# Patient Record
Sex: MALE | Race: BLACK OR AFRICAN AMERICAN | Employment: OTHER | URBAN - METROPOLITAN AREA
[De-identification: names, ages, dates, MRNs, and addresses within clinical notes are randomized per-mention and may not be internally consistent; named-entity substitution may affect disease eponyms.]

---

## 2022-01-06 ENCOUNTER — HOSPITAL ENCOUNTER (EMERGENCY)
Age: 72
Discharge: HOME | End: 2022-01-06
Attending: EMERGENCY MEDICINE
Payer: MEDICARE

## 2022-01-06 VITALS
BODY MASS INDEX: 37.89 KG/M2 | TEMPERATURE: 97.4 F | HEART RATE: 92 BPM | WEIGHT: 250 LBS | DIASTOLIC BLOOD PRESSURE: 71 MMHG | SYSTOLIC BLOOD PRESSURE: 106 MMHG | RESPIRATION RATE: 16 BRPM | HEIGHT: 68 IN | OXYGEN SATURATION: 99 %

## 2022-01-06 DIAGNOSIS — U07.1 COVID-19: Primary | ICD-10-CM

## 2022-01-06 PROCEDURE — 99281 EMR DPT VST MAYX REQ PHY/QHP: CPT

## 2022-01-06 NOTE — ED PROVIDER NOTES
EMERGENCY DEPARTMENT HISTORY AND PHYSICAL EXAM      Date: 1/6/2022  Patient Name: Lo Wen    History of Presenting Illness     Chief Complaint   Patient presents with    Positive For Covid-19     tested positive for covid two weeks ago. He has sx of cough, sore throat and generally feeling bad       History Provided By: Patient    HPI: Lo Wen, 66 y.o. male with PMHx significant as below, presents by POV to the ED with cc of fatigue and generalized malaise. He was in [place] last week and tested positive for Covid. He reports that he is just very tired. He denies fever, chills, cough, congestion, otalgia, sore throat, loss sensation of smell, loss of sensation of taste. Occupation: retired     Travel: Recently went to [place] for the holidays. There are no other complaints, changes, or physical findings at this time. Social Hx: Tobacco (denies), EtOH (denies), Illicit drug use (denies)     PCP: Unknown, Provider, DPM    No current facility-administered medications on file prior to encounter. Current Outpatient Medications on File Prior to Encounter   Medication Sig Dispense Refill    metFORMIN (GLUCOPHAGE) 500 mg tablet Take  by mouth two (2) times daily (with meals).  simvastatin (ZOCOR) 20 mg tablet Take  by mouth nightly.  lisinopril (PRINIVIL, ZESTRIL) 40 mg tablet Take 40 mg by mouth daily.          Past History     Past Medical History:  Past Medical History:   Diagnosis Date    Diabetes ([address]     Hypertension     DEJA (obstructive sleep apnea)     AHI: 8.9 per hour       Past Surgical History:  Past Surgical History:   Procedure Laterality Date    ABDOMEN SURGERY PROC UNLISTED      Hernia Repair       Family History:  Family History   Problem Relation Age of Onset    Hypertension Mother     Diabetes Mother     Hypertension Father     Diabetes Father        Social History:  Social History     Tobacco Use    Smoking status: Never Smoker    Smokeless tobacco: Never Used   Substance Use Topics    Alcohol use: No    Drug use: Not on file       Allergies: Allergies   Allergen Reactions    Aspirin Anxiety         Review of Systems   Review of Systems   Constitutional: Positive for fatigue. Negative for chills, diaphoresis and fever. HENT: Negative for congestion, ear pain, rhinorrhea and sore throat. Respiratory: Negative for cough and shortness of breath. Cardiovascular: Negative for chest pain. Gastrointestinal: Negative for abdominal pain, constipation, diarrhea, nausea and vomiting. Genitourinary: Negative for difficulty urinating, dysuria, frequency and hematuria. Musculoskeletal: Negative for arthralgias and myalgias. Neurological: Negative for headaches. All other systems reviewed and are negative. Physical Exam   Physical Exam  Vitals and nursing note reviewed. Constitutional:       General: He is not in acute distress. Appearance: He is well-developed. He is not diaphoretic. Comments: 66 y.o. -American male    HENT:      Head: Normocephalic and atraumatic. Eyes:      General:         Right eye: No discharge. Left eye: No discharge. Conjunctiva/sclera: Conjunctivae normal.   Cardiovascular:      Rate and Rhythm: Normal rate and regular rhythm. Heart sounds: Normal heart sounds. No murmur heard. Pulmonary:      Effort: Pulmonary effort is normal. No respiratory distress. Breath sounds: Normal breath sounds. Musculoskeletal:      Cervical back: Normal range of motion and neck supple. Skin:     General: Skin is warm and dry. Neurological:      Mental Status: He is alert and oriented to person, place, and time. Psychiatric:         Behavior: Behavior normal.         Diagnostic Study Results     Labs - COVID-19 testing pending at discharge. Radiologic Studies - None    Medical Decision Making   I am the first provider for this patient.     I reviewed the vital signs, available nursing notes, past medical history, past surgical history, family history and social history. Vital Signs-Reviewed the patient's vital signs. Patient Vitals for the past 12 hrs:   Temp Pulse Resp BP SpO2   01/06/22 1010 97.4 °F (36.3 °C) 92 16 106/71 99 %       Records Reviewed: Nursing Notes and Old Medical Records    Provider Notes (Medical Decision Making): The evaluation, management, and disposition decisions of this patient have been made in the context of the current and rapidly developing COVID-19 pandemic. In my clinical judgment, the balance of clinical factors dictate expedited evaluation and discharge from the ED. I have carefully considered the risk and benefits of prolonged ED workups and/or hospitalization vs their risk of acquiring or transmitting COVID-19. I have made reasonable efforts to conserve healthcare resources and defer to safe outpatient alternatives when feasible. I have also discussed the importance of social distancing and proper hygiene to the patient. Based on an appropriate medical screening exam, there is currently no evidence of an emergency medical condition in the patient, and he is clinically safe for discharge. This was a collective decision made with the patient and/or any available family/caretakers. They expressed understanding and agreement with the above. Patient's vitals are stable. He is not hypoxic. ED Course:   Initial assessment performed. The patients presenting problems have been discussed, and they are in agreement with the care plan formulated and outlined with them. I have encouraged them to ask questions as they arise throughout their visit. Critical Care Time: None    Disposition:  DISCHARGE NOTE:  10:44 AM  The pt is ready for discharge. The pt's signs, symptoms, diagnosis, and discharge instructions have been discussed and pt has conveyed their understanding. The pt is to follow up as recommended or return to ER should their symptoms worsen.  Plan has been discussed and pt is in agreement. PLAN:  1. Current Discharge Medication List        2. Follow-up Information     Follow up With Specialties Details Why Contact Info    Your PCP  In 1 week As needed, If not improved     [facility] EMERGENCY DEPT Emergency Medicine  If symptoms worsen [address]  [address] [facility]  [phone]        3. COVID Testing results will be called once available if positive. Patient should utilize My Chart to access results. 4. Take Tylenol as needed  5. Drink plenty of fluids  6. It is advised to lay prone for 3 hours daily  Return to ED if worse     Diagnosis     Clinical Impression:   1. COVID-19          Please note that this dictation was completed with [employer], the computer voice recognition software. Quite often unanticipated grammatical, syntax, homophones, and other interpretive errors are inadvertently transcribed by the computer software. Please disregards these errors. Please excuse any errors that have escaped final proofreading.

## 2022-01-06 NOTE — ED NOTES
Pt reports he was positive for covid a few weeks ago, continues to feel weak and has a sore throat.  Denies fever

## 2022-01-06 NOTE — DISCHARGE INSTRUCTIONS
It was a pleasure taking care of you at [facility] Emergency Department today. We know that when you come to [facility], you are entrusting us with your health, comfort, and safety. Our physicians and nurses honor that trust, and we truly appreciate the opportunity to care for you and your loved ones. We also value your feedback. If you receive a survey about your Emergency Department experience today, please fill it out. We care about our patients' feedback, and we listen to what you have to say. Thank you!

## 2022-01-07 ENCOUNTER — PATIENT OUTREACH (OUTPATIENT)
Dept: CASE MANAGEMENT | Age: 72
End: 2022-01-07

## 2022-01-07 NOTE — PROGRESS NOTES
Patient contacted regarding COVID-19 diagnosis. [facility] ED 22 dx-covid-19    Discussed COVID-19 related testing which was available at this time. Test results were positive per ED provider note. Patient informed of results, if available? yes. Attempted to contact for follow up. Left voice message requesting call Mansoor Edwards back at work cell [phone].    1/10/22-pt stated he feels okay. Denies cough. Stated he got tested positive at the hospital on 22, although this ACM does not see a covid test in his chart. Pt stated he has received two covid vaccines in [place] and is due for a booster. Gave pt phone numbers to [facility] and [facility] for PC referrals. Ambulatory Care Manager contacted the patient by telephone to perform post discharge assessment. Call within 2 business days of discharge: Yes Verified name and  with patient as identifiers. Provided introduction to self, and explanation of the CTN/ACM role, and reason for call due to risk factors for infection and/or exposure to COVID-19. Symptoms reviewed with patient who verbalized the following symptoms: no new symptoms and no worsening symptoms      Due to no new or worsening symptoms encounter was not routed to provider for escalation. Discussed follow-up appointments. If no appointment was previously scheduled, appointment scheduling offered:  yes. [facility] follow up appointment(s): No future appointments. Non-[facility] follow up appointment(s): no    Interventions to address risk factors: Obtained and reviewed discharge summary and/or continuity of care documents     Advance Care Planning:   Does patient have an Advance Directive: not on file; education provided. Educated patient about risk for severe COVID-19 due to risk factors according to CDC guidelines. ACM reviewed discharge instructions, medical action plan and red flag symptoms with the patient who verbalized understanding. Discussed COVID vaccination status: yes.  Education provided on COVID-19 vaccination as appropriate. Discussed exposure protocols and quarantine with CDC Guidelines. Patient was given an opportunity to verbalize any questions and concerns and agrees to contact ACM or health care provider for questions related to their healthcare. Reviewed and educated patient on any new and changed medications related to discharge diagnosis     Was patient discharged with a pulse oximeter? no Discussed and confirmed pulse oximeter discharge instructions and when to notify provider or seek emergency care. ACM provided contact information. Plan for follow-up call in 5-7 days based on severity of symptoms and risk factors.

## 2022-01-08 ENCOUNTER — HOSPITAL ENCOUNTER (EMERGENCY)
Age: 72
Discharge: HOME | End: 2022-01-08
Attending: EMERGENCY MEDICINE
Payer: MEDICARE

## 2022-01-08 ENCOUNTER — APPOINTMENT (OUTPATIENT)
Dept: GENERAL RADIOLOGY | Age: 72
End: 2022-01-08
Attending: NURSE PRACTITIONER
Payer: MEDICARE

## 2022-01-08 VITALS
SYSTOLIC BLOOD PRESSURE: 128 MMHG | HEIGHT: 68 IN | DIASTOLIC BLOOD PRESSURE: 90 MMHG | HEART RATE: 93 BPM | RESPIRATION RATE: 20 BRPM | TEMPERATURE: 98 F | BODY MASS INDEX: 42.44 KG/M2 | WEIGHT: 280 LBS | OXYGEN SATURATION: 98 %

## 2022-01-08 DIAGNOSIS — R05.9 COUGH: ICD-10-CM

## 2022-01-08 DIAGNOSIS — U07.1 COVID-19: Primary | ICD-10-CM

## 2022-01-08 PROCEDURE — 74011250637 HC RX REV CODE- 250/637: Performed by: NURSE PRACTITIONER

## 2022-01-08 PROCEDURE — 94640 AIRWAY INHALATION TREATMENT: CPT

## 2022-01-08 PROCEDURE — 71045 X-RAY EXAM CHEST 1 VIEW: CPT

## 2022-01-08 PROCEDURE — 99282 EMERGENCY DEPT VISIT SF MDM: CPT

## 2022-01-08 RX ORDER — ALBUTEROL SULFATE 90 UG/1
6 AEROSOL, METERED RESPIRATORY (INHALATION)
Status: COMPLETED | OUTPATIENT
Start: 2022-01-08 | End: 2022-01-08

## 2022-01-08 RX ORDER — ALBUTEROL SULFATE 90 UG/1
2 AEROSOL, METERED RESPIRATORY (INHALATION)
Qty: 1 EACH | Refills: 0 | Status: ON HOLD | OUTPATIENT
Start: 2022-01-08 | End: 2022-09-16 | Stop reason: SDUPTHER

## 2022-01-08 RX ORDER — GUAIFENESIN DEXTROMETHORPHAN HYDROBROMIDE ORAL SOLUTION 10; 100 MG/5ML; MG/5ML
10 SOLUTION ORAL
Qty: 118 ML | Refills: 0 | Status: ON HOLD | OUTPATIENT
Start: 2022-01-08 | End: 2022-03-28

## 2022-01-08 RX ADMIN — ALBUTEROL SULFATE 6 PUFF: 90 AEROSOL, METERED RESPIRATORY (INHALATION) at 13:22

## 2022-01-08 NOTE — ED NOTES
Discharge instructions were given to the patient by Crystal Browne RN. The patient left the Emergency Department ambulatory, alert and oriented and in no acute distress with 2 prescriptions. The patient was encouraged to call or return to the ED for worsening issues or problems and was encouraged to schedule a follow up appointment for continuing care. The patient verbalized understanding of discharge instructions and prescriptions, all questions were answered. The patient has no further concerns at this time.

## 2022-01-08 NOTE — ED TRIAGE NOTES
Patient here with dx of COVID. Stating \" I feel bad\". Denies any SOB. Not taking anything at home. Patient is homeless.

## 2022-01-09 NOTE — ED PROVIDER NOTES
EMERGENCY DEPARTMENT HISTORY AND PHYSICAL EXAM    Date: 1/8/2022  Patient Name: Mabel Brizuela    History of Presenting Illness     Chief Complaint   Patient presents with    Positive For Covid-19         History Provided By: Patient    Chief Complaint: cough  Duration: 2 Days  Timing:  Acute  Quality: dry cough  Severity: Moderate  Modifying Factors: none  Associated Symptoms: denies any other associated signs or symptoms      HPI: Mabel Brizuela is a 66 y.o. male with a PMH of diabetes and hypertension who presents with cough for the past 2 days. Patient states he has tested positive for COVID. Patient denies body aches wheezing chills or fever. PCP: Unknown, Provider, DPM    Current Outpatient Medications   Medication Sig Dispense Refill    albuterol (PROVENTIL HFA, VENTOLIN HFA, PROAIR HFA) 90 mcg/actuation inhaler Take 2 Puffs by inhalation every four (4) hours as needed for Wheezing. 1 Each 0    guaiFENesin-dextromethorphan (Adult Robitussin Peak Cold DM)  mg/5 mL liqd Take 10 mL by mouth every four (4) hours as needed for Cough or Congestion. 118 mL 0    metFORMIN (GLUCOPHAGE) 500 mg tablet Take  by mouth two (2) times daily (with meals).  simvastatin (ZOCOR) 20 mg tablet Take  by mouth nightly.  lisinopril (PRINIVIL, ZESTRIL) 40 mg tablet Take 40 mg by mouth daily.          Past History     Past Medical History:  Past Medical History:   Diagnosis Date    Diabetes ([address]     Hypertension     DEJA (obstructive sleep apnea)     AHI: 8.9 per hour       Past Surgical History:  Past Surgical History:   Procedure Laterality Date    ABDOMEN SURGERY PROC UNLISTED      Hernia Repair       Family History:  Family History   Problem Relation Age of Onset    Hypertension Mother     Diabetes Mother     Hypertension Father     Diabetes Father        Social History:  Social History     Tobacco Use    Smoking status: Never Smoker    Smokeless tobacco: Never Used   Substance Use Topics    Alcohol use: No    Drug use: Not on file       Allergies: Allergies   Allergen Reactions    Aspirin Anxiety         Review of Systems   Review of Systems   Constitutional: Negative for chills, fatigue and fever. HENT: Negative for congestion and sore throat. Eyes: Negative for redness. Respiratory: Positive for cough. Negative for chest tightness and wheezing. Cardiovascular: Negative for chest pain. Gastrointestinal: Negative for abdominal pain. Genitourinary: Negative for dysuria. Musculoskeletal: Negative for arthralgias, back pain, myalgias, neck pain and neck stiffness. Skin: Negative for rash. Neurological: Negative for dizziness, weakness, light-headedness, numbness and headaches. Psychiatric/Behavioral: Negative for agitation and behavioral problems. All other systems reviewed and are negative. Physical Exam     Vitals:    01/08/22 1159   BP: (!) 128/90   Pulse: 93   Resp: 20   Temp: 98 °F (36.7 °C)   SpO2: 98%   Weight: 127 kg (280 lb)   Height: 5' 8\" (1.727 m)     Physical Exam  Vitals and nursing note reviewed. Constitutional:       Appearance: Normal appearance. He is well-developed. HENT:      Head: Normocephalic and atraumatic. Right Ear: External ear normal.      Left Ear: External ear normal.      Nose: Nose normal.      Mouth/Throat:      Mouth: Mucous membranes are moist.   Eyes:      General:         Right eye: No discharge. Left eye: No discharge. Conjunctiva/sclera: Conjunctivae normal.   Cardiovascular:      Rate and Rhythm: Normal rate and regular rhythm. Heart sounds: Normal heart sounds. Pulmonary:      Effort: Pulmonary effort is normal. No respiratory distress. Breath sounds: Normal breath sounds. No wheezing. Comments: Breath sounds diminished  Abdominal:      General: Bowel sounds are normal.      Palpations: Abdomen is soft. Tenderness: There is no abdominal tenderness.    Musculoskeletal:         General: Normal range of motion. Cervical back: Normal range of motion and neck supple. Lymphadenopathy:      Cervical: No cervical adenopathy. Skin:     General: Skin is warm and dry. Neurological:      Mental Status: He is alert and oriented to person, place, and time. Cranial Nerves: No cranial nerve deficit. Psychiatric:         Behavior: Behavior normal.         Thought Content: Thought content normal.         Judgment: Judgment normal.           Diagnostic Study Results     Labs -   No results found for this or any previous visit (from the past 12 hour(s)). Radiologic Studies -   XR CHEST PORT   Final Result   No acute cardiopulmonary process. CT Results  (Last 48 hours)    None        CXR Results  (Last 48 hours)               01/08/22 1326  XR CHEST PORT Final result    Impression:  No acute cardiopulmonary process. Narrative:  EXAM: XR CHEST PORT       HISTORY: chest pain. COMPARISON: None       FINDINGS: Single view(s) of the chest. The lungs are well inflated. No focal   consolidation, pleural effusion, or pneumothorax. The cardiomediastinal   silhouette is unremarkable. The visualized osseous structures are unremarkable. Medical Decision Making   I am the first provider for this patient. I reviewed the vital signs, available nursing notes, past medical history, past surgical history, family history and social history. Vital Signs-Reviewed the patient's vital signs. Records Reviewed: Nursing Notes and Old Medical Records    Provider Notes (Medical Decision Making):   DDX pneumonia COVID-19 bronchitis          Disposition:  home    DISCHARGE NOTE:     I have discussed with patient their diagnosis, treatment, and follow up plan. The patient agrees to follow up as outlined in discharge paperwork and also to return to the ED with any worsening.  Stephon Lezama NP        Follow-up Information     Follow up With Specialties Details Why Contact Info    [facility]    [address] [facility] [address]          Discharge Medication List as of 1/8/2022  2:01 PM      START taking these medications    Details   albuterol (PROVENTIL HFA, VENTOLIN HFA, PROAIR HFA) 90 mcg/actuation inhaler Take 2 Puffs by inhalation every four (4) hours as needed for Wheezing., Normal, Disp-1 Each, R-0      guaiFENesin-dextromethorphan (Adult Robitussin Peak Cold DM)  mg/5 mL liqd Take 10 mL by mouth every four (4) hours as needed for Cough or Congestion. , Normal, Disp-118 mL, R-0         CONTINUE these medications which have NOT CHANGED    Details   metFORMIN (GLUCOPHAGE) 500 mg tablet Take  by mouth two (2) times daily (with meals). Historical Med      simvastatin (ZOCOR) 20 mg tablet Take  by mouth nightly. Historical Med      lisinopril (PRINIVIL, ZESTRIL) 40 mg tablet Take 40 mg by mouth daily. Historical Med, 40 mg             Procedures:  Procedures    Please note that this dictation was completed with Dragon, computer voice recognition software. Quite often unanticipated grammatical, syntax, homophones, and other interpretive errors are inadvertently transcribed by the computer software. Please disregard these errors. Additionally, please excuse any errors that have escaped final proofreading. Diagnosis     Clinical Impression:   1. COVID-19    2.  Cough

## 2022-01-10 ENCOUNTER — PATIENT OUTREACH (OUTPATIENT)
Dept: CASE MANAGEMENT | Age: 72
End: 2022-01-10

## 2022-01-10 NOTE — ACP (ADVANCE CARE PLANNING)
Advance Care Planning:   Does patient have an Advance Directive: not on file; education provided.        [facility] [phone]  Advance Care Planning 1/10/2022   Confirm Advance Directive None

## 2022-01-11 ENCOUNTER — HOSPITAL ENCOUNTER (OUTPATIENT)
Dept: LAB | Age: 72
Discharge: HOME | End: 2022-01-11
Payer: MEDICARE

## 2022-01-11 LAB
SARS-COV-2, XPLCVT: DETECTED
SOURCE, COVRS: ABNORMAL

## 2022-01-11 PROCEDURE — U0005 INFEC AGEN DETEC AMPLI PROBE: HCPCS

## 2022-01-11 NOTE — ANCILLARY DISCHARGE INSTRUCTIONS
Nasopharyngeal swab completed. Tolerated well. Information given to patient about how to access MyChart. Pt given a generic quarantine work/school excuse.

## 2022-01-13 ENCOUNTER — HOSPITAL ENCOUNTER (EMERGENCY)
Age: 72
Discharge: HOME | End: 2022-01-13
Attending: EMERGENCY MEDICINE
Payer: MEDICARE

## 2022-01-13 VITALS
DIASTOLIC BLOOD PRESSURE: 86 MMHG | RESPIRATION RATE: 16 BRPM | WEIGHT: 280 LBS | BODY MASS INDEX: 42.44 KG/M2 | SYSTOLIC BLOOD PRESSURE: 122 MMHG | OXYGEN SATURATION: 97 % | HEIGHT: 68 IN | HEART RATE: 96 BPM | TEMPERATURE: 98.7 F

## 2022-01-13 DIAGNOSIS — N47.1 PHIMOSIS OF PENIS: Primary | ICD-10-CM

## 2022-01-13 PROCEDURE — 74011000250 HC RX REV CODE- 250: Performed by: EMERGENCY MEDICINE

## 2022-01-13 PROCEDURE — 99283 EMERGENCY DEPT VISIT LOW MDM: CPT

## 2022-01-13 RX ADMIN — BACITRACIN, NEOMYCIN, POLYMYXIN B 1 PACKET: 400; 3.5; 5 OINTMENT TOPICAL at 09:37

## 2022-01-13 NOTE — ED TRIAGE NOTES
Patient presents to the ED with c/o foreskin stuck to penis x3 days. Pt reports that he is uncircumcised. PT reports that he is still able to urinate.

## 2022-01-13 NOTE — ED NOTES
Emergency Department Nursing Plan of Care       The Nursing Plan of Care is developed from the Nursing assessment and Emergency Department Attending provider initial evaluation. The plan of care may be reviewed in the ED Provider note.     The Plan of Care was developed with the following considerations:   Patient / Family readiness to learn indicated by:verbalized understanding  Persons(s) to be included in education: patient  Barriers to Learning/Limitations:No    Signed     Haydee Peñaloza RN    1/13/2022   9:24 AM

## 2022-01-13 NOTE — ED PROVIDER NOTES
EMERGENCY DEPARTMENT HISTORY AND PHYSICAL EXAM      Date: 1/13/2022  Patient Name: Drake Coronado    History of Presenting Illness     Chief Complaint   Patient presents with    Skin Problem       History Provided By: Patient    HPI: Drake Coronado, 66 y.o. male with PMHx of HTN, DM, DEJA presents to the ED with cc of phimosis. Patient entered the ED saying that his foreskin was stuck over his penis. Patient complained of extreme pain in his penis. There are no other complaints, changes, or physical findings at this time. PCP: Unknown, Provider, DPM    No current facility-administered medications on file prior to encounter. Current Outpatient Medications on File Prior to Encounter   Medication Sig Dispense Refill    albuterol (PROVENTIL HFA, VENTOLIN HFA, PROAIR HFA) 90 mcg/actuation inhaler Take 2 Puffs by inhalation every four (4) hours as needed for Wheezing. 1 Each 0    guaiFENesin-dextromethorphan (Adult Robitussin Peak Cold DM)  mg/5 mL liqd Take 10 mL by mouth every four (4) hours as needed for Cough or Congestion. 118 mL 0    metFORMIN (GLUCOPHAGE) 500 mg tablet Take  by mouth two (2) times daily (with meals).  simvastatin (ZOCOR) 20 mg tablet Take  by mouth nightly.  lisinopril (PRINIVIL, ZESTRIL) 40 mg tablet Take 40 mg by mouth daily.          Past History     Past Medical History:  Past Medical History:   Diagnosis Date    Diabetes ([address]     Hypertension     DEJA (obstructive sleep apnea)     AHI: 8.9 per hour       Past Surgical History:  Past Surgical History:   Procedure Laterality Date    ABDOMEN SURGERY PROC UNLISTED      Hernia Repair       Family History:  Family History   Problem Relation Age of Onset    Hypertension Mother     Diabetes Mother     Hypertension Father     Diabetes Father        Social History:  Social History     Tobacco Use    Smoking status: Never Smoker    Smokeless tobacco: Never Used   Substance Use Topics    Alcohol use: No    Drug use: Not on file       Allergies: Allergies   Allergen Reactions    Aspirin Anxiety         Review of Systems   Review of Systems   Constitutional: Negative for chills, fatigue and fever. HENT: Negative. Negative for sore throat. Eyes: Negative. Respiratory: Negative for cough and shortness of breath. Cardiovascular: Negative for chest pain and palpitations. Gastrointestinal: Negative for abdominal pain, nausea and vomiting. Genitourinary: Negative for dysuria. Phimosis   Musculoskeletal: Negative. Skin: Negative. Neurological: Negative for dizziness, weakness, light-headedness and headaches. Psychiatric/Behavioral: Negative. All other systems reviewed and are negative. Physical Exam   Physical Exam  Vitals and nursing note reviewed. Constitutional:       Appearance: Normal appearance. HENT:      Head: Normocephalic and atraumatic. Nose: Nose normal.      Mouth/Throat:      Mouth: Mucous membranes are moist.      Pharynx: Oropharynx is clear. Eyes:      Extraocular Movements: Extraocular movements intact. Pupils: Pupils are equal, round, and reactive to light. Cardiovascular:      Rate and Rhythm: Normal rate and regular rhythm. Pulses: Normal pulses. Heart sounds: Normal heart sounds. Pulmonary:      Effort: Pulmonary effort is normal.      Breath sounds: Normal breath sounds. Abdominal:      Tenderness: There is no guarding or rebound. Genitourinary:     Penis: Uncircumcised. Phimosis present. Testes: Normal.      Comments: Foreskin of the penis was unable to be retracted. Musculoskeletal:      Cervical back: Normal range of motion and neck supple. Skin:     General: Skin is warm and dry. Neurological:      General: No focal deficit present. Mental Status: He is alert and oriented to person, place, and time.    Psychiatric:         Mood and Affect: Mood normal.         Behavior: Behavior normal.         Diagnostic Study Results     Labs -   No results found for this or any previous visit (from the past 12 hour(s)). Radiologic Studies -   No orders to display     CT Results  (Last 48 hours)    None        CXR Results  (Last 48 hours)    None          Medical Decision Making   I am the first provider for this patient. I reviewed the vital signs, available nursing notes, past medical history, past surgical history, family history and social history. Vital Signs-Reviewed the patient's vital signs. Patient Vitals for the past 12 hrs:   Temp Pulse Resp BP SpO2   01/13/22 0903 98.7 °F (37.1 °C) 96 16 122/86 97 %         Records Reviewed: Nursing Notes    Provider Notes (Medical Decision Making):   Phimosis balanitis     ED Course:   Initial assessment performed. The patients presenting problems have been discussed, and they are in agreement with the care plan formulated and outlined with them. I have encouraged them to ask questions as they arise throughout their visit. Procedure Note:  9:18 AM    Foreskin was retracted using KY jelly and the glans penis was cleaned with saline and antibiotic ointment was applied. Critical Care Time:   none       Disposition:  DISCHARGE  9:22 AM  The patient has been re-evaluated and is ready for discharge. Reviewed available results with patient. Counseled pt on diagnosis and care plan. Pt has expressed understanding, and all questions have been answered. Pt agrees with plan and agrees to follow up as recommended, or return to the ED if their symptoms worsen. Discharge instructions have been provided and explained to the pt, along with reasons to return to the ED. DISCHARGE PLAN:  1. Current Discharge Medication List        2. Follow-up Information    None       3. Return to ED if worse     Diagnosis     Clinical Impression: No diagnosis found. Attestations:   This note is prepared by Patsey Habermann, acting as Scribe for Aki Mroeno MD.     Aki Moreno MD. The brenda's documentation has been prepared under my direction and personally reviewed by me in its entirety. I confirm that the note above accurately reflects all work, treatment, procedures and medical decision making performed by me.

## 2022-01-17 ENCOUNTER — OFFICE VISIT (OUTPATIENT)
Dept: URGENT CARE | Age: 72
End: 2022-01-17
Payer: MEDICARE

## 2022-01-17 ENCOUNTER — PATIENT OUTREACH (OUTPATIENT)
Dept: CASE MANAGEMENT | Age: 72
End: 2022-01-17

## 2022-01-17 VITALS — TEMPERATURE: 98.4 F | OXYGEN SATURATION: 96 % | HEART RATE: 91 BPM | RESPIRATION RATE: 15 BRPM

## 2022-01-17 DIAGNOSIS — U07.1 COVID-19: Primary | ICD-10-CM

## 2022-01-17 PROCEDURE — G8419 CALC BMI OUT NRM PARAM NOF/U: HCPCS | Performed by: FAMILY MEDICINE

## 2022-01-17 PROCEDURE — G8536 NO DOC ELDER MAL SCRN: HCPCS | Performed by: FAMILY MEDICINE

## 2022-01-17 PROCEDURE — G8427 DOCREV CUR MEDS BY ELIG CLIN: HCPCS | Performed by: FAMILY MEDICINE

## 2022-01-17 PROCEDURE — 3017F COLORECTAL CA SCREEN DOC REV: CPT | Performed by: FAMILY MEDICINE

## 2022-01-17 PROCEDURE — 1101F PT FALLS ASSESS-DOCD LE1/YR: CPT | Performed by: FAMILY MEDICINE

## 2022-01-17 PROCEDURE — G8432 DEP SCR NOT DOC, RNG: HCPCS | Performed by: FAMILY MEDICINE

## 2022-01-17 PROCEDURE — 99202 OFFICE O/P NEW SF 15 MIN: CPT | Performed by: FAMILY MEDICINE

## 2022-01-17 NOTE — PROGRESS NOTES
This patient was seen at [address] [place] [address] [facility]gent [facility] while in their vehicle due to COVID-19 pandemic with PPE and focused examination in order to decrease community viral transmission. The patient/guardian gave verbal consent to treat. Ann Marie Dorado is a 66 y.o. male who presents for COVID-19 retest. Pt tested positive last week. Denies cough, fever, SOB, N/V/D. The history is provided by the patient. Past Medical History:   Diagnosis Date    Diabetes ([address]     Hypertension     DEJA (obstructive sleep apnea)     AHI: 8.9 per hour        Past Surgical History:   Procedure Laterality Date    [place] ABDOMEN SURGERY PROC UNLISTED      Hernia Repair         Family History   Problem Relation Age of Onset    Hypertension Mother     Diabetes Mother     Hypertension Father     Diabetes Father         Social History     Socioeconomic History    Marital status: UNKNOWN     Spouse name: Not on file    Number of children: Not on file    Years of education: Not on file    Highest education level: Not on file   Occupational History    Not on file   Tobacco Use    Smoking status: Never Smoker    Smokeless tobacco: Never Used   Substance and Sexual Activity    Alcohol use: No    Drug use: Not on file    Sexual activity: Not on file   Other Topics Concern    Not on file   Social History Narrative    Not on file     Social Determinants of Health     Financial Resource Strain:     Difficulty of Paying Living Expenses: Not on file   Food Insecurity:     Worried About Running Out of Food in the Last Year: Not on file    [place] of Food in the Last Year: Not on file   Transportation Needs:     Lack of Transportation (Medical): Not on file    Lack of Transportation (Non-Medical):  Not on file   Physical Activity:     Days of Exercise per Week: Not on file    Minutes of Exercise per Session: Not on file   Stress:     Feeling of Stress : Not on file   Social Connections:     Frequency of Communication with Friends and Family: Not on file    Frequency of Social Gatherings with Friends and Family: Not on file    Attends Yarsani Services: Not on file    Active Member of Clubs or Organizations: Not on file    Attends Club or Organization Meetings: Not on file    Marital Status: Not on file   Intimate Partner Violence:     Fear of Current or Ex-Partner: Not on file    Emotionally Abused: Not on file    Physically Abused: Not on file    Sexually Abused: Not on file   Housing Stability:     Unable to Pay for Housing in the Last Year: Not on file    Number of [place] in the Last Year: Not on file    Unstable Housing in the Last Year: Not on file                ALLERGIES: Aspirin    Review of Systems   Constitutional: Negative for fever. Respiratory: Negative for cough and shortness of breath. Gastrointestinal: Negative for diarrhea, nausea and vomiting. Vitals:    01/17/22 1209   Pulse: 91   Resp: 15   Temp: 98.4 °F (36.9 °C)   SpO2: 96%       Physical Exam  Vitals and nursing note reviewed. Constitutional:       General: He is not in acute distress. Appearance: He is well-developed. He is not diaphoretic. Pulmonary:      Effort: Pulmonary effort is normal. No respiratory distress. Breath sounds: No stridor. No wheezing, rhonchi or rales. Neurological:      Mental Status: He is alert. Psychiatric:         Behavior: Behavior normal.         Thought Content:  Thought content normal.         Judgment: Judgment normal.         [facility]    ICD-10-CM ICD-9-CM   1. COVID-19  U07.1 079.89       Orders Placed This Encounter    NOVEL CORONAVIRUS (COVID-19)     Scheduling Instructions:      1) Due to current limited availability of the COVID-19 PCR test, tests will be prioritized and may not be completed.              2) Order only if the test result will change clinical management or necessary for a return to mission-critical employment decision.              3) Print and instruct patient to adhere to CDC home isolation program. (Link Above)              4) Set up or refer patient for a monitoring program.              5) Have patient sign up for and leverage MyChart (if not previously done). Order Specific Question:   Is this test for diagnosis or screening? Answer:   Screening     Order Specific Question:   Symptomatic for COVID-19 as defined by CDC? Answer:   No     Order Specific Question:   Hospitalized for COVID-19? Answer:   No     Order Specific Question:   Admitted to ICU for COVID-19? Answer:   No     Order Specific Question:   Employed in healthcare setting? Answer:   Unknown     Order Specific Question:   Resident in a congregate (group) care setting? Answer:   Unknown     Order Specific Question:   Previously tested for COVID-19? Answer:   Unknown        Await COVID test  Quarantine complete    If signs and symptoms become worse the pt is to go to the ER.            Procedures

## 2022-01-17 NOTE — PROGRESS NOTES
Patient resolved from Transition of Care episode on 1/17/22. ACM/CTN was unsuccessful at contacting this patient today. Patient/family was provided the following resources and education related to COVID-19 during the initial call:                         Signs, symptoms and red flags related to COVID-19            CDC exposure and quarantine guidelines            Conduit exposure contact - [phone]            Contact for their local Department of Health                 Patient has not had any additional ED or hospital visits. No further outreach scheduled with this CTN/ACM. Episode of Care resolved. Patient has this CTN/ACM contact information if future needs arise.

## 2022-01-20 LAB — SARS-COV-2, NAA: NOT DETECTED

## 2022-01-21 ENCOUNTER — TELEPHONE (OUTPATIENT)
Dept: URGENT CARE | Age: 72
End: 2022-01-21

## 2022-01-21 NOTE — TELEPHONE ENCOUNTER
Two pt identifiers confirmed. Informed pt of negative COVID-19 results from 01/17/22. Pt verbalized understanding of information discussed w/ no further questions at this time.

## 2022-02-01 ENCOUNTER — HOSPITAL ENCOUNTER (EMERGENCY)
Age: 72
Discharge: LEFT BEFORE DISCHARGE | End: 2022-02-01
Attending: EMERGENCY MEDICINE
Payer: MEDICARE

## 2022-02-01 ENCOUNTER — HOSPITAL ENCOUNTER (EMERGENCY)
Age: 72
Discharge: HOME | End: 2022-02-01
Attending: EMERGENCY MEDICINE
Payer: MEDICARE

## 2022-02-01 VITALS
DIASTOLIC BLOOD PRESSURE: 72 MMHG | RESPIRATION RATE: 20 BRPM | OXYGEN SATURATION: 100 % | SYSTOLIC BLOOD PRESSURE: 150 MMHG | BODY MASS INDEX: 34.1 KG/M2 | HEART RATE: 78 BPM | TEMPERATURE: 98.1 F | HEIGHT: 68 IN | WEIGHT: 225 LBS

## 2022-02-01 VITALS
OXYGEN SATURATION: 97 % | TEMPERATURE: 97.6 F | SYSTOLIC BLOOD PRESSURE: 145 MMHG | HEIGHT: 68 IN | BODY MASS INDEX: 34.1 KG/M2 | DIASTOLIC BLOOD PRESSURE: 78 MMHG | HEART RATE: 76 BPM | WEIGHT: 225 LBS | RESPIRATION RATE: 18 BRPM

## 2022-02-01 DIAGNOSIS — N39.0 URINARY TRACT INFECTION ASSOCIATED WITH CATHETERIZATION OF URINARY TRACT, UNSPECIFIED INDWELLING URINARY CATHETER TYPE, INITIAL ENCOUNTER (HCC): Primary | ICD-10-CM

## 2022-02-01 DIAGNOSIS — R33.8 BENIGN PROSTATIC HYPERPLASIA WITH URINARY RETENTION: ICD-10-CM

## 2022-02-01 DIAGNOSIS — T83.511A URINARY TRACT INFECTION ASSOCIATED WITH CATHETERIZATION OF URINARY TRACT, UNSPECIFIED INDWELLING URINARY CATHETER TYPE, INITIAL ENCOUNTER (HCC): Primary | ICD-10-CM

## 2022-02-01 DIAGNOSIS — N40.1 BENIGN PROSTATIC HYPERPLASIA WITH URINARY RETENTION: ICD-10-CM

## 2022-02-01 LAB
APPEARANCE UR: ABNORMAL
BACTERIA URNS QL MICRO: ABNORMAL /HPF
BILIRUB UR QL: NEGATIVE
COLOR UR: ABNORMAL
EPITH CASTS URNS QL MICRO: ABNORMAL /LPF
GLUCOSE UR STRIP.AUTO-MCNC: 250 MG/DL
HGB UR QL STRIP: ABNORMAL
KETONES UR QL STRIP.AUTO: NEGATIVE MG/DL
LEUKOCYTE ESTERASE UR QL STRIP.AUTO: ABNORMAL
NITRITE UR QL STRIP.AUTO: POSITIVE
PH UR STRIP: 6 [PH] (ref 5–8)
PROT UR STRIP-MCNC: 100 MG/DL
RBC #/AREA URNS HPF: ABNORMAL /HPF (ref 0–5)
SP GR UR REFRACTOMETRY: 1.01 (ref 1–1.03)
UA: UC IF INDICATED,UAUC: ABNORMAL
UROBILINOGEN UR QL STRIP.AUTO: 0.2 EU/DL (ref 0.2–1)
WBC URNS QL MICRO: >100 /HPF (ref 0–4)

## 2022-02-01 PROCEDURE — 74011000250 HC RX REV CODE- 250: Performed by: EMERGENCY MEDICINE

## 2022-02-01 PROCEDURE — 99283 EMERGENCY DEPT VISIT LOW MDM: CPT

## 2022-02-01 PROCEDURE — 87077 CULTURE AEROBIC IDENTIFY: CPT

## 2022-02-01 PROCEDURE — 75810000275 HC EMERGENCY DEPT VISIT NO LEVEL OF CARE

## 2022-02-01 PROCEDURE — 87086 URINE CULTURE/COLONY COUNT: CPT

## 2022-02-01 PROCEDURE — 74011250637 HC RX REV CODE- 250/637: Performed by: EMERGENCY MEDICINE

## 2022-02-01 PROCEDURE — 87186 SC STD MICRODIL/AGAR DIL: CPT

## 2022-02-01 PROCEDURE — 81001 URINALYSIS AUTO W/SCOPE: CPT

## 2022-02-01 RX ORDER — IBUPROFEN 400 MG/1
800 TABLET ORAL
Status: COMPLETED | OUTPATIENT
Start: 2022-02-01 | End: 2022-02-01

## 2022-02-01 RX ORDER — LEVOFLOXACIN 750 MG/1
750 TABLET ORAL DAILY
Qty: 5 TABLET | Refills: 0 | Status: SHIPPED | OUTPATIENT
Start: 2022-02-01 | End: 2022-02-03 | Stop reason: ALTCHOICE

## 2022-02-01 RX ORDER — LIDOCAINE HYDROCHLORIDE 20 MG/ML
JELLY TOPICAL
Status: COMPLETED | OUTPATIENT
Start: 2022-02-01 | End: 2022-02-01

## 2022-02-01 RX ORDER — LEVOFLOXACIN 750 MG/1
750 TABLET ORAL
Status: COMPLETED | OUTPATIENT
Start: 2022-02-01 | End: 2022-02-01

## 2022-02-01 RX ORDER — LIDOCAINE HYDROCHLORIDE 20 MG/ML
JELLY TOPICAL
Status: DISCONTINUED | OUTPATIENT
Start: 2022-02-01 | End: 2022-02-01

## 2022-02-01 RX ORDER — PHENAZOPYRIDINE HYDROCHLORIDE 200 MG/1
200 TABLET, FILM COATED ORAL 3 TIMES DAILY
Qty: 6 TABLET | Refills: 0 | Status: SHIPPED | OUTPATIENT
Start: 2022-02-01 | End: 2022-02-03

## 2022-02-01 RX ADMIN — IBUPROFEN 800 MG: 400 TABLET, FILM COATED ORAL at 10:32

## 2022-02-01 RX ADMIN — LIDOCAINE HYDROCHLORIDE: 20 JELLY TOPICAL at 11:31

## 2022-02-01 RX ADMIN — LEVOFLOXACIN 750 MG: 750 TABLET, FILM COATED ORAL at 11:20

## 2022-02-01 NOTE — ED NOTES
Volunteer told this RN that pt asked for directions out of ER. Pt not in lobby and not in waiting room. Dr Nguyen Ng aware.

## 2022-02-01 NOTE — ED PROVIDER NOTES
EMERGENCY DEPARTMENT HISTORY AND PHYSICAL EXAM      Date: 2/1/2022  Patient Name: Elisha Frost    History of Presenting Illness     Chief Complaint   Patient presents with    Penis Pain     History Provided By: Patient    HPI: Elisha Frost, 66 y.o. male with past medical history significant for diabetes and enlarged prostate who presents via private vehicle to the ED with cc of penile pain and dysuria for the past week or so. Patient states the pain is only present when he urinates or dribbles urine from the urethral meatus. 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 He straight caths at least 4 times a day using a clean catheter each time. He denies any flank pain, fevers, chills, nausea, vomiting, or diarrhea. His pain is rated as a 10 out of 10 and described as a sharp, stabbing pain at the tip of the penis that does not radiate. He has tried taking Tylenol for the pain without improvement. PMHx: Diabetes and enlarged prostate  Social Hx: Denies alcohol, tobacco, or illegal drug use    PCP: Unknown, Provider, LETICIA    There are no other complaints, changes, or physical findings at this time. No current facility-administered medications on file prior to encounter. Current Outpatient Medications on File Prior to Encounter   Medication Sig Dispense Refill    albuterol (PROVENTIL HFA, VENTOLIN HFA, PROAIR HFA) 90 mcg/actuation inhaler Take 2 Puffs by inhalation every four (4) hours as needed for Wheezing. 1 Each 0    metFORMIN (GLUCOPHAGE) 500 mg tablet Take  by mouth two (2) times daily (with meals).  simvastatin (ZOCOR) 20 mg tablet Take  by mouth nightly.  lisinopril (PRINIVIL, ZESTRIL) 40 mg tablet Take 40 mg by mouth daily.  guaiFENesin-dextromethorphan (Adult Robitussin Peak Cold DM)  mg/5 mL liqd Take 10 mL by mouth every four (4) hours as needed for Cough or Congestion.  (Patient not taking: Reported on 2/1/2022) 118 mL 0     Past History     Past Medical History:  Past Medical History:   Diagnosis Date    Diabetes ([address]     Gastrointestinal disorder     Hypertension     DEJA (obstructive sleep apnea)     AHI: 8.9 per hour     Past Surgical History:  Past Surgical History:   Procedure Laterality Date    HX PROSTATE SURGERY      [place] ABDOMEN SURGERY PROC UNLISTED      Hernia Repair     Family History:  Family History   Problem Relation Age of Onset    Hypertension Mother     Diabetes Mother     Hypertension Father     Diabetes Father      Social History:  Social History     Tobacco Use    Smoking status: Never Smoker    Smokeless tobacco: Never Used   Substance Use Topics    Alcohol use: No    Drug use: Not on file     Allergies: Allergies   Allergen Reactions    Aspirin Anxiety     Review of Systems   Review of Systems   Constitutional: Negative for chills and fever. HENT: Negative for congestion, rhinorrhea, sneezing and sore throat. Eyes: Negative for redness and visual disturbance. Respiratory: Negative for shortness of breath. Cardiovascular: Negative for chest pain and leg swelling. Gastrointestinal: Negative for abdominal pain, nausea and vomiting. Genitourinary: Positive for dysuria and penile pain. Negative for decreased urine volume, flank pain, frequency, penile discharge, penile swelling, scrotal swelling and testicular pain. Musculoskeletal: Negative for back pain, myalgias and neck stiffness. Skin: Negative for rash. Neurological: Negative for dizziness, syncope, weakness and headaches. Hematological: Negative for adenopathy. All other systems reviewed and are negative. Physical Exam   Physical Exam  Vitals and nursing note reviewed. Constitutional:       Appearance: Normal appearance. He is well-developed. He is obese. HENT:      Head: Normocephalic and atraumatic. Cardiovascular:      Rate and Rhythm: Normal rate and regular rhythm. Pulses: Normal pulses.       Heart sounds: Normal heart sounds. Pulmonary:      Effort: Pulmonary effort is normal. No respiratory distress. Breath sounds: Normal breath sounds. Chest:      Chest wall: No tenderness. Abdominal:      General: Bowel sounds are normal.      Palpations: Abdomen is soft. Tenderness: There is no abdominal tenderness. There is no guarding or rebound. Genitourinary:     Penis: Uncircumcised. Phimosis and swelling (Swelling of the glans penis without erythema) present. No tenderness or lesions. Testes: Normal.   Musculoskeletal:      Cervical back: Full passive range of motion without pain, normal range of motion and neck supple. Skin:     General: Skin is warm and dry. Findings: No erythema or rash. Neurological:      Mental Status: He is alert and oriented to person, place, and time. Psychiatric:         Speech: Speech normal.         Behavior: Behavior normal.         Thought Content: Thought content normal.         Judgment: Judgment normal.       Diagnostic Study Results   Labs -     Recent Results (from the past 12 hour(s))   URINALYSIS W/ REFLEX CULTURE    Collection Time: 02/01/22 10:32 AM    Specimen: Urine   Result Value Ref Range    Color YELLOW/STRAW      Appearance TURBID (A) CLEAR      Specific gravity 1.015 1.003 - 1.030      pH (UA) 6.0 5.0 - 8.0      Protein 100 (A) NEG mg/dL    Glucose 250 (A) NEG mg/dL    Ketone Negative NEG mg/dL    Bilirubin Negative NEG      Blood LARGE (A) NEG      Urobilinogen 0.2 0.2 - 1.0 EU/dL    Nitrites Positive (A) NEG      Leukocyte Esterase LARGE (A) NEG      WBC >100 (H) 0 - 4 /hpf    RBC 10-20 0 - 5 /hpf    Epithelial cells FEW FEW /lpf    Bacteria 2+ (A) NEG /hpf    UA:UC IF INDICATED URINE CULTURE ORDERED (A) CNI         Radiologic Studies -   No orders to display     No results found. Medical Decision Making   I am the first provider for this patient.     I reviewed the vital signs, available nursing notes, past medical history, past surgical history, family history and social history. Vital Signs-Reviewed the patient's vital signs. Patient Vitals for the past 24 hrs:   Temp Pulse Resp BP SpO2   02/01/22 0928 -- -- -- (!) 145/78 --   02/01/22 0924 97.6 °F (36.4 °C) 76 18 (!) 136/115 97 %     Pulse Oximetry Analysis - 97% on RA (normal)    Records Reviewed: Nursing Notes and Old Medical Records    Provider Notes (Medical Decision Making):   69-year-old male presents with dysuria and pain at the tip of the penis for the past week or more. He has no signs of trauma, excoriation, or sore at the urethral meatus. Differential includes UTI, urethritis, and low suspicion for STI. Will send a urinalysis and treat with ibuprofen and lidocaine Urojet. Will have case management see the patient to arrange urology follow-up. ED Course:   Initial assessment performed. The patients presenting problems have been discussed, and they are in agreement with the care plan formulated and outlined with them. I have encouraged them to ask questions as they arise throughout their visit. Progress Note  12:17 PM  Case management has arranged a follow-up appointment with urology this Thursday. Progress Note:   Updated pt on all returned results and findings. Discussed the importance of proper follow up as referred below along with return precautions. Pt in agreement with the care plan and expresses agreement with and understanding of all items discussed. Disposition:  Discharge Note:  The pt is ready for discharge. The pt's signs, symptoms, diagnosis, and discharge instructions have been discussed and pt has conveyed their understanding. The pt is to follow up as recommended or return to ER should their symptoms worsen. Plan has been discussed and pt is in agreement. PLAN:  1. Current Discharge Medication List      START taking these medications    Details   levoFLOXacin (Levaquin) 750 mg tablet Take 1 Tablet by mouth daily.   Qty: 5 Tablet, Refills: 0  Start date: 2/1/2022      phenazopyridine (Pyridium) 200 mg tablet Take 1 Tablet by mouth three (3) times daily for 2 days. Qty: 6 Tablet, Refills: 0  Start date: 2/1/2022, End date: 2/3/2022           2. Follow-up Information     Follow up With Specialties Details Why [address] [facility]  On 2/3/2022 Your appointment time 0800, please bring insurance card, picture ID and discharge papers appoinment with Dr. Justine Dias. [address] [facility] [address] [facility] EMERGENCY DEPT Emergency Medicine  As needed, If symptoms worsen [address]  Call  to arrange primary care [facility]  [phone]        Return to ED if worse     Diagnosis     Clinical Impression:   1. Urinary tract infection associated with catheterization of urinary tract, unspecified indwelling urinary catheter type, initial encounter ([facility] [address]    2. Benign prostatic hyperplasia with urinary retention            Please note that this dictation was completed with Dragon, computer voice recognition software. Quite often unanticipated grammatical, syntax, homophones, and other interpretive errors are inadvertently transcribed by the computer software. Please disregard these errors. Additionally, please excuse any errors that have escaped final proofreading.

## 2022-02-01 NOTE — PROGRESS NOTES
CM receive consult. Patient needs Urology follow-up. [id number] RAUDEL review chart. Patient has urologist last follow-up was 6/30/22 with Dr. Madina Haskins. [id number] CM spoke with patient he states he does not deal with them anymore and has not call to schedule an appointment with a urologist and does not have a plan other than the ER. He receives his supplies with a company that send him 2-3 months supply and in the process of switching medicaid to [facility] from [place].     [phone] [id number] RAUDEL attempting to schedule follow-up with [facility] urology currently caller #[phone] inform patient appointments might be weeks out for new patient. [id number] follow-up appointment for 2/3/22 @ 0800 with Dr. Chano Quinonez.     Talia Hunter CM

## 2022-02-02 NOTE — PROGRESS NOTES
Spiritual Care Partner Volunteer visited patient at [facility] in [facility] EMERGENCY DEPT on 2/1/2022   Documented by:  Marva Iraheta M.Div,., Kobi 605 Provider   Paging Service [phone])

## 2022-02-03 LAB
BACTERIA SPEC CULT: ABNORMAL
BACTERIA SPEC CULT: ABNORMAL
CC UR VC: ABNORMAL
SERVICE CMNT-IMP: ABNORMAL

## 2022-02-03 RX ORDER — NITROFURANTOIN 25; 75 MG/1; MG/1
100 CAPSULE ORAL 2 TIMES DAILY
Qty: 14 CAPSULE | Refills: 0 | Status: SHIPPED | OUTPATIENT
Start: 2022-02-03 | End: 2022-02-10

## 2022-02-03 NOTE — PROGRESS NOTES
Pt notified, states he went to urology today and was prescribed Omnicef which is 3rd generation same as rocephin so pt advised to D/C [facility] and [facility] can start tomorrow

## 2022-02-18 ENCOUNTER — APPOINTMENT (OUTPATIENT)
Dept: ULTRASOUND IMAGING | Age: 72
DRG: 699 | End: 2022-02-18
Attending: STUDENT IN AN ORGANIZED HEALTH CARE EDUCATION/TRAINING PROGRAM
Payer: MEDICARE

## 2022-02-18 ENCOUNTER — HOSPITAL ENCOUNTER (INPATIENT)
Age: 72
LOS: 1 days | Discharge: HOME | DRG: 699 | End: 2022-02-21
Attending: EMERGENCY MEDICINE | Admitting: STUDENT IN AN ORGANIZED HEALTH CARE EDUCATION/TRAINING PROGRAM
Payer: MEDICARE

## 2022-02-18 DIAGNOSIS — N39.0 URINARY TRACT INFECTION ASSOCIATED WITH CATHETERIZATION OF URINARY TRACT, UNSPECIFIED INDWELLING URINARY CATHETER TYPE, INITIAL ENCOUNTER (HCC): Primary | ICD-10-CM

## 2022-02-18 DIAGNOSIS — N41.0 ACUTE PROSTATITIS: ICD-10-CM

## 2022-02-18 DIAGNOSIS — T83.511A URINARY TRACT INFECTION ASSOCIATED WITH CATHETERIZATION OF URINARY TRACT, UNSPECIFIED INDWELLING URINARY CATHETER TYPE, INITIAL ENCOUNTER (HCC): Primary | ICD-10-CM

## 2022-02-18 PROBLEM — Z16.12 ESBL (EXTENDED SPECTRUM BETA-LACTAMASE) PRODUCING BACTERIA INFECTION: Status: ACTIVE | Noted: 2022-02-18

## 2022-02-18 PROBLEM — A49.9 ESBL (EXTENDED SPECTRUM BETA-LACTAMASE) PRODUCING BACTERIA INFECTION: Status: ACTIVE | Noted: 2022-02-18

## 2022-02-18 LAB
ANION GAP SERPL CALC-SCNC: 12 MMOL/L (ref 5–15)
APPEARANCE UR: ABNORMAL
BACTERIA URNS QL MICRO: ABNORMAL /HPF
BASOPHILS # BLD: 0 K/UL (ref 0–0.1)
BASOPHILS NFR BLD: 1 % (ref 0–1)
BILIRUB UR QL: NEGATIVE
BUN SERPL-MCNC: 23 MG/DL (ref 6–20)
BUN/CREAT SERPL: 15 (ref 12–20)
CALCIUM SERPL-MCNC: 9.2 MG/DL (ref 8.5–10.1)
CHLORIDE SERPL-SCNC: 100 MMOL/L (ref 97–108)
CO2 SERPL-SCNC: 23 MMOL/L (ref 21–32)
COLOR UR: ABNORMAL
CREAT SERPL-MCNC: 1.55 MG/DL (ref 0.7–1.3)
DIFFERENTIAL METHOD BLD: ABNORMAL
EOSINOPHIL # BLD: 0.1 K/UL (ref 0–0.4)
EOSINOPHIL NFR BLD: 2 % (ref 0–7)
EPITH CASTS URNS QL MICRO: ABNORMAL /LPF
ERYTHROCYTE [DISTWIDTH] IN BLOOD BY AUTOMATED COUNT: 14 % (ref 11.5–14.5)
GLUCOSE BLD STRIP.AUTO-MCNC: 221 MG/DL (ref 65–117)
GLUCOSE SERPL-MCNC: 226 MG/DL (ref 65–100)
GLUCOSE UR STRIP.AUTO-MCNC: 250 MG/DL
HCT VFR BLD AUTO: 34.2 % (ref 36.6–50.3)
HGB BLD-MCNC: 10.4 G/DL (ref 12.1–17)
HGB UR QL STRIP: ABNORMAL
IMM GRANULOCYTES # BLD AUTO: 0 K/UL (ref 0–0.04)
IMM GRANULOCYTES NFR BLD AUTO: 0 % (ref 0–0.5)
KETONES UR QL STRIP.AUTO: NEGATIVE MG/DL
LEUKOCYTE ESTERASE UR QL STRIP.AUTO: ABNORMAL
LYMPHOCYTES # BLD: 1.7 K/UL (ref 0.8–3.5)
LYMPHOCYTES NFR BLD: 30 % (ref 12–49)
MCH RBC QN AUTO: 26.5 PG (ref 26–34)
MCHC RBC AUTO-ENTMCNC: 30.4 G/DL (ref 30–36.5)
MCV RBC AUTO: 87.2 FL (ref 80–99)
MONOCYTES # BLD: 0.8 K/UL (ref 0–1)
MONOCYTES NFR BLD: 13 % (ref 5–13)
NEUTS SEG # BLD: 3.1 K/UL (ref 1.8–8)
NEUTS SEG NFR BLD: 54 % (ref 32–75)
NITRITE UR QL STRIP.AUTO: POSITIVE
NRBC # BLD: 0 K/UL (ref 0–0.01)
NRBC BLD-RTO: 0 PER 100 WBC
PH UR STRIP: 6 [PH] (ref 5–8)
PLATELET # BLD AUTO: 295 K/UL (ref 150–400)
PMV BLD AUTO: 9.3 FL (ref 8.9–12.9)
POTASSIUM SERPL-SCNC: 4.1 MMOL/L (ref 3.5–5.1)
PROT UR STRIP-MCNC: 100 MG/DL
RBC # BLD AUTO: 3.92 M/UL (ref 4.1–5.7)
RBC #/AREA URNS HPF: ABNORMAL /HPF (ref 0–5)
SERVICE CMNT-IMP: ABNORMAL
SODIUM SERPL-SCNC: 135 MMOL/L (ref 136–145)
SP GR UR REFRACTOMETRY: 1.01 (ref 1–1.03)
UA: UC IF INDICATED,UAUC: ABNORMAL
UROBILINOGEN UR QL STRIP.AUTO: 0.2 EU/DL (ref 0.2–1)
WBC # BLD AUTO: 5.7 K/UL (ref 4.1–11.1)
WBC URNS QL MICRO: >100 /HPF (ref 0–4)

## 2022-02-18 PROCEDURE — 85025 COMPLETE CBC W/AUTO DIFF WBC: CPT

## 2022-02-18 PROCEDURE — 36415 COLL VENOUS BLD VENIPUNCTURE: CPT

## 2022-02-18 PROCEDURE — 74011250637 HC RX REV CODE- 250/637: Performed by: STUDENT IN AN ORGANIZED HEALTH CARE EDUCATION/TRAINING PROGRAM

## 2022-02-18 PROCEDURE — 96376 TX/PRO/DX INJ SAME DRUG ADON: CPT

## 2022-02-18 PROCEDURE — 51798 US URINE CAPACITY MEASURE: CPT

## 2022-02-18 PROCEDURE — 74011000250 HC RX REV CODE- 250: Performed by: EMERGENCY MEDICINE

## 2022-02-18 PROCEDURE — G0378 HOSPITAL OBSERVATION PER HR: HCPCS

## 2022-02-18 PROCEDURE — 74011250637 HC RX REV CODE- 250/637: Performed by: EMERGENCY MEDICINE

## 2022-02-18 PROCEDURE — 74011250636 HC RX REV CODE- 250/636: Performed by: STUDENT IN AN ORGANIZED HEALTH CARE EDUCATION/TRAINING PROGRAM

## 2022-02-18 PROCEDURE — 82962 GLUCOSE BLOOD TEST: CPT

## 2022-02-18 PROCEDURE — 99284 EMERGENCY DEPT VISIT MOD MDM: CPT

## 2022-02-18 PROCEDURE — 74011000258 HC RX REV CODE- 258: Performed by: STUDENT IN AN ORGANIZED HEALTH CARE EDUCATION/TRAINING PROGRAM

## 2022-02-18 PROCEDURE — 87086 URINE CULTURE/COLONY COUNT: CPT

## 2022-02-18 PROCEDURE — 81001 URINALYSIS AUTO W/SCOPE: CPT

## 2022-02-18 PROCEDURE — 74011000250 HC RX REV CODE- 250: Performed by: STUDENT IN AN ORGANIZED HEALTH CARE EDUCATION/TRAINING PROGRAM

## 2022-02-18 PROCEDURE — 87186 SC STD MICRODIL/AGAR DIL: CPT

## 2022-02-18 PROCEDURE — 76770 US EXAM ABDO BACK WALL COMP: CPT

## 2022-02-18 PROCEDURE — 74011636637 HC RX REV CODE- 636/637: Performed by: HOSPITALIST

## 2022-02-18 PROCEDURE — 74011250636 HC RX REV CODE- 250/636: Performed by: EMERGENCY MEDICINE

## 2022-02-18 PROCEDURE — 74011000258 HC RX REV CODE- 258: Performed by: EMERGENCY MEDICINE

## 2022-02-18 PROCEDURE — 87077 CULTURE AEROBIC IDENTIFY: CPT

## 2022-02-18 PROCEDURE — 96374 THER/PROPH/DIAG INJ IV PUSH: CPT

## 2022-02-18 PROCEDURE — 80048 BASIC METABOLIC PNL TOTAL CA: CPT

## 2022-02-18 RX ORDER — PANTOPRAZOLE SODIUM 40 MG/1
1 TABLET, DELAYED RELEASE ORAL DAILY
COMMUNITY
Start: 2021-08-26 | End: 2022-04-27 | Stop reason: SDUPTHER

## 2022-02-18 RX ORDER — AMITRIPTYLINE HYDROCHLORIDE 10 MG/1
10 TABLET, FILM COATED ORAL
Status: ON HOLD | COMMUNITY
Start: 2021-12-06 | End: 2022-02-19

## 2022-02-18 RX ORDER — INSULIN LISPRO 100 [IU]/ML
2 INJECTION, SOLUTION INTRAVENOUS; SUBCUTANEOUS
Status: DISCONTINUED | OUTPATIENT
Start: 2022-02-18 | End: 2022-02-19

## 2022-02-18 RX ORDER — MAGNESIUM SULFATE 100 %
4 CRYSTALS MISCELLANEOUS AS NEEDED
Status: DISCONTINUED | OUTPATIENT
Start: 2022-02-18 | End: 2022-02-18

## 2022-02-18 RX ORDER — OXYCODONE AND ACETAMINOPHEN 5; 325 MG/1; MG/1
1 TABLET ORAL
Status: DISCONTINUED | OUTPATIENT
Start: 2022-02-18 | End: 2022-02-21 | Stop reason: HOSPADM

## 2022-02-18 RX ORDER — CEFDINIR 300 MG/1
300 CAPSULE ORAL 2 TIMES DAILY
COMMUNITY
End: 2022-02-21

## 2022-02-18 RX ORDER — DEXTROSE 50 % IN WATER (D50W) INTRAVENOUS SYRINGE
25-50 AS NEEDED
Status: DISCONTINUED | OUTPATIENT
Start: 2022-02-18 | End: 2022-02-21 | Stop reason: HOSPADM

## 2022-02-18 RX ORDER — ATORVASTATIN CALCIUM 80 MG/1
1 TABLET, FILM COATED ORAL
COMMUNITY
Start: 2021-03-28 | End: 2022-04-27 | Stop reason: SDUPTHER

## 2022-02-18 RX ORDER — TAMSULOSIN HYDROCHLORIDE 0.4 MG/1
0.8 CAPSULE ORAL DAILY
COMMUNITY
End: 2022-03-09

## 2022-02-18 RX ORDER — MAGNESIUM SULFATE 100 %
4 CRYSTALS MISCELLANEOUS AS NEEDED
Status: DISCONTINUED | OUTPATIENT
Start: 2022-02-18 | End: 2022-02-21 | Stop reason: HOSPADM

## 2022-02-18 RX ORDER — ATORVASTATIN CALCIUM 10 MG/1
20 TABLET, FILM COATED ORAL
Status: DISCONTINUED | OUTPATIENT
Start: 2022-02-18 | End: 2022-02-19

## 2022-02-18 RX ORDER — SODIUM CHLORIDE 0.9 % (FLUSH) 0.9 %
5-40 SYRINGE (ML) INJECTION AS NEEDED
Status: DISCONTINUED | OUTPATIENT
Start: 2022-02-18 | End: 2022-02-21 | Stop reason: HOSPADM

## 2022-02-18 RX ORDER — ACETAMINOPHEN 650 MG/1
650 SUPPOSITORY RECTAL
Status: DISCONTINUED | OUTPATIENT
Start: 2022-02-18 | End: 2022-02-21 | Stop reason: HOSPADM

## 2022-02-18 RX ORDER — TRAMADOL HYDROCHLORIDE 50 MG/1
50 TABLET ORAL
Status: COMPLETED | OUTPATIENT
Start: 2022-02-18 | End: 2022-02-18

## 2022-02-18 RX ORDER — ACETAMINOPHEN 325 MG/1
650 TABLET ORAL
Status: DISCONTINUED | OUTPATIENT
Start: 2022-02-18 | End: 2022-02-21 | Stop reason: HOSPADM

## 2022-02-18 RX ORDER — ENOXAPARIN SODIUM 100 MG/ML
40 INJECTION SUBCUTANEOUS DAILY
Status: DISCONTINUED | OUTPATIENT
Start: 2022-02-19 | End: 2022-02-21 | Stop reason: HOSPADM

## 2022-02-18 RX ORDER — SODIUM CHLORIDE 0.9 % (FLUSH) 0.9 %
5-40 SYRINGE (ML) INJECTION EVERY 8 HOURS
Status: DISCONTINUED | OUTPATIENT
Start: 2022-02-18 | End: 2022-02-21 | Stop reason: HOSPADM

## 2022-02-18 RX ORDER — GABAPENTIN 100 MG/1
200 CAPSULE ORAL
Status: ON HOLD | COMMUNITY
End: 2022-02-19

## 2022-02-18 RX ORDER — POLYETHYLENE GLYCOL 3350 17 G/17G
17 POWDER, FOR SOLUTION ORAL DAILY PRN
Status: DISCONTINUED | OUTPATIENT
Start: 2022-02-18 | End: 2022-02-21 | Stop reason: HOSPADM

## 2022-02-18 RX ORDER — TAMSULOSIN HYDROCHLORIDE 0.4 MG/1
0.8 CAPSULE ORAL DAILY
Status: DISCONTINUED | OUTPATIENT
Start: 2022-02-18 | End: 2022-02-21 | Stop reason: HOSPADM

## 2022-02-18 RX ORDER — LOSARTAN POTASSIUM 100 MG/1
100 TABLET ORAL DAILY
COMMUNITY
Start: 2021-11-29 | End: 2022-03-16

## 2022-02-18 RX ORDER — LATANOPROST 50 UG/ML
1 SOLUTION/ DROPS OPHTHALMIC
Status: ON HOLD | COMMUNITY
End: 2022-09-16 | Stop reason: SDUPTHER

## 2022-02-18 RX ORDER — SODIUM CHLORIDE 0.9 % (FLUSH) 0.9 %
5-40 SYRINGE (ML) INJECTION EVERY 8 HOURS
Status: DISCONTINUED | OUTPATIENT
Start: 2022-02-18 | End: 2022-02-19 | Stop reason: SDUPTHER

## 2022-02-18 RX ORDER — AMLODIPINE BESYLATE 5 MG/1
1 TABLET ORAL DAILY
COMMUNITY
Start: 2021-03-29 | End: 2022-04-27 | Stop reason: SDUPTHER

## 2022-02-18 RX ORDER — DEXTROSE 50 % IN WATER (D50W) INTRAVENOUS SYRINGE
12.5-25 AS NEEDED
Status: DISCONTINUED | OUTPATIENT
Start: 2022-02-18 | End: 2022-02-18

## 2022-02-18 RX ORDER — FINASTERIDE 5 MG/1
5 TABLET, FILM COATED ORAL DAILY
Status: DISCONTINUED | OUTPATIENT
Start: 2022-02-19 | End: 2022-02-21 | Stop reason: HOSPADM

## 2022-02-18 RX ORDER — ONDANSETRON 4 MG/1
4 TABLET, ORALLY DISINTEGRATING ORAL
Status: DISCONTINUED | OUTPATIENT
Start: 2022-02-18 | End: 2022-02-21 | Stop reason: HOSPADM

## 2022-02-18 RX ORDER — ONDANSETRON 2 MG/ML
4 INJECTION INTRAMUSCULAR; INTRAVENOUS
Status: DISCONTINUED | OUTPATIENT
Start: 2022-02-18 | End: 2022-02-21 | Stop reason: HOSPADM

## 2022-02-18 RX ORDER — IPRATROPIUM BROMIDE AND ALBUTEROL SULFATE 2.5; .5 MG/3ML; MG/3ML
3 SOLUTION RESPIRATORY (INHALATION)
Status: DISCONTINUED | OUTPATIENT
Start: 2022-02-18 | End: 2022-02-21 | Stop reason: HOSPADM

## 2022-02-18 RX ADMIN — TRAMADOL HYDROCHLORIDE 50 MG: 50 TABLET, COATED ORAL at 14:00

## 2022-02-18 RX ADMIN — MEROPENEM 500 MG: 500 INJECTION, POWDER, FOR SOLUTION INTRAVENOUS at 21:22

## 2022-02-18 RX ADMIN — SODIUM CHLORIDE, PRESERVATIVE FREE 10 ML: 5 INJECTION INTRAVENOUS at 21:21

## 2022-02-18 RX ADMIN — SODIUM CHLORIDE, PRESERVATIVE FREE 10 ML: 5 INJECTION INTRAVENOUS at 14:00

## 2022-02-18 RX ADMIN — Medication 2 UNITS: at 22:10

## 2022-02-18 RX ADMIN — TAMSULOSIN HYDROCHLORIDE 0.8 MG: 0.4 CAPSULE ORAL at 17:06

## 2022-02-18 RX ADMIN — ATORVASTATIN CALCIUM 20 MG: 10 TABLET ORAL at 21:21

## 2022-02-18 RX ADMIN — MEROPENEM 500 MG: 500 INJECTION, POWDER, FOR SOLUTION INTRAVENOUS at 14:47

## 2022-02-18 NOTE — LETTER
[facility] EMERGENCY DEPT  [address] [facility] [id number] [phone]    Work/School Note    Date: 2/18/2022    To Whom It May concern:    Mabel Brizuela was seen and treated today in the emergency room by the following provider(s):  Attending Provider: Nicolas Morgan MD.      Mabel Brizuela may return to work on 2/20/2022.     Sincerely,                              Lorraine HASKINS

## 2022-02-18 NOTE — PROGRESS NOTES
Advance Care Planning     Advance Care Planning (ACP) Physician/NP/PA Conversation      Date of Conversation: 2/18/2022  Conducted with: Patient with Decision Making Capacity    Healthcare Decision Maker:     Primary Decision Maker: Jessica Nino -  - [phone]  Click here to complete [address] including selection of the Healthcare Decision Maker Relationship (ie \"Primary\")      Today we documented Decision Maker(s). The patient will provide ACP documents. Care Preferences:    Hospitalization: \"If your health worsens and it becomes clear that your chance of recovery is unlikely, what would be your preference regarding hospitalization? \"  The patient would prefer hospitalization. Ventilation: \"If you were unable to breathe on your own and your chance of recovery was unlikely, what would be your preference about the use of a ventilator (breathing machine) if it was available to you? \"   The patient would desire the use of a ventilator. Resuscitation: \"In the event your heart stopped as a result of an underlying serious health condition, would you want attempts to be made to restart your heart, or would you prefer a natural death? \"   Yes, attempt to resuscitate.     Additional topics discussed: artificial nutrition, hospitalization preferences and resuscitation preferences    Conversation Outcomes / Follow-Up Plan:   ACP in process - information provided, considering goals and options  Reviewed DNR/DNI and patient elects Full Code (Attempt Resuscitation)     Length of Voluntary ACP Conversation in minutes:  20 minutes    Richard Domínguez MD

## 2022-02-18 NOTE — ED PROVIDER NOTES
EMERGENCY DEPARTMENT HISTORY AND PHYSICAL EXAM      Date: 2/18/2022  Patient Name: Elisha Frost    History of Presenting Illness     Chief Complaint   Patient presents with    Anal Pain    Urinary Pain       History Provided By: Patient    HPI: Elisha Frost, 66 y.o. male presents to the ED with cc of perineal pain and difficulty urinating for several weeks. Patient has a history of enlarged prostate and has to self catheterize himself to urinate. Patient has been on antibiotics for several weeks for this urinary tract infection. According to patient his not feeling any better. Denies fever low back pain nausea vomiting or abdominal pain. There are no other associated symptoms, patient concerns, or physical findings at this time. PCP: Unknown, Provider, DPM    No current facility-administered medications on file prior to encounter. Current Outpatient Medications on File Prior to Encounter   Medication Sig Dispense Refill    cefdinir (OMNICEF) 300 mg capsule Take 300 mg by mouth two (2) times a day.  metFORMIN (GLUCOPHAGE) 500 mg tablet Take  by mouth two (2) times daily (with meals).  simvastatin (ZOCOR) 20 mg tablet Take  by mouth nightly.  lisinopril (PRINIVIL, ZESTRIL) 40 mg tablet Take 40 mg by mouth daily.  albuterol (PROVENTIL HFA, VENTOLIN HFA, PROAIR HFA) 90 mcg/actuation inhaler Take 2 Puffs by inhalation every four (4) hours as needed for Wheezing. (Patient not taking: Reported on 2/18/2022) 1 Each 0    guaiFENesin-dextromethorphan (Adult Robitussin Peak Cold DM)  mg/5 mL liqd Take 10 mL by mouth every four (4) hours as needed for Cough or Congestion.  (Patient not taking: Reported on 2/1/2022) 118 mL 0       Past History     Past Medical History:  Past Medical History:   Diagnosis Date    Diabetes ([facility] [address]     Gastrointestinal disorder     Hypertension     DEJA (obstructive sleep apnea)     AHI: 8.9 per hour       Past Surgical History:  Past Surgical History: Procedure Laterality Date    HX PROSTATE SURGERY      CA ABDOMEN SURGERY PROC UNLISTED      Hernia Repair       Family History:  Family History   Problem Relation Age of Onset   [facility] Hypertension Mother     Diabetes Mother     Hypertension Father     Diabetes Father        Social History:  Social History     Tobacco Use    Smoking status: Never Smoker    Smokeless tobacco: Never Used   Substance Use Topics    Alcohol use: No    Drug use: Not on file       Allergies: Allergies   Allergen Reactions    Aspirin Anxiety         Review of Systems   Review of Systems   Constitutional: Negative for fever. HENT: Negative for sore throat and trouble swallowing. Eyes: Negative for photophobia and redness. Respiratory: Negative for cough and shortness of breath. Cardiovascular: Negative for chest pain and leg swelling. Gastrointestinal: Negative for abdominal pain, constipation, diarrhea, nausea and vomiting. Endocrine: Negative for polydipsia and polyuria. Genitourinary: Positive for difficulty urinating, dysuria and urgency. Negative for hematuria and scrotal swelling. Musculoskeletal: Negative for back pain and joint swelling. Skin: Negative for rash. Neurological: Negative for dizziness, syncope, weakness and headaches. Psychiatric/Behavioral: Negative for suicidal ideas. All other systems reviewed and are negative. Physical Exam   Physical Exam  Vitals and nursing note reviewed. Constitutional:       General: He is not in acute distress. Appearance: He is well-developed. HENT:      Head: Normocephalic and atraumatic. Mouth/Throat:      Pharynx: No oropharyngeal exudate. Eyes:      General:         Left eye: No discharge. Conjunctiva/sclera: Conjunctivae normal.      Pupils: Pupils are equal, round, and reactive to light. Neck:      Vascular: No JVD. Cardiovascular:      Rate and Rhythm: Normal rate and regular rhythm. Heart sounds: Normal heart sounds. Pulmonary:      Effort: Pulmonary effort is normal. No respiratory distress. Breath sounds: Normal breath sounds. No wheezing. Abdominal:      General: Bowel sounds are normal. There is no distension. Palpations: Abdomen is soft. Tenderness: There is no abdominal tenderness. There is no guarding or rebound. Musculoskeletal:         General: No tenderness. Normal range of motion. Cervical back: Normal range of motion and neck supple. Lymphadenopathy:      Cervical: No cervical adenopathy. Skin:     General: Skin is warm and dry. Findings: No rash. Neurological:      Mental Status: He is alert and oriented to person, place, and time. Cranial Nerves: No cranial nerve deficit. Deep Tendon Reflexes: Reflexes are normal and symmetric. Psychiatric:         Behavior: Behavior normal.         Diagnostic Study Results     Labs -   No results found for this or any previous visit (from the past 12 hour(s)). Radiologic Studies -   No orders to display     CT Results  (Last 48 hours)    None        CXR Results  (Last 48 hours)    None          Medical Decision Making   I am the first provider for this patient. I reviewed the vital signs, available nursing notes, past medical history, past surgical history, family history and social history. Vital Signs-Reviewed the patient's vital signs. Patient Vitals for the past 12 hrs:   Temp Pulse Resp BP SpO2   02/18/22 1243 98.8 °F (37.1 °C) 85 18 (!) 153/134 98 %       EKG interpretation: (Preliminary)    Records Reviewed: Nursing Notes    Provider Notes (Medical Decision Making):   UTI, BPH, urosepsis, acute prostatitis. ED Course:   Initial assessment performed. The patients presenting problems have been discussed, and they are in agreement with the care plan formulated and outlined with them. I have encouraged them to ask questions as they arise throughout their visit.       Critical Care Time:   none  Progress Note:  2:50 PM Case discussed with Dr. Herve Negrete- hospitalist who agrees to admit patient to the hospital.    Disposition:Admitted    admitted to hospital  I reviewed exam findings, diagnostic results, and clinical impression with patient. Counseled patient on diagnosis and care plan. DISCHARGE PLAN:  1. Current Discharge Medication List        2. Follow-up Information    None       3. Return to ED if current symptoms worsen or new symptoms arise. Diagnosis     Clinical Impression: No diagnosis found.

## 2022-02-18 NOTE — H&P
Hospitalist Admission Note    NAME: Britney Bhatia   :  1950   MRN:  [id number]   Room Number: NK75/17  @ [address] [facility]     Date/Time:  2022 3:37 PM    Patient PCP: Unknown, Provider, DPM    Please note that this dictation was completed with [email or web address], the computer voice recognition software. Quite often unanticipated grammatical, syntax, homophones, and other interpretive errors are inadvertently transcribed by the computer software. Please disregard these errors. Please excuse any errors that have escaped final proofreading.  ______________________________________________________________________  Given the patient's current clinical presentation, I have a high level of concern for decompensation if discharged from the emergency department. Complex decision making was performed, which includes reviewing the patient's available past medical records, laboratory results, and x-ray films. My assessment of this patient's clinical condition and my plan of care is as follows. 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 Body mass index is 34.21 kg/m².   Code Status: Full   Surrogate Decision Maker:    DVT Prophylaxis: Lovenox  GI Prophylaxis: not indicated Subjective:   CHIEF COMPLAINT: Urinary hesitancy w/ pain       HISTORY OF PRESENT ILLNESS:     Stephen Garrido is a 66 y.o.  male with PMH of  Above mentioned  who presents to ED with c/o  Worsening urinary complaints including burning, painful micturition and difficulty w/ urination. Patient has been treated for UTI w/ PO ABx w/o improvement. Patient denied any other active medical complaints. Patient stated he has been going to urology for BPH and still having problems with that       We were asked to admit for work up and evaluation of the above problems. Past Medical History:   Diagnosis Date    Diabetes ([address]     Gastrointestinal disorder     Hypertension     DEJA (obstructive sleep apnea)     AHI: 8.9 per hour        Past Surgical History:   Procedure Laterality Date    HX PROSTATE SURGERY      MA ABDOMEN SURGERY PROC UNLISTED      Hernia Repair       Social History     Tobacco Use    Smoking status: Never Smoker    Smokeless tobacco: Never Used   Substance Use Topics    Alcohol use: No        Family History   Problem Relation Age of Onset    Hypertension Mother     Diabetes Mother     Hypertension Father     Diabetes Father      Allergies   Allergen Reactions    Aspirin Anxiety        Prior to Admission medications    Medication Sig Start Date End Date Taking? Authorizing Provider   cefdinir (OMNICEF) 300 mg capsule Take 300 mg by mouth two (2) times a day. Yes Other, MD Reggie   metFORMIN (GLUCOPHAGE) 500 mg tablet Take  by mouth two (2) times daily (with meals). Yes Provider, Historical   simvastatin (ZOCOR) 20 mg tablet Take  by mouth nightly. Yes Provider, Historical   lisinopril (PRINIVIL, ZESTRIL) 40 mg tablet Take 40 mg by mouth daily. Yes Provider, Historical   albuterol (PROVENTIL HFA, VENTOLIN HFA, PROAIR HFA) 90 mcg/actuation inhaler Take 2 Puffs by inhalation every four (4) hours as needed for Wheezing.   Patient not taking: Reported on 2/18/2022 1/8/22 Soha Soto NP   guaiFENesin-dextromethorphan (Adult Robitussin Peak Cold DM)  mg/5 mL liqd Take 10 mL by mouth every four (4) hours as needed for Cough or Congestion. Patient not taking: Reported on 2/1/2022 1/8/22   Soha Soto NP       REVIEW OF SYSTEMS:     I am not able to complete the review of systems because:    The patient is intubated and sedated    The patient has altered mental status due to his acute medical problems    The patient has baseline aphasia from prior stroke(s)    The patient has baseline dementia and is not reliable historian    The patient is in acute medical distress and unable to provide information           Total of 12 systems reviewed as follows:       POSITIVE= underlined text  Negative = text not underlined  General:  fever, chills, sweats, generalized weakness, weight loss/gain,      loss of appetite   Eyes:    blurred vision, eye pain, loss of vision, double vision  ENT:    rhinorrhea, pharyngitis   Respiratory:   cough, sputum production, SOB, ALEJO, wheezing, pleuritic pain   Cardiology:   chest pain, palpitations, orthopnea, PND, edema, syncope   Gastrointestinal:  abdominal pain , N/V, diarrhea, dysphagia, constipation, bleeding   Genitourinary:  frequency, urgency, dysuria, hematuria, incontinence   Muskuloskeletal :  arthralgia, myalgia, back pain  Hematology:  easy bruising, nose or gum bleeding, lymphadenopathy   Dermatological: rash, ulceration, pruritis, color change / jaundice  Endocrine:   hot flashes or polydipsia   Neurological:  headache, dizziness, confusion, focal weakness, paresthesia,     Speech difficulties, memory loss, gait difficulty  Psychological: Feelings of anxiety, depression, agitation    Objective:   VITALS:    Visit Vitals  BP (!) 153/134 (BP 1 Location: Left upper arm, BP Patient Position: At rest)   Pulse 85   Temp 98.8 °F (37.1 °C)   Resp 18   Ht 5' 8\" (1.727 m)   Wt 102.1 kg (225 lb)   SpO2 98%   BMI 34.21 kg/m² PHYSICAL EXAM:    General:    Alert, cooperative, no distress, appears stated age. HEENT: Atraumatic, anicteric sclerae, pink conjunctivae     No oral ulcers, mucosa moist, throat clear, dentition fair  Neck:  Supple, symmetrical,  thyroid: non tender  Lungs:   Clear to auscultation bilaterally. No Wheezing or Rhonchi. No rales. Chest wall:  No tenderness  No Accessory muscle use. Heart:   Regular  rhythm,  No  murmur   No edema  Abdomen:   Soft, non-tender. Not distended. Bowel sounds normal  Extremities: No cyanosis. No clubbing,      Skin turgor normal, Capillary refill normal, Radial dial pulse 2+  Skin:     Not pale. Not Jaundiced  No rashes   Psych:  Good insight. Not depressed. Not anxious or agitated. Neurologic: EOMs intact. No facial asymmetry. No aphasia or slurred speech. Symmetrical strength, Sensation grossly intact. Alert and oriented X 4.     ______________________________________________________________________    Care Plan discussed with:  Patient/Family    Expected  Disposition:   PT, OT, RN  ________________________________________________________________________  TOTAL TIME:  54 Minutes    Critical Care Provided     Minutes non procedure based      Comments    x Reviewed previous records   >50% of visit spent in counseling and coordination of care x Discussion with patient and/or family and questions answered       ________________________________________________________________________  Signed: Yue Gaytan MD    Procedures: see electronic medical records for all procedures/Xrays and details which were not copied into this note but were reviewed prior to creation of Plan.     LAB DATA REVIEWED:    Recent Results (from the past 24 hour(s))   URINALYSIS W/ REFLEX CULTURE    Collection Time: 02/18/22  1:43 PM    Specimen: Urine   Result Value Ref Range    Color YELLOW/STRAW      Appearance TURBID (A) CLEAR      Specific gravity 1.015 1.003 - 1.030      pH (UA) 6.0 5.0 - 8.0 Protein 100 (A) NEG mg/dL    Glucose 250 (A) NEG mg/dL    Ketone Negative NEG mg/dL    Bilirubin Negative NEG      Blood LARGE (A) NEG      Urobilinogen 0.2 0.2 - 1.0 EU/dL    Nitrites Positive (A) NEG      Leukocyte Esterase LARGE (A) NEG      WBC >100 (H) 0 - 4 /hpf    RBC 10-20 0 - 5 /hpf    Epithelial cells FEW FEW /lpf    Bacteria 1+ (A) NEG /hpf    UA:UC IF INDICATED URINE CULTURE ORDERED (A) CNI     CBC WITH AUTOMATED DIFF    Collection Time: 02/18/22  1:43 PM   Result Value Ref Range    WBC 5.7 4.1 - 11.1 K/uL    RBC 3.92 (L) 4.10 - 5.70 M/uL    HGB 10.4 (L) 12.1 - 17.0 g/dL    HCT 34.2 (L) 36.6 - 50.3 %    MCV 87.2 80.0 - 99.0 FL    MCH 26.5 26.0 - 34.0 PG    MCHC 30.4 30.0 - 36.5 g/dL    RDW 14.0 11.5 - 14.5 %    PLATELET 763 283 - 132 K/uL    MPV 9.3 8.9 - 12.9 FL    NRBC 0.0 0  WBC    ABSOLUTE NRBC 0.00 0.00 - 0.01 K/uL    NEUTROPHILS 54 32 - 75 %    LYMPHOCYTES 30 12 - 49 %    MONOCYTES 13 5 - 13 %    EOSINOPHILS 2 0 - 7 %    BASOPHILS 1 0 - 1 %    IMMATURE GRANULOCYTES 0 0.0 - 0.5 %    ABS. NEUTROPHILS 3.1 1.8 - 8.0 K/UL    ABS. LYMPHOCYTES 1.7 0.8 - 3.5 K/UL    ABS. MONOCYTES 0.8 0.0 - 1.0 K/UL    ABS. EOSINOPHILS 0.1 0.0 - 0.4 K/UL    ABS. BASOPHILS 0.0 0.0 - 0.1 K/UL    ABS. IMM.  GRANS. 0.0 0.00 - 0.04 K/UL    DF AUTOMATED     METABOLIC PANEL, BASIC    Collection Time: 02/18/22  1:43 PM   Result Value Ref Range    Sodium 135 (L) 136 - 145 mmol/L    Potassium 4.1 3.5 - 5.1 mmol/L    Chloride 100 97 - 108 mmol/L    CO2 23 21 - 32 mmol/L    Anion gap 12 5 - 15 mmol/L    Glucose 226 (H) 65 - 100 mg/dL    BUN 23 (H) 6 - 20 MG/DL    Creatinine 1.55 (H) 0.70 - 1.30 MG/DL    BUN/Creatinine ratio 15 12 - 20      GFR est AA 54 (L) >60 ml/min/1.73m2    GFR est non-AA 44 (L) >60 ml/min/1.73m2    Calcium 9.2 8.5 - 10.1 MG/DL

## 2022-02-18 NOTE — ED TRIAGE NOTES
C/o enlarged prostate due to infection x 2-3 weeks. Experiencing worsening pain so he came into ED. Pt reporting painful, \"milky,\" urination ever since he started taking prescribed cefdinir.

## 2022-02-19 LAB
ANION GAP SERPL CALC-SCNC: 14 MMOL/L (ref 5–15)
BUN SERPL-MCNC: 23 MG/DL (ref 6–20)
BUN/CREAT SERPL: 17 (ref 12–20)
CALCIUM SERPL-MCNC: 9 MG/DL (ref 8.5–10.1)
CHLORIDE SERPL-SCNC: 101 MMOL/L (ref 97–108)
CO2 SERPL-SCNC: 23 MMOL/L (ref 21–32)
CREAT SERPL-MCNC: 1.36 MG/DL (ref 0.7–1.3)
EST. AVERAGE GLUCOSE BLD GHB EST-MCNC: 177 MG/DL
GLUCOSE BLD STRIP.AUTO-MCNC: 192 MG/DL (ref 65–117)
GLUCOSE BLD STRIP.AUTO-MCNC: 201 MG/DL (ref 65–117)
GLUCOSE BLD STRIP.AUTO-MCNC: 210 MG/DL (ref 65–117)
GLUCOSE BLD STRIP.AUTO-MCNC: 221 MG/DL (ref 65–117)
GLUCOSE SERPL-MCNC: 191 MG/DL (ref 65–100)
HBA1C MFR BLD: 7.8 % (ref 4–5.6)
POTASSIUM SERPL-SCNC: 3.7 MMOL/L (ref 3.5–5.1)
SERVICE CMNT-IMP: ABNORMAL
SODIUM SERPL-SCNC: 138 MMOL/L (ref 136–145)

## 2022-02-19 PROCEDURE — 74011250636 HC RX REV CODE- 250/636: Performed by: STUDENT IN AN ORGANIZED HEALTH CARE EDUCATION/TRAINING PROGRAM

## 2022-02-19 PROCEDURE — 83036 HEMOGLOBIN GLYCOSYLATED A1C: CPT

## 2022-02-19 PROCEDURE — 74011000258 HC RX REV CODE- 258: Performed by: STUDENT IN AN ORGANIZED HEALTH CARE EDUCATION/TRAINING PROGRAM

## 2022-02-19 PROCEDURE — 74011636637 HC RX REV CODE- 636/637: Performed by: STUDENT IN AN ORGANIZED HEALTH CARE EDUCATION/TRAINING PROGRAM

## 2022-02-19 PROCEDURE — 51701 INSERT BLADDER CATHETER: CPT

## 2022-02-19 PROCEDURE — 74011636637 HC RX REV CODE- 636/637: Performed by: HOSPITALIST

## 2022-02-19 PROCEDURE — 82962 GLUCOSE BLOOD TEST: CPT

## 2022-02-19 PROCEDURE — 96372 THER/PROPH/DIAG INJ SC/IM: CPT

## 2022-02-19 PROCEDURE — G0378 HOSPITAL OBSERVATION PER HR: HCPCS

## 2022-02-19 PROCEDURE — 0T9B70Z DRAINAGE OF BLADDER WITH DRAINAGE DEVICE, VIA NATURAL OR ARTIFICIAL OPENING: ICD-10-PCS | Performed by: HOSPITALIST

## 2022-02-19 PROCEDURE — 96376 TX/PRO/DX INJ SAME DRUG ADON: CPT

## 2022-02-19 PROCEDURE — 36415 COLL VENOUS BLD VENIPUNCTURE: CPT

## 2022-02-19 PROCEDURE — 80048 BASIC METABOLIC PNL TOTAL CA: CPT

## 2022-02-19 PROCEDURE — 74011000250 HC RX REV CODE- 250: Performed by: STUDENT IN AN ORGANIZED HEALTH CARE EDUCATION/TRAINING PROGRAM

## 2022-02-19 PROCEDURE — 74011250637 HC RX REV CODE- 250/637: Performed by: STUDENT IN AN ORGANIZED HEALTH CARE EDUCATION/TRAINING PROGRAM

## 2022-02-19 RX ORDER — INSULIN LISPRO 100 [IU]/ML
7 INJECTION, SOLUTION INTRAVENOUS; SUBCUTANEOUS
Status: DISCONTINUED | OUTPATIENT
Start: 2022-02-19 | End: 2022-02-19

## 2022-02-19 RX ORDER — LATANOPROST 50 UG/ML
1 SOLUTION/ DROPS OPHTHALMIC EVERY EVENING
Status: DISCONTINUED | OUTPATIENT
Start: 2022-02-19 | End: 2022-02-21 | Stop reason: HOSPADM

## 2022-02-19 RX ORDER — NALOXONE HYDROCHLORIDE 0.4 MG/ML
0.4 INJECTION, SOLUTION INTRAMUSCULAR; INTRAVENOUS; SUBCUTANEOUS
Status: DISCONTINUED | OUTPATIENT
Start: 2022-02-19 | End: 2022-02-21 | Stop reason: HOSPADM

## 2022-02-19 RX ORDER — INSULIN LISPRO 100 [IU]/ML
1-10 INJECTION, SOLUTION INTRAVENOUS; SUBCUTANEOUS
Status: DISCONTINUED | OUTPATIENT
Start: 2022-02-19 | End: 2022-02-21 | Stop reason: HOSPADM

## 2022-02-19 RX ORDER — SODIUM CHLORIDE, SODIUM LACTATE, POTASSIUM CHLORIDE, CALCIUM CHLORIDE 600; 310; 30; 20 MG/100ML; MG/100ML; MG/100ML; MG/100ML
75 INJECTION, SOLUTION INTRAVENOUS CONTINUOUS
Status: DISPENSED | OUTPATIENT
Start: 2022-02-19 | End: 2022-02-19

## 2022-02-19 RX ORDER — ATORVASTATIN CALCIUM 40 MG/1
80 TABLET, FILM COATED ORAL
Status: DISCONTINUED | OUTPATIENT
Start: 2022-02-19 | End: 2022-02-21 | Stop reason: HOSPADM

## 2022-02-19 RX ADMIN — SODIUM CHLORIDE, PRESERVATIVE FREE 10 ML: 5 INJECTION INTRAVENOUS at 06:26

## 2022-02-19 RX ADMIN — Medication 2 UNITS: at 21:29

## 2022-02-19 RX ADMIN — Medication 2 UNITS: at 08:18

## 2022-02-19 RX ADMIN — MEROPENEM 500 MG: 500 INJECTION, POWDER, FOR SOLUTION INTRAVENOUS at 21:28

## 2022-02-19 RX ADMIN — OXYCODONE HYDROCHLORIDE AND ACETAMINOPHEN 1 TABLET: 5; 325 TABLET ORAL at 21:35

## 2022-02-19 RX ADMIN — SODIUM CHLORIDE, POTASSIUM CHLORIDE, SODIUM LACTATE AND CALCIUM CHLORIDE 75 ML/HR: 600; 310; 30; 20 INJECTION, SOLUTION INTRAVENOUS at 10:31

## 2022-02-19 RX ADMIN — Medication 2 UNITS: at 11:33

## 2022-02-19 RX ADMIN — OXYCODONE HYDROCHLORIDE AND ACETAMINOPHEN 1 TABLET: 5; 325 TABLET ORAL at 08:55

## 2022-02-19 RX ADMIN — LATANOPROST 1 DROP: 50 SOLUTION OPHTHALMIC at 17:26

## 2022-02-19 RX ADMIN — MEROPENEM 500 MG: 500 INJECTION, POWDER, FOR SOLUTION INTRAVENOUS at 03:06

## 2022-02-19 RX ADMIN — MEROPENEM 500 MG: 500 INJECTION, POWDER, FOR SOLUTION INTRAVENOUS at 08:19

## 2022-02-19 RX ADMIN — ATORVASTATIN CALCIUM 80 MG: 40 TABLET, FILM COATED ORAL at 21:29

## 2022-02-19 RX ADMIN — FINASTERIDE 5 MG: 5 TABLET, FILM COATED ORAL at 08:18

## 2022-02-19 RX ADMIN — Medication 3 UNITS: at 16:09

## 2022-02-19 RX ADMIN — TAMSULOSIN HYDROCHLORIDE 0.8 MG: 0.4 CAPSULE ORAL at 08:18

## 2022-02-19 RX ADMIN — OXYCODONE HYDROCHLORIDE AND ACETAMINOPHEN 1 TABLET: 5; 325 TABLET ORAL at 14:56

## 2022-02-19 RX ADMIN — MEROPENEM 500 MG: 500 INJECTION, POWDER, FOR SOLUTION INTRAVENOUS at 15:15

## 2022-02-19 RX ADMIN — SODIUM CHLORIDE, PRESERVATIVE FREE 10 ML: 5 INJECTION INTRAVENOUS at 14:59

## 2022-02-19 RX ADMIN — ENOXAPARIN SODIUM 40 MG: 100 INJECTION SUBCUTANEOUS at 08:18

## 2022-02-19 RX ADMIN — SODIUM CHLORIDE, PRESERVATIVE FREE 10 ML: 5 INJECTION INTRAVENOUS at 21:29

## 2022-02-19 RX ADMIN — OXYCODONE HYDROCHLORIDE AND ACETAMINOPHEN 1 TABLET: 5; 325 TABLET ORAL at 00:57

## 2022-02-19 NOTE — PROGRESS NOTES
CM sent referral to [place] for ABX at home pending referral.    RAUDEL also left message for [facility].      Talia Hunter CM

## 2022-02-19 NOTE — PROGRESS NOTES
Problem: Pain  Goal: *Control of Pain  Outcome: Progressing Towards Goal     Problem: Diabetes Self-Management  Goal: *Disease process and treatment process  Description: Define diabetes and identify own type of diabetes; list 3 options for treating diabetes.   Outcome: Progressing Towards Goal

## 2022-02-19 NOTE — PROGRESS NOTES
TRANSFER - IN REPORT:    Verbal report received from Ana LuisaRN(name) on Devin Hilliard  being received from ED(unit) for routine progression of care      Report consisted of patients Situation, Background, Assessment and   Recommendations(SBAR). Information from the following report(s) SBAR, Kardex, ED Summary, Intake/Output, MAR and Recent Results was reviewed with the receiving nurse. Opportunity for questions and clarification was provided.

## 2022-02-19 NOTE — ED NOTES
Verbal shift change report given to CLEOPATRA RN  (oncoming nurse) by Fausto Henderson RN  (offgoing nurse). Report included the following information SBAR, ED Summary, Intake/Output, MAR and Recent Results.

## 2022-02-19 NOTE — PROGRESS NOTES
2119- PVR bladder scan revealed >293.    2138-This writer's message to on call provider:    Patient admitted for UTI, PVR bladder scan revealed >293 @ 2113. Also, Accucheck FSBG revealed HSBG of 221 and patient does not have a sliding scale ordered. Please advise. 2151-New orders received from Dr. Marisa Glynn. 0105-PVR bladder scan revealed 407.    0110-This writer's message to on call provider:    Patient admitted for UTI, PVR bladder scan revealed 407 ml. Please advise    0114-New order received from Dr. Marisa Glynn. 0130-Patient refused to allow this writer to straight cath him. He stated he self caths at home and stated \"you don't know when it hurts me\". Patient agreed to allow this writer to assist him. Patient performed hand hygiene, donned sterile gloves (size 8.5), cleansed area and self cath(14 Fr.) for 2 min and removed 400ml of yellow cloudy urine. Patient tolerated fair.

## 2022-02-19 NOTE — PROGRESS NOTES
Hospitalist Progress Note    NAME: Yoana Becker   :  1950   MRN:  [id number]   Room Number:    @ [facility]     Please note that this dictation was completed with Improve[employer]! [id number], the computer voice recognition software. Quite often unanticipated grammatical, syntax, homophones, and other interpretive errors are inadvertently transcribed by the computer software. Please disregard these errors. Please excuse any errors that have escaped final proofreading. Interim Hospital Summary: 66 y.o. male whom presented on 2022 with      Assessment / Plan:    # ESBL UTI   # Urinary Pain  #Urinary retention and LUTS due to BPH POA  -UA with WBC, RBC, Bacteria   -UCX w/ ESBL previously  -Patient currently performs CIC using 14 Fr catheter     - IV Merrem   - Cont Flomax   - Add finasteride   - Pain mgmt   - Cont CIC     #AYESHA on CKD 2  -Suspect secondary to bladder obstruction  -Serum creatinine 1.55 on admission(  baseline creatinine 2020)   -Ultrasound retroperitoneum without any hydro nephrosis  -Renally dose medication  -Avoid nephrotoxic medication  -Monitor creatinine     #Hx of LUTS s/p EBRT ( 2019)  and ADT( 2021)  for GG5 prostate cancer 2019   #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         Body mass index is 34.21 kg/m². Code Status: Full   Surrogate Decision Maker:     DVT Prophylaxis: Lovenox  GI Prophylaxis: not indicated         Subjective:     Chief Complaint / Reason for Physician Visit  Feeling better discussed with RN events overnight.      Review of Systems:  No fevers, chills, appetite change, cough, sputum production, shortness of breath, dyspnea on exertion, nausea, vomitting, diarrhea, constipation, chest pain, leg edema, abdominal pain, joint pain, rash, itching. Tolerating PT/OT. Tolerating diet. Objective:     VITALS:   Last 24hrs VS reviewed since prior progress note. Most recent are:  Patient Vitals for the past 24 hrs:   Temp Pulse Resp BP SpO2   02/19/22 0814 97.1 °F (36.2 °C) 76 16 125/86 95 %   02/19/22 0750 98.1 °F (36.7 °C) 66 18 138/71 97 %   02/18/22 2055 98.6 °F (37 °C) 81 18 (!) 117/52 96 %   02/18/22 1847 -- 74 18 (!) 141/76 97 %   02/18/22 1243 98.8 °F (37.1 °C) 85 18 (!) 153/134 98 %       Intake/Output Summary (Last 24 hours) at 2/19/2022 0924  Last data filed at 2/19/2022 0750  Gross per 24 hour   Intake 60 ml   Output 1150 ml   Net -1090 ml        PHYSICAL EXAM:  General: WD, WN. Alert, cooperative, no acute distress    EENT:  EOMI. Anicteric sclerae. MMM  Resp:  CTA bilaterally, no wheezing or rales. No accessory muscle use  CV:  Regular  rhythm,  normal S1/S2, no murmurs rubs gallops, No edema  GI:  Soft, Non distended, Non tender. +Bowel sounds  Neurologic:  Alert and oriented X 3, normal speech,   Psych:   Good insight. Not anxious nor agitated  Skin:  No rashes. No jaundice    Reviewed most current lab test results and cultures  YES  Reviewed most current radiology test results   YES  Review and summation of old records today    NO  Reviewed patient's current orders and MAR    YES  PMH/SH reviewed - no change compared to H&P  ________________________________________________________________________  Care Plan discussed with:    Comments   Patient x    Family      RN x    Care Manager x    Consultant                       x Multidiciplinary team rounds were held today with , nursing, pharmacist and clinical coordinator. Patient's plan of care was discussed; medications were reviewed and discharge planning was addressed.      ________________________________________________________________________  Total NON critical care TIME:  25   Minutes    Total CRITICAL CARE TIME Spent:   Minutes non procedure based Comments   >50% of visit spent in counseling and coordination of care x    ________________________________________________________________________  Cassius New MD     Procedures: see electronic medical records for all procedures/Xrays and details which were not copied into this note but were reviewed prior to creation of Plan. LABS:  I reviewed today's most current labs and imaging studies.   Pertinent labs include:  Recent Labs     02/18/22  1343   WBC 5.7   HGB 10.4*   HCT 34.2*        Recent Labs     02/19/22  0400 02/18/22  1343    135*   K 3.7 4.1    100   CO2 23 23   * 226*   BUN 23* 23*   CREA 1.36* 1.55*   CA 9.0 9.2       Signed: Cassius New MD

## 2022-02-19 NOTE — PROGRESS NOTES
2484 Shift report given to Nell Marcos RN and Eder Horne RN (oncoming) from JOZEF Enrique (offgoing). Report included the following: SBAR, MAR, Kardex, Intake/Ouput and Recent Results. Pt appears to be sleeping w/ no signs of distress. 8248 Phone provided for pt & pharmacy called to talk about home medications     0922 Pt stated pain has decreased to 5/10.     1013 Pt given supplies to straight cath self when needed. Urinal emptied. Pt's phone brought to nurse's station to charge    [phone] Pt waiting for family to bring personal straight cath supplies as pt states ours are too big. Pt provided sterile gloves (size 8.5) and iodine swabs     1442 Pt resting in bed w/ no signs of distress     1630 Pt self cath x 2 per Eder Horne RN. Supplies provided per request     [phone] Pt resting in bed w/ no signs of distress     1829 Pt talking on the phone. IV abx complete & turned off     1925 Shift report given to JOZEF Enrique (oncoming) from Nell Marcos, [address] [place] [address] and Eder Horne, [address] [place] [address] (offgoing). Report included the following: SBAR, MAR, Kardex, Intake/Ouput and Recent Results. ** Assessment & charting completed by Eder Horne RN.  Reviewed & verified by this RN **

## 2022-02-19 NOTE — PROGRESS NOTES
**Physician Signature**  This document was electronically signed by: Larry Stephens MD  02/18/2022 09:51 PM    **Consult Information**  Member Facility: [address] [place] [address] Rd., [address] MRN: [id number]  Consult ID: [id number]  Facility Time Zone: ET  Date and Time of Request: 02/18/2022 09:38:31 PM  ET  Requesting Clinician: Sd Smith  Patient Name: Marry Holder  Date of Birth: [birth year]  Gender: Male    **Reason for Consult**  Reason for Consult: Other non-Emergent    **Clinical Note**  Clinical Note: Bladder scan shows >293 PVR. Recommend monitoring and rechecking BS in 4 hours. BG is 221, order placed for ISS. **Attestation**  Interaction Mode: Phone Only  Phone Duration (mins): 1  Time of Call : 02/18/2022 09:49 PM  ET  Interaction Attestation: Interprofessional internet consultation was delivered through telephonic and/or electronic communication upon the request of the patients treating provider, while the requesting and the rendering provider were not in the same physical location. A written summary report was provided to the requesting provider at the originating site.   Evaluation Duration (mins): 2    **Physician Signature**  This document was electronically signed by: Larry Stephens MD  02/18/2022 09:51 PM

## 2022-02-19 NOTE — PROGRESS NOTES
**Physician Signature**  This document was electronically signed by: Oren Lerner MD  02/19/2022 01:14 AM    **Consult Information**  Member Facility: [address] [place] [address] Rd[address] MRN: [id number]  Consult ID: [id number]  Facility Time Zone: ET  Date and Time of Request: 02/19/2022 01:10:08 AM  ET  Requesting Clinician: Tom Becerra  Patient Name: Steven Bravo  Date of Birth: [birth year]  Gender: Male    **Reason for Consult**  Reason for Consult: Other non-Emergent    **Clinical Note**  Clinical Note: PVR bladder scan is 407, order placed for straight cath. **Attestation**  Interaction Mode: Phone Only  Phone Duration (mins): 1  Time of Call : 02/19/2022 01:13 AM  ET  Interaction Attestation: Interprofessional internet consultation was delivered through telephonic and/or electronic communication upon the request of the patients treating provider, while the requesting and the rendering provider were not in the same physical location. A written summary report was provided to the requesting provider at the originating site.   Evaluation Duration (mins): 1    **Physician Signature**  This document was electronically signed by: Oren Lerner MD  02/19/2022 01:14 AM

## 2022-02-19 NOTE — PROGRESS NOTES
Bedside shift change report given to [facility] (oncoming nurse) by Serenity Finney (offgoing nurse). Report included the following information SBAR, Kardex, Intake/Output, MAR and Recent Results.

## 2022-02-19 NOTE — PROGRESS NOTES
[facility] Admission Pharmacy Medication Reconciliation    Information obtained from:  Tone Mendoza [phone], [address], [place], [facility]  RxQuery data available1:yes    Comments/recommendations:    1) The patient was interviewed regarding current PTA medication list and drug allergies. Fair historian and admits he does not know exact names of medications. 2) Medication changes to PTA list:    Added  Losartan 100 mg po daily  Amlodipine 5 mg po daily  Atorvastatin 80 mg po QHS  Latanoprost (XALATAN) 0.005% eye drops - one drop each eye QHS  Pantoprazole 40 mg po daily  Removed  Lisinopril 40 mg  Adjusted  Simvastatin 20 mg changed to atorvastatin 80 mg    3) The [employer] and [employer] were accessed to determine fill history of any controlled medications:  No long-term controlled meds. Less than 7 days' supply of Oxycodone 5 mg, Lore Lewis from 4/20/21 to 12/6/21       1RxQuery pharmacy benefit data reflects medications filled and processed through the patient's insurance, however                this data does NOT capture whether the medication was picked up or is currently being taken by the patient. Past Medical History/Disease States:  Past Medical History:   Diagnosis Date    Diabetes ([address]     Gastrointestinal disorder     Hypertension     DEJA (obstructive sleep apnea)     AHI: 8.9 per hour         Patient allergies: Allergies as of 02/18/2022 - Fully Reviewed 02/18/2022   Allergen Reaction Noted    Aspirin Anxiety 04/16/2013         Prior to Admission Medications   Prescriptions Last Dose Informant  Taking? albuterol (PROVENTIL HFA, VENTOLIN HFA, PROAIR HFA) 90 mcg/actuation inhaler  Self  Yes   Sig: Take 2 Puffs by inhalation every four (4) hours as needed for Wheezing. amLODIPine (NORVASC) 5 mg tablet 2/17/2022 Self  Yes   Sig: Take 1 Tablet by mouth daily.    atorvastatin (LIPITOR) 80 mg tablet 2/16/2022 Self  Yes Sig: Take 1 Tablet by mouth nightly. cefdinir (OMNICEF) 300 mg capsule Not Taking at Unknown time Self  No   Sig: Take 300 mg by mouth two (2) times a day. Patient not taking: Reported on 2/19/2022   guaiFENesin-dextromethorphan (Adult Robitussin Peak Cold DM)  mg/5 mL liqd  Self  Yes   Sig: Take 10 mL by mouth every four (4) hours as needed for Cough or Congestion. latanoprost (XALATAN) 0.005 % ophthalmic solution 2/17/2022 Self  Yes   Sig: Administer 1 Drop to both eyes nightly. losartan (COZAAR) 100 mg tablet 2/17/2022 Self  Yes   Sig: Take 100 mg by mouth daily. metFORMIN (GLUCOPHAGE) 500 mg tablet 2/17/2022 at Unknown time Self  Yes   Sig: Take  by mouth two (2) times daily (with meals). pantoprazole (PROTONIX) 40 mg tablet 2/17/2022 Self  Yes   Sig: Take 1 Tablet by mouth daily. tamsulosin (FLOMAX) 0.4 mg capsule 2/17/2022 Self  Yes   Sig: Take 0.8 mg by mouth daily.           Thank you,  Gala Garza, [facility]

## 2022-02-20 LAB
ANION GAP SERPL CALC-SCNC: 10 MMOL/L (ref 5–15)
BACTERIA SPEC CULT: ABNORMAL
BACTERIA SPEC CULT: ABNORMAL
BUN SERPL-MCNC: 20 MG/DL (ref 6–20)
BUN/CREAT SERPL: 17 (ref 12–20)
CALCIUM SERPL-MCNC: 9 MG/DL (ref 8.5–10.1)
CC UR VC: ABNORMAL
CHLORIDE SERPL-SCNC: 102 MMOL/L (ref 97–108)
CO2 SERPL-SCNC: 25 MMOL/L (ref 21–32)
CREAT SERPL-MCNC: 1.17 MG/DL (ref 0.7–1.3)
GLUCOSE BLD STRIP.AUTO-MCNC: 169 MG/DL (ref 65–117)
GLUCOSE BLD STRIP.AUTO-MCNC: 196 MG/DL (ref 65–117)
GLUCOSE BLD STRIP.AUTO-MCNC: 221 MG/DL (ref 65–117)
GLUCOSE BLD STRIP.AUTO-MCNC: 249 MG/DL (ref 65–117)
GLUCOSE SERPL-MCNC: 156 MG/DL (ref 65–100)
POTASSIUM SERPL-SCNC: 3.9 MMOL/L (ref 3.5–5.1)
SERVICE CMNT-IMP: ABNORMAL
SODIUM SERPL-SCNC: 137 MMOL/L (ref 136–145)

## 2022-02-20 PROCEDURE — G0378 HOSPITAL OBSERVATION PER HR: HCPCS

## 2022-02-20 PROCEDURE — 51701 INSERT BLADDER CATHETER: CPT

## 2022-02-20 PROCEDURE — 80048 BASIC METABOLIC PNL TOTAL CA: CPT

## 2022-02-20 PROCEDURE — 74011000250 HC RX REV CODE- 250: Performed by: STUDENT IN AN ORGANIZED HEALTH CARE EDUCATION/TRAINING PROGRAM

## 2022-02-20 PROCEDURE — 65270000032 HC RM SEMIPRIVATE

## 2022-02-20 PROCEDURE — 96376 TX/PRO/DX INJ SAME DRUG ADON: CPT

## 2022-02-20 PROCEDURE — 74011000258 HC RX REV CODE- 258: Performed by: STUDENT IN AN ORGANIZED HEALTH CARE EDUCATION/TRAINING PROGRAM

## 2022-02-20 PROCEDURE — 74011250637 HC RX REV CODE- 250/637: Performed by: STUDENT IN AN ORGANIZED HEALTH CARE EDUCATION/TRAINING PROGRAM

## 2022-02-20 PROCEDURE — 74011250636 HC RX REV CODE- 250/636: Performed by: STUDENT IN AN ORGANIZED HEALTH CARE EDUCATION/TRAINING PROGRAM

## 2022-02-20 PROCEDURE — 36415 COLL VENOUS BLD VENIPUNCTURE: CPT

## 2022-02-20 PROCEDURE — 82962 GLUCOSE BLOOD TEST: CPT

## 2022-02-20 PROCEDURE — 74011636637 HC RX REV CODE- 636/637: Performed by: STUDENT IN AN ORGANIZED HEALTH CARE EDUCATION/TRAINING PROGRAM

## 2022-02-20 PROCEDURE — 96372 THER/PROPH/DIAG INJ SC/IM: CPT

## 2022-02-20 RX ADMIN — SODIUM CHLORIDE, PRESERVATIVE FREE 10 ML: 5 INJECTION INTRAVENOUS at 14:01

## 2022-02-20 RX ADMIN — OXYCODONE HYDROCHLORIDE AND ACETAMINOPHEN 1 TABLET: 5; 325 TABLET ORAL at 20:40

## 2022-02-20 RX ADMIN — SODIUM CHLORIDE, PRESERVATIVE FREE 10 ML: 5 INJECTION INTRAVENOUS at 05:00

## 2022-02-20 RX ADMIN — Medication 2 UNITS: at 16:46

## 2022-02-20 RX ADMIN — Medication 2 UNITS: at 08:55

## 2022-02-20 RX ADMIN — Medication 2 UNITS: at 21:21

## 2022-02-20 RX ADMIN — SODIUM CHLORIDE, PRESERVATIVE FREE 10 ML: 5 INJECTION INTRAVENOUS at 22:00

## 2022-02-20 RX ADMIN — OXYCODONE HYDROCHLORIDE AND ACETAMINOPHEN 1 TABLET: 5; 325 TABLET ORAL at 08:54

## 2022-02-20 RX ADMIN — ENOXAPARIN SODIUM 40 MG: 100 INJECTION SUBCUTANEOUS at 09:57

## 2022-02-20 RX ADMIN — OXYCODONE HYDROCHLORIDE AND ACETAMINOPHEN 1 TABLET: 5; 325 TABLET ORAL at 03:33

## 2022-02-20 RX ADMIN — FINASTERIDE 5 MG: 5 TABLET, FILM COATED ORAL at 08:54

## 2022-02-20 RX ADMIN — Medication 3 UNITS: at 11:48

## 2022-02-20 RX ADMIN — MEROPENEM 500 MG: 500 INJECTION, POWDER, FOR SOLUTION INTRAVENOUS at 15:01

## 2022-02-20 RX ADMIN — ATORVASTATIN CALCIUM 80 MG: 40 TABLET, FILM COATED ORAL at 20:39

## 2022-02-20 RX ADMIN — MEROPENEM 500 MG: 500 INJECTION, POWDER, FOR SOLUTION INTRAVENOUS at 03:34

## 2022-02-20 RX ADMIN — LATANOPROST 1 DROP: 50 SOLUTION OPHTHALMIC at 18:00

## 2022-02-20 RX ADMIN — TAMSULOSIN HYDROCHLORIDE 0.8 MG: 0.4 CAPSULE ORAL at 08:54

## 2022-02-20 RX ADMIN — MEROPENEM 500 MG: 500 INJECTION, POWDER, FOR SOLUTION INTRAVENOUS at 20:40

## 2022-02-20 RX ADMIN — MEROPENEM 500 MG: 500 INJECTION, POWDER, FOR SOLUTION INTRAVENOUS at 08:54

## 2022-02-20 NOTE — PROGRESS NOTES
Bedside shift change report given to [facility] (oncoming nurse) by Tresa Mcdonald (offgoing nurse). Report included the following information SBAR, Kardex, Intake/Output, MAR and Recent Results.

## 2022-02-20 NOTE — PROGRESS NOTES
Problem: Pain  Goal: *Control of Pain  Outcome: Progressing Towards Goal     Problem: Falls - Risk of  Goal: *Absence of Falls  Description: Document Sharlene Fall Risk and appropriate interventions in the flowsheet.   Outcome: Progressing Towards Goal  Note: Fall Risk Interventions:            Medication Interventions: Assess postural VS orthostatic hypotension

## 2022-02-20 NOTE — PROGRESS NOTES
8678 Shift report given to Eddie Smart, RN and Mary Ku, RN (oncoming) from JOZEF Enrique (offgoing). Report included the following: SBAR, MAR, Kardex, Intake/Output and Recent results. 0741 , pt denies any further needs     0835 Pt eating breakfast at this time     0924 IV pump beeping. Corrected pump. Pt left talking on the phone     [phone] Pt in bed talking on phone     [phone] Critical lab called from [facility], pt growing E coli in urine, ESBL. MD aware, no new orders placed as this is not new information, pt has ESBL    1213 Discussed w/ pt about not taking home Metformin as MD is prescribing only insulin while in the hospital. This RN explained that increased BS can be affected by infection in the body, stress, and abx. Pt informed that sliding scale insulin is a quicker fix than the metformin. Pt verbalized understanding & agreed to not take his metformin while receiving treatment in hospital     1303 Pt appears to be sleeping in bed w/ no signs of distress     1408 Pt walking unit to get exercise. Pt compliant wearing mask     1645 Pt appears to be sleeping w/ no signs of distress     1925 Shift report given to Sai Enrique  (oncoming) from Eddie Smart [facility] [place] and Stormy Garre, [place] (offgoing). Report included the following: SBAR, MAR, Kardex, Intake/Output and Recent results.

## 2022-02-20 NOTE — PROGRESS NOTES
Hospitalist Progress Note    NAME: Elena Hubbard   :  1950   MRN:  [id number]   Room Number:    @ [facility]     Please note that this dictation was completed with Martin Pierson, the computer voice recognition software. Quite often unanticipated grammatical, syntax, homophones, and other interpretive errors are inadvertently transcribed by the computer software. Please disregard these errors. Please excuse any errors that have escaped final proofreading. Interim Hospital Summary: 66 y.o. male whom presented on 2022 with      Assessment / Plan:    # ESBL UTI   # Urinary Pain POA resolved   #Urinary retention and LUTS due to BPH POA  -UA with WBC, RBC, Bacteria   -UCX w/ ESBL previously  -Patient currently performs CIC using 14 Fr catheter     - IV Merrem x 7 days   - Cont Flomax   - Add finasteride   - Pain mgmt   - Cont CIC     #AYESHA on CKD 2 POA resolved   -Suspect secondary to bladder obstruction  -Serum creatinine 1.55 on admission trend down to 1.17 (  baseline creatinine 2020)   -Ultrasound retroperitoneum without any hydro nephrosis  -Renally dose medication  -Avoid nephrotoxic medication  -Monitor creatinine     #Hx of LUTS s/p EBRT ( 2019)  and ADT( 2021)  for GG5 prostate cancer 2019   #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         Body mass index is 34.21 kg/m². Code Status: Full   Surrogate Decision Maker:     DVT Prophylaxis: Lovenox  GI Prophylaxis: not indicated         Subjective:     Chief Complaint / Reason for Physician Visit  Feeling better discussed with RN events overnight.      Review of Systems:  No fevers, chills, appetite change, cough, sputum production, shortness of breath, dyspnea on exertion, nausea, vomitting, diarrhea, constipation, chest pain, leg edema, abdominal pain, joint pain, rash, itching. Tolerating PT/OT. Tolerating diet. Objective:     VITALS:   Last 24hrs VS reviewed since prior progress note. Most recent are:  Patient Vitals for the past 24 hrs:   Temp Pulse Resp BP SpO2   02/20/22 0809 98.2 °F (36.8 °C) 72 16 127/77 100 %   02/20/22 0257 97.2 °F (36.2 °C) 64 18 134/79 100 %   02/19/22 2008 97.9 °F (36.6 °C) 70 18 135/74 98 %   02/19/22 1516 96.8 °F (36 °C) 78 17 117/66 99 %       Intake/Output Summary (Last 24 hours) at 2/20/2022 0911  Last data filed at 2/20/2022 0309  Gross per 24 hour   Intake 120 ml   Output 1395 ml   Net -1275 ml        PHYSICAL EXAM:  General: WD, WN. Alert, cooperative, no acute distress    EENT:  EOMI. Anicteric sclerae. MMM  Resp:  CTA bilaterally, no wheezing or rales. No accessory muscle use  CV:  Regular  rhythm,  normal S1/S2, no murmurs rubs gallops, No edema  GI:  Soft, Non distended, Non tender. +Bowel sounds  Neurologic:  Alert and oriented X 3, normal speech,   Psych:   Good insight. Not anxious nor agitated  Skin:  No rashes. No jaundice    Reviewed most current lab test results and cultures  YES  Reviewed most current radiology test results   YES  Review and summation of old records today    NO  Reviewed patient's current orders and MAR    YES  PMH/ reviewed - no change compared to H&P  ________________________________________________________________________  Care Plan discussed with:    Comments   Patient x    Family      RN x    Care Manager x    Consultant                       x Multidiciplinary team rounds were held today with , nursing, pharmacist and clinical coordinator. Patient's plan of care was discussed; medications were reviewed and discharge planning was addressed.      ________________________________________________________________________  Total NON critical care TIME:  25   Minutes    Total CRITICAL CARE TIME Spent:   Minutes non procedure based      Comments   >50% of visit spent in counseling and coordination of care x    ________________________________________________________________________  Michael Puri MD     Procedures: see electronic medical records for all procedures/Xrays and details which were not copied into this note but were reviewed prior to creation of Plan. LABS:  I reviewed today's most current labs and imaging studies.   Pertinent labs include:  Recent Labs     02/18/22  1343   WBC 5.7   HGB 10.4*   HCT 34.2*        Recent Labs     02/20/22  0400 02/19/22  0400 02/18/22  1343    138 135*   K 3.9 3.7 4.1    101 100   CO2 25 23 23   * 191* 226*   BUN 20 23* 23*   CREA 1.17 1.36* 1.55*   CA 9.0 9.0 9.2       Signed: Michael Puri MD

## 2022-02-20 NOTE — PROGRESS NOTES
Transition of Care Plan:  RUR: 11%     * Disposition- Home  * Transportation at Discharge: Family to transport home  * IM/MOONS/OBS/FOC letter: IMM on discharge   * Appointment with [facility] [place] [facility] on 2/28/22     Additional Resources:  * Primary care associate Patient to call and schedule   *Podiatry patient to call and schedule follow-up  *Neurology patient to call and schedule follow-up. [id number] will need to verify patient Medicare/Medicaid (out-of-state). CM reviewed chart. CM completed assessment with pt at bedside. Pt is alert and oriented throughout encounter. CM introduced self/role, verified demographics, and discussed discharge planning. Patient is known to CM, 5 ER visits in 6 month patient recently located to [place] from [place]. CM saw patient on 2/1/22 schedule a new patient appointment with [facility], per patient he kept that appointment and next one is on 2/28/22 but does not know the time. Discuss with patient provider plan for his remainder 4 days of ABX, referral sent to [facility] vital care pending his insurance information and CM will send order and information to [facility] for additional services. Patient states he has not called and establish care for PCP asking information on neurology for his foot, dental, and foot care. CM remind patient all those information was giving to him last ER visits and he has to call himself to establish these cares. Inform patient will add same information back on AVS and he much call to establish these care if he plans to stay in [place] permanently. Patient was status was upgraded from OBS to inpatient will no longer needs OBS and MOONS but will need the IMM notification 4 hours prior to discharge, first notification of IMM sign on 2/18 by patient access. Patient has transportation home knows to inform CM if plans change.     [address], [address] [phone] Weekend cell phone

## 2022-02-21 VITALS
HEART RATE: 60 BPM | OXYGEN SATURATION: 100 % | RESPIRATION RATE: 18 BRPM | DIASTOLIC BLOOD PRESSURE: 81 MMHG | HEIGHT: 68 IN | TEMPERATURE: 98.1 F | BODY MASS INDEX: 32.98 KG/M2 | WEIGHT: 217.59 LBS | SYSTOLIC BLOOD PRESSURE: 127 MMHG

## 2022-02-21 LAB
GLUCOSE BLD STRIP.AUTO-MCNC: 156 MG/DL (ref 65–117)
GLUCOSE BLD STRIP.AUTO-MCNC: 271 MG/DL (ref 65–117)
SERVICE CMNT-IMP: ABNORMAL
SERVICE CMNT-IMP: ABNORMAL

## 2022-02-21 PROCEDURE — 74011000258 HC RX REV CODE- 258: Performed by: STUDENT IN AN ORGANIZED HEALTH CARE EDUCATION/TRAINING PROGRAM

## 2022-02-21 PROCEDURE — 74011636637 HC RX REV CODE- 636/637: Performed by: STUDENT IN AN ORGANIZED HEALTH CARE EDUCATION/TRAINING PROGRAM

## 2022-02-21 PROCEDURE — 74011250637 HC RX REV CODE- 250/637: Performed by: STUDENT IN AN ORGANIZED HEALTH CARE EDUCATION/TRAINING PROGRAM

## 2022-02-21 PROCEDURE — 82962 GLUCOSE BLOOD TEST: CPT

## 2022-02-21 PROCEDURE — 74011000250 HC RX REV CODE- 250: Performed by: STUDENT IN AN ORGANIZED HEALTH CARE EDUCATION/TRAINING PROGRAM

## 2022-02-21 PROCEDURE — 74011250636 HC RX REV CODE- 250/636: Performed by: STUDENT IN AN ORGANIZED HEALTH CARE EDUCATION/TRAINING PROGRAM

## 2022-02-21 RX ADMIN — SODIUM CHLORIDE, PRESERVATIVE FREE 10 ML: 5 INJECTION INTRAVENOUS at 06:00

## 2022-02-21 RX ADMIN — Medication 2 UNITS: at 08:38

## 2022-02-21 RX ADMIN — Medication 5 UNITS: at 11:50

## 2022-02-21 RX ADMIN — ERTAPENEM SODIUM 1 G: 1 INJECTION, POWDER, LYOPHILIZED, FOR SOLUTION INTRAMUSCULAR; INTRAVENOUS at 12:45

## 2022-02-21 RX ADMIN — FINASTERIDE 5 MG: 5 TABLET, FILM COATED ORAL at 08:38

## 2022-02-21 RX ADMIN — ENOXAPARIN SODIUM 40 MG: 100 INJECTION SUBCUTANEOUS at 08:38

## 2022-02-21 RX ADMIN — MEROPENEM 500 MG: 500 INJECTION, POWDER, FOR SOLUTION INTRAVENOUS at 08:38

## 2022-02-21 RX ADMIN — TAMSULOSIN HYDROCHLORIDE 0.8 MG: 0.4 CAPSULE ORAL at 08:38

## 2022-02-21 RX ADMIN — MEROPENEM 500 MG: 500 INJECTION, POWDER, FOR SOLUTION INTRAVENOUS at 03:21

## 2022-02-21 RX ADMIN — OXYCODONE HYDROCHLORIDE AND ACETAMINOPHEN 1 TABLET: 5; 325 TABLET ORAL at 03:31

## 2022-02-21 NOTE — PROGRESS NOTES
2545 Shift report given to Rajesh Joyce RN and Belinda Looney RN (offgoing). Report included the following: SBAR, MAR, Kardex, Intake/Output and Recent results. Pt appears to be sleeping w/ no signs of distress. 1314 . Urinal emptied. Pt stated urine is from straight cathing self at 0630.     0909 Pt walking hallways, stating he was \"stretching his legs\"     1048 Pt sitting in bed, talking on the phone     [phone] IV abx hung & infusing    [phone] Pt requesting pain medication before he leaves. This RN accompanied Belinda Looney RN to inform pt that if he got his PRN Percocet at this time he would have to wait 4 hours before being allowed to drive home d/t a side effect of drowsiness and to ensure safety. Pt requesting to talk to MD about a prescription for pain medication.  MD made aware     1425 Pt walking independently downstairs for dc

## 2022-02-21 NOTE — ACP (ADVANCE CARE PLANNING)
Current Advanced Directive/Advance Care Plan: Full Code  Patient wants CPR/ventilation. Primary Decision Maker: Laurent Kowalski - Sister - [phone]  Patient also has a son, Mario [place].  Patient stated that he does not want son to make medical decisions for him. CM explained that son is next of kin and according to [place] law son is decision maker. CM informed him that he can sign an Advanced Directive to make someone else his decision maker. Patient stated understand and he wants to think about it before signing a document.

## 2022-02-21 NOTE — PROGRESS NOTES
2011 HTN nurse notified. Pt c/o 8/10 burning pain to penis. Nurse notified. Pt denies any needs at this time. 0245 Pt up in bed watching tv. Notified nurse of BP. Pt c/o pain to penis. Notified nurse. No further needs at this time.

## 2022-02-21 NOTE — PROGRESS NOTES
Physician Progress Note      PATIENT:               Narayan English  [facility]:                  [id number]  :                       1950  ADMIT DATE:       2022 1:18 PM  DISCH DATE:  RESPONDING  PROVIDER #:        Radha Fraser MD          QUERY TEXT:    Pt admitted with UTI. Pt noted to have Hx of Prostate CA and has to self catheterize himself to urinate. If possible, please document in the progress notes and discharge summary if you are evaluating and/or treating any of the following: The medical record reflects the following:  Risk Factors: Hx. Prostate CA; Self catheterize  Clinical Indicators: UA: nitrites: +; le: large; wbc: >100; bact: 1+. ..... UCx: >100,000 colonies/ml; E. Coli. Victor Hugo James Noted: cc of perineal pain and difficulty urinating for several weeks; Positive for difficulty urinating, dysuria and urgency  Treatment: PO Ultram; PO Flomax; IV ABx; UA/ UCX; IVF    Thank you,    Sacha Wing  [facility]  [phone]  Options provided:  -- UTI due to self catheterization  -- UTI not due to self catheterization  -- Other - I will add my own diagnosis  -- Disagree - Not applicable / Not valid  -- Disagree - Clinically unable to determine / Unknown  -- Refer to Clinical Documentation Reviewer    PROVIDER RESPONSE TEXT:    UTI is due to self catheterization. Query created by: Tim Khan on 2022 11:01 AM      QUERY TEXT:    Patient admitted with UTI. Noted documentation of AYESHA on CKD 2 in IM progress note dated . In order to support the diagnosis of AYESHA, please include additional clinical indicators in your documentation. Or please document if the diagnosis of AYESHA has been ruled out after further study. The medical record reflects the following:  Risk Factors: Hx: dm; htn; prostate ca; Self-catherization  Clinical Indicators: Bun/Cr: 23/1.55 ^ 23/1.36 ^ 20/1.17; GFR: 54 ^ >60. Victor Hugo James .. US Retroperitoneum: No hydronephrosis. Victor Hugo James Noted: Serum creatinine 1.55 on admission(  baseline creatinine 1- 2020)  Treatment: Renally dose medication; Avoid nephrotoxic medication; Monitor creatinine; US Retroperitoneum; Thank you,    Gilmer Guerrero  [facility]  [phone]    RIFLE  (BSV Approved)    RISK:  Increased SCr x 1.5 or GFR decrease > 25% (within 7 days)    INJURY:  Increased SCr x 2.0 or GFR decreased > 50%    FAILURE:  Increased SCr x 3.0 or GFR decrease > 75% or SCr >4.0 mg/dL or acute increase >0.5 mg/dL    LOSS:  Persistent acute renal failure = complete loss of kidney function > 4 weeks    END STAGE:  End stage of kidney disease > 3 months      AKIN    STAGE  1:  Increase in SCr >/= 0.3 mg/dL or >/= 150% to 200% (1.5 to 2-fold) from baseline (within 48 hours)    STAGE  2:  Increase in SCr to more than 200% to 300% (>2-3 fold) from baseline    STAGE  3:  Increase in SCr to more than 300% (>3-fold) from baseline or SCr >/= 4.0 mg/dL with an acute increase of at least 0.5 mg/dL or initiation of renal replacement therapy    Defined by Kidney Disease Improving Global Outcomes (KDIGO) clinical practice guideline for acute kidney injury:  -Increase in SCr by greater than or equal to 0.3 mg/dl within 48 hours; or  -Increase or decrease in SCr to greater than or equal to 1.5 times baseline, which is known or presumed to have occurred within the prior 7 days; or  -Urine volume < 0.5ml/kg/h for 6 hours  Options provided:  -- Acute kidney injury evidenced by, Please document evidence as well as baseline creatinine, if known. -- Currently resolved acute kidney injury was evidenced by, Please document evidence as well as baseline creatinine, if known.   -- Acute kidney injury ruled out after study  -- Other - I will add my own diagnosis  -- Disagree - Not applicable / Not valid  -- Disagree - Clinically unable to determine / Unknown  -- Refer to Clinical Documentation Reviewer    PROVIDER RESPONSE TEXT:    This patient has an acute kidney injury as evidenced by scr 1.55 ( baseline 1)    Query created by: Sudhir Terry on 2/21/2022 11:09 AM      Electronically signed by:  Shireen Dakin MD 2/21/2022 11:36 AM

## 2022-02-21 NOTE — PROGRESS NOTES
Bedside and Verbal shift change report given to JOZEF Rios/JOZEF Amaya (oncoming nurse) by Tresa Mcdonald (offgoing nurse). Report included the following information SBAR, Kardex, Intake/Output, MAR and Recent Results.

## 2022-02-21 NOTE — PROGRESS NOTES
Reason for Admission:  HISTORY OF PRESENT ILLNESS:     Stephen Garrido is a 66 y.o.  male with PMH of  Above mentioned  who presents to ED with c/o  Worsening urinary complaints including burning, painful micturition and difficulty w/ urination. Patient has been treated for UTI w/ PO ABx w/o improvement. RUR Score:    11                 Plan for utilizing home health: Will need IV ABT     PCP: First and Last name:  Unknown, Provider, DPM     Name of Practice:    Are you a current patient: Yes/No:    Approximate date of last visit:    Can you participate in a virtual visit with your PCP:                     Current Advanced Directive/Advance Care Plan: Full Code  Patient wants CPR/ventilation. Primary Decision MakerHonorio Patel Sister - [phone]  Patient also has a son, Israel Jaramillo:   Click here to complete [place] including selection of the Healthcare Decision Maker Relationship (ie \"Primary\")             Primary Decision Maker: Derrick Brunner - Sister - [phone]                  Transition of Care Plan:                    Verified address and phone number. Geni Cool phone number is [phone]  Lives with son in [facility] from [place] at the first of this year. Receives [employer] income     Plan  1/  IV ABT for 4 days. Patient and CM met with Iris Le ([phone]) to scheduled patient to come into [address] [facility] for completion of his IV ABT. 2/ Patient will need to establish Primary Care. CM to schedule appointment. Patient uses [facility] on [address] for medications. Patient stated that he has transportation home at discharge. No DME or HH usage. CM met with patient to discuss OBS notification. Patient declined to sign the forms. Care Management Interventions  PCP Verified by CM:  Yes  Mode of Transport at Discharge: Self  Transition of Care Consult (CM Consult): Infusion Center  Health Maintenance Reviewed: Yes  Support Systems: Child(jaylan),Other Family Member(s)  Confirm Follow Up Transport: Self  The Plan for Transition of Care is Related to the Following Treatment Goals : Establish PCP, IV Infusion Therapy  The Patient and/or Patient Representative was Provided with a Choice of Provider and Agrees with the Discharge Plan?: Yes  Name of the Patient Representative Who was Provided with a Choice of Provider and Agrees with the Discharge Plan: Patient  Freedom of Choice List was Provided with Basic Dialogue that Supports the Patient's Individualized Plan of Care/Goals, Treatment Preferences and Shares the Quality Data Associated with the Providers?: Yes  Discharge Location  Patient Expects to be Discharged to[de-identified]  (Home with Outpatient Services)      Follow up with Pillo Boateng DPM on 2/22/2022   Specialty: Podiatry   [address]   [address] [phone]   as discuss please call and establish care for your foot care. Icon Checkbox    Dental    Next steps: Follow up on 2/22/2022   call your insurance compnay and your care coordinator with your insurance and ask about dental care. as discuss call you insurance company and ask about dental care. Icon Checkbox    [facility]   Next steps: Follow up on 2/22/2022   [address] [facility] [address], [facility] [address]   Phone: [phone]   Your appointment time is 1pm.  , Bring ins card, picture id, and discharge papers, Please keep this appointment, Please arrive 15 minutes early.    Icon Checkbox    Follow up with Jamey on 2/28/2022   [address] [facility]   [phone]   please keep this appointment as discuss   Icon Checkbox    Follow up with Jevon Hagen MD on 3/24/2022   Specialty: Internal Medicine   [address] [address]   [phone] PCP- NEW TO PROVIDER 10:00 AM Pleaae call if you cannot keep within 48 hours.   JENAE Arriaga/RAUDEL  [phone]

## 2022-02-21 NOTE — DISCHARGE INSTRUCTIONS
Patient Education        Learning About ESBL Infection  What is an ESBL infection? ESBL stands for extended spectrum beta-lactamase. It's an enzyme found in some strains of bacteria. ESBL-producing bacteria can't be killed by many of the antibiotics that doctors use to treat infections, like penicillins and some cephalosporins. This makes it harder to treat. An infection with ESBL germs can be in any part of the body, including blood, organs, skin, and sites where surgery was done. There are many ways ESBL germs can be spread. The most common ways are by touching a person or thing that has the bacteria on it. The infection is more likely to spread in a hospital. For some people, especially those who are weak or ill, an ESBL infection can be serious. What are the symptoms? Symptoms of an ESBL infection are similar to other infections. Which symptoms you have will depend on where the infection is. For example:  · If the infection is in the skin, that area may be red or tender. · If it's in the urinary tract, you may have back pain, a burning sensation when you urinate, or a need to urinate more often than usual.  · If it's in the lungs, you may have a cough and trouble breathing. You may also:  · Have diarrhea. · Feel weak and sick. · Have fever and chills. How is an ESBL infection treated? Your doctor will give you antibiotics to treat the infection. Take your antibiotics as directed. Do not stop taking them just because you feel better. You need to take the full course of antibiotics. Taking only some of the medicine may cause antibiotic-resistant bacteria to develop. You may have to stay in the hospital for treatment. How can you prevent the spread of an ESBL infection? If you have an ESBL infection in the hospital:  · Your doctor may want to keep you away from others to reduce the chances of spreading the bacteria. You may be in a special room, called an isolation room.  Visitors may be limited to prevent ESBL germs from being carried outside your room. Children, pregnant women, people who are ill, and some others might not be allowed into the room. That's because they can be more likely to get a serious infection. · Everyone who comes in the room will need to wash their hands with soap and water or use an alcohol-based hand . Clean hands help stop the germs from spreading. · Visitors and caregivers may have to use disposable gloves and a gown over their clothes. This helps prevent ESBL germs from spreading. Practice good hygiene  · Wash your hands often. Hand-washing is the best way to avoid spreading germs. When washing hands with soap and water:  ? Wet your hands with running water, and apply soap. ? Rub your hands together to make a lather. Scrub well for at least 20 seconds. ? Pay special attention to your wrists, the backs of your hands, between your fingers, and under your fingernails. ? Rinse your hands well under running water. ? Use a clean towel to dry your hands, or air-dry your hands. You may want to use a clean towel as a barrier between the faucet and your clean hands when you turn off the water. · You can also use an alcohol-based hand . If you use , rub your hands and fingers until they are dry. You don't need to use water. The alcohol quickly kills many types of germs on your hands. · If you're in the hospital, ask everyone to wash their hands before they come in the room. · Keep cuts and scrapes clean and covered with a bandage. Wash your hands after you touch elastic bandages or other dressings over a wound. This can keep bacteria from spreading. Wrap bandages in a plastic bag before you throw them away. Avoid contact with other people's wounds or bandages. · Do not share towels, washcloths, razors, clothing, or other items that touched your wound or bandage. Wash your sheets, towels, and clothes with warm water and detergent.  Dry them in a hot dryer, if you can. · Keep shared areas clean by wiping down surfaces (such as counters, doorknobs, and light switches) with a disinfectant. Follow-up care is a key part of your treatment and safety. Be sure to make and go to all appointments, and call your doctor if you are having problems. It's also a good idea to know your test results and keep a list of the medicines you take. Where can you learn more? Go to http://www.gray.com/  Enter L900 in the search box to learn more about \"Learning About ESBL Infection. \"  Current as of: July 1, 2021               Content Version: 13.0  © 2274-1358 [employer]. Care instructions adapted under license by [employer] (which disclaims liability or warranty for this information).  If you have questions about a medical condition or this instruction, always ask your healthcare professional. [address] any warranty or liability for your use of this information.       -You are scheduled to receive Ertapenem1 g daily for 4 more days  -Please follow-up with urology for the management of urinary retention

## 2022-02-21 NOTE — DISCHARGE SUMMARY
Hospitalist Discharge Summary     Patient ID:  Rick Garcia  [id number]  33 y.o.  1950    PCP on record: Unknown, Provider, DPCHAPIN    Admit date: 2/18/2022  Discharge date and time: 2/21/2022    Please note that this dictation was completed with [employer], the [employer] AI voice recognition software. Quite often unanticipated grammatical, syntax, homophones, and other interpretive errors are inadvertently transcribed by the computer software. Please disregard these errors. Please excuse any errors that have escaped final proofreading. Admission Diagnoses: ESBL (extended spectrum beta-lactamase) producing bacteria infection [A49.9, Z16.12]    Discharge Diagnoses: Active Problems:    ESBL (extended spectrum beta-lactamase) producing bacteria infection (2/18/2022)           Hospital Course:     # ESBL UTI   # Urinary Pain POA resolved   #Urinary retention and LUTS due to BPH POA  -UA with WBC, RBC, Bacteria   -UCX w/ ESBL previously  -Patient currently performs CIC using 14 Fr catheter  -Patient received 3 days of meropenem in the hospital and to receive 4 days of  Ertapenem outpatient to complete 7-day course of ESBL UTI   -Follow-up with urology for the management of urinary retention due to BPH  - Patient agreed and verbalized understanding          #AYESHA on CKD 2 POA resolved      #Hx of LUTS s/p EBRT ( 9/2019)  and ADT( 1/2021)  for GG5 prostate cancer 9/2019   #History of complex radiation related bulbomembranous stricture ( 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  male with PMH of  Above mentioned  who presents to ED with c/o  Worsening urinary complaints including burning, painful micturition and difficulty w/ urination. Patient has been treated for UTI w/ PO ABx w/o improvement.  Patient denied any other active medical complaints. Patient stated he has been going to urology for BPH and still having problems with that     ______________________________________________________________________  DISCHARGE SUMMARY/HOSPITAL COURSE:  for full details see H&P, daily progress notes, labs, consult notes. _______________________________________________________________________  Patient seen and examined by me on discharge day. Pertinent Findings:  Gen:    Not in distress  Chest: Clear lungs  CVS:   Regular rhythm. No edema  Abd:  Soft, not distended, not tender  Neuro:  Alert with good insight. Oriented to person, place, and time   _______________________________________________________________________  DISCHARGE MEDICATIONS:   Current Discharge Medication List      START taking these medications    Details   ertapenem 1 gram 1 g IVPB 1 g by IntraVENous route every twenty-four (24) hours for 4 days. Qty: 4 Dose, Refills: 0  Start date: 2/21/2022, End date: 2/25/2022         CONTINUE these medications which have NOT CHANGED    Details   amLODIPine (NORVASC) 5 mg tablet Take 1 Tablet by mouth daily. atorvastatin (LIPITOR) 80 mg tablet Take 1 Tablet by mouth nightly. losartan (COZAAR) 100 mg tablet Take 100 mg by mouth daily. tamsulosin (FLOMAX) 0.4 mg capsule Take 0.8 mg by mouth daily. latanoprost (XALATAN) 0.005 % ophthalmic solution Administer 1 Drop to both eyes nightly. pantoprazole (PROTONIX) 40 mg tablet Take 1 Tablet by mouth daily. albuterol (PROVENTIL HFA, VENTOLIN HFA, PROAIR HFA) 90 mcg/actuation inhaler Take 2 Puffs by inhalation every four (4) hours as needed for Wheezing. Qty: 1 Each, Refills: 0      guaiFENesin-dextromethorphan (Adult Robitussin Peak Cold DM)  mg/5 mL liqd Take 10 mL by mouth every four (4) hours as needed for Cough or Congestion.   Qty: 118 mL, Refills: 0      metFORMIN (GLUCOPHAGE) 500 mg tablet Take  by mouth two (2) times daily (with meals). STOP taking these medications       cefdinir (OMNICEF) 300 mg capsule Comments:   Reason for Stopping:               My Recommended Diet, Activity, Wound Care, and follow-up labs are listed in the patient's Discharge Insturctions which I have personally completed and reviewed. _______________________________________________________________________  DISPOSITION:     Home with Family: x   Home with HH/PT/OT/RN:    SNF/LTC:    ARMANDO:    OTHER:        Condition at Discharge:  Stable  _______________________________________________________________________  Follow up with:   PCP : Unknown, Provider, DPM  Follow-up Information     Follow up With Specialties Details Why [facility]  On 2/28/2022 please keep this appointment as discuss [address]  [phone]    [facility] On 2/22/2022 as discuss please call and establish care for your foot care. [place]   [phone]      Dental   On 2/22/2022 as discuss call you insurance company and ask about dental care. call your insurance Bonnie Burns and your care coordinator with your insurance and ask about dental care. [facility]  On 2/22/2022 Your appointment time is 1pm.  , Bring ins card, picture id, and discharge papers, Please keep this appointment, Please arrive 15 minutes early. [address]ann [address]  Phone: [phone]    Sd Lynn MD Internal Medicine On 3/24/2022 Mar24 PCP- NEW TO PROVIDER 10:00 AM Pleaae call if you cannot keep within 48 hours.  [address] [facility]  [phone]      Unknown, Provider, DPM Podiatry   Patient not available to ask                Total time in minutes spent coordinating this discharge (includes going over instructions, follow-up, prescriptions, and preparing report for sign off to her PCP) : 45minutes    Signed:  Sylvia Jones MD

## 2022-02-22 ENCOUNTER — TELEPHONE (OUTPATIENT)
Dept: CASE MANAGEMENT | Age: 72
End: 2022-02-22

## 2022-02-22 NOTE — PROGRESS NOTES
[address]       2/22/2022      RE: Melanie Elaine      To Whom it May Concern: This is to certify that Melanie Elaine was admitted to hospital on 2/18/2022   to 2/21/2022 He may return to work on 2/27/2022. Thank you for your assistance in this matter.     Sincerely,      Jenny Rubio MD

## 2022-02-22 NOTE — TELEPHONE ENCOUNTER
CM called patient for the purpose of follow up to inpatient admission to check on environmental challenges/medications/appointment follow up/and questions/concerns. The call was answered by patient  and name confirmed per patient he is doing better wanted to know about his appointments inform of appointments dates and times no further questions or concerns from patient.      Talia Hunter CM

## 2022-02-23 ENCOUNTER — HOSPITAL ENCOUNTER (EMERGENCY)
Age: 72
Discharge: HOME | End: 2022-02-23
Attending: EMERGENCY MEDICINE | Admitting: EMERGENCY MEDICINE
Payer: MEDICARE

## 2022-02-23 VITALS
TEMPERATURE: 98.1 F | WEIGHT: 225.8 LBS | BODY MASS INDEX: 34.22 KG/M2 | OXYGEN SATURATION: 97 % | HEART RATE: 83 BPM | SYSTOLIC BLOOD PRESSURE: 173 MMHG | HEIGHT: 68 IN | RESPIRATION RATE: 22 BRPM | DIASTOLIC BLOOD PRESSURE: 89 MMHG

## 2022-02-23 DIAGNOSIS — R31.9 HEMATURIA, UNSPECIFIED TYPE: Primary | ICD-10-CM

## 2022-02-23 DIAGNOSIS — N30.90 CYSTITIS: ICD-10-CM

## 2022-02-23 PROCEDURE — 99283 EMERGENCY DEPT VISIT LOW MDM: CPT

## 2022-02-23 RX ORDER — OXYCODONE HYDROCHLORIDE 5 MG/1
TABLET ORAL
Status: DISPENSED
Start: 2022-02-23 | End: 2022-02-23

## 2022-02-23 RX ORDER — PHENAZOPYRIDINE HYDROCHLORIDE 200 MG/1
200 TABLET, FILM COATED ORAL 3 TIMES DAILY
Qty: 6 TABLET | Refills: 0 | Status: SHIPPED | OUTPATIENT
Start: 2022-02-23 | End: 2022-02-25

## 2022-02-23 NOTE — ED NOTES
Patient given copy of dc instructions and 0 paper script(s) and 1 electronic script. Patient verbalized understanding of instructions and script (s). Patient given a current medication reconciliation form and verbalized understanding of their medications. Patient verbalized understanding of the importance of discussing medications with  his or her physician or clinic they will be following up with. Patient alert and oriented and in no acute distress.

## 2022-02-23 NOTE — ED NOTES
Pt was in room pouring red tinged urine out of the urinal into the sink in the room. Explained to pt that he cannot do that. Pt stated he didn't know he couldn't pour urine down the sink in the room.

## 2022-02-23 NOTE — ED TRIAGE NOTES
Pt presents to ED with c/o blood in urine beginning this morning.   Pt reports a recent admission to the second floor for a UTI

## 2022-02-23 NOTE — ED PROVIDER NOTES
EMERGENCY DEPARTMENT HISTORY AND PHYSICAL EXAM      Date: 2/23/2022  Patient Name: Richard Olmos    History of Presenting Illness     Chief Complaint   Patient presents with    Blood in Urine       History Provided By: Patient    HPI: Richard Olmos, 66 y.o. male with PMHx as noted below presents the emergency department with chief complaint of dysuria and hematuria. Patient was just discharged from the hospital with a urinary tract infection and has been receiving IV ertapenem. Patient states he has gone to his scheduled infusions but is still continue to have pain with urination and today noted hematuria. Patient does note that he has not been self cathing as he should be. Otherwise rates the pain is moderate and constant without relieving or exacerbating factors. The pain is nonradiating. He is having no systemic symptoms. Pt denies any other alleviating or exacerbating factors. Additionally, pt specifically denies any recent fever, chills, headache, nausea, vomiting, abdominal pain, CP, SOB, lightheadedness, dizziness, numbness, weakness, BLE swelling, heart palpitations, urinary sxs, diarrhea, constipation, melena, hematochezia, cough, or congestion. PCP: None    Current Outpatient Medications   Medication Sig Dispense Refill    phenazopyridine (Pyridium) 200 mg tablet Take 1 Tablet by mouth three (3) times daily for 6 doses. 6 Tablet 0    ertapenem 1 gram 1 g IVPB 1 g by IntraVENous route every twenty-four (24) hours for 4 days. 4 Dose 0    amLODIPine (NORVASC) 5 mg tablet Take 1 Tablet by mouth daily.  atorvastatin (LIPITOR) 80 mg tablet Take 1 Tablet by mouth nightly.  losartan (COZAAR) 100 mg tablet Take 100 mg by mouth daily.  tamsulosin (FLOMAX) 0.4 mg capsule Take 0.8 mg by mouth daily.  latanoprost (XALATAN) 0.005 % ophthalmic solution Administer 1 Drop to both eyes nightly.  pantoprazole (PROTONIX) 40 mg tablet Take 1 Tablet by mouth daily.       albuterol (PROVENTIL HFA, VENTOLIN HFA, PROAIR HFA) 90 mcg/actuation inhaler Take 2 Puffs by inhalation every four (4) hours as needed for Wheezing. 1 Each 0    guaiFENesin-dextromethorphan (Adult Robitussin Peak Cold DM)  mg/5 mL liqd Take 10 mL by mouth every four (4) hours as needed for Cough or Congestion. 118 mL 0    metFORMIN (GLUCOPHAGE) 500 mg tablet Take  by mouth two (2) times daily (with meals). Past History     Past Medical History:  Past Medical History:   Diagnosis Date    Diabetes ([facility] [address]     Gastrointestinal disorder     Hypertension     DEJA (obstructive sleep apnea)     AHI: 8.9 per hour       Past Surgical History:  Past Surgical History:   Procedure Laterality Date    HX PROSTATE SURGERY      [place] ABDOMEN SURGERY PROC UNLISTED      Hernia Repair       Family History:  Family History   Problem Relation Age of Onset    Hypertension Mother     Diabetes Mother     Hypertension Father     Diabetes Father        Social History:  Social History     Tobacco Use    Smoking status: Never Smoker    Smokeless tobacco: Never Used   Substance Use Topics    Alcohol use: No    Drug use: Not on file       Allergies: Allergies   Allergen Reactions    Aspirin Anxiety         Review of Systems   Review of Systems  Constitutional: Negative for fever, chills, and fatigue. HENT: Negative for congestion, sore throat, rhinorrhea, sneezing and neck stiffness   Eyes: Negative for discharge and redness. Respiratory: Negative for  shortness of breath, wheezing   Cardiovascular: Negative for chest pain, palpitations   Gastrointestinal: Negative for nausea, vomiting, abdominal pain, constipation, diarrhea and blood in stool. Genitourinary: Positive for dysuria, hematuria.negative flank pain, decreased urine volume, discharge,   Musculoskeletal: Negative for myalgias or joint pain . Skin: Negative for rash or lesions . Neurological: Negative weakness, light-headedness, numbness and headaches. Physical Exam   Physical Exam    GENERAL: alert and oriented, no acute distress  EYES: PEERL, No injection, discharge or icterus. ENT: Mucous membranes pink and moist.  NECK: Supple  LUNGS: Airway patent. Non-labored respirations. Breath sounds clear with good air entry bilaterally. HEART: Regular rate and rhythm. No peripheral edema  ABDOMEN: Non-distended and non-tender, without guarding or rebound. SKIN:  warm, dry  MSK/EXTREMITIES: Without swelling, tenderness or deformity, symmetric with normal ROM  NEUROLOGICAL: Alert, oriented      Diagnostic Study Results     Labs -   No results found for this or any previous visit (from the past 12 hour(s)). Radiologic Studies -   No orders to display     CT Results  (Last 48 hours)    None        CXR Results  (Last 48 hours)    None            Medical Decision Making     IJohana MD am the first provider for this patient and am the attending of record for this patient encounter. I reviewed the vital signs, available nursing notes, past medical history, past surgical history, family history and social history. Vital Signs-Reviewed the patient's vital signs. Patient Vitals for the past 12 hrs:   Temp Pulse Resp BP SpO2   02/23/22 0307 98.1 °F (36.7 °C) 83 22 (!) 173/89 97 %         Records Reviewed: Nursing Notes and Old Medical Records    Provider Notes (Medical Decision Making): On presentation, the patient is well appearing, in no acute distress with normal vital signs. Patient presenting with hematuria and dysuria in the setting of known urinary tract infection. Urine was reviewed at bedside and was notable for hematuria, no notable clots. Patient is still able to pass urine however he is supposed to be intermittently straight cathing at home which he has not been doing. Patient did have 500 cc of urine in his bladder likely as he has not been straight cathing. Patient was given oxycodone with some relief.   Pain is likely multifactorial with hematuria, cystitis as well as some bladder distention from retention. Patient is able to pass urine here is do not feel that an indwelling Ball would be indicated. Will discharge patient as he is having no signs of any systemic illness that would warrant hospitalization. Patient should continue his current antibiotic regimen and patient has follow-up with urology in 5 days. Patient should return immediately to the emergency department if his pain worsens or if he has acute urinary retention that is unrelieved by home catheterization. ED Course:   Initial assessment performed. The patients presenting problems have been discussed, and they are in agreement with the care plan formulated and outlined with them. I have encouraged them to ask questions as they arise throughout their visit. Medications - No data to display      [address] [employer]  results have been reviewed with him. He has been counseled regarding his diagnosis. He verbally conveys understanding and agreement of the signs, symptoms, diagnosis, treatment and prognosis and additionally agrees to follow up as recommended. He also agrees with the care-plan and conveys that all of his questions have been answered. I have also put together some discharge instructions for him that include: 1) educational information regarding their diagnosis, 2) how to care for their diagnosis at home, as well a 3) list of reasons why they would want to return to the ED prior to their follow-up appointment, should their condition change. Disposition:  home    PLAN:  1. Discharge Medication List as of 2/23/2022  4:41 AM      START taking these medications    Details   phenazopyridine (Pyridium) 200 mg tablet Take 1 Tablet by mouth three (3) times daily for 6 doses. , Normal, Disp-6 Tablet, R-0         CONTINUE these medications which have NOT CHANGED    Details   ertapenem 1 gram 1 g IVPB 1 g by IntraVENous route every twenty-four (24) hours for 4 days. , No Print, Disp-4 Dose, R-0      amLODIPine (NORVASC) 5 mg tablet Take 1 Tablet by mouth daily. , Historical Med      atorvastatin (LIPITOR) 80 mg tablet Take 1 Tablet by mouth nightly., Historical Med      losartan (COZAAR) 100 mg tablet Take 100 mg by mouth daily. , Historical Med      tamsulosin (FLOMAX) 0.4 mg capsule Take 0.8 mg by mouth daily. , Historical Med      latanoprost (XALATAN) 0.005 % ophthalmic solution Administer 1 Drop to both eyes nightly., Historical Med      pantoprazole (PROTONIX) 40 mg tablet Take 1 Tablet by mouth daily. , Historical Med      albuterol (PROVENTIL HFA, VENTOLIN HFA, PROAIR HFA) 90 mcg/actuation inhaler Take 2 Puffs by inhalation every four (4) hours as needed for Wheezing., Normal, Disp-1 Each, R-0      guaiFENesin-dextromethorphan (Adult Robitussin Peak Cold DM)  mg/5 mL liqd Take 10 mL by mouth every four (4) hours as needed for Cough or Congestion. , Normal, Disp-118 mL, R-0      metFORMIN (GLUCOPHAGE) 500 mg tablet Take  by mouth two (2) times daily (with meals). , Historical Med           2. Follow-up Information     Follow up With Specialties Details Why [address] [facility] EMERGENCY DEPT Emergency Medicine  If symptoms worsen [address] Urology  Schedule an appointment as soon as possible for a visit in 2 days  [address] [facility]        Return to ED if worse     Diagnosis     Clinical Impression:   1. Hematuria, unspecified type    2. Cystitis        Please note that this dictation was completed with Dragon, computer voice recognition software. Quite often unanticipated grammatical, syntax, homophones, and other interpretive errors are inadvertently transcribed by the computer software. Please disregard these errors. Additionally, please excuse any errors that have escaped final proofreading.

## 2022-02-23 NOTE — ED NOTES
The documentation for this period is being entered following the guidelines as defined in the [facility] downtime policy by García Chavez.

## 2022-03-09 ENCOUNTER — HOSPITAL ENCOUNTER (INPATIENT)
Age: 72
LOS: 7 days | Discharge: HOME | DRG: 690 | End: 2022-03-16
Attending: EMERGENCY MEDICINE | Admitting: STUDENT IN AN ORGANIZED HEALTH CARE EDUCATION/TRAINING PROGRAM
Payer: MEDICARE

## 2022-03-09 DIAGNOSIS — N39.0 UTI DUE TO EXTENDED-SPECTRUM BETA LACTAMASE (ESBL) PRODUCING ESCHERICHIA COLI: ICD-10-CM

## 2022-03-09 DIAGNOSIS — D64.9 ANEMIA, UNSPECIFIED TYPE: ICD-10-CM

## 2022-03-09 DIAGNOSIS — B96.29 UTI DUE TO EXTENDED-SPECTRUM BETA LACTAMASE (ESBL) PRODUCING ESCHERICHIA COLI: ICD-10-CM

## 2022-03-09 DIAGNOSIS — C61 PROSTATE CANCER (HCC): ICD-10-CM

## 2022-03-09 DIAGNOSIS — Z16.12 UTI DUE TO EXTENDED-SPECTRUM BETA LACTAMASE (ESBL) PRODUCING ESCHERICHIA COLI: ICD-10-CM

## 2022-03-09 DIAGNOSIS — N23 URINARY TRACT PAIN: Primary | ICD-10-CM

## 2022-03-09 DIAGNOSIS — R42 DIZZINESS: ICD-10-CM

## 2022-03-09 LAB
ALBUMIN SERPL-MCNC: 3.2 G/DL (ref 3.5–5)
ALBUMIN/GLOB SERPL: 0.6 {RATIO} (ref 1.1–2.2)
ALP SERPL-CCNC: 88 U/L (ref 45–117)
ALT SERPL-CCNC: 30 U/L (ref 12–78)
ANION GAP SERPL CALC-SCNC: 14 MMOL/L (ref 5–15)
APPEARANCE UR: ABNORMAL
AST SERPL-CCNC: 17 U/L (ref 15–37)
BACTERIA URNS QL MICRO: NEGATIVE /HPF
BASOPHILS # BLD: 0 K/UL (ref 0–0.1)
BASOPHILS NFR BLD: 0 % (ref 0–1)
BILIRUB SERPL-MCNC: 0.5 MG/DL (ref 0.2–1)
BILIRUB UR QL: NEGATIVE
BUN SERPL-MCNC: 11 MG/DL (ref 6–20)
BUN/CREAT SERPL: 8 (ref 12–20)
CALCIUM SERPL-MCNC: 9.2 MG/DL (ref 8.5–10.1)
CHLORIDE SERPL-SCNC: 99 MMOL/L (ref 97–108)
CO2 SERPL-SCNC: 24 MMOL/L (ref 21–32)
COLOR UR: ABNORMAL
CREAT SERPL-MCNC: 1.35 MG/DL (ref 0.7–1.3)
DIFFERENTIAL METHOD BLD: ABNORMAL
EOSINOPHIL # BLD: 0.1 K/UL (ref 0–0.4)
EOSINOPHIL NFR BLD: 2 % (ref 0–7)
EPITH CASTS URNS QL MICRO: ABNORMAL /LPF
ERYTHROCYTE [DISTWIDTH] IN BLOOD BY AUTOMATED COUNT: 16 % (ref 11.5–14.5)
GLOBULIN SER CALC-MCNC: 5.2 G/DL (ref 2–4)
GLUCOSE BLD STRIP.AUTO-MCNC: 146 MG/DL (ref 65–117)
GLUCOSE BLD STRIP.AUTO-MCNC: 208 MG/DL (ref 65–117)
GLUCOSE BLD STRIP.AUTO-MCNC: 211 MG/DL (ref 65–117)
GLUCOSE BLD STRIP.AUTO-MCNC: 231 MG/DL (ref 65–117)
GLUCOSE SERPL-MCNC: 202 MG/DL (ref 65–100)
GLUCOSE UR STRIP.AUTO-MCNC: NEGATIVE MG/DL
HCT VFR BLD AUTO: 29.5 % (ref 36.6–50.3)
HEMOCCULT STL QL: NEGATIVE
HGB BLD-MCNC: 8.7 G/DL (ref 12.1–17)
HGB UR QL STRIP: ABNORMAL
IMM GRANULOCYTES # BLD AUTO: 0 K/UL (ref 0–0.04)
IMM GRANULOCYTES NFR BLD AUTO: 0 % (ref 0–0.5)
KETONES UR QL STRIP.AUTO: NEGATIVE MG/DL
LEUKOCYTE ESTERASE UR QL STRIP.AUTO: ABNORMAL
LYMPHOCYTES # BLD: 1.8 K/UL (ref 0.8–3.5)
LYMPHOCYTES NFR BLD: 27 % (ref 12–49)
MCH RBC QN AUTO: 25.7 PG (ref 26–34)
MCHC RBC AUTO-ENTMCNC: 29.5 G/DL (ref 30–36.5)
MCV RBC AUTO: 87.3 FL (ref 80–99)
MONOCYTES # BLD: 0.6 K/UL (ref 0–1)
MONOCYTES NFR BLD: 9 % (ref 5–13)
NEUTS SEG # BLD: 4.2 K/UL (ref 1.8–8)
NEUTS SEG NFR BLD: 62 % (ref 32–75)
NITRITE UR QL STRIP.AUTO: POSITIVE
NRBC # BLD: 0 K/UL (ref 0–0.01)
NRBC BLD-RTO: 0 PER 100 WBC
PH UR STRIP: 6 [PH] (ref 5–8)
PLATELET # BLD AUTO: 393 K/UL (ref 150–400)
PMV BLD AUTO: 8.2 FL (ref 8.9–12.9)
POTASSIUM SERPL-SCNC: 3.5 MMOL/L (ref 3.5–5.1)
PROT SERPL-MCNC: 8.4 G/DL (ref 6.4–8.2)
PROT UR STRIP-MCNC: 100 MG/DL
RBC # BLD AUTO: 3.38 M/UL (ref 4.1–5.7)
RBC #/AREA URNS HPF: ABNORMAL /HPF (ref 0–5)
SERVICE CMNT-IMP: ABNORMAL
SODIUM SERPL-SCNC: 137 MMOL/L (ref 136–145)
SP GR UR REFRACTOMETRY: <1.005 (ref 1–1.03)
TROPONIN-HIGH SENSITIVITY: 8 NG/L (ref 0–76)
UA: UC IF INDICATED,UAUC: ABNORMAL
UROBILINOGEN UR QL STRIP.AUTO: 0.2 EU/DL (ref 0.2–1)
WBC # BLD AUTO: 6.9 K/UL (ref 4.1–11.1)
WBC URNS QL MICRO: ABNORMAL /HPF (ref 0–4)

## 2022-03-09 PROCEDURE — 87077 CULTURE AEROBIC IDENTIFY: CPT

## 2022-03-09 PROCEDURE — 36415 COLL VENOUS BLD VENIPUNCTURE: CPT

## 2022-03-09 PROCEDURE — 93005 ELECTROCARDIOGRAM TRACING: CPT

## 2022-03-09 PROCEDURE — 74011250636 HC RX REV CODE- 250/636: Performed by: STUDENT IN AN ORGANIZED HEALTH CARE EDUCATION/TRAINING PROGRAM

## 2022-03-09 PROCEDURE — 74011636637 HC RX REV CODE- 636/637: Performed by: STUDENT IN AN ORGANIZED HEALTH CARE EDUCATION/TRAINING PROGRAM

## 2022-03-09 PROCEDURE — 77030019905 HC CATH URETH INTMIT MDII -A

## 2022-03-09 PROCEDURE — 84484 ASSAY OF TROPONIN QUANT: CPT

## 2022-03-09 PROCEDURE — 99285 EMERGENCY DEPT VISIT HI MDM: CPT

## 2022-03-09 PROCEDURE — 74011000250 HC RX REV CODE- 250: Performed by: STUDENT IN AN ORGANIZED HEALTH CARE EDUCATION/TRAINING PROGRAM

## 2022-03-09 PROCEDURE — 87186 SC STD MICRODIL/AGAR DIL: CPT

## 2022-03-09 PROCEDURE — 87086 URINE CULTURE/COLONY COUNT: CPT

## 2022-03-09 PROCEDURE — 82272 OCCULT BLD FECES 1-3 TESTS: CPT

## 2022-03-09 PROCEDURE — 74011000258 HC RX REV CODE- 258: Performed by: STUDENT IN AN ORGANIZED HEALTH CARE EDUCATION/TRAINING PROGRAM

## 2022-03-09 PROCEDURE — 81001 URINALYSIS AUTO W/SCOPE: CPT

## 2022-03-09 PROCEDURE — 82962 GLUCOSE BLOOD TEST: CPT

## 2022-03-09 PROCEDURE — 80053 COMPREHEN METABOLIC PANEL: CPT

## 2022-03-09 PROCEDURE — 85025 COMPLETE CBC W/AUTO DIFF WBC: CPT

## 2022-03-09 PROCEDURE — 74011250637 HC RX REV CODE- 250/637: Performed by: STUDENT IN AN ORGANIZED HEALTH CARE EDUCATION/TRAINING PROGRAM

## 2022-03-09 PROCEDURE — 65270000032 HC RM SEMIPRIVATE

## 2022-03-09 RX ORDER — SODIUM CHLORIDE 0.9 % (FLUSH) 0.9 %
5-40 SYRINGE (ML) INJECTION EVERY 8 HOURS
Status: DISCONTINUED | OUTPATIENT
Start: 2022-03-09 | End: 2022-03-16 | Stop reason: HOSPADM

## 2022-03-09 RX ORDER — DEXTROSE 50 % IN WATER (D50W) INTRAVENOUS SYRINGE
25-50 AS NEEDED
Status: DISCONTINUED | OUTPATIENT
Start: 2022-03-09 | End: 2022-03-16 | Stop reason: HOSPADM

## 2022-03-09 RX ORDER — TAMSULOSIN HYDROCHLORIDE 0.4 MG/1
0.8 CAPSULE ORAL DAILY
Status: DISCONTINUED | OUTPATIENT
Start: 2022-03-09 | End: 2022-03-16 | Stop reason: HOSPADM

## 2022-03-09 RX ORDER — ATORVASTATIN CALCIUM 40 MG/1
80 TABLET, FILM COATED ORAL
Status: DISCONTINUED | OUTPATIENT
Start: 2022-03-09 | End: 2022-03-16 | Stop reason: HOSPADM

## 2022-03-09 RX ORDER — INSULIN LISPRO 100 [IU]/ML
1-10 INJECTION, SOLUTION INTRAVENOUS; SUBCUTANEOUS
Status: DISCONTINUED | OUTPATIENT
Start: 2022-03-09 | End: 2022-03-16 | Stop reason: HOSPADM

## 2022-03-09 RX ORDER — MAGNESIUM SULFATE 100 %
4 CRYSTALS MISCELLANEOUS AS NEEDED
Status: DISCONTINUED | OUTPATIENT
Start: 2022-03-09 | End: 2022-03-16 | Stop reason: HOSPADM

## 2022-03-09 RX ORDER — PANTOPRAZOLE SODIUM 40 MG/1
40 TABLET, DELAYED RELEASE ORAL
Status: DISCONTINUED | OUTPATIENT
Start: 2022-03-10 | End: 2022-03-16 | Stop reason: HOSPADM

## 2022-03-09 RX ORDER — ACETAMINOPHEN 325 MG/1
650 TABLET ORAL
Status: DISCONTINUED | OUTPATIENT
Start: 2022-03-09 | End: 2022-03-16 | Stop reason: HOSPADM

## 2022-03-09 RX ORDER — TAMSULOSIN HYDROCHLORIDE 0.4 MG/1
0.4 CAPSULE ORAL 2 TIMES DAILY
COMMUNITY
End: 2022-04-27 | Stop reason: SDUPTHER

## 2022-03-09 RX ORDER — SODIUM CHLORIDE 0.9 % (FLUSH) 0.9 %
5-40 SYRINGE (ML) INJECTION AS NEEDED
Status: DISCONTINUED | OUTPATIENT
Start: 2022-03-09 | End: 2022-03-16 | Stop reason: HOSPADM

## 2022-03-09 RX ORDER — ENOXAPARIN SODIUM 100 MG/ML
40 INJECTION SUBCUTANEOUS DAILY
Status: DISCONTINUED | OUTPATIENT
Start: 2022-03-10 | End: 2022-03-16 | Stop reason: HOSPADM

## 2022-03-09 RX ORDER — AMLODIPINE BESYLATE 5 MG/1
5 TABLET ORAL DAILY
Status: DISCONTINUED | OUTPATIENT
Start: 2022-03-10 | End: 2022-03-16 | Stop reason: HOSPADM

## 2022-03-09 RX ORDER — LATANOPROST 50 UG/ML
1 SOLUTION/ DROPS OPHTHALMIC
Status: DISCONTINUED | OUTPATIENT
Start: 2022-03-09 | End: 2022-03-16 | Stop reason: HOSPADM

## 2022-03-09 RX ORDER — OXYCODONE AND ACETAMINOPHEN 5; 325 MG/1; MG/1
1 TABLET ORAL
Status: DISCONTINUED | OUTPATIENT
Start: 2022-03-09 | End: 2022-03-16 | Stop reason: HOSPADM

## 2022-03-09 RX ORDER — IPRATROPIUM BROMIDE AND ALBUTEROL SULFATE 2.5; .5 MG/3ML; MG/3ML
3 SOLUTION RESPIRATORY (INHALATION)
Status: DISCONTINUED | OUTPATIENT
Start: 2022-03-09 | End: 2022-03-16 | Stop reason: HOSPADM

## 2022-03-09 RX ORDER — GUAIFENESIN/DEXTROMETHORPHAN 100-10MG/5
10 SYRUP ORAL
Status: DISCONTINUED | OUTPATIENT
Start: 2022-03-09 | End: 2022-03-16 | Stop reason: HOSPADM

## 2022-03-09 RX ORDER — ONDANSETRON 2 MG/ML
4 INJECTION INTRAMUSCULAR; INTRAVENOUS
Status: DISCONTINUED | OUTPATIENT
Start: 2022-03-09 | End: 2022-03-16 | Stop reason: HOSPADM

## 2022-03-09 RX ORDER — POLYETHYLENE GLYCOL 3350 17 G/17G
17 POWDER, FOR SOLUTION ORAL DAILY PRN
Status: DISCONTINUED | OUTPATIENT
Start: 2022-03-09 | End: 2022-03-16 | Stop reason: HOSPADM

## 2022-03-09 RX ORDER — SODIUM CHLORIDE, SODIUM LACTATE, POTASSIUM CHLORIDE, CALCIUM CHLORIDE 600; 310; 30; 20 MG/100ML; MG/100ML; MG/100ML; MG/100ML
100 INJECTION, SOLUTION INTRAVENOUS CONTINUOUS
Status: DISCONTINUED | OUTPATIENT
Start: 2022-03-09 | End: 2022-03-10

## 2022-03-09 RX ORDER — ACETAMINOPHEN 650 MG/1
650 SUPPOSITORY RECTAL
Status: DISCONTINUED | OUTPATIENT
Start: 2022-03-09 | End: 2022-03-16 | Stop reason: HOSPADM

## 2022-03-09 RX ORDER — ACETAMINOPHEN 325 MG/1
650 TABLET ORAL
COMMUNITY
End: 2022-04-30

## 2022-03-09 RX ADMIN — SODIUM CHLORIDE, POTASSIUM CHLORIDE, SODIUM LACTATE AND CALCIUM CHLORIDE 100 ML/HR: 600; 310; 30; 20 INJECTION, SOLUTION INTRAVENOUS at 15:07

## 2022-03-09 RX ADMIN — LATANOPROST 1 DROP: 50 SOLUTION OPHTHALMIC at 21:32

## 2022-03-09 RX ADMIN — Medication 3 UNITS: at 16:58

## 2022-03-09 RX ADMIN — OXYCODONE HYDROCHLORIDE AND ACETAMINOPHEN 1 TABLET: 5; 325 TABLET ORAL at 15:08

## 2022-03-09 RX ADMIN — TAMSULOSIN HYDROCHLORIDE 0.8 MG: 0.4 CAPSULE ORAL at 15:08

## 2022-03-09 RX ADMIN — MEROPENEM 500 MG: 500 INJECTION, POWDER, FOR SOLUTION INTRAVENOUS at 15:08

## 2022-03-09 RX ADMIN — ATORVASTATIN CALCIUM 80 MG: 40 TABLET, FILM COATED ORAL at 21:10

## 2022-03-09 RX ADMIN — OXYCODONE HYDROCHLORIDE AND ACETAMINOPHEN 1 TABLET: 5; 325 TABLET ORAL at 20:02

## 2022-03-09 RX ADMIN — SODIUM CHLORIDE, PRESERVATIVE FREE 10 ML: 5 INJECTION INTRAVENOUS at 14:05

## 2022-03-09 RX ADMIN — SODIUM CHLORIDE, PRESERVATIVE FREE 10 ML: 5 INJECTION INTRAVENOUS at 21:11

## 2022-03-09 RX ADMIN — MEROPENEM 500 MG: 500 INJECTION, POWDER, FOR SOLUTION INTRAVENOUS at 21:10

## 2022-03-09 NOTE — ED NOTES
TRANSFER - OUT REPORT:    Verbal report given to Adria Villanueva RN (name) on Iline July  being transferred to Med/Surg (unit) for routine progression of care       Report consisted of patients Situation, Background, Assessment and   Recommendations(SBAR). Information from the following report(s) SBAR, Kardex and ED Summary was reviewed with the receiving nurse. Lines:   Peripheral IV 03/09/22 Distal;Right Antecubital (Active)        Opportunity for questions and clarification was provided.       Patient transported with:  Pt sam

## 2022-03-09 NOTE — PROGRESS NOTES
1730 Patient transported to room form ED. Patient place in gown. Skin assessed. patient bathed with HCG wipes.

## 2022-03-09 NOTE — ED NOTES
Bobbi from [facility] aware needs to consult with Dr. Luis Daniel Mendosa, advised she will get back to us

## 2022-03-09 NOTE — ED TRIAGE NOTES
Pt c/o lightheadedness/dizziness since he woke up this morning around 0600. Pt states he has been up all night urinating.

## 2022-03-09 NOTE — PROGRESS NOTES
Advance Care Planning     Advance Care Planning (ACP) Physician/NP/PA Conversation      Date of Conversation: 3/9/2022  Conducted with: Patient with Decision Making Capacity    Healthcare Decision Maker:     Primary Decision Maker: Fouzia Cool - [phone]  Click here to complete [place] including selection of the Healthcare Decision Maker Relationship (ie \"Primary\")      Today we documented Decision Maker(s). The patient will provide ACP documents. Care Preferences:    Hospitalization: \"If your health worsens and it becomes clear that your chance of recovery is unlikely, what would be your preference regarding hospitalization? \"  The patient would prefer hospitalization. Ventilation: \"If you were unable to breathe on your own and your chance of recovery was unlikely, what would be your preference about the use of a ventilator (breathing machine) if it was available to you? \"   The patient would desire the use of a ventilator. Resuscitation: \"In the event your heart stopped as a result of an underlying serious health condition, would you want attempts to be made to restart your heart, or would you prefer a natural death? \"   Yes, attempt to resuscitate.     Additional topics discussed: treatment goals and hospitalization preferences    Conversation Outcomes / Follow-Up Plan:   ACP in process - information provided, considering goals and options  Reviewed DNR/DNI and patient elects Full Code (Attempt Resuscitation)     Length of Voluntary ACP Conversation in minutes:  20 minutes    Danilo Marshall MD

## 2022-03-09 NOTE — H&P
Hospitalist Admission Note    NAME: Steffanie Pratt   :  1950   MRN:  [id number]   Room Number: ER09/09  @ [facility]     Date/Time:  3/9/2022 2:09 PM    Patient PCP: None    Please note that this dictation was completed with [employer], the computer voice recognition software. Quite often unanticipated grammatical, syntax, homophones, and other interpretive errors are inadvertently transcribed by the computer software. Please disregard these errors. Please excuse any errors that have escaped final proofreading.  ______________________________________________________________________  Given the patient's current clinical presentation, I have a high level of concern for decompensation if discharged from the emergency department. Complex decision making was performed, which includes reviewing the patient's available past medical records, laboratory results, and x-ray films. My assessment of this patient's clinical condition and my plan of care is as follows. Assessment / Plan:        Active Problems:    ESBL (extended spectrum beta-lactamase) producing bacteria infection (2022)      # ESBL UTI   # Urinary Pain POA   #Urinary retention and LUTS due to BPH POA  #Hematuria POA  -UA with WBC, RBC, Bacteria   -UCX w/ ESBL previously  -Patient currently performs CIC using 14 Fr catheter  -Patient did not complete previous course for ESBL UTI  -Patient did not see urology appointment made for him previously on   -Hemoglobin 8.7 baseline 10.4      -IV meropenem inpatient setting for 7 days given patient does not have resources to receive outpatient therapy  -Monitor labs  -Transfuse to keep hemoglobin above 7  -Continue Flomax  - Percocet PRN for pain   -Bladder check  as needed  -Patient to follow-up with urology as an outpatient for management of hematuria and urinary retention due to BPH w/ LUTS           #CKD 2 POA   #Hx of LUTS s/p EBRT ( 2019)  and ADT( 1/2021)  for GG5 prostate cancer 9/2019   #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   - Duo neb as needed   - resume HTN meds as BP tolerates       Body mass index is 33.45 kg/m². Code Status: Full   Surrogate Decision Maker:    DVT Prophylaxis: Hold due to hematuria  GI Prophylaxis: not indicate        Subjective:   CHIEF COMPLAINT: Urinary burning    HISTORY OF PRESENT ILLNESS:     Katelyn Gama is a 66 y.o.  male with PMH of above-mentioned problems who presents to ED with c/o burning sensation while urination since last night. Patient has been diagnosed with ESBL UTI in the past and was discharged home on 4 more days of ertapenem. Patient states that he only received 1 more doses outpatient and did not complete the entire course due to hematuria. Patient stated he did not attend his urology appointment. Patient endorsed pain while urination. We were asked to admit for work up and evaluation of the above problems. Past Medical History:   Diagnosis Date    Diabetes ([facility] [address]     Gastrointestinal disorder     Hypertension     DEJA (obstructive sleep apnea)     AHI: 8.9 per hour        Past Surgical History:   Procedure Laterality Date    HX PROSTATE SURGERY      [place] ABDOMEN SURGERY PROC UNLISTED      Hernia Repair       Social History     Tobacco Use    Smoking status: Never Smoker    Smokeless tobacco: Never Used   Substance Use Topics    Alcohol use: No        Family History   Problem Relation Age of Onset    Hypertension Mother     Diabetes Mother     Hypertension Father     Diabetes Father      Allergies   Allergen Reactions    Aspirin Anxiety        Prior to Admission medications    Medication Sig Start Date End Date Taking?  Authorizing Provider   amLODIPine (NORVASC) 5 mg tablet Take 1 Tablet by mouth daily. 3/29/21  Yes Provider, Historical   atorvastatin (LIPITOR) 80 mg tablet Take 1 Tablet by mouth nightly. 3/28/21  Yes Provider, Historical   losartan (COZAAR) 100 mg tablet Take 100 mg by mouth daily. 11/29/21  Yes Provider, Historical   tamsulosin (FLOMAX) 0.4 mg capsule Take 0.8 mg by mouth daily. Yes Provider, Historical   latanoprost (XALATAN) 0.005 % ophthalmic solution Administer 1 Drop to both eyes nightly. Yes Provider, Historical   pantoprazole (PROTONIX) 40 mg tablet Take 1 Tablet by mouth daily. 8/26/21  Yes Provider, Historical   albuterol (PROVENTIL HFA, VENTOLIN HFA, PROAIR HFA) 90 mcg/actuation inhaler Take 2 Puffs by inhalation every four (4) hours as needed for Wheezing. 1/8/22  Yes Andria Hunt, NP   guaiFENesin-dextromethorphan (Adult Robitussin Peak Cold DM)  mg/5 mL liqd Take 10 mL by mouth every four (4) hours as needed for Cough or Congestion. 1/8/22  Yes Andria Hunt, NP   metFORMIN (GLUCOPHAGE) 500 mg tablet Take  by mouth two (2) times daily (with meals). Yes Provider, Historical       REVIEW OF SYSTEMS:     I am not able to complete the review of systems because:    The patient is intubated and sedated    The patient has altered mental status due to his acute medical problems    The patient has baseline aphasia from prior stroke(s)    The patient has baseline dementia and is not reliable historian    The patient is in acute medical distress and unable to provide information           Total of 12 systems reviewed as follows:       POSITIVE= underlined text  Negative = text not underlined  General:  fever, chills, sweats, generalized weakness, weight loss/gain,      loss of appetite   Eyes:    blurred vision, eye pain, loss of vision, double vision  ENT:    rhinorrhea, pharyngitis   Respiratory:   cough, sputum production, SOB, ALEJO, wheezing, pleuritic pain   Cardiology:   chest pain, palpitations, orthopnea, PND, edema, syncope   Gastrointestinal:  abdominal pain , N/V, diarrhea, dysphagia, constipation, bleeding   Genitourinary:  frequency, urgency, dysuria, hematuria, incontinence   Muskuloskeletal :  arthralgia, myalgia, back pain  Hematology:  easy bruising, nose or gum bleeding, lymphadenopathy   Dermatological: rash, ulceration, pruritis, color change / jaundice  Endocrine:   hot flashes or polydipsia   Neurological:  headache, dizziness, confusion, focal weakness, paresthesia,     Speech difficulties, memory loss, gait difficulty  Psychological: Feelings of anxiety, depression, agitation    Objective:   VITALS:    Visit Vitals  /82   Pulse 83   Temp 98 °F (36.7 °C)   Resp 18   Ht 5' 8\" (1.727 m)   Wt 99.8 kg (220 lb)   SpO2 96%   BMI 33.45 kg/m²       PHYSICAL EXAM:    General:    Alert, cooperative, no distress, appears stated age. HEENT: Atraumatic, anicteric sclerae, pink conjunctivae     No oral ulcers, mucosa moist, throat clear, dentition fair  Neck:  Supple, symmetrical,  thyroid: non tender  Lungs:   Clear to auscultation bilaterally. No Wheezing or Rhonchi. No rales. Chest wall:  No tenderness  No Accessory muscle use. Heart:   Regular  rhythm,  No  murmur   No edema  Abdomen:   Soft, non-tender. Not distended. Bowel sounds normal  Extremities: No cyanosis. No clubbing,      Skin turgor normal, Capillary refill normal, Radial dial pulse 2+  Skin:     Not pale. Not Jaundiced  No rashes   Psych:  Good insight. Not depressed. Not anxious or agitated. Neurologic: EOMs intact. No facial asymmetry. No aphasia or slurred speech. Symmetrical strength, Sensation grossly intact.  Alert and oriented X 4.     ______________________________________________________________________    Care Plan discussed with:  Patient/Family    Expected  Disposition:  Home w/Family  ________________________________________________________________________  TOTAL TIME:  54 Minutes    Critical Care Provided     Minutes non procedure based      Comments    x Reviewed previous records   >50% of visit spent in counseling and coordination of care x Discussion with patient and/or family and questions answered       ________________________________________________________________________  Signed: Adirana Herrera MD    Procedures: see electronic medical records for all procedures/Xrays and details which were not copied into this note but were reviewed prior to creation of Plan. LAB DATA REVIEWED:    Recent Results (from the past 24 hour(s))   EKG, 12 LEAD, INITIAL    Collection Time: 03/09/22 10:03 AM   Result Value Ref Range    Ventricular Rate 84 BPM    Atrial Rate 84 BPM    P-R Interval 150 ms    QRS Duration 72 ms    Q-T Interval 366 ms    QTC Calculation (Bezet) 432 ms    Calculated P Axis 37 degrees    Calculated R Axis -9 degrees    Calculated T Axis 11 degrees    Diagnosis Normal sinus rhythm  No previous ECGs available      GLUCOSE, POC    Collection Time: 03/09/22 10:06 AM   Result Value Ref Range    Glucose (POC) 211 (H) 65 - 117 mg/dL    Performed by Vaishali Keane RN    METABOLIC PANEL, COMPREHENSIVE    Collection Time: 03/09/22 10:09 AM   Result Value Ref Range    Sodium 137 136 - 145 mmol/L    Potassium 3.5 3.5 - 5.1 mmol/L    Chloride 99 97 - 108 mmol/L    CO2 24 21 - 32 mmol/L    Anion gap 14 5 - 15 mmol/L    Glucose 202 (H) 65 - 100 mg/dL    BUN 11 6 - 20 MG/DL    Creatinine 1.35 (H) 0.70 - 1.30 MG/DL    BUN/Creatinine ratio 8 (L) 12 - 20      GFR est AA >60 >60 ml/min/1.73m2    GFR est non-AA 52 (L) >60 ml/min/1.73m2    Calcium 9.2 8.5 - 10.1 MG/DL    Bilirubin, total 0.5 0.2 - 1.0 MG/DL    ALT (SGPT) 30 12 - 78 U/L    AST (SGOT) 17 15 - 37 U/L    Alk.  phosphatase 88 45 - 117 U/L    Protein, total 8.4 (H) 6.4 - 8.2 g/dL    Albumin 3.2 (L) 3.5 - 5.0 g/dL    Globulin 5.2 (H) 2.0 - 4.0 g/dL    A-G Ratio 0.6 (L) 1.1 - 2.2     CBC WITH AUTOMATED DIFF    Collection Time: 03/09/22 10:09 AM   Result Value Ref Range    WBC 6.9 4.1 - 11.1 K/uL    RBC 3.38 (L) 4.10 - 5.70 M/uL    HGB 8.7 (L) 12.1 - 17.0 g/dL    HCT 29.5 (L) 36.6 - 50.3 %    MCV 87.3 80.0 - 99.0 FL    MCH 25.7 (L) 26.0 - 34.0 PG    MCHC 29.5 (L) 30.0 - 36.5 g/dL    RDW 16.0 (H) 11.5 - 14.5 %    PLATELET 120 793 - 545 K/uL    MPV 8.2 (L) 8.9 - 12.9 FL    NRBC 0.0 0  WBC    ABSOLUTE NRBC 0.00 0.00 - 0.01 K/uL    NEUTROPHILS 62 32 - 75 %    LYMPHOCYTES 27 12 - 49 %    MONOCYTES 9 5 - 13 %    EOSINOPHILS 2 0 - 7 %    BASOPHILS 0 0 - 1 %    IMMATURE GRANULOCYTES 0 0.0 - 0.5 %    ABS. NEUTROPHILS 4.2 1.8 - 8.0 K/UL    ABS. LYMPHOCYTES 1.8 0.8 - 3.5 K/UL    ABS. MONOCYTES 0.6 0.0 - 1.0 K/UL    ABS. EOSINOPHILS 0.1 0.0 - 0.4 K/UL    ABS. BASOPHILS 0.0 0.0 - 0.1 K/UL    ABS. IMM.  GRANS. 0.0 0.00 - 0.04 K/UL    DF AUTOMATED     URINALYSIS W/ REFLEX CULTURE    Collection Time: 03/09/22 10:09 AM    Specimen: Urine   Result Value Ref Range    Color DARK YELLOW      Appearance TURBID (A) CLEAR      Specific gravity <1.005 1.003 - 1.030    pH (UA) 6.0 5.0 - 8.0      Protein 100 (A) NEG mg/dL    Glucose Negative NEG mg/dL    Ketone Negative NEG mg/dL    Bilirubin Negative NEG      Blood LARGE (A) NEG      Urobilinogen 0.2 0.2 - 1.0 EU/dL    Nitrites Positive (A) NEG      Leukocyte Esterase LARGE (A) NEG      WBC  0 - 4 /hpf    RBC 5-10 0 - 5 /hpf    Epithelial cells FEW FEW /lpf    Bacteria Negative NEG /hpf    UA:UC IF INDICATED URINE CULTURE ORDERED (A) CNI     TROPONIN-HIGH SENSITIVITY    Collection Time: 03/09/22 10:09 AM   Result Value Ref Range    Troponin-High Sensitivity 8 0 - 76 ng/L   OCCULT BLOOD, STOOL    Collection Time: 03/09/22 11:10 AM   Result Value Ref Range    Occult blood, stool Negative NEG

## 2022-03-09 NOTE — PROGRESS NOTES
[facility]: MED RECONCILIATION    Comments/Recommendations:   Patient was a good historian and endorses being adherent to medications. He said that he is almost out of his home medications and would like to have new Rxs on d/c and find a new PCP. He said that he is not allergic to aspirin, but will not take it even if it is prescribed. Medications added:     Acetaminophen 625 mg PO q4h PRN pain    Medications removed:    None    Medications adjusted:    Tamsulosin - he takes 1 capsule BID instead of 2 capsules PO every day    Information obtained from: Patient and RX Query    Allergies: Aspirin - says not allergic, but refuses to take    Prior to Admission Medications:     Medication Documentation Review Audit       Reviewed by Olimpia Patel (Pharmacy Student) on 03/09/22 at 1728      Medication Sig Documenting Provider Last Dose Status Taking?   acetaminophen (TylenoL) 325 mg tablet Take 650 mg by mouth every four (4) hours as needed for Pain. Provider, Historical 3/9/2022 0800 Active Yes   albuterol (PROVENTIL HFA, VENTOLIN HFA, PROAIR HFA) 90 mcg/actuation inhaler Take 2 Puffs by inhalation every four (4) hours as needed for Wheezing. Jacque Lopez NP 3/6/2022 Unknown time Active Yes   amLODIPine (NORVASC) 5 mg tablet Take 1 Tablet by mouth daily. Provider, Historical 3/8/2022 0800 Active Yes   atorvastatin (LIPITOR) 80 mg tablet Take 1 Tablet by mouth nightly. Provider, Historical 3/8/2022 2000 Active Yes   guaiFENesin-dextromethorphan (Adult Robitussin Peak Cold DM)  mg/5 mL liqd Take 10 mL by mouth every four (4) hours as needed for Cough or Congestion. Jacque Lopez NP 2/9/2022 Unknown time Active Yes   latanoprost (XALATAN) 0.005 % ophthalmic solution Administer 1 Drop to both eyes nightly. Provider, Historical 3/8/2022 2000 Active Yes   losartan (COZAAR) 100 mg tablet Take 100 mg by mouth daily.  Provider, Historical 3/8/2022 0800 Active Yes   metFORMIN (GLUCOPHAGE) 500 mg tablet Take  by mouth two (2) times daily (with meals). Provider, Historical 3/8/2022 2000 Active Yes   pantoprazole (PROTONIX) 40 mg tablet Take 1 Tablet by mouth daily. Provider, Historical 3/8/2022 0800 Active Yes   tamsulosin (FLOMAX) 0.4 mg capsule Take 0.4 mg by mouth two (2) times a day.  Provider, Historical 3/8/2022 2000 Active Yes                    Constantino Martin, PharmD Candidate 1976

## 2022-03-09 NOTE — ED PROVIDER NOTES
EMERGENCY DEPARTMENT HISTORY AND PHYSICAL EXAM      Date: 3/9/2022  Patient Name: Devin Hilliard    History of Presenting Illness     Chief Complaint   Patient presents with    Dizziness       History Provided By: Patient    Additional History (Context): Devin Hilliard is a 66 y.o. male with HTN, DM, DEJA who presents with dizziness. Onset this morning. Patient reports dizziness occurs at rest or with activity. Denies chest pain, nausea, vomiting, shortness of breath. He also reports urinary frequency and painful urination. Patient recently seen at least 3 times within the last month for recurrent UTI with last hospitalization on 2/18/2022. At that time he was admitted for IV antibiotics for UTI. He reports no symptom improvement and states that he noticed hematuria secondary to self-catheterization. Denies fever, chills, abdominal pain.   PCP: None    Current Facility-Administered Medications   Medication Dose Route Frequency Provider Last Rate Last Admin    sodium chloride (NS) flush 5-40 mL  5-40 mL IntraVENous Q8H Josette Alfaro MD        sodium chloride (NS) flush 5-40 mL  5-40 mL IntraVENous PRN Suma Coyne MD        acetaminophen (TYLENOL) tablet 650 mg  650 mg Oral Q6H PRN Suma Coyne MD        Or    acetaminophen (TYLENOL) suppository 650 mg  650 mg Rectal Q6H PRN Suma Coyne MD        polyethylene glycol (MIRALAX) packet 17 g  17 g Oral DAILY PRN Suma Coyne MD        [Held by provider] enoxaparin (LOVENOX) injection 40 mg  40 mg SubCUTAneous DAILY Suma Coyne MD        ondansetron [facility]) injection 4 mg  4 mg IntraVENous Q6H PRN Suma Coyne MD        glucose chewable tablet 16 g  4 Tablet Oral PRN Suma Coyne MD        dextrose (D50W) injection syrg 12.5-25 g  25-50 mL IntraVENous PRN Suma Coyne MD        glucagon (GLUCAGEN) injection 1 mg  1 mg IntraMUSCular PRN Suma Coyne MD        insulin lispro (HUMALOG) injection 1-10 Units  1-10 Units SubCUTAneous AC&HS MD Tigre Oliveros ON 3/10/2022] amLODIPine (NORVASC) tablet 5 mg  5 mg Oral DAILY Nae Najera MD        atorvastatin (LIPITOR) tablet 80 mg  80 mg Oral QHS Nae Najera MD        tamsulosin (FLOMAX) capsule 0.8 mg  0.8 mg Oral DAILY Nae Najera MD        albuterol-ipratropium (DUO-NEB) 2.5 MG-0.5 MG/3 ML  3 mL Nebulization Q4H PRN Nae Najera MD        [START ON 3/10/2022] pantoprazole (PROTONIX) tablet 40 mg  40 mg Oral ACB Dia Alfaro MD        meropenem (MERREM) 500 mg in 0.9% sodium chloride (MBP/ADV) 50 mL MBP  500 mg IntraVENous Q6H Nae Najera MD        oxyCODONE-acetaminophen (PERCOCET) 5-325 mg per tablet 1 Tablet  1 Tablet Oral Q4H PRN Nae Najera MD        latanoprost (XALATAN) 0.005 % ophthalmic solution 1 Drop  1 Drop Both Eyes QPM Flaco Alfaro MD        guaiFENesin-dextromethorphan (ROBITUSSIN DM) 100-10 mg/5 mL syrup 10 mL  10 mL Oral Q6H PRN Nae Najera MD        lactated Ringers infusion  100 mL/hr IntraVENous CONTINUOUS Nae Najera MD         Current Outpatient Medications   Medication Sig Dispense Refill    amLODIPine (NORVASC) 5 mg tablet Take 1 Tablet by mouth daily.  atorvastatin (LIPITOR) 80 mg tablet Take 1 Tablet by mouth nightly.  losartan (COZAAR) 100 mg tablet Take 100 mg by mouth daily.  tamsulosin (FLOMAX) 0.4 mg capsule Take 0.8 mg by mouth daily.  latanoprost (XALATAN) 0.005 % ophthalmic solution Administer 1 Drop to both eyes nightly.  pantoprazole (PROTONIX) 40 mg tablet Take 1 Tablet by mouth daily.  albuterol (PROVENTIL HFA, VENTOLIN HFA, PROAIR HFA) 90 mcg/actuation inhaler Take 2 Puffs by inhalation every four (4) hours as needed for Wheezing. 1 Each 0    guaiFENesin-dextromethorphan (Adult Robitussin Peak Cold DM)  mg/5 mL liqd Take 10 mL by mouth every four (4) hours as needed for Cough or Congestion.  118 mL 0    metFORMIN (GLUCOPHAGE) 500 mg tablet Take  by mouth two (2) times daily (with meals). Past History     Past Medical History:  Past Medical History:   Diagnosis Date    Diabetes ([address]     Gastrointestinal disorder     Hypertension     DEJA (obstructive sleep apnea)     AHI: 8.9 per hour       Past Surgical History:  Past Surgical History:   Procedure Laterality Date    HX PROSTATE SURGERY      [place] ABDOMEN SURGERY PROC UNLISTED      Hernia Repair       Family History:  Family History   Problem Relation Age of Onset    Hypertension Mother     Diabetes Mother     Hypertension Father     Diabetes Father        Social History:  Social History     Tobacco Use    Smoking status: Never Smoker    Smokeless tobacco: Never Used   Substance Use Topics    Alcohol use: No    Drug use: Not Currently       Allergies: Allergies   Allergen Reactions    Aspirin Anxiety         Review of Systems   Review of Systems   Constitutional: Negative for chills, fatigue and fever. HENT: Negative for congestion, ear pain, rhinorrhea and sore throat. Respiratory: Negative for cough and shortness of breath. Cardiovascular: Negative for chest pain and leg swelling. Gastrointestinal: Negative for abdominal pain, constipation, diarrhea, nausea and vomiting. Endocrine: Negative for polydipsia and polyphagia. Genitourinary: Positive for decreased urine volume, dysuria, frequency, hematuria and penile pain. Negative for penile swelling, scrotal swelling, testicular pain and urgency. Musculoskeletal: Negative for back pain, gait problem and neck pain. Skin: Negative for wound. Neurological: Positive for dizziness. Negative for numbness and headaches. All other systems reviewed and are negative.       Physical Exam     Vitals:    03/09/22 0936 03/09/22 1012 03/09/22 1130   BP: (!) 153/68  130/82   Pulse: 91 83    Resp: 18     Temp: 98 °F (36.7 °C)     SpO2: 96%     Weight: 99.8 kg (220 lb)     Height: 5' 8\" (1.727 m)       Physical Exam  Vitals and nursing note reviewed. Exam conducted with a chaperone present Segundo Coyne ). Constitutional:       General: He is not in acute distress. Appearance: He is well-developed. He is not ill-appearing. HENT:      Head: Normocephalic and atraumatic. Right Ear: Tympanic membrane, ear canal and external ear normal.      Left Ear: Tympanic membrane, ear canal and external ear normal.      Nose: Nose normal.      Mouth/Throat:      Mouth: Mucous membranes are moist.      Pharynx: Oropharynx is clear. No oropharyngeal exudate or posterior oropharyngeal erythema. Eyes:      Extraocular Movements: Extraocular movements intact. Conjunctiva/sclera: Conjunctivae normal.      Pupils: Pupils are equal, round, and reactive to light. Cardiovascular:      Rate and Rhythm: Normal rate and regular rhythm. Pulses: Normal pulses. Heart sounds: Normal heart sounds. Pulmonary:      Effort: Pulmonary effort is normal.      Breath sounds: Normal breath sounds. Abdominal:      General: Bowel sounds are normal. There is no distension. Palpations: Abdomen is soft. Tenderness: There is no abdominal tenderness. There is no guarding or rebound. Genitourinary:     Rectum: Normal.      Comments: Declined evaluation of genital but allow rectal exam   Musculoskeletal:         General: Normal range of motion. Cervical back: Normal range of motion and neck supple. Lymphadenopathy:      Cervical: No cervical adenopathy. Skin:     General: Skin is warm and dry. Neurological:      Mental Status: He is alert and oriented to person, place, and time. GCS: GCS eye subscore is 4. GCS verbal subscore is 5. GCS motor subscore is 6. Cranial Nerves: No cranial nerve deficit. Sensory: Sensation is intact. No sensory deficit. Motor: Motor function is intact. Coordination: Coordination is intact. Gait: Gait is intact. Psychiatric:         Thought Content:  Thought content normal.           Diagnostic Study Results     Labs -     Recent Results (from the past 12 hour(s))   EKG, 12 LEAD, INITIAL    Collection Time: 03/09/22 10:03 AM   Result Value Ref Range    Ventricular Rate 84 BPM    Atrial Rate 84 BPM    P-R Interval 150 ms    QRS Duration 72 ms    Q-T Interval 366 ms    QTC Calculation (Bezet) 432 ms    Calculated P Axis 37 degrees    Calculated R Axis -9 degrees    Calculated T Axis 11 degrees    Diagnosis Normal sinus rhythm  No previous ECGs available      GLUCOSE, POC    Collection Time: 03/09/22 10:06 AM   Result Value Ref Range    Glucose (POC) 211 (H) 65 - 117 mg/dL    Performed by Tai Whitaker RN    METABOLIC PANEL, COMPREHENSIVE    Collection Time: 03/09/22 10:09 AM   Result Value Ref Range    Sodium 137 136 - 145 mmol/L    Potassium 3.5 3.5 - 5.1 mmol/L    Chloride 99 97 - 108 mmol/L    CO2 24 21 - 32 mmol/L    Anion gap 14 5 - 15 mmol/L    Glucose 202 (H) 65 - 100 mg/dL    BUN 11 6 - 20 MG/DL    Creatinine 1.35 (H) 0.70 - 1.30 MG/DL    BUN/Creatinine ratio 8 (L) 12 - 20      GFR est AA >60 >60 ml/min/1.73m2    GFR est non-AA 52 (L) >60 ml/min/1.73m2    Calcium 9.2 8.5 - 10.1 MG/DL    Bilirubin, total 0.5 0.2 - 1.0 MG/DL    ALT (SGPT) 30 12 - 78 U/L    AST (SGOT) 17 15 - 37 U/L    Alk.  phosphatase 88 45 - 117 U/L    Protein, total 8.4 (H) 6.4 - 8.2 g/dL    Albumin 3.2 (L) 3.5 - 5.0 g/dL    Globulin 5.2 (H) 2.0 - 4.0 g/dL    A-G Ratio 0.6 (L) 1.1 - 2.2     CBC WITH AUTOMATED DIFF    Collection Time: 03/09/22 10:09 AM   Result Value Ref Range    WBC 6.9 4.1 - 11.1 K/uL    RBC 3.38 (L) 4.10 - 5.70 M/uL    HGB 8.7 (L) 12.1 - 17.0 g/dL    HCT 29.5 (L) 36.6 - 50.3 %    MCV 87.3 80.0 - 99.0 FL    MCH 25.7 (L) 26.0 - 34.0 PG    MCHC 29.5 (L) 30.0 - 36.5 g/dL    RDW 16.0 (H) 11.5 - 14.5 %    PLATELET 584 864 - 172 K/uL    MPV 8.2 (L) 8.9 - 12.9 FL    NRBC 0.0 0  WBC    ABSOLUTE NRBC 0.00 0.00 - 0.01 K/uL    NEUTROPHILS 62 32 - 75 %    LYMPHOCYTES 27 12 - 49 % MONOCYTES 9 5 - 13 %    EOSINOPHILS 2 0 - 7 %    BASOPHILS 0 0 - 1 %    IMMATURE GRANULOCYTES 0 0.0 - 0.5 %    ABS. NEUTROPHILS 4.2 1.8 - 8.0 K/UL    ABS. LYMPHOCYTES 1.8 0.8 - 3.5 K/UL    ABS. MONOCYTES 0.6 0.0 - 1.0 K/UL    ABS. EOSINOPHILS 0.1 0.0 - 0.4 K/UL    ABS. BASOPHILS 0.0 0.0 - 0.1 K/UL    ABS. IMM. GRANS. 0.0 0.00 - 0.04 K/UL    DF AUTOMATED     URINALYSIS W/ REFLEX CULTURE    Collection Time: 03/09/22 10:09 AM    Specimen: Urine   Result Value Ref Range    Color DARK YELLOW      Appearance TURBID (A) CLEAR      Specific gravity <1.005 1.003 - 1.030    pH (UA) 6.0 5.0 - 8.0      Protein 100 (A) NEG mg/dL    Glucose Negative NEG mg/dL    Ketone Negative NEG mg/dL    Bilirubin Negative NEG      Blood LARGE (A) NEG      Urobilinogen 0.2 0.2 - 1.0 EU/dL    Nitrites Positive (A) NEG      Leukocyte Esterase LARGE (A) NEG      WBC  0 - 4 /hpf    RBC 5-10 0 - 5 /hpf    Epithelial cells FEW FEW /lpf    Bacteria Negative NEG /hpf    UA:UC IF INDICATED URINE CULTURE ORDERED (A) CNI     TROPONIN-HIGH SENSITIVITY    Collection Time: 03/09/22 10:09 AM   Result Value Ref Range    Troponin-High Sensitivity 8 0 - 76 ng/L   OCCULT BLOOD, STOOL    Collection Time: 03/09/22 11:10 AM   Result Value Ref Range    Occult blood, stool Negative NEG         Radiologic Studies -   No orders to display     CT Results  (Last 48 hours)    None        CXR Results  (Last 48 hours)    None            Medical Decision Making   I am the first provider for this patient. I reviewed the vital signs, available nursing notes, past medical history, past surgical history, family history and social history. Vital Signs-Reviewed the patient's vital signs. Records Reviewed: Nursing Notes, Old Medical Records, Previous Radiology Studies and Previous Laboratory Studies    69-year-old male presenting for dizziness exhibiting benign neurological exam in addition he does not exhibit any signs of orthostatic hypotension.   Plan to obtain labs in addition will repeat UA due to history of UTI. Differential diagnoses include UTI, pyelonephritis, kidney stones, BPH, anemia, orthostatic hypotension, dehydration, hypoglycemia    ED Course:   ED Course as of 03/09/22 1426   Wed Mar 09, 2022   1018 EKG per my interpretation normal sinus rhythm, rate 84 bpm, normal axis, no acute ischemic changes or interval changes. [AK]   1100 Progress Note:  [NA]   1135 Progress Note:   UA + for leuks and nitrates in addition to WBC concerning for recurrent UTI. Crea still slightly elevated. Cncerning for drop in hgb from 10.4 to 8.7. Denies blood in stools. I suspect chronic anemia due to prostate CA but occult stool performed  [NA]   1302 Progress Note:   IP consult to hospitalist. Discussed case with Dr Martin Notice. He recommends case management consult to arrange outpatient IV infusion. If unable to be completed then he will accept pt for admission. [NA]   1424 Progress Note:   Informed by hospitalist Dr Martin Notice that patient will be accepted for admission for IV antibiotics for UTI. [NA]      ED Course User Index  [AK] Jacki Jimenes MD  [NA] Awilda Santiago NP         Disposition:  Admit     Follow-up Information     Follow up With Specialties Details Why Contact Info    Sravani Caicedo MD Internal Medicine On 3/24/2022 Mar24 NEW TO PROVIDER 10:00 AM  [address] [facility]  [phone]      [place] [address]  Phone: [phone]          Current Discharge Medication List          Provider Notes (Medical Decision Making):     Procedures:  Procedures      Diagnosis     Clinical Impression:   1. UTI due to extended-spectrum beta lactamase (ESBL) producing Escherichia coli    2. Anemia, unspecified type    3. Dizziness    4.  Prostate cancer ([facility] [address]

## 2022-03-09 NOTE — PROGRESS NOTES
ISAEL     Referral from the ER about 1:15PM  To work on IV ABX x 7 days for patient. Patient known to us from previous admit. He was discharged from us  2-22- at that time sent home with 4 days of IV ABX through [facility] - He was to go there center [address] [place] [address] [place] [place] [address]  Phone: [phone]    Called them and the kept all of his appointments- missed one injection per the center when they sent him to the ER. His new PCP appointment is March 28 coming up  soon. They were willing to accept hm back but they could not provide the service over the weekend. They are only open M-F. Will need to contact our [address] [place] [address] to see if they could accept with our Hospitalist following for a week. Normally they do not accept this. Need to clarify and  reach the Urologist patient indicates he has seen. He missed the appointment we had made for  Last Month. [facility] providers out until June. We can check to see who is able to accept his insurance plans at this time.      Addendum    MD to admit at this time and will follow-up with patient on the unit     [address] JUANJOSE RN

## 2022-03-09 NOTE — ED NOTES
Emergency Department Nursing Plan of Care       The Nursing Plan of Care is developed from the Nursing assessment and Emergency Department Attending provider initial evaluation. The plan of care may be reviewed in the ED Provider note.     The Plan of Care was developed with the following considerations:   Patient / Family readiness to learn indicated by:verbalized understanding  Persons(s) to be included in education: patient  Barriers to Learning/Limitations:No    Signed     Naz Jimenez RN    3/9/2022   3:34 PM

## 2022-03-10 LAB
ALBUMIN SERPL-MCNC: 2.9 G/DL (ref 3.5–5)
ALBUMIN/GLOB SERPL: 0.6 {RATIO} (ref 1.1–2.2)
ALP SERPL-CCNC: 76 U/L (ref 45–117)
ALT SERPL-CCNC: 25 U/L (ref 12–78)
ANION GAP SERPL CALC-SCNC: 9 MMOL/L (ref 5–15)
AST SERPL-CCNC: 12 U/L (ref 15–37)
BASOPHILS # BLD: 0 K/UL (ref 0–0.1)
BASOPHILS NFR BLD: 1 % (ref 0–1)
BILIRUB SERPL-MCNC: 0.4 MG/DL (ref 0.2–1)
BUN SERPL-MCNC: 12 MG/DL (ref 6–20)
BUN/CREAT SERPL: 9 (ref 12–20)
CALCIUM SERPL-MCNC: 8.9 MG/DL (ref 8.5–10.1)
CHLORIDE SERPL-SCNC: 103 MMOL/L (ref 97–108)
CO2 SERPL-SCNC: 25 MMOL/L (ref 21–32)
CREAT SERPL-MCNC: 1.38 MG/DL (ref 0.7–1.3)
DIFFERENTIAL METHOD BLD: ABNORMAL
EOSINOPHIL # BLD: 0.2 K/UL (ref 0–0.4)
EOSINOPHIL NFR BLD: 3 % (ref 0–7)
ERYTHROCYTE [DISTWIDTH] IN BLOOD BY AUTOMATED COUNT: 16 % (ref 11.5–14.5)
GLOBULIN SER CALC-MCNC: 4.7 G/DL (ref 2–4)
GLUCOSE BLD STRIP.AUTO-MCNC: 165 MG/DL (ref 65–117)
GLUCOSE BLD STRIP.AUTO-MCNC: 195 MG/DL (ref 65–117)
GLUCOSE BLD STRIP.AUTO-MCNC: 213 MG/DL (ref 65–117)
GLUCOSE BLD STRIP.AUTO-MCNC: 219 MG/DL (ref 65–117)
GLUCOSE SERPL-MCNC: 152 MG/DL (ref 65–100)
HCT VFR BLD AUTO: 27.3 % (ref 36.6–50.3)
HGB BLD-MCNC: 8.2 G/DL (ref 12.1–17)
IMM GRANULOCYTES # BLD AUTO: 0 K/UL (ref 0–0.04)
IMM GRANULOCYTES NFR BLD AUTO: 0 % (ref 0–0.5)
LYMPHOCYTES # BLD: 1.7 K/UL (ref 0.8–3.5)
LYMPHOCYTES NFR BLD: 33 % (ref 12–49)
MCH RBC QN AUTO: 26 PG (ref 26–34)
MCHC RBC AUTO-ENTMCNC: 30 G/DL (ref 30–36.5)
MCV RBC AUTO: 86.7 FL (ref 80–99)
MONOCYTES # BLD: 0.6 K/UL (ref 0–1)
MONOCYTES NFR BLD: 12 % (ref 5–13)
NEUTS SEG # BLD: 2.7 K/UL (ref 1.8–8)
NEUTS SEG NFR BLD: 51 % (ref 32–75)
NRBC # BLD: 0 K/UL (ref 0–0.01)
NRBC BLD-RTO: 0 PER 100 WBC
PLATELET # BLD AUTO: 374 K/UL (ref 150–400)
PMV BLD AUTO: 8.5 FL (ref 8.9–12.9)
POTASSIUM SERPL-SCNC: 3.7 MMOL/L (ref 3.5–5.1)
PROT SERPL-MCNC: 7.6 G/DL (ref 6.4–8.2)
RBC # BLD AUTO: 3.15 M/UL (ref 4.1–5.7)
SERVICE CMNT-IMP: ABNORMAL
SODIUM SERPL-SCNC: 137 MMOL/L (ref 136–145)
WBC # BLD AUTO: 5.3 K/UL (ref 4.1–11.1)

## 2022-03-10 PROCEDURE — 80053 COMPREHEN METABOLIC PANEL: CPT

## 2022-03-10 PROCEDURE — 74011250636 HC RX REV CODE- 250/636: Performed by: STUDENT IN AN ORGANIZED HEALTH CARE EDUCATION/TRAINING PROGRAM

## 2022-03-10 PROCEDURE — 74011250637 HC RX REV CODE- 250/637: Performed by: STUDENT IN AN ORGANIZED HEALTH CARE EDUCATION/TRAINING PROGRAM

## 2022-03-10 PROCEDURE — 74011000250 HC RX REV CODE- 250: Performed by: STUDENT IN AN ORGANIZED HEALTH CARE EDUCATION/TRAINING PROGRAM

## 2022-03-10 PROCEDURE — 65270000032 HC RM SEMIPRIVATE

## 2022-03-10 PROCEDURE — 85025 COMPLETE CBC W/AUTO DIFF WBC: CPT

## 2022-03-10 PROCEDURE — 74011000258 HC RX REV CODE- 258: Performed by: STUDENT IN AN ORGANIZED HEALTH CARE EDUCATION/TRAINING PROGRAM

## 2022-03-10 PROCEDURE — 82962 GLUCOSE BLOOD TEST: CPT

## 2022-03-10 PROCEDURE — 74011636637 HC RX REV CODE- 636/637: Performed by: STUDENT IN AN ORGANIZED HEALTH CARE EDUCATION/TRAINING PROGRAM

## 2022-03-10 PROCEDURE — 36415 COLL VENOUS BLD VENIPUNCTURE: CPT

## 2022-03-10 RX ORDER — NALOXONE HYDROCHLORIDE 0.4 MG/ML
0.4 INJECTION, SOLUTION INTRAMUSCULAR; INTRAVENOUS; SUBCUTANEOUS
Status: DISCONTINUED | OUTPATIENT
Start: 2022-03-10 | End: 2022-03-16 | Stop reason: HOSPADM

## 2022-03-10 RX ADMIN — LATANOPROST 1 DROP: 50 SOLUTION OPHTHALMIC at 21:19

## 2022-03-10 RX ADMIN — AMLODIPINE BESYLATE 5 MG: 5 TABLET ORAL at 08:32

## 2022-03-10 RX ADMIN — MEROPENEM 500 MG: 500 INJECTION, POWDER, FOR SOLUTION INTRAVENOUS at 08:32

## 2022-03-10 RX ADMIN — OXYCODONE HYDROCHLORIDE AND ACETAMINOPHEN 1 TABLET: 5; 325 TABLET ORAL at 07:46

## 2022-03-10 RX ADMIN — Medication 2 UNITS: at 11:04

## 2022-03-10 RX ADMIN — PANTOPRAZOLE SODIUM 40 MG: 40 TABLET, DELAYED RELEASE ORAL at 07:46

## 2022-03-10 RX ADMIN — OXYCODONE HYDROCHLORIDE AND ACETAMINOPHEN 1 TABLET: 5; 325 TABLET ORAL at 02:27

## 2022-03-10 RX ADMIN — OXYCODONE HYDROCHLORIDE AND ACETAMINOPHEN 1 TABLET: 5; 325 TABLET ORAL at 15:01

## 2022-03-10 RX ADMIN — MEROPENEM 500 MG: 500 INJECTION, POWDER, FOR SOLUTION INTRAVENOUS at 14:24

## 2022-03-10 RX ADMIN — Medication 2 UNITS: at 07:46

## 2022-03-10 RX ADMIN — Medication 4 UNITS: at 16:16

## 2022-03-10 RX ADMIN — MEROPENEM 500 MG: 500 INJECTION, POWDER, FOR SOLUTION INTRAVENOUS at 20:47

## 2022-03-10 RX ADMIN — SODIUM CHLORIDE, PRESERVATIVE FREE 10 ML: 5 INJECTION INTRAVENOUS at 13:00

## 2022-03-10 RX ADMIN — ATORVASTATIN CALCIUM 80 MG: 40 TABLET, FILM COATED ORAL at 21:18

## 2022-03-10 RX ADMIN — TAMSULOSIN HYDROCHLORIDE 0.8 MG: 0.4 CAPSULE ORAL at 08:32

## 2022-03-10 RX ADMIN — SODIUM CHLORIDE, PRESERVATIVE FREE 10 ML: 5 INJECTION INTRAVENOUS at 06:06

## 2022-03-10 RX ADMIN — OXYCODONE HYDROCHLORIDE AND ACETAMINOPHEN 1 TABLET: 5; 325 TABLET ORAL at 20:48

## 2022-03-10 RX ADMIN — MEROPENEM 500 MG: 500 INJECTION, POWDER, FOR SOLUTION INTRAVENOUS at 02:28

## 2022-03-10 RX ADMIN — Medication 3 UNITS: at 21:18

## 2022-03-10 RX ADMIN — SODIUM CHLORIDE, PRESERVATIVE FREE 10 ML: 5 INJECTION INTRAVENOUS at 21:19

## 2022-03-10 NOTE — PROGRESS NOTES
Hospitalist Progress Note    NAME: Maranda Chavez   :  1950   MRN:  [id number]   Room Number:  521/96  @ [facility]     Please note that this dictation was completed with Carlos Alonzo, the computer voice recognition software. Quite often unanticipated grammatical, syntax, homophones, and other interpretive errors are inadvertently transcribed by the computer software. Please disregard these errors. Please excuse any errors that have escaped final proofreading. Interim Hospital Summary: 66 y.o. male whom presented on 3/9/2022 with      Assessment / Plan:          # ESBL UTI   # Urinary Pain POA   #Urinary retention and LUTS due to BPH POA  #Hematuria POA  -UA with WBC, RBC, Bacteria   -UCX w/ ESBL previously  -Patient currently performs CIC using 14 Fr catheter  -Patient did not complete previous course for ESBL UTI  -Patient did not see urology appointment made for him previously on   -Hemoglobin 8.7 ( baseline 10.4)         -IV meropenem inpatient setting for 7 days given patient does not have resources to receive outpatient therapy  -Monitor labs  -Transfuse to keep hemoglobin above 7  -Continue Flomax  - Percocet PRN for pain   -Bladder check  as needed  -Patient to follow-up with urology as an outpatient for management of hematuria and urinary retention due to BPH w/ LUTS           #CKD 2 POA   #Hx of LUTS s/p EBRT ( 2019)  and ADT( 2021)  for GG5 prostate cancer 2019   #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   - Duo neb as needed   - resume HTN meds as BP tolerates         Body mass index is 33.45 kg/m².   Code Status: Full   Surrogate Decision Maker:     DVT Prophylaxis: Hold due to hematuria  GI Prophylaxis: not indicate       Subjective:     Chief Complaint / Reason for Physician Visit  Feeling better  Discussed with RN events overnight. Review of Systems:  No fevers, chills, appetite change, cough, sputum production, shortness of breath, dyspnea on exertion, nausea, vomitting, diarrhea, constipation, chest pain, leg edema, abdominal pain, joint pain, rash, itching. Tolerating PT/OT. Tolerating diet. Objective:     VITALS:   Last 24hrs VS reviewed since prior progress note. Most recent are:  Patient Vitals for the past 24 hrs:   Temp Pulse Resp BP SpO2   03/10/22 0730 (!) 96.6 °F (35.9 °C) 66 18 128/78 100 %   03/09/22 2003 98.4 °F (36.9 °C) 84 18 134/74 98 %   03/09/22 1730 98.6 °F (37 °C) 89 18 138/79 99 %   03/09/22 1445 -- -- -- (!) 141/70 --   03/09/22 1415 -- -- -- 135/72 --   03/09/22 1130 -- -- -- 130/82 --   03/09/22 1012 -- 83 -- -- --   03/09/22 0936 98 °F (36.7 °C) 91 18 (!) 153/68 96 %       Intake/Output Summary (Last 24 hours) at 3/10/2022 0905  Last data filed at 3/10/2022 0626  Gross per 24 hour   Intake 1550 ml   Output 200 ml   Net 1350 ml        PHYSICAL EXAM:  General: WD, WN. Alert, cooperative, no acute distress    EENT:  EOMI. Anicteric sclerae. MMM  Resp:  CTA bilaterally, no wheezing or rales. No accessory muscle use  CV:  Regular  rhythm,  normal S1/S2, no murmurs rubs gallops, No edema  GI:  Soft, Non distended, Non tender. +Bowel sounds  Neurologic:  Alert and oriented X 3, normal speech,   Psych:   Good insight. Not anxious nor agitated  Skin:  No rashes.   No jaundice    Reviewed most current lab test results and cultures  YES  Reviewed most current radiology test results   YES  Review and summation of old records today    NO  Reviewed patient's current orders and MAR    YES  PMH/SH reviewed - no change compared to H&P  ________________________________________________________________________  Care Plan discussed with:    Comments   Patient x    Family      RN x    Care Manager x    Consultant                       x Multidiciplinary team rounds were held today with , nursing, pharmacist and clinical coordinator. Patient's plan of care was discussed; medications were reviewed and discharge planning was addressed. ________________________________________________________________________  Total NON critical care TIME:  25   Minutes    Total CRITICAL CARE TIME Spent:   Minutes non procedure based      Comments   >50% of visit spent in counseling and coordination of care x    ________________________________________________________________________  Darlin Cmapos MD     Procedures: see electronic medical records for all procedures/Xrays and details which were not copied into this note but were reviewed prior to creation of Plan. LABS:  I reviewed today's most current labs and imaging studies.   Pertinent labs include:  Recent Labs     03/10/22  0243 03/09/22  1009   WBC 5.3 6.9   HGB 8.2* 8.7*   HCT 27.3* 29.5*    393     Recent Labs     03/10/22  0243 03/09/22  1009    137   K 3.7 3.5    99   CO2 25 24   * 202*   BUN 12 11   CREA 1.38* 1.35*   CA 8.9 9.2   ALB 2.9* 3.2*   TBILI 0.4 0.5   ALT 25 30       Signed: Darlin Campos MD

## 2022-03-10 NOTE — PROGRESS NOTES
Assumed care of patient from Yevgeniy Padilla, [address] [place] [address]. Patient resting quietly in bed, currently with no complaints.

## 2022-03-10 NOTE — PROGRESS NOTES
Reason for Admission:   HISTORY OF PRESENT ILLNESS:     Mario Meyer is a 66 y.o.  male with PMH of above-mentioned problems who presents to ED with c/o burning sensation while urination since last night. Patient has been diagnosed with ESBL UTI in the past and was discharged home on 4 more days of ertapenem. Patient states that he only received 1 more doses outpatient and did not complete the entire course due to hematuria. Patient stated he did not attend his urology appointment. RUR Score:  16              PCP: First and Last name:   None     Name of Practice:    Are you a current patient: Yes/No:    Approximate date of last visit:    Can you participate in a virtual visit if needed:     Do you (patient/family) have any concerns for transition/discharge? Plan for utilizing home health:       Current Advanced Directive/Advance Care Plan:  Full Code  Wants CPR and ventilation. Primary Decision Maker: Laurent Cool - [phone]    Healthcare Decision Maker:   Click here to complete [address] including selection of the Healthcare Decision Maker Relationship (ie \"Primary\")            Primary Decision Maker: Laurent Cool - [phone]    Transition of Care Plan:     Verified address and phone number. Michelle Cool phone number is [phone]  Lives with son in [place] to [place] from [place] at the first of this year. Receives [employer] income     Patient uses [facility] [address] for medications. Patient stated that he has transportation home at discharge. No DME or HH usage.          Patient stated that he only went for out patient infusion therapy once and then he did not want to go back for further treatment.       Patient has a PCP appointment scheduled for 3/24:  Keo Chatman MD on 3/24/2022   Specialty: Internal Medicine   [address] [facility] [address]   [phone] PCP- NEW TO PROVIDER 10:00 AM Pleaae call if you cannot keep within 48 hours. Planning  1/ PCP f/u appointment scheduled  2/ Urology appointment to be rescheduled    Care Management Interventions  PCP Verified by CM:  Yes  Mode of Transport at Discharge: Self  Transition of Care Consult (CM Consult): Discharge Planning  Support Systems: Child(jaylan),Other Family Member(s)  The Plan for Transition of Care is Related to the Following Treatment Goals : F/U PCP appointment, Urology appointment  Name of the Patient Representative Who was Provided with a Choice of Provider and Agrees with the Discharge Plan: Patient  Freedom of Choice List was Provided with Basic Dialogue that Supports the Patient's Individualized Plan of Care/Goals, Treatment Preferences and Shares the Quality Data Associated with the Providers?: Yes  Discharge Location  Patient Expects to be Discharged to[de-identified]  [facility])     JENAE Ernst/RAUDEL  [phone]

## 2022-03-10 NOTE — INTERDISCIPLINARY ROUNDS
Interdisciplinary team rounds were held 3/10/2022 with the following team members:Nursing, physician, pharmacy, case management and the patient. Plan of care discussed. See clinical pathway and/or care plan for interventions and desired outcomes.

## 2022-03-10 NOTE — PROGRESS NOTES
Problem: Diabetes Self-Management  Goal: *Disease process and treatment process  Description: Define diabetes and identify own type of diabetes; list 3 options for treating diabetes. Outcome: Progressing Towards Goal  Goal: *Incorporating nutritional management into lifestyle  Description: Describe effect of type, amount and timing of food on blood glucose; list 3 methods for planning meals. Outcome: Progressing Towards Goal  Goal: *Incorporating physical activity into lifestyle  Description: State effect of exercise on blood glucose levels. Outcome: Progressing Towards Goal  Goal: *Developing strategies to promote health/change behavior  Description: Define the ABC's of diabetes; identify appropriate screenings, schedule and personal plan for screenings. Outcome: Progressing Towards Goal  Goal: *Using medications safely  Description: State effect of diabetes medications on diabetes; name diabetes medication taking, action and side effects. Outcome: Progressing Towards Goal  Goal: *Monitoring blood glucose, interpreting and using results  Description: Identify recommended blood glucose targets  and personal targets. Outcome: Progressing Towards Goal  Goal: *Prevention, detection, treatment of acute complications  Description: List symptoms of hyper- and hypoglycemia; describe how to treat low blood sugar and actions for lowering  high blood glucose level. Outcome: Progressing Towards Goal  Goal: *Prevention, detection and treatment of chronic complications  Description: Define the natural course of diabetes and describe the relationship of blood glucose levels to long term complications of diabetes.   Outcome: Progressing Towards Goal  Goal: *Developing strategies to address psychosocial issues  Description: Describe feelings about living with diabetes; identify support needed and support network  Outcome: Progressing Towards Goal  Goal: *Insulin pump training  Outcome: Progressing Towards Goal  Goal: *Sick day guidelines  Outcome: Progressing Towards Goal  Goal: *Patient Specific Goal (EDIT GOAL, INSERT TEXT)  Outcome: Progressing Towards Goal     Problem: Patient Education: Go to Patient Education Activity  Goal: Patient/Family Education  Outcome: Progressing Towards Goal     Problem: Risk for Spread of Infection  Goal: Prevent transmission of infectious organism to others  Description: Prevent the transmission of infectious organisms to other patients, staff members, and visitors. Outcome: Progressing Towards Goal     Problem: Patient Education:  Go to Education Activity  Goal: Patient/Family Education  Outcome: Progressing Towards Goal     Problem: Falls - Risk of  Goal: *Absence of Falls  Description: Document Lyndon Thakur Fall Risk and appropriate interventions in the flowsheet.   Outcome: Progressing Towards Goal  Note: Fall Risk Interventions:            Medication Interventions: Assess postural VS orthostatic hypotension                   Problem: Patient Education: Go to Patient Education Activity  Goal: Patient/Family Education  Outcome: Progressing Towards Goal

## 2022-03-10 NOTE — PROGRESS NOTES
1920: Bedside and Verbal shift change report received from Karyn Urias [facility] (offgoing nurse) and given to Honorio Ho RN (oncoming nurse). Report included the following information SBAR, Kardex, MAR and Recent Results. 0330: labs drawn and taken to laboratory. Pt slept well throughout the night thus far. Requested PRN PO pain med for c/o pain to penis last night and this morning - c/o intense burning w/ urination. Pt has experienced urgency and frequency, only voiding small amounts of cloudy urine at a time. 0720: Bedside and Verbal shift change report given to Sherrin Collet., RN (oncoming nurse) by Honorio Ho RN (offgoing nurse). Report included the following information SBAR, Kardex, MAR and Recent Results.

## 2022-03-10 NOTE — ACP (ADVANCE CARE PLANNING)
Current Advanced Directive/Advance Care Plan:  Full Code  Wants CPR and ventilation.   Primary Decision MakerDeryudith Nia Cool - [phone]

## 2022-03-11 LAB
ALBUMIN SERPL-MCNC: 2.8 G/DL (ref 3.5–5)
ALBUMIN/GLOB SERPL: 0.6 {RATIO} (ref 1.1–2.2)
ALP SERPL-CCNC: 74 U/L (ref 45–117)
ALT SERPL-CCNC: 24 U/L (ref 12–78)
ANION GAP SERPL CALC-SCNC: 10 MMOL/L (ref 5–15)
AST SERPL-CCNC: 13 U/L (ref 15–37)
ATRIAL RATE: 84 BPM
BACTERIA SPEC CULT: ABNORMAL
BACTERIA SPEC CULT: ABNORMAL
BASOPHILS # BLD: 0.1 K/UL (ref 0–0.1)
BASOPHILS NFR BLD: 1 % (ref 0–1)
BILIRUB SERPL-MCNC: 0.4 MG/DL (ref 0.2–1)
BUN SERPL-MCNC: 16 MG/DL (ref 6–20)
BUN/CREAT SERPL: 12 (ref 12–20)
CALCIUM SERPL-MCNC: 9.2 MG/DL (ref 8.5–10.1)
CALCULATED P AXIS, ECG09: 37 DEGREES
CALCULATED R AXIS, ECG10: -9 DEGREES
CALCULATED T AXIS, ECG11: 11 DEGREES
CC UR VC: ABNORMAL
CHLORIDE SERPL-SCNC: 104 MMOL/L (ref 97–108)
CO2 SERPL-SCNC: 25 MMOL/L (ref 21–32)
CREAT SERPL-MCNC: 1.32 MG/DL (ref 0.7–1.3)
DIAGNOSIS, 93000: NORMAL
DIFFERENTIAL METHOD BLD: ABNORMAL
EOSINOPHIL # BLD: 0.2 K/UL (ref 0–0.4)
EOSINOPHIL NFR BLD: 4 % (ref 0–7)
ERYTHROCYTE [DISTWIDTH] IN BLOOD BY AUTOMATED COUNT: 15.8 % (ref 11.5–14.5)
GLOBULIN SER CALC-MCNC: 4.8 G/DL (ref 2–4)
GLUCOSE BLD STRIP.AUTO-MCNC: 163 MG/DL (ref 65–117)
GLUCOSE BLD STRIP.AUTO-MCNC: 172 MG/DL (ref 65–117)
GLUCOSE BLD STRIP.AUTO-MCNC: 188 MG/DL (ref 65–117)
GLUCOSE BLD STRIP.AUTO-MCNC: 247 MG/DL (ref 65–117)
GLUCOSE SERPL-MCNC: 158 MG/DL (ref 65–100)
HCT VFR BLD AUTO: 27.2 % (ref 36.6–50.3)
HGB BLD-MCNC: 8 G/DL (ref 12.1–17)
IMM GRANULOCYTES # BLD AUTO: 0 K/UL (ref 0–0.04)
IMM GRANULOCYTES NFR BLD AUTO: 0 % (ref 0–0.5)
LYMPHOCYTES # BLD: 1.9 K/UL (ref 0.8–3.5)
LYMPHOCYTES NFR BLD: 34 % (ref 12–49)
MCH RBC QN AUTO: 25.7 PG (ref 26–34)
MCHC RBC AUTO-ENTMCNC: 29.4 G/DL (ref 30–36.5)
MCV RBC AUTO: 87.5 FL (ref 80–99)
MONOCYTES # BLD: 0.6 K/UL (ref 0–1)
MONOCYTES NFR BLD: 11 % (ref 5–13)
NEUTS SEG # BLD: 2.8 K/UL (ref 1.8–8)
NEUTS SEG NFR BLD: 50 % (ref 32–75)
NRBC # BLD: 0 K/UL (ref 0–0.01)
NRBC BLD-RTO: 0 PER 100 WBC
P-R INTERVAL, ECG05: 150 MS
PLATELET # BLD AUTO: 349 K/UL (ref 150–400)
PMV BLD AUTO: 8.1 FL (ref 8.9–12.9)
POTASSIUM SERPL-SCNC: 4.1 MMOL/L (ref 3.5–5.1)
PROT SERPL-MCNC: 7.6 G/DL (ref 6.4–8.2)
Q-T INTERVAL, ECG07: 366 MS
QRS DURATION, ECG06: 72 MS
QTC CALCULATION (BEZET), ECG08: 432 MS
RBC # BLD AUTO: 3.11 M/UL (ref 4.1–5.7)
SERVICE CMNT-IMP: ABNORMAL
SODIUM SERPL-SCNC: 139 MMOL/L (ref 136–145)
VENTRICULAR RATE, ECG03: 84 BPM
WBC # BLD AUTO: 5.6 K/UL (ref 4.1–11.1)

## 2022-03-11 PROCEDURE — 65270000032 HC RM SEMIPRIVATE

## 2022-03-11 PROCEDURE — 74011250637 HC RX REV CODE- 250/637: Performed by: STUDENT IN AN ORGANIZED HEALTH CARE EDUCATION/TRAINING PROGRAM

## 2022-03-11 PROCEDURE — 74011000258 HC RX REV CODE- 258: Performed by: STUDENT IN AN ORGANIZED HEALTH CARE EDUCATION/TRAINING PROGRAM

## 2022-03-11 PROCEDURE — 85025 COMPLETE CBC W/AUTO DIFF WBC: CPT

## 2022-03-11 PROCEDURE — 74011250636 HC RX REV CODE- 250/636: Performed by: STUDENT IN AN ORGANIZED HEALTH CARE EDUCATION/TRAINING PROGRAM

## 2022-03-11 PROCEDURE — 74011000250 HC RX REV CODE- 250: Performed by: STUDENT IN AN ORGANIZED HEALTH CARE EDUCATION/TRAINING PROGRAM

## 2022-03-11 PROCEDURE — 80053 COMPREHEN METABOLIC PANEL: CPT

## 2022-03-11 PROCEDURE — 36415 COLL VENOUS BLD VENIPUNCTURE: CPT

## 2022-03-11 PROCEDURE — 82962 GLUCOSE BLOOD TEST: CPT

## 2022-03-11 PROCEDURE — 74011636637 HC RX REV CODE- 636/637: Performed by: STUDENT IN AN ORGANIZED HEALTH CARE EDUCATION/TRAINING PROGRAM

## 2022-03-11 RX ADMIN — ATORVASTATIN CALCIUM 80 MG: 40 TABLET, FILM COATED ORAL at 22:12

## 2022-03-11 RX ADMIN — OXYCODONE HYDROCHLORIDE AND ACETAMINOPHEN 1 TABLET: 5; 325 TABLET ORAL at 04:54

## 2022-03-11 RX ADMIN — Medication 2 UNITS: at 12:02

## 2022-03-11 RX ADMIN — MEROPENEM 500 MG: 500 INJECTION, POWDER, FOR SOLUTION INTRAVENOUS at 22:12

## 2022-03-11 RX ADMIN — LATANOPROST 1 DROP: 50 SOLUTION OPHTHALMIC at 22:16

## 2022-03-11 RX ADMIN — Medication 2 UNITS: at 08:50

## 2022-03-11 RX ADMIN — OXYCODONE HYDROCHLORIDE AND ACETAMINOPHEN 1 TABLET: 5; 325 TABLET ORAL at 15:39

## 2022-03-11 RX ADMIN — Medication 2 UNITS: at 17:05

## 2022-03-11 RX ADMIN — Medication 2 UNITS: at 21:53

## 2022-03-11 RX ADMIN — OXYCODONE HYDROCHLORIDE AND ACETAMINOPHEN 1 TABLET: 5; 325 TABLET ORAL at 00:44

## 2022-03-11 RX ADMIN — MEROPENEM 500 MG: 500 INJECTION, POWDER, FOR SOLUTION INTRAVENOUS at 14:13

## 2022-03-11 RX ADMIN — PANTOPRAZOLE SODIUM 40 MG: 40 TABLET, DELAYED RELEASE ORAL at 08:49

## 2022-03-11 RX ADMIN — TAMSULOSIN HYDROCHLORIDE 0.8 MG: 0.4 CAPSULE ORAL at 08:49

## 2022-03-11 RX ADMIN — MEROPENEM 500 MG: 500 INJECTION, POWDER, FOR SOLUTION INTRAVENOUS at 08:50

## 2022-03-11 RX ADMIN — OXYCODONE HYDROCHLORIDE AND ACETAMINOPHEN 1 TABLET: 5; 325 TABLET ORAL at 22:32

## 2022-03-11 RX ADMIN — AMLODIPINE BESYLATE 5 MG: 5 TABLET ORAL at 08:49

## 2022-03-11 RX ADMIN — SODIUM CHLORIDE, PRESERVATIVE FREE 10 ML: 5 INJECTION INTRAVENOUS at 14:21

## 2022-03-11 RX ADMIN — SODIUM CHLORIDE, PRESERVATIVE FREE 10 ML: 5 INJECTION INTRAVENOUS at 22:16

## 2022-03-11 RX ADMIN — SODIUM CHLORIDE, PRESERVATIVE FREE 10 ML: 5 INJECTION INTRAVENOUS at 05:33

## 2022-03-11 RX ADMIN — MEROPENEM 500 MG: 500 INJECTION, POWDER, FOR SOLUTION INTRAVENOUS at 02:23

## 2022-03-11 NOTE — PROGRESS NOTES
1661 Shift report given to Susan Birmingham RN and Gabi Mauricio RN (oncoming) from Martin Tyler RN (offgoing). Report included the following: SBAR, MAR, Kardex, Intake/Output and Recent Results. Pt resting in bed w/ no signs of distress. 2780 VS taken by Ms Cece Infante. , insulin coverage to follow     0831 Pt appears to be sleeping w/ no signs of distress    0918 Pt sitting at side of bed. Gabi Mauricio RN at bedside. 1010 Pt resting in bed. Gabi Mauricio RN at bedside     1050 IV abx complete, pt disconnected. Urinal emptied     1107 , taken by Ms Danyell Gordillo. Insulin coverage to follow     1202 Insulin coverage admin. Pt denies any further needs. Left eating lunch in chair     1513 Pt sitting up in the chair. IV abx complete & disconnected. Pt brought new paper scrubs & socks per pt request     1535 VS stable, pt stated pain 8/10 in penis. PRN Percocet admin for pt stated pain     1609 , taken by Gabi Mauricio RN. Insulin coverage to follow     1708 Pt stated \"its fine\" when asked about pain level. Stated it was rated as 8/10 still. Insulin coverage admin by Gabi Mauricio, [facility] Pt resting in bed, talking on the phone. Pt denies any needs at this time     1804 Pt visitor at bedside     1915 Shift report given to JOZEF Dawson ([place])  (oncoming) from Susan Birmingham, [facility] and Gabi Mauricio RN (offgoing). Report included the following: SBAR, MAR, Kardex, Intake/Output and Recent Results.

## 2022-03-11 NOTE — PROGRESS NOTES
Hospitalist Progress Note    NAME: Stephen Garrido   :  1950   MRN:  [id number]   Room Number:    @ [facility]       Interim Hospital Summary: 66 y.o. male whom presented on 3/9/2022 with      Assessment / Plan:  Anticipated discharge date : 3/16/2022  Anticipated disposition : Home  Barriers to discharge : IV abx     ESBL UTI   Urinary Pain POA   Urinary retention and LUTS due to BPH POA  Hematuria POA  -UA with WBC, RBC, Bacteria   -UCX w/ ESBL previously  -Patient currently performs CIC using 14 Fr catheter  -Patient did not complete previous course for ESBL UTI  -Patient did not see urology appointment made for him previously on   -Hemoglobin 8.7 ( baseline 10.4)         -IV meropenem,  3/16/2022  -Monitor labs  -Transfuse to keep hemoglobin above 7  -Continue Flomax  - Percocet PRN for pain   -Bladder check  as needed  -Patient to follow-up with urology as an outpatient for management of hematuria and urinary retention due to BPH w/ LUTS           CKD 2 POA   Hx of LUTS s/p EBRT ( 2019)  and ADT( 2021)  for GG5 prostate cancer 2019   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   - Duo neb as needed   - resume HTN meds as BP tolerates         Body mass index is 33.45 kg/m². Code Status: Full   Surrogate Decision Maker:     DVT Prophylaxis: Hold due to hematuria  GI Prophylaxis: not indicate         Subjective:     Chief Complaint / Reason for Physician Visit  \"im doing ok\". Discussed with RN events overnight. Review of Systems:  No fevers, chills, appetite change, cough, sputum production, shortness of breath, dyspnea on exertion, nausea, vomitting, diarrhea, constipation, chest pain, leg edema, abdominal pain, joint pain, rash, itching. Tolerating PT/OT.  Tolerating diet.       Objective:     VITALS:   Last 24hrs VS reviewed since prior progress note. Most recent are:  Patient Vitals for the past 24 hrs:   Temp Pulse Resp BP SpO2   03/11/22 0748 98.5 °F (36.9 °C) 68 18 (!) 165/86 96 %   03/11/22 0400 98 °F (36.7 °C) 72 18 136/87 100 %   03/10/22 1930 98.5 °F (36.9 °C) 79 18 (!) 148/83 96 %   03/10/22 1521 98.2 °F (36.8 °C) 75 20 125/74 97 %       Intake/Output Summary (Last 24 hours) at 3/11/2022 1019  Last data filed at 3/11/2022 1172  Gross per 24 hour   Intake 720 ml   Output 875 ml   Net -155 ml        PHYSICAL EXAM:  General: Alert, cooperative, no acute distress    EENT:  EOMI. Anicteric sclerae. MMM  Resp:  CTA bilaterally, no wheezing or rales. No accessory muscle use  CV:  Regular  rhythm,  normal S1/S2, no murmurs rubs gallops, No edema  GI:  Soft, Non distended, Non tender. +Bowel sounds  Neurologic:  Alert and oriented X 3, normal speech,   Psych:   Good insight. Not anxious nor agitated  Skin:  No rashes. No jaundice    Reviewed most current lab test results and cultures  YES  Reviewed most current radiology test results   YES  Review and summation of old records today    NO  Reviewed patient's current orders and MAR    YES  PMH/ reviewed - no change compared to H&P  ________________________________________________________________________  Care Plan discussed with:    Comments   Patient x    Family      RN x    Care Manager x    Consultant                       x Multidiciplinary team rounds were held today with , nursing, pharmacist and clinical coordinator. Patient's plan of care was discussed; medications were reviewed and discharge planning was addressed.      ________________________________________________________________________  Total NON critical care TIME: 25  Minutes    Total CRITICAL CARE TIME Spent:   Minutes non procedure based      Comments   >50% of visit spent in counseling and coordination of care ________________________________________________________________________  Donnell Stevens MD     Procedures: see electronic medical records for all procedures/Xrays and details which were not copied into this note but were reviewed prior to creation of Plan. LABS:  I reviewed today's most current labs and imaging studies.   Pertinent labs include:  Recent Labs     03/11/22  0327 03/10/22  0243 03/09/22  1009   WBC 5.6 5.3 6.9   HGB 8.0* 8.2* 8.7*   HCT 27.2* 27.3* 29.5*    374 393     Recent Labs     03/11/22  0327 03/10/22  0243 03/09/22  1009    137 137   K 4.1 3.7 3.5    103 99   CO2 25 25 24   * 152* 202*   BUN 16 12 11   CREA 1.32* 1.38* 1.35*   CA 9.2 8.9 9.2   ALB 2.8* 2.9* 3.2*   TBILI 0.4 0.4 0.5   ALT 24 25 30       Signed: Donnell Stevens MD

## 2022-03-11 NOTE — PROGRESS NOTES
Problem: Diabetes Self-Management  Goal: *Using medications safely  Description: State effect of diabetes medications on diabetes; name diabetes medication taking, action and side effects. Outcome: Progressing Towards Goal     Problem: Diabetes Self-Management  Goal: *Monitoring blood glucose, interpreting and using results  Description: Identify recommended blood glucose targets  and personal targets. Outcome: Progressing Towards Goal     Problem: Diabetes Self-Management  Goal: *Prevention, detection and treatment of chronic complications  Description: Define the natural course of diabetes and describe the relationship of blood glucose levels to long term complications of diabetes.   Outcome: Progressing Towards Goal

## 2022-03-11 NOTE — PROGRESS NOTES
Readmission Assessment  Number of days since last admission?: 8-30 days  Previous disposition: Other (comment) (Home with out patient home infusion)  Who is being interviewed?: Patient  What was the patient's/caregiver's perception as to why they think they needed to return back to the hospital?: Other (Comment) (Only went to IV Out Patient Therapy one time.   DId not f/u with Urology appointment)  Did you visit your Primary Care Physician after you left the hospital, before you returned this time?: No ([facility] scheduled for 3/24)  Why weren't you able to visit your PCP?: Other (Comment) (Patient did not complete IV ABT outpatient and did not go to f/u urology appointment)  Did you see a specialist, such as Cardiac, Pulmonary, Orthopedic Physician, etc. after you left the hospital?: No  Who advised the patient to return to the hospital?: Self-referral  Does the patient report anything that got in the way of taking their medications?: No  In our efforts to provide the best possible care to you and others like you, can you think of anything that we could have done to help you after you left the hospital the first time, so that you might not have needed to return so soon?: Teaching during hospitalization regarding your illness

## 2022-03-11 NOTE — PROGRESS NOTES
1915: Bedside and Verbal shift change report given to Nicolle Gutiérrez RN (oncoming nurse) by Jessi Malone RN (offgoing nurse). Report included the following information: SBAR, Kizzy, MAR and Recent Results. 2110: Blood glucose 216 - 3 units Humalog given SQ    0454: gave Percocet 5/325mg PO for c/o pain to his penis throughout the night. Pt has been voiding adequate amounts of urine. Urine has become more clear over the past couple of days and less malodorous. Still c/o of burning w/ urination. Urgency and frequency are still present. However, amount pt is voiding has increased tremendously over the past couple of days. 0715: Bedside and Verbal shift change report given to Alva Goddard RN (oncoming nurse) by Meryle Beecham, RN (offgoing nurse). Report included the following information:  SBAR, Kizzy, MAR and Recent Results.

## 2022-03-12 LAB
ALBUMIN SERPL-MCNC: 2.8 G/DL (ref 3.5–5)
ALBUMIN/GLOB SERPL: 0.6 {RATIO} (ref 1.1–2.2)
ALP SERPL-CCNC: 72 U/L (ref 45–117)
ALT SERPL-CCNC: 28 U/L (ref 12–78)
ANION GAP SERPL CALC-SCNC: 10 MMOL/L (ref 5–15)
AST SERPL-CCNC: 17 U/L (ref 15–37)
BASOPHILS # BLD: 0.1 K/UL (ref 0–0.1)
BASOPHILS NFR BLD: 1 % (ref 0–1)
BILIRUB SERPL-MCNC: 0.3 MG/DL (ref 0.2–1)
BUN SERPL-MCNC: 18 MG/DL (ref 6–20)
BUN/CREAT SERPL: 15 (ref 12–20)
CALCIUM SERPL-MCNC: 9 MG/DL (ref 8.5–10.1)
CHLORIDE SERPL-SCNC: 104 MMOL/L (ref 97–108)
CO2 SERPL-SCNC: 25 MMOL/L (ref 21–32)
CREAT SERPL-MCNC: 1.2 MG/DL (ref 0.7–1.3)
DIFFERENTIAL METHOD BLD: ABNORMAL
EOSINOPHIL # BLD: 0.2 K/UL (ref 0–0.4)
EOSINOPHIL NFR BLD: 4 % (ref 0–7)
ERYTHROCYTE [DISTWIDTH] IN BLOOD BY AUTOMATED COUNT: 15.8 % (ref 11.5–14.5)
GLOBULIN SER CALC-MCNC: 4.6 G/DL (ref 2–4)
GLUCOSE BLD STRIP.AUTO-MCNC: 178 MG/DL (ref 65–117)
GLUCOSE BLD STRIP.AUTO-MCNC: 191 MG/DL (ref 65–117)
GLUCOSE BLD STRIP.AUTO-MCNC: 193 MG/DL (ref 65–117)
GLUCOSE BLD STRIP.AUTO-MCNC: 276 MG/DL (ref 65–117)
GLUCOSE SERPL-MCNC: 175 MG/DL (ref 65–100)
HCT VFR BLD AUTO: 25.9 % (ref 36.6–50.3)
HGB BLD-MCNC: 7.8 G/DL (ref 12.1–17)
IMM GRANULOCYTES # BLD AUTO: 0 K/UL (ref 0–0.04)
IMM GRANULOCYTES NFR BLD AUTO: 0 % (ref 0–0.5)
LYMPHOCYTES # BLD: 2 K/UL (ref 0.8–3.5)
LYMPHOCYTES NFR BLD: 42 % (ref 12–49)
MCH RBC QN AUTO: 26.1 PG (ref 26–34)
MCHC RBC AUTO-ENTMCNC: 30.1 G/DL (ref 30–36.5)
MCV RBC AUTO: 86.6 FL (ref 80–99)
MONOCYTES # BLD: 0.6 K/UL (ref 0–1)
MONOCYTES NFR BLD: 12 % (ref 5–13)
NEUTS SEG # BLD: 2 K/UL (ref 1.8–8)
NEUTS SEG NFR BLD: 41 % (ref 32–75)
NRBC # BLD: 0 K/UL (ref 0–0.01)
NRBC BLD-RTO: 0 PER 100 WBC
PLATELET # BLD AUTO: 346 K/UL (ref 150–400)
PMV BLD AUTO: 8.3 FL (ref 8.9–12.9)
POTASSIUM SERPL-SCNC: 3.9 MMOL/L (ref 3.5–5.1)
PROT SERPL-MCNC: 7.4 G/DL (ref 6.4–8.2)
RBC # BLD AUTO: 2.99 M/UL (ref 4.1–5.7)
RBC MORPH BLD: ABNORMAL
SERVICE CMNT-IMP: ABNORMAL
SODIUM SERPL-SCNC: 139 MMOL/L (ref 136–145)
WBC # BLD AUTO: 4.9 K/UL (ref 4.1–11.1)

## 2022-03-12 PROCEDURE — 74011636637 HC RX REV CODE- 636/637: Performed by: STUDENT IN AN ORGANIZED HEALTH CARE EDUCATION/TRAINING PROGRAM

## 2022-03-12 PROCEDURE — 74011250636 HC RX REV CODE- 250/636: Performed by: STUDENT IN AN ORGANIZED HEALTH CARE EDUCATION/TRAINING PROGRAM

## 2022-03-12 PROCEDURE — 85025 COMPLETE CBC W/AUTO DIFF WBC: CPT

## 2022-03-12 PROCEDURE — 36415 COLL VENOUS BLD VENIPUNCTURE: CPT

## 2022-03-12 PROCEDURE — 74011250637 HC RX REV CODE- 250/637: Performed by: STUDENT IN AN ORGANIZED HEALTH CARE EDUCATION/TRAINING PROGRAM

## 2022-03-12 PROCEDURE — 82962 GLUCOSE BLOOD TEST: CPT

## 2022-03-12 PROCEDURE — 74011000258 HC RX REV CODE- 258: Performed by: STUDENT IN AN ORGANIZED HEALTH CARE EDUCATION/TRAINING PROGRAM

## 2022-03-12 PROCEDURE — 65270000032 HC RM SEMIPRIVATE

## 2022-03-12 PROCEDURE — 74011000250 HC RX REV CODE- 250: Performed by: STUDENT IN AN ORGANIZED HEALTH CARE EDUCATION/TRAINING PROGRAM

## 2022-03-12 PROCEDURE — 80053 COMPREHEN METABOLIC PANEL: CPT

## 2022-03-12 RX ADMIN — MEROPENEM 500 MG: 500 INJECTION, POWDER, FOR SOLUTION INTRAVENOUS at 08:15

## 2022-03-12 RX ADMIN — Medication 5 UNITS: at 12:26

## 2022-03-12 RX ADMIN — OXYCODONE HYDROCHLORIDE AND ACETAMINOPHEN 1 TABLET: 5; 325 TABLET ORAL at 04:34

## 2022-03-12 RX ADMIN — ATORVASTATIN CALCIUM 80 MG: 40 TABLET, FILM COATED ORAL at 22:14

## 2022-03-12 RX ADMIN — OXYCODONE HYDROCHLORIDE AND ACETAMINOPHEN 1 TABLET: 5; 325 TABLET ORAL at 15:30

## 2022-03-12 RX ADMIN — MEROPENEM 500 MG: 500 INJECTION, POWDER, FOR SOLUTION INTRAVENOUS at 03:48

## 2022-03-12 RX ADMIN — SODIUM CHLORIDE, PRESERVATIVE FREE 10 ML: 5 INJECTION INTRAVENOUS at 22:49

## 2022-03-12 RX ADMIN — PANTOPRAZOLE SODIUM 40 MG: 40 TABLET, DELAYED RELEASE ORAL at 08:15

## 2022-03-12 RX ADMIN — MEROPENEM 500 MG: 500 INJECTION, POWDER, FOR SOLUTION INTRAVENOUS at 15:21

## 2022-03-12 RX ADMIN — MEROPENEM 500 MG: 500 INJECTION, POWDER, FOR SOLUTION INTRAVENOUS at 22:14

## 2022-03-12 RX ADMIN — OXYCODONE HYDROCHLORIDE AND ACETAMINOPHEN 1 TABLET: 5; 325 TABLET ORAL at 08:30

## 2022-03-12 RX ADMIN — TAMSULOSIN HYDROCHLORIDE 0.8 MG: 0.4 CAPSULE ORAL at 08:15

## 2022-03-12 RX ADMIN — Medication 2 UNITS: at 17:17

## 2022-03-12 RX ADMIN — SODIUM CHLORIDE, PRESERVATIVE FREE 10 ML: 5 INJECTION INTRAVENOUS at 06:32

## 2022-03-12 RX ADMIN — OXYCODONE HYDROCHLORIDE AND ACETAMINOPHEN 1 TABLET: 5; 325 TABLET ORAL at 22:15

## 2022-03-12 RX ADMIN — LATANOPROST 1 DROP: 50 SOLUTION OPHTHALMIC at 22:45

## 2022-03-12 RX ADMIN — SODIUM CHLORIDE, PRESERVATIVE FREE 10 ML: 5 INJECTION INTRAVENOUS at 15:22

## 2022-03-12 RX ADMIN — Medication 2 UNITS: at 08:30

## 2022-03-12 RX ADMIN — AMLODIPINE BESYLATE 5 MG: 5 TABLET ORAL at 08:15

## 2022-03-12 NOTE — PROGRESS NOTES
shift change report given to [facility] RN  by Diane Fraser. Report included the following information SBAR, Kardex, MAR, and Recent results. Fabi Murcia

## 2022-03-12 NOTE — PROGRESS NOTES
Problem: Diabetes Self-Management  Goal: *Disease process and treatment process  Description: Define diabetes and identify own type of diabetes; list 3 options for treating diabetes. Outcome: Progressing Towards Goal  Goal: *Incorporating nutritional management into lifestyle  Description: Describe effect of type, amount and timing of food on blood glucose; list 3 methods for planning meals. Outcome: Progressing Towards Goal  Goal: *Incorporating physical activity into lifestyle  Description: State effect of exercise on blood glucose levels. Outcome: Progressing Towards Goal  Goal: *Developing strategies to promote health/change behavior  Description: Define the ABC's of diabetes; identify appropriate screenings, schedule and personal plan for screenings. Outcome: Progressing Towards Goal  Goal: *Using medications safely  Description: State effect of diabetes medications on diabetes; name diabetes medication taking, action and side effects. Outcome: Progressing Towards Goal  Goal: *Monitoring blood glucose, interpreting and using results  Description: Identify recommended blood glucose targets  and personal targets. Outcome: Progressing Towards Goal  Goal: *Prevention, detection, treatment of acute complications  Description: List symptoms of hyper- and hypoglycemia; describe how to treat low blood sugar and actions for lowering  high blood glucose level. Outcome: Progressing Towards Goal  Goal: *Prevention, detection and treatment of chronic complications  Description: Define the natural course of diabetes and describe the relationship of blood glucose levels to long term complications of diabetes.   Outcome: Progressing Towards Goal  Goal: *Developing strategies to address psychosocial issues  Description: Describe feelings about living with diabetes; identify support needed and support network  Outcome: Progressing Towards Goal  Goal: *Insulin pump training  Outcome: Progressing Towards Goal  Goal: *Sick day guidelines  Outcome: Progressing Towards Goal  Goal: *Patient Specific Goal (EDIT GOAL, INSERT TEXT)  Outcome: Progressing Towards Goal     Problem: Patient Education: Go to Patient Education Activity  Goal: Patient/Family Education  Outcome: Progressing Towards Goal     Problem: Risk for Spread of Infection  Goal: Prevent transmission of infectious organism to others  Description: Prevent the transmission of infectious organisms to other patients, staff members, and visitors. Outcome: Progressing Towards Goal     Problem: Patient Education:  Go to Education Activity  Goal: Patient/Family Education  Outcome: Progressing Towards Goal     Problem: Falls - Risk of  Goal: *Absence of Falls  Description: Document Na Chaves Fall Risk and appropriate interventions in the flowsheet.   Outcome: Progressing Towards Goal  Note: Fall Risk Interventions:            Medication Interventions: Bed/chair exit alarm,Teach patient to arise slowly                   Problem: Patient Education: Go to Patient Education Activity  Goal: Patient/Family Education  Outcome: Progressing Towards Goal

## 2022-03-12 NOTE — PROGRESS NOTES
Problem: Diabetes Self-Management  Goal: *Disease process and treatment process  Description: Define diabetes and identify own type of diabetes; list 3 options for treating diabetes. Outcome: Progressing Towards Goal  Goal: *Incorporating nutritional management into lifestyle  Description: Describe effect of type, amount and timing of food on blood glucose; list 3 methods for planning meals. Outcome: Progressing Towards Goal  Goal: *Incorporating physical activity into lifestyle  Description: State effect of exercise on blood glucose levels. Outcome: Progressing Towards Goal  Goal: *Developing strategies to promote health/change behavior  Description: Define the ABC's of diabetes; identify appropriate screenings, schedule and personal plan for screenings. Outcome: Progressing Towards Goal  Goal: *Using medications safely  Description: State effect of diabetes medications on diabetes; name diabetes medication taking, action and side effects. Outcome: Progressing Towards Goal  Goal: *Monitoring blood glucose, interpreting and using results  Description: Identify recommended blood glucose targets  and personal targets. Outcome: Progressing Towards Goal  Goal: *Prevention, detection, treatment of acute complications  Description: List symptoms of hyper- and hypoglycemia; describe how to treat low blood sugar and actions for lowering  high blood glucose level. Outcome: Progressing Towards Goal  Goal: *Prevention, detection and treatment of chronic complications  Description: Define the natural course of diabetes and describe the relationship of blood glucose levels to long term complications of diabetes.   Outcome: Progressing Towards Goal  Goal: *Developing strategies to address psychosocial issues  Description: Describe feelings about living with diabetes; identify support needed and support network  Outcome: Progressing Towards Goal  Goal: *Insulin pump training  Outcome: Progressing Towards Goal  Goal: *Sick day guidelines  Outcome: Progressing Towards Goal  Goal: *Patient Specific Goal (EDIT GOAL, INSERT TEXT)  Outcome: Progressing Towards Goal     Problem: Risk for Spread of Infection  Goal: Prevent transmission of infectious organism to others  Description: Prevent the transmission of infectious organisms to other patients, staff members, and visitors. Outcome: Progressing Towards Goal     Problem: Falls - Risk of  Goal: *Absence of Falls  Description: Document Julieta Campos Fall Risk and appropriate interventions in the flowsheet. Outcome: Progressing Towards Goal  Note: Fall Risk Interventions:            Medication Interventions: Patient to call before getting OOB                   Problem: Falls - Risk of  Goal: *Absence of Falls  Description: Document Julieta Campos Fall Risk and appropriate interventions in the flowsheet. Outcome: Progressing Towards Goal  Note: Fall Risk Interventions:            Medication Interventions: Patient to call before getting OOB                   Problem: Falls - Risk of  Goal: *Absence of Falls  Description: Document Julieta Campos Fall Risk and appropriate interventions in the flowsheet.   Outcome: Progressing Towards Goal  Note: Fall Risk Interventions:            Medication Interventions: Patient to call before getting OOB

## 2022-03-12 NOTE — PROGRESS NOTES
Hospitalist Progress Note    NAME: Lo Wen   :  1950   MRN:  [id number]   Room Number:    @ [facility]       Interim Hospital Summary: 66 y.o. male whom presented on 3/9/2022 with      Assessment / Plan:  Anticipated discharge date : 3/16/2022  Anticipated disposition : Home  Barriers to discharge : IV abx     ESBL UTI   Urinary Pain POA   Urinary retention and LUTS due to BPH POA  Hematuria POA  -UA with WBC, RBC, Bacteria   -UCX w/ ESBL previously  -Patient currently performs CIC using 14 Fr catheter  -Patient did not complete previous course for ESBL UTI  -Patient did not see urology appointment made for him previously on   -Hemoglobin 8.7 ( baseline 10.4)         -IV meropenem,  3/16/2022  -Monitor labs  -Transfuse to keep hemoglobin above 7  -Continue Flomax  - Percocet PRN for pain   -Bladder check  as needed  -Patient to follow-up with urology as an outpatient for management of hematuria and urinary retention due to BPH w/ LUTS           CKD 2 POA   Hx of LUTS s/p EBRT ( 2019)  and ADT( 2021)  for GG5 prostate cancer 2019   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   - Duo neb as needed   - resume HTN meds as BP tolerates         Body mass index is 33.45 kg/m². Code Status: Full   Surrogate Decision Maker:     DVT Prophylaxis: Hold due to hematuria  GI Prophylaxis: not indicate         Subjective:     Chief Complaint / Reason for Physician Visit  \"im doing ok\". Discussed with RN events overnight. Review of Systems:  No fevers, chills, appetite change, cough, sputum production, shortness of breath, dyspnea on exertion, nausea, vomitting, diarrhea, constipation, chest pain, leg edema, abdominal pain, joint pain, rash, itching. Tolerating PT/OT.  Tolerating diet.       Objective:     VITALS:   Last 24hrs VS reviewed since prior progress note. Most recent are:  Patient Vitals for the past 24 hrs:   Temp Pulse Resp BP SpO2   03/12/22 0801 97.7 °F (36.5 °C) 62 16 123/73 99 %   03/12/22 0355 97.1 °F (36.2 °C) 63 16 131/80 95 %   03/11/22 2051 98.7 °F (37.1 °C) 80 18 (!) 141/72 98 %       Intake/Output Summary (Last 24 hours) at 3/12/2022 1751  Last data filed at 3/12/2022 2584  Gross per 24 hour   Intake --   Output 250 ml   Net -250 ml        PHYSICAL EXAM:  General: Alert, cooperative, no acute distress    EENT:  EOMI. Anicteric sclerae. MMM  Resp:  CTA bilaterally, no wheezing or rales. No accessory muscle use  CV:  Regular  rhythm,  normal S1/S2, no murmurs rubs gallops, No edema  GI:  Soft, Non distended, Non tender. +Bowel sounds  Neurologic:  Alert and oriented X 3, normal speech,   Psych:   Good insight. Not anxious nor agitated  Skin:  No rashes. No jaundice    Reviewed most current lab test results and cultures  YES  Reviewed most current radiology test results   YES  Review and summation of old records today    NO  Reviewed patient's current orders and MAR    YES  PMH/ reviewed - no change compared to H&P  ________________________________________________________________________  Care Plan discussed with:    Comments   Patient x    Family      RN x    Care Manager x    Consultant                       x Multidiciplinary team rounds were held today with , nursing, pharmacist and clinical coordinator. Patient's plan of care was discussed; medications were reviewed and discharge planning was addressed.      ________________________________________________________________________  Total NON critical care TIME: 25  Minutes    Total CRITICAL CARE TIME Spent:   Minutes non procedure based      Comments   >50% of visit spent in counseling and coordination of care     ________________________________________________________________________  Awilda Barr Sarah Chavez MD     Procedures: see electronic medical records for all procedures/Xrays and details which were not copied into this note but were reviewed prior to creation of Plan. LABS:  I reviewed today's most current labs and imaging studies.   Pertinent labs include:  Recent Labs     03/12/22  0338 03/11/22  0327 03/10/22  0243   WBC 4.9 5.6 5.3   HGB 7.8* 8.0* 8.2*   HCT 25.9* 27.2* 27.3*    349 374     Recent Labs     03/12/22  0338 03/11/22  0327 03/10/22  0243    139 137   K 3.9 4.1 3.7    104 103   CO2 25 25 25   * 158* 152*   BUN 18 16 12   CREA 1.20 1.32* 1.38*   CA 9.0 9.2 8.9   ALB 2.8* 2.8* 2.9*   TBILI 0.3 0.4 0.4   ALT 28 24 25       Signed: Joyce Vann MD

## 2022-03-12 NOTE — PROGRESS NOTES
Patient continue to receive Antibiotics. He denies chest pain, cough or SOB. Patient has been awake most of night, c/o pain medicated with PRN as ordered. No overnight issues.

## 2022-03-13 LAB
GLUCOSE BLD STRIP.AUTO-MCNC: 171 MG/DL (ref 65–117)
GLUCOSE BLD STRIP.AUTO-MCNC: 198 MG/DL (ref 65–117)
GLUCOSE BLD STRIP.AUTO-MCNC: 198 MG/DL (ref 65–117)
GLUCOSE BLD STRIP.AUTO-MCNC: 221 MG/DL (ref 65–117)
SERVICE CMNT-IMP: ABNORMAL

## 2022-03-13 PROCEDURE — 74011000250 HC RX REV CODE- 250: Performed by: STUDENT IN AN ORGANIZED HEALTH CARE EDUCATION/TRAINING PROGRAM

## 2022-03-13 PROCEDURE — 65270000032 HC RM SEMIPRIVATE

## 2022-03-13 PROCEDURE — 74011000258 HC RX REV CODE- 258: Performed by: STUDENT IN AN ORGANIZED HEALTH CARE EDUCATION/TRAINING PROGRAM

## 2022-03-13 PROCEDURE — 82962 GLUCOSE BLOOD TEST: CPT

## 2022-03-13 PROCEDURE — 74011250636 HC RX REV CODE- 250/636: Performed by: STUDENT IN AN ORGANIZED HEALTH CARE EDUCATION/TRAINING PROGRAM

## 2022-03-13 PROCEDURE — 74011250637 HC RX REV CODE- 250/637: Performed by: STUDENT IN AN ORGANIZED HEALTH CARE EDUCATION/TRAINING PROGRAM

## 2022-03-13 PROCEDURE — 74011636637 HC RX REV CODE- 636/637: Performed by: STUDENT IN AN ORGANIZED HEALTH CARE EDUCATION/TRAINING PROGRAM

## 2022-03-13 RX ADMIN — AMLODIPINE BESYLATE 5 MG: 5 TABLET ORAL at 08:45

## 2022-03-13 RX ADMIN — MEROPENEM 500 MG: 500 INJECTION, POWDER, FOR SOLUTION INTRAVENOUS at 04:01

## 2022-03-13 RX ADMIN — OXYCODONE HYDROCHLORIDE AND ACETAMINOPHEN 1 TABLET: 5; 325 TABLET ORAL at 22:05

## 2022-03-13 RX ADMIN — TAMSULOSIN HYDROCHLORIDE 0.8 MG: 0.4 CAPSULE ORAL at 08:45

## 2022-03-13 RX ADMIN — MEROPENEM 500 MG: 500 INJECTION, POWDER, FOR SOLUTION INTRAVENOUS at 14:28

## 2022-03-13 RX ADMIN — OXYCODONE HYDROCHLORIDE AND ACETAMINOPHEN 1 TABLET: 5; 325 TABLET ORAL at 05:00

## 2022-03-13 RX ADMIN — Medication 2 UNITS: at 17:20

## 2022-03-13 RX ADMIN — OXYCODONE HYDROCHLORIDE AND ACETAMINOPHEN 1 TABLET: 5; 325 TABLET ORAL at 09:43

## 2022-03-13 RX ADMIN — OXYCODONE HYDROCHLORIDE AND ACETAMINOPHEN 1 TABLET: 5; 325 TABLET ORAL at 14:28

## 2022-03-13 RX ADMIN — Medication 2 UNITS: at 21:40

## 2022-03-13 RX ADMIN — ATORVASTATIN CALCIUM 80 MG: 40 TABLET, FILM COATED ORAL at 21:40

## 2022-03-13 RX ADMIN — PANTOPRAZOLE SODIUM 40 MG: 40 TABLET, DELAYED RELEASE ORAL at 08:45

## 2022-03-13 RX ADMIN — Medication 2 UNITS: at 08:45

## 2022-03-13 RX ADMIN — MEROPENEM 500 MG: 500 INJECTION, POWDER, FOR SOLUTION INTRAVENOUS at 08:46

## 2022-03-13 RX ADMIN — SODIUM CHLORIDE, PRESERVATIVE FREE 10 ML: 5 INJECTION INTRAVENOUS at 12:25

## 2022-03-13 RX ADMIN — LATANOPROST 1 DROP: 50 SOLUTION OPHTHALMIC at 21:42

## 2022-03-13 RX ADMIN — SODIUM CHLORIDE, PRESERVATIVE FREE 10 ML: 5 INJECTION INTRAVENOUS at 21:43

## 2022-03-13 RX ADMIN — Medication 2 UNITS: at 12:25

## 2022-03-13 RX ADMIN — MEROPENEM 500 MG: 500 INJECTION, POWDER, FOR SOLUTION INTRAVENOUS at 21:39

## 2022-03-13 RX ADMIN — SODIUM CHLORIDE, PRESERVATIVE FREE 10 ML: 5 INJECTION INTRAVENOUS at 05:01

## 2022-03-13 NOTE — PROGRESS NOTES
Problem: Diabetes Self-Management  Goal: *Disease process and treatment process  Description: Define diabetes and identify own type of diabetes; list 3 options for treating diabetes. Outcome: Progressing Towards Goal  Goal: *Incorporating nutritional management into lifestyle  Description: Describe effect of type, amount and timing of food on blood glucose; list 3 methods for planning meals. Outcome: Progressing Towards Goal  Goal: *Incorporating physical activity into lifestyle  Description: State effect of exercise on blood glucose levels. Outcome: Progressing Towards Goal  Goal: *Developing strategies to promote health/change behavior  Description: Define the ABC's of diabetes; identify appropriate screenings, schedule and personal plan for screenings. Outcome: Progressing Towards Goal  Goal: *Using medications safely  Description: State effect of diabetes medications on diabetes; name diabetes medication taking, action and side effects. Outcome: Progressing Towards Goal  Goal: *Monitoring blood glucose, interpreting and using results  Description: Identify recommended blood glucose targets  and personal targets. Outcome: Progressing Towards Goal  Goal: *Prevention, detection, treatment of acute complications  Description: List symptoms of hyper- and hypoglycemia; describe how to treat low blood sugar and actions for lowering  high blood glucose level. Outcome: Progressing Towards Goal  Goal: *Prevention, detection and treatment of chronic complications  Description: Define the natural course of diabetes and describe the relationship of blood glucose levels to long term complications of diabetes.   Outcome: Progressing Towards Goal  Goal: *Developing strategies to address psychosocial issues  Description: Describe feelings about living with diabetes; identify support needed and support network  Outcome: Progressing Towards Goal  Goal: *Insulin pump training  Outcome: Progressing Towards Goal  Goal: *Sick day guidelines  Outcome: Progressing Towards Goal  Goal: *Patient Specific Goal (EDIT GOAL, INSERT TEXT)  Outcome: Progressing Towards Goal     Problem: Falls - Risk of  Goal: *Absence of Falls  Description: Document Sharlene Fall Risk and appropriate interventions in the flowsheet. Outcome: Progressing Towards Goal  Note: Fall Risk Interventions:            Medication Interventions: Patient to call before getting OOB                   Problem: Risk for Spread of Infection  Goal: Prevent transmission of infectious organism to others  Description: Prevent the transmission of infectious organisms to other patients, staff members, and visitors.   Outcome: Progressing Towards Goal     Problem: Pain  Goal: *Control of Pain  Outcome: Progressing Towards Goal  Goal: *PALLIATIVE CARE:  Alleviation of Pain  Outcome: Progressing Towards Goal Arun Parry

## 2022-03-13 NOTE — PROGRESS NOTES
Hospitalist Progress Note    NAME: Iban Quezada   :  1950   MRN:  [id number]   Room Number:    @ [facility]       Interim Hospital Summary: 66 y.o. male whom presented on 3/9/2022 with      Assessment / Plan:  Anticipated discharge date : 3/16/2022  Anticipated disposition : Home  Barriers to discharge : IV abx     ESBL UTI   Urinary Pain POA   Urinary retention and LUTS due to BPH POA  Hematuria POA  -UA with WBC, RBC, Bacteria   -UCX w/ ESBL previously  -Patient currently performs CIC using 14 Fr catheter  -Patient did not complete previous course for ESBL UTI  -Patient did not see urology appointment made for him previously on   -Hemoglobin 8.7 ( baseline 10.4)         -IV meropenem,  3/16/2022  -Monitor labs  -Transfuse to keep hemoglobin above 7  -Continue Flomax  - Percocet PRN for pain   -Bladder check  as needed  -Patient to follow-up with urology as an outpatient for management of hematuria and urinary retention due to BPH w/ LUTS           CKD 2 POA   Hx of LUTS s/p EBRT ( 2019)  and ADT( 2021)  for GG5 prostate cancer 2019   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   - Duo neb as needed   - resume HTN meds as BP tolerates         Body mass index is 33.45 kg/m². Code Status: Full   Surrogate Decision Maker:     DVT Prophylaxis: Hold due to hematuria  GI Prophylaxis: not indicate         Subjective:     Chief Complaint / Reason for Physician Visit  \"im doing ok\". Discussed with RN events overnight. Review of Systems:  No fevers, chills, appetite change, cough, sputum production, shortness of breath, dyspnea on exertion, nausea, vomitting, diarrhea, constipation, chest pain, leg edema, abdominal pain, joint pain, rash, itching. Tolerating PT/OT.  Tolerating diet.       Objective:     VITALS:   Last 24hrs VS reviewed since prior progress note. Most recent are:  Patient Vitals for the past 24 hrs:   Temp Pulse Resp BP SpO2   03/13/22 0759 97.8 °F (36.6 °C) 63 18 (!) 146/92 97 %   03/13/22 0449 97.2 °F (36.2 °C) 64 19 136/81 97 %   03/12/22 1947 98 °F (36.7 °C) 62 18 121/62 97 %     No intake or output data in the 24 hours ending 03/13/22 1040     PHYSICAL EXAM:  General: Alert, cooperative, no acute distress    EENT:  EOMI. Anicteric sclerae. MMM  Resp:  CTA bilaterally, no wheezing or rales. No accessory muscle use  CV:  Regular  rhythm,  normal S1/S2, no murmurs rubs gallops, No edema  GI:  Soft, Non distended, Non tender. +Bowel sounds  Neurologic:  Alert and oriented X 3, normal speech,   Psych:   Good insight. Not anxious nor agitated  Skin:  No rashes. No jaundice    Reviewed most current lab test results and cultures  YES  Reviewed most current radiology test results   YES  Review and summation of old records today    NO  Reviewed patient's current orders and MAR    YES  PMH/SH reviewed - no change compared to H&P  ________________________________________________________________________  Care Plan discussed with:    Comments   Patient x    Family      RN x    Care Manager x    Consultant                       x Multidiciplinary team rounds were held today with , nursing, pharmacist and clinical coordinator. Patient's plan of care was discussed; medications were reviewed and discharge planning was addressed.      ________________________________________________________________________  Total NON critical care TIME: 25  Minutes    Total CRITICAL CARE TIME Spent:   Minutes non procedure based      Comments   >50% of visit spent in counseling and coordination of care     ________________________________________________________________________  Jeannette Encinas MD     Procedures: see electronic medical records for all procedures/Xrays and details which were not copied into this note but were reviewed prior to creation of Plan. LABS:  I reviewed today's most current labs and imaging studies.   Pertinent labs include:  Recent Labs     03/12/22 0338 03/11/22 0327   WBC 4.9 5.6   HGB 7.8* 8.0*   HCT 25.9* 27.2*    349     Recent Labs     03/12/22 0338 03/11/22 0327    139   K 3.9 4.1    104   CO2 25 25   * 158*   BUN 18 16   CREA 1.20 1.32*   CA 9.0 9.2   ALB 2.8* 2.8*   TBILI 0.3 0.4   ALT 28 24       Signed: Everton Cottrell MD

## 2022-03-13 NOTE — PROGRESS NOTES
shift change report given to [facility] RN by Tamela Servin.  Report included the following information SBAR, Kardex, and [facility]

## 2022-03-13 NOTE — PROGRESS NOTES
Patient is alert, c/o pain medicated with PRN. IV flushed well with no issues. No overnight issues.  Patient resting well in bed

## 2022-03-13 NOTE — PROGRESS NOTES
1927) Bedside and Verbal shift change report given to [address] and Lorrie Holloway (oncoming nurse) by Peng Treadwell (offgoing nurse). Report included the following information SBAR, Kardex, Intake/Output and MAR.     1953)  PM assessment done. Patient is AOx4. VS taken. Patient found resting comfortably in the bed and watching TV.      2140) Bedtime medications taken whole with 60 ml of water and tolerated well. Scheduled IV Meropenem 500 mg in 0.9% Nacl 50 ml given. 2205) PRN Percocet 5-325 mg tablet given with 30 ml of water. 0040) Patient was rounded on. Patient is awake and resting comfortably in bed.    0221) Patient was rounded on. Patient is sleeping comfortably in bed.    0400) Scheduled  IV Meropenem 500 mg in 0.9% Nacl 50 ml given. PRN Percocet 5-325 mg tablet given with 60 ml of water per patient's request. Cookie given per patient's request. Patient in bed resting comfortably. 9219)  Patient was rounded on. Patient in bed resting comfortably in bed.

## 2022-03-13 NOTE — PROGRESS NOTES
Problem: Diabetes Self-Management  Goal: *Disease process and treatment process  Description: Define diabetes and identify own type of diabetes; list 3 options for treating diabetes. Outcome: Progressing Towards Goal     Problem: Diabetes Self-Management  Goal: *Incorporating nutritional management into lifestyle  Description: Describe effect of type, amount and timing of food on blood glucose; list 3 methods for planning meals. Outcome: Progressing Towards Goal     Problem: Diabetes Self-Management  Goal: *Incorporating physical activity into lifestyle  Description: State effect of exercise on blood glucose levels. Outcome: Progressing Towards Goal     Problem: Diabetes Self-Management  Goal: *Using medications safely  Description: State effect of diabetes medications on diabetes; name diabetes medication taking, action and side effects. Outcome: Progressing Towards Goal     Problem: Diabetes Self-Management  Goal: *Monitoring blood glucose, interpreting and using results  Description: Identify recommended blood glucose targets  and personal targets. Outcome: Progressing Towards Goal     Problem: Diabetes Self-Management  Goal: *Prevention, detection, treatment of acute complications  Description: List symptoms of hyper- and hypoglycemia; describe how to treat low blood sugar and actions for lowering  high blood glucose level. Outcome: Progressing Towards Goal     Problem: Diabetes Self-Management  Goal: *Prevention, detection and treatment of chronic complications  Description: Define the natural course of diabetes and describe the relationship of blood glucose levels to long term complications of diabetes.   Outcome: Progressing Towards Goal     Problem: Patient Education:  Go to Education Activity  Goal: Patient/Family Education  Outcome: Progressing Towards Goal     Problem: Patient Education: Go to Patient Education Activity  Goal: Patient/Family Education  Outcome: Progressing Towards Goal     Problem: Progress Note (short form)





- Note


Progress Note: 





urology note. consult dictated Falls - Risk of  Goal: *Absence of Falls  Description: Document Joaquim Beer Fall Risk and appropriate interventions in the flowsheet.   Outcome: Progressing Towards Goal  Note: Fall Risk Interventions:            Medication Interventions: Patient to call before getting OOB

## 2022-03-14 LAB
GLUCOSE BLD STRIP.AUTO-MCNC: 173 MG/DL (ref 65–117)
GLUCOSE BLD STRIP.AUTO-MCNC: 221 MG/DL (ref 65–117)
GLUCOSE BLD STRIP.AUTO-MCNC: 248 MG/DL (ref 65–117)
GLUCOSE BLD STRIP.AUTO-MCNC: 291 MG/DL (ref 65–117)
SERVICE CMNT-IMP: ABNORMAL

## 2022-03-14 PROCEDURE — 74011636637 HC RX REV CODE- 636/637: Performed by: STUDENT IN AN ORGANIZED HEALTH CARE EDUCATION/TRAINING PROGRAM

## 2022-03-14 PROCEDURE — 65270000032 HC RM SEMIPRIVATE

## 2022-03-14 PROCEDURE — 74011250636 HC RX REV CODE- 250/636: Performed by: STUDENT IN AN ORGANIZED HEALTH CARE EDUCATION/TRAINING PROGRAM

## 2022-03-14 PROCEDURE — 82962 GLUCOSE BLOOD TEST: CPT

## 2022-03-14 PROCEDURE — 2709999900 HC NON-CHARGEABLE SUPPLY

## 2022-03-14 PROCEDURE — 74011250637 HC RX REV CODE- 250/637: Performed by: STUDENT IN AN ORGANIZED HEALTH CARE EDUCATION/TRAINING PROGRAM

## 2022-03-14 PROCEDURE — 74011000258 HC RX REV CODE- 258: Performed by: STUDENT IN AN ORGANIZED HEALTH CARE EDUCATION/TRAINING PROGRAM

## 2022-03-14 PROCEDURE — 51798 US URINE CAPACITY MEASURE: CPT

## 2022-03-14 PROCEDURE — 74011000250 HC RX REV CODE- 250: Performed by: STUDENT IN AN ORGANIZED HEALTH CARE EDUCATION/TRAINING PROGRAM

## 2022-03-14 RX ADMIN — SODIUM CHLORIDE, PRESERVATIVE FREE 10 ML: 5 INJECTION INTRAVENOUS at 13:59

## 2022-03-14 RX ADMIN — AMLODIPINE BESYLATE 5 MG: 5 TABLET ORAL at 08:23

## 2022-03-14 RX ADMIN — OXYCODONE HYDROCHLORIDE AND ACETAMINOPHEN 1 TABLET: 5; 325 TABLET ORAL at 04:00

## 2022-03-14 RX ADMIN — Medication 2 UNITS: at 16:55

## 2022-03-14 RX ADMIN — Medication 3 UNITS: at 11:12

## 2022-03-14 RX ADMIN — OXYCODONE HYDROCHLORIDE AND ACETAMINOPHEN 1 TABLET: 5; 325 TABLET ORAL at 12:33

## 2022-03-14 RX ADMIN — MEROPENEM 500 MG: 500 INJECTION, POWDER, FOR SOLUTION INTRAVENOUS at 03:38

## 2022-03-14 RX ADMIN — SODIUM CHLORIDE, PRESERVATIVE FREE 10 ML: 5 INJECTION INTRAVENOUS at 06:12

## 2022-03-14 RX ADMIN — LATANOPROST 1 DROP: 50 SOLUTION OPHTHALMIC at 22:13

## 2022-03-14 RX ADMIN — Medication 3 UNITS: at 22:12

## 2022-03-14 RX ADMIN — SODIUM CHLORIDE, PRESERVATIVE FREE 10 ML: 5 INJECTION INTRAVENOUS at 22:14

## 2022-03-14 RX ADMIN — OXYCODONE HYDROCHLORIDE AND ACETAMINOPHEN 1 TABLET: 5; 325 TABLET ORAL at 08:22

## 2022-03-14 RX ADMIN — MEROPENEM 500 MG: 500 INJECTION, POWDER, FOR SOLUTION INTRAVENOUS at 08:44

## 2022-03-14 RX ADMIN — TAMSULOSIN HYDROCHLORIDE 0.8 MG: 0.4 CAPSULE ORAL at 08:23

## 2022-03-14 RX ADMIN — MEROPENEM 500 MG: 500 INJECTION, POWDER, FOR SOLUTION INTRAVENOUS at 20:55

## 2022-03-14 RX ADMIN — OXYCODONE HYDROCHLORIDE AND ACETAMINOPHEN 1 TABLET: 5; 325 TABLET ORAL at 16:13

## 2022-03-14 RX ADMIN — PANTOPRAZOLE SODIUM 40 MG: 40 TABLET, DELAYED RELEASE ORAL at 08:22

## 2022-03-14 RX ADMIN — ATORVASTATIN CALCIUM 80 MG: 40 TABLET, FILM COATED ORAL at 22:13

## 2022-03-14 RX ADMIN — Medication 3 UNITS: at 08:21

## 2022-03-14 RX ADMIN — MEROPENEM 500 MG: 500 INJECTION, POWDER, FOR SOLUTION INTRAVENOUS at 15:27

## 2022-03-14 RX ADMIN — OXYCODONE HYDROCHLORIDE AND ACETAMINOPHEN 1 TABLET: 5; 325 TABLET ORAL at 20:54

## 2022-03-14 NOTE — PROGRESS NOTES
0720)Bedside shift change report given to Lukas Interiano RN (oncoming nurse) by Dyan Lozano RN (offgoing nurse). Report included the following information SBAR, Kardex and MAR.   0820) pt request Merrem after he finished eating his breakfast  0900) pt refuse CHG bath at this time \"maybe later\"   1025) IDR with Dr. Wil Henderson (MD), Rin Nam (pharmacist), Lindy Salamanca and Nguyen Tillman (), Camacho Beckham (nurse supervisor),  Kerwin ([facility]), and Lukas Interiano (RN) to discuss plan of care including discharge 3-16 after 9 am antibiotic, no fluid restriction, consult to nutrition for diabetic diet education. 1115) pt sitting in chair, watching TV. Discussed with pt drinking water and diet sodas instead of regular soda. Discussed   1230) pt request shaving supplies  1350) Pt resting  1430) pt resting  1527) Pt IV dressing reinforced  1600) pt request straight cath kit. Discuss bladder scan. Pt ask to wait until after antibiotic is finished. 1620) Discuss with Dr. Wil Henderson, pt bladder scan 405 mL; pt able to self cath. 1655) pt straight cathed 400 mL  1715) pt walk hallway with a new gown on and washing his hands  1800) pt resting in bed. 1912) Bedside shift change report given to Dyan Lozano RN (oncoming nurse) by Lukas Interiano RN (offgoing nurse). Report included the following information SBAR, Kardex and MAR.

## 2022-03-14 NOTE — PROGRESS NOTES
This writer witnessed patient ambulating in hallway without a gown to cover garments he was wearing. Patient educated by this writer that because he is on contact precautions, he would need to wear a gown to cover clothes when he walks in the hallway. Patient stated \" I have been here for ten days and nobody has told me that. \" This writer apologized to patient and informed him that this was per policy. Patient appeared upset and stated \"I am not the one to be picked on. \" This writer explained to patient that was not the intention at all and we are here to promote healing as well as a healthy, safe environment for him, other patients and staff. Patient nodded in agreement. This writer provided patient with 2 gowns to wear outside of his room.

## 2022-03-14 NOTE — PROGRESS NOTES
Hospitalist Progress Note    NAME: Melvin Suero   :  1950   MRN:  [id number]   Room Number:    @ [facility]       Interim Hospital Summary: 66 y.o. male whom presented on 3/9/2022 with      Assessment / Plan:  Anticipated discharge date : 3/16/2022 AM after abx  Anticipated disposition : Home with family  Barriers to discharge : abx     ESBL UTI   Urinary Pain POA   Urinary retention and LUTS due to BPH POA  Hematuria POA  -UA with WBC, RBC, Bacteria   -UCX w/ ESBL previously  -Patient currently performs CIC using 14 Fr catheter  -Patient did not complete previous course for ESBL UTI  -Patient did not see urology appointment made for him previously on   -Hemoglobin 8.7 ( baseline 10.4)         -IV meropenem,  3/16/2022  -Monitor labs  -Transfuse to keep hemoglobin above 7  -Continue Flomax  - Percocet PRN for pain   -Bladder check  as needed  -Patient to follow-up with urology as an outpatient for management of hematuria and urinary retention due to BPH w/ LUTS           CKD 2 POA   Hx of LUTS s/p EBRT ( 2019)  and ADT( 2021)  for GG5 prostate cancer 2019   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   - Duo neb as needed   - resume HTN meds as BP tolerates         Body mass index is 33.45 kg/m². Code Status: Full   Surrogate Decision Maker:     DVT Prophylaxis: Hold due to hematuria  GI Prophylaxis: not indicate         Subjective:     Chief Complaint / Reason for Physician Visit  \"im doing ok\". Discussed with RN events overnight. Review of Systems:  No fevers, chills, appetite change, cough, sputum production, shortness of breath, dyspnea on exertion, nausea, vomitting, diarrhea, constipation, chest pain, leg edema, abdominal pain, joint pain, rash, itching. Tolerating PT/OT. Tolerating diet. Objective:     VITALS:   Last 24hrs VS reviewed since prior progress note. Most recent are:  Patient Vitals for the past 24 hrs:   Temp Pulse Resp BP SpO2   03/14/22 0726 97 °F (36.1 °C) 60 18 (!) 143/82 97 %   03/13/22 1958 98.3 °F (36.8 °C) 71 18 (!) 147/79 94 %   03/13/22 1953 -- -- -- -- 94 %   03/13/22 1542 98.1 °F (36.7 °C) 63 -- 131/69 96 %   03/13/22 1141 97.2 °F (36.2 °C) 67 16 119/76 96 %       Intake/Output Summary (Last 24 hours) at 3/14/2022 1135  Last data filed at 3/14/2022 1112  Gross per 24 hour   Intake 710 ml   Output 1125 ml   Net -415 ml        PHYSICAL EXAM:  General: Alert, cooperative, no acute distress    EENT:  EOMI. Anicteric sclerae. MMM  Resp:  CTA bilaterally, no wheezing or rales. No accessory muscle use  CV:  Regular  rhythm,  normal S1/S2, no murmurs rubs gallops, No edema  GI:  Soft, Non distended, Non tender. +Bowel sounds  Neurologic:  Alert and oriented X 3, normal speech,   Psych:   Good insight. Not anxious nor agitated  Skin:  No rashes. No jaundice    Reviewed most current lab test results and cultures  YES  Reviewed most current radiology test results   YES  Review and summation of old records today    NO  Reviewed patient's current orders and MAR    YES  PMH/ reviewed - no change compared to H&P  ________________________________________________________________________  Care Plan discussed with:    Comments   Patient x    Family      RN x    Care Manager x    Consultant                       x Multidiciplinary team rounds were held today with , nursing, pharmacist and clinical coordinator. Patient's plan of care was discussed; medications were reviewed and discharge planning was addressed.      ________________________________________________________________________  Total NON critical care TIME: 25  Minutes    Total CRITICAL CARE TIME Spent:   Minutes non procedure based      Comments   >50% of visit spent in counseling and coordination of care     ________________________________________________________________________  Clifford Tavera MD     Procedures: see electronic medical records for all procedures/Xrays and details which were not copied into this note but were reviewed prior to creation of Plan. LABS:  I reviewed today's most current labs and imaging studies.   Pertinent labs include:  Recent Labs     03/12/22  0338   WBC 4.9   HGB 7.8*   HCT 25.9*        Recent Labs     03/12/22  0338      K 3.9      CO2 25   *   BUN 18   CREA 1.20   CA 9.0   ALB 2.8*   TBILI 0.3   ALT 28       Signed: Clifford Tavera MD

## 2022-03-14 NOTE — PROGRESS NOTES
Bedside shift change report given to [facility] VENITA (oncoming nurse) by Martha Ponce (offgoing nurse). Report included the following information SBAR, Kardex, Intake/Output, MAR and Recent Results.

## 2022-03-15 LAB
GLUCOSE BLD STRIP.AUTO-MCNC: 207 MG/DL (ref 65–117)
GLUCOSE BLD STRIP.AUTO-MCNC: 209 MG/DL (ref 65–117)
GLUCOSE BLD STRIP.AUTO-MCNC: 210 MG/DL (ref 65–117)
GLUCOSE BLD STRIP.AUTO-MCNC: 256 MG/DL (ref 65–117)
SERVICE CMNT-IMP: ABNORMAL

## 2022-03-15 PROCEDURE — 65270000032 HC RM SEMIPRIVATE

## 2022-03-15 PROCEDURE — 74011000258 HC RX REV CODE- 258: Performed by: STUDENT IN AN ORGANIZED HEALTH CARE EDUCATION/TRAINING PROGRAM

## 2022-03-15 PROCEDURE — 74011250637 HC RX REV CODE- 250/637: Performed by: STUDENT IN AN ORGANIZED HEALTH CARE EDUCATION/TRAINING PROGRAM

## 2022-03-15 PROCEDURE — 51798 US URINE CAPACITY MEASURE: CPT

## 2022-03-15 PROCEDURE — 74011250636 HC RX REV CODE- 250/636: Performed by: STUDENT IN AN ORGANIZED HEALTH CARE EDUCATION/TRAINING PROGRAM

## 2022-03-15 PROCEDURE — 2709999900 HC NON-CHARGEABLE SUPPLY

## 2022-03-15 PROCEDURE — 74011636637 HC RX REV CODE- 636/637: Performed by: STUDENT IN AN ORGANIZED HEALTH CARE EDUCATION/TRAINING PROGRAM

## 2022-03-15 PROCEDURE — 82962 GLUCOSE BLOOD TEST: CPT

## 2022-03-15 PROCEDURE — 74011000250 HC RX REV CODE- 250: Performed by: STUDENT IN AN ORGANIZED HEALTH CARE EDUCATION/TRAINING PROGRAM

## 2022-03-15 RX ORDER — FINASTERIDE 5 MG/1
5 TABLET, FILM COATED ORAL DAILY
Status: DISCONTINUED | OUTPATIENT
Start: 2022-03-15 | End: 2022-03-16 | Stop reason: HOSPADM

## 2022-03-15 RX ORDER — LIDOCAINE HYDROCHLORIDE 20 MG/ML
JELLY TOPICAL ONCE
Status: DISPENSED | OUTPATIENT
Start: 2022-03-15 | End: 2022-03-15

## 2022-03-15 RX ADMIN — MEROPENEM 500 MG: 500 INJECTION, POWDER, FOR SOLUTION INTRAVENOUS at 09:17

## 2022-03-15 RX ADMIN — AMLODIPINE BESYLATE 5 MG: 5 TABLET ORAL at 08:09

## 2022-03-15 RX ADMIN — MEROPENEM 500 MG: 500 INJECTION, POWDER, FOR SOLUTION INTRAVENOUS at 21:19

## 2022-03-15 RX ADMIN — SODIUM CHLORIDE, PRESERVATIVE FREE 10 ML: 5 INJECTION INTRAVENOUS at 21:19

## 2022-03-15 RX ADMIN — PANTOPRAZOLE SODIUM 40 MG: 40 TABLET, DELAYED RELEASE ORAL at 08:09

## 2022-03-15 RX ADMIN — ATORVASTATIN CALCIUM 80 MG: 40 TABLET, FILM COATED ORAL at 21:19

## 2022-03-15 RX ADMIN — Medication 3 UNITS: at 16:51

## 2022-03-15 RX ADMIN — Medication 2 UNITS: at 21:19

## 2022-03-15 RX ADMIN — SODIUM CHLORIDE, PRESERVATIVE FREE 10 ML: 5 INJECTION INTRAVENOUS at 14:33

## 2022-03-15 RX ADMIN — SODIUM CHLORIDE, PRESERVATIVE FREE 10 ML: 5 INJECTION INTRAVENOUS at 06:04

## 2022-03-15 RX ADMIN — OXYCODONE HYDROCHLORIDE AND ACETAMINOPHEN 1 TABLET: 5; 325 TABLET ORAL at 08:25

## 2022-03-15 RX ADMIN — SODIUM CHLORIDE, PRESERVATIVE FREE 10 ML: 5 INJECTION INTRAVENOUS at 09:17

## 2022-03-15 RX ADMIN — MEROPENEM 500 MG: 500 INJECTION, POWDER, FOR SOLUTION INTRAVENOUS at 03:34

## 2022-03-15 RX ADMIN — LATANOPROST 1 DROP: 50 SOLUTION OPHTHALMIC at 21:20

## 2022-03-15 RX ADMIN — Medication 3 UNITS: at 08:09

## 2022-03-15 RX ADMIN — OXYCODONE HYDROCHLORIDE AND ACETAMINOPHEN 1 TABLET: 5; 325 TABLET ORAL at 21:31

## 2022-03-15 RX ADMIN — MEROPENEM 500 MG: 500 INJECTION, POWDER, FOR SOLUTION INTRAVENOUS at 14:33

## 2022-03-15 RX ADMIN — FINASTERIDE 5 MG: 5 TABLET, FILM COATED ORAL at 11:42

## 2022-03-15 RX ADMIN — TAMSULOSIN HYDROCHLORIDE 0.8 MG: 0.4 CAPSULE ORAL at 08:09

## 2022-03-15 RX ADMIN — OXYCODONE HYDROCHLORIDE AND ACETAMINOPHEN 1 TABLET: 5; 325 TABLET ORAL at 16:05

## 2022-03-15 RX ADMIN — Medication 5 UNITS: at 11:40

## 2022-03-15 RX ADMIN — OXYCODONE HYDROCHLORIDE AND ACETAMINOPHEN 1 TABLET: 5; 325 TABLET ORAL at 04:39

## 2022-03-15 NOTE — PROGRESS NOTES
Comprehensive Nutrition Assessment    Type and Reason for Visit: (P) Initial,Consult,Patient education    Nutrition Recommendations/Plan: Diet as tolerated. RD added supplements to meal trays. Diet education. Nutrition Assessment:  (P) Pt admitted with ESBL UTI, abx until 3/16. Did not complete previous abx course for same dx. Hx notable for DM (A1c 7.8), CKD-II, HTN      Estimated Daily Nutrient Needs:  Energy (kcal): (P) 2050; Weight Used for Energy Requirements: (P) Adjusted  Protein (g): (P) 76.8 (.76 g/kg CBW); Weight Used for Protein Requirements: (P) Adjusted  Fluid (ml/day): (P) 2050; Method Used for Fluid Requirements: (P) 1 ml/kcal      Nutrition Related Findings:  (P) Pt tolerating diet      Wounds:    (P) None       Current Nutrition Therapies:  ADULT DIET Regular; 4 carb choices (60 gm/meal)    Anthropometric Measures:  Height:  (P) 5' 8\" (172.7 cm)  Current Body Wt:  (P) 101.6 kg (224 lb)   Admission Body Wt:       Usual Body Wt:        Ideal Body Wt:  (P) 154 lbs:  (P) 145.5 %   Adjusted Body Weight:   ; Weight Adjustment for: (P)  (ABW 86 kg)   Adjusted BMI:       BMI Category:  (P) Obese class 1 (BMI 30.0-34. 9)       Nutrition Diagnosis:   (P) Food & nutrition-related knowledge deficit,Overweight/obesity,Limited adherence to nutrition-related recommendations related to (P) endocrine dysfunction,excessive energy intake as evidenced by (P) BMI    Nutrition Interventions:   Food and/or Nutrient Delivery: (P) Continue current diet,Start oral nutrition supplement  Nutrition Education and Counseling: (P) Education initiated  Coordination of Nutrition Care: (P) Continue to monitor while inpatient    Goals:  (P) PO intake of meals and ONS > 75% most meals next 5-10 days       Nutrition Monitoring and Evaluation:   Behavioral-Environmental Outcomes: (P) Knowledge or skill  Food/Nutrient Intake Outcomes: (P) Food and nutrient intake,Supplement intake  Physical Signs/Symptoms Outcomes: (P) Meal time behavior,Weight    Discharge Planning:    (P) Continue current diet     Electronically signed by Claude Bury, PhD, RD, [address]  on 3/15/2022 at 12:04 PM    Contact: [phone]

## 2022-03-15 NOTE — PROGRESS NOTES
Hospitalist Progress Note    NAME: Nahomi Rodriguez   :  1950   MRN:  [id number]   Room Number:    @ [facility]       Interim Hospital Summary: 66 y.o. male whom presented on 3/9/2022 with      Assessment / Plan:  Anticipated discharge date : 3/16/2022 AM after abx  Anticipated disposition : Home with family  Barriers to discharge : abx     ESBL UTI   Urinary Pain POA   Urinary retention and LUTS due to BPH POA  Hematuria POA  -UA with WBC, RBC, Bacteria   -UCX w/ ESBL previously  -Patient currently performs CIC using 14 Fr catheter  -Patient did not complete previous course for ESBL UTI  -Patient did not see urology appointment made for him previously on   -Hemoglobin 8.7 ( baseline 10.4)         -IV meropenem,  3/16/2022  -Monitor labs  -Transfuse to keep hemoglobin above 7  -Continue Flomax  - Percocet PRN for pain   -Bladder check  as needed  -Patient to follow-up with urology as an outpatient for management of hematuria and urinary retention due to BPH w/ LUTS           CKD 2 POA   Hx of LUTS s/p EBRT ( 2019)  and ADT( 2021)  for GG5 prostate cancer 2019   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   - Duo neb as needed   - resume HTN meds as BP tolerates         Body mass index is 33.45 kg/m². Code Status: Full   Surrogate Decision Maker:     DVT Prophylaxis: Hold due to hematuria  GI Prophylaxis: not indicate         Subjective:     Chief Complaint / Reason for Physician Visit  Expressing concern about CIC at home Discussed with RN events overnight.      Review of Systems:  No fevers, chills, appetite change, cough, sputum production, shortness of breath, dyspnea on exertion, nausea, vomitting, diarrhea, constipation, chest pain, leg edema, abdominal pain, joint pain, rash, itching. Tolerating PT/OT. Tolerating diet. Objective:     VITALS:   Last 24hrs VS reviewed since prior progress note. Most recent are:  Patient Vitals for the past 24 hrs:   Temp Pulse Resp BP SpO2   03/15/22 0802 98.9 °F (37.2 °C) 63 18 (!) 141/82 97 %   03/15/22 0411 98.2 °F (36.8 °C) 69 18 (!) 154/82 98 %   03/14/22 2002 98.6 °F (37 °C) 66 16 133/73 95 %   03/14/22 1400 97.7 °F (36.5 °C) 64 16 126/67 98 %       Intake/Output Summary (Last 24 hours) at 3/15/2022 1313  Last data filed at 3/15/2022 1028  Gross per 24 hour   Intake 180 ml   Output 750 ml   Net -570 ml        PHYSICAL EXAM:  General: Alert, cooperative, no acute distress    EENT:  EOMI. Anicteric sclerae. MMM  Resp:  CTA bilaterally, no wheezing or rales. No accessory muscle use  CV:  Regular  rhythm,  normal S1/S2, no murmurs rubs gallops, No edema  GI:  Soft, Non distended, Non tender. +Bowel sounds  Neurologic:  Alert and oriented X 3, normal speech,   Psych:   Good insight. Not anxious nor agitated  Skin:  No rashes. No jaundice    Reviewed most current lab test results and cultures  YES  Reviewed most current radiology test results   YES  Review and summation of old records today    NO  Reviewed patient's current orders and MAR    YES  PMH/ reviewed - no change compared to H&P  ________________________________________________________________________  Care Plan discussed with:    Comments   Patient x    Family      RN x    Care Manager x    Consultant                       x Multidiciplinary team rounds were held today with , nursing, pharmacist and clinical coordinator. Patient's plan of care was discussed; medications were reviewed and discharge planning was addressed.      ________________________________________________________________________  Total NON critical care TIME: 25  Minutes    Total CRITICAL CARE TIME Spent:   Minutes non procedure based      Comments   >50% of visit spent in counseling and coordination of care     ________________________________________________________________________  Toyin De La O MD     Procedures: see electronic medical records for all procedures/Xrays and details which were not copied into this note but were reviewed prior to creation of Plan. LABS:  I reviewed today's most current labs and imaging studies. Pertinent labs include:  No results for input(s): WBC, HGB, HCT, PLT, HGBEXT, HCTEXT, PLTEXT, HGBEXT, HCTEXT, PLTEXT in the last 72 hours. No results for input(s): NA, K, CL, CO2, GLU, BUN, CREA, CA, MG, PHOS, ALB, TBIL, TBILI, ALT, INR, INREXT, INREXT in the last 72 hours.     No lab exists for component: SGOT    Signed: Toyin De La O MD

## 2022-03-15 NOTE — PROGRESS NOTES
Spiritual Care Assessment/Progress Note  [facility]      NAME: Britney Bhatia      MRN: [id number]  AGE: 66 y.o.  SEX: male  Rastafari Affiliation: Unknown   Language: English     3/15/2022     Total Time (in minutes): 26     Spiritual Assessment begun in [address] through conversation with:         [x]Patient        [] Family    [] Friend(s)        Reason for Consult: Initial/Spiritual assessment, patient floor     Spiritual beliefs: (Please include comment if needed)     [x] Identifies with a sergio tradition:   Synagogue      [] Supported by a sergio community:            [] Claims no spiritual orientation:           [] Seeking spiritual identity:                [] Adheres to an individual form of spirituality:           [] Not able to assess:                           Identified resources for coping:      [x] Prayer                               [] Music                  [] Guided Imagery     [] Family/friends                 [] Pet visits     [x] Devotional reading                         [] Unknown     [] Other:                                              Interventions offered during this visit: (See comments for more details)    Patient Interventions: Coping skills reviewed/reinforced,Initial/Spiritual assessment, patient floor,Life review/legacy,Normalization of emotional/spiritual concerns,Rastafari beliefs/image of God discussed,Integration of medical assessment with existing values and beliefs,Catharsis/review of pertinent events in supportive environment,Affirmation of sergio,Prayer (assurance of),Bible or other spiritual literature provided           Plan of Care:     [] Support spiritual and/or cultural needs    [] Support AMD and/or advance care planning process      [] Support grieving process   [] Coordinate Rites and/or Rituals    [] Coordination with community clergy   [] No spiritual needs identified at this time   [] Detailed Plan of Care below (See Comments)  [] Make referral to Music Therapy  [] Make referral to Pet Therapy     [] Make referral to Addiction services  [] Make referral to [facility]  [] Make referral to Spiritual Care Partner  [] No future visits requested        [] Contact Spiritual Care for further referrals     Comments:  's visit was for initial spiritual assessment on the Med Surg & Tel unit. Patient, Mr. Haim Guan, was sitting in his recliner, alert and comfortable. He welcomed  and engaged in conversation. He shared what led to his hospitalization, alluding to sergio in God as his main coping resource. When  asked about other coping resources, he stated he has family but did not want to talk about them at this time. He expressed interest in receiving a Bible and a [address] brought resources requested and assured him of his prayers. Patient thanked  for the visit. Visited by: Peter Valladares.    Paging Service: [phone])

## 2022-03-15 NOTE — PROGRESS NOTES
Bedside shift change report given to [facility] VENITA (oncoming nurse) by Delaney Park (offgoing nurse). Report included the following information SBAR, Kardex, Intake/Output, MAR and Recent Results.

## 2022-03-16 VITALS
HEART RATE: 70 BPM | WEIGHT: 224 LBS | SYSTOLIC BLOOD PRESSURE: 146 MMHG | TEMPERATURE: 98.8 F | RESPIRATION RATE: 18 BRPM | BODY MASS INDEX: 33.95 KG/M2 | HEIGHT: 68 IN | DIASTOLIC BLOOD PRESSURE: 85 MMHG | OXYGEN SATURATION: 95 %

## 2022-03-16 LAB
GLUCOSE BLD STRIP.AUTO-MCNC: 160 MG/DL (ref 65–117)
SERVICE CMNT-IMP: ABNORMAL

## 2022-03-16 PROCEDURE — 74011250636 HC RX REV CODE- 250/636: Performed by: STUDENT IN AN ORGANIZED HEALTH CARE EDUCATION/TRAINING PROGRAM

## 2022-03-16 PROCEDURE — 74011000258 HC RX REV CODE- 258: Performed by: STUDENT IN AN ORGANIZED HEALTH CARE EDUCATION/TRAINING PROGRAM

## 2022-03-16 PROCEDURE — 74011000250 HC RX REV CODE- 250: Performed by: STUDENT IN AN ORGANIZED HEALTH CARE EDUCATION/TRAINING PROGRAM

## 2022-03-16 PROCEDURE — 2709999900 HC NON-CHARGEABLE SUPPLY

## 2022-03-16 PROCEDURE — 74011636637 HC RX REV CODE- 636/637: Performed by: STUDENT IN AN ORGANIZED HEALTH CARE EDUCATION/TRAINING PROGRAM

## 2022-03-16 PROCEDURE — 74011250637 HC RX REV CODE- 250/637: Performed by: STUDENT IN AN ORGANIZED HEALTH CARE EDUCATION/TRAINING PROGRAM

## 2022-03-16 PROCEDURE — 82962 GLUCOSE BLOOD TEST: CPT

## 2022-03-16 RX ORDER — FINASTERIDE 5 MG/1
5 TABLET, FILM COATED ORAL DAILY
Qty: 30 TABLET | Refills: 1 | Status: SHIPPED | OUTPATIENT
Start: 2022-03-17 | End: 2022-04-27 | Stop reason: SDUPTHER

## 2022-03-16 RX ORDER — OXYCODONE AND ACETAMINOPHEN 5; 325 MG/1; MG/1
1 TABLET ORAL
Qty: 10 TABLET | Refills: 0 | Status: SHIPPED | OUTPATIENT
Start: 2022-03-16 | End: 2022-03-19

## 2022-03-16 RX ADMIN — MEROPENEM 500 MG: 500 INJECTION, POWDER, FOR SOLUTION INTRAVENOUS at 03:19

## 2022-03-16 RX ADMIN — SODIUM CHLORIDE, PRESERVATIVE FREE 10 ML: 5 INJECTION INTRAVENOUS at 03:19

## 2022-03-16 RX ADMIN — FINASTERIDE 5 MG: 5 TABLET, FILM COATED ORAL at 08:42

## 2022-03-16 RX ADMIN — PANTOPRAZOLE SODIUM 40 MG: 40 TABLET, DELAYED RELEASE ORAL at 07:59

## 2022-03-16 RX ADMIN — TAMSULOSIN HYDROCHLORIDE 0.8 MG: 0.4 CAPSULE ORAL at 08:41

## 2022-03-16 RX ADMIN — Medication 2 UNITS: at 07:30

## 2022-03-16 RX ADMIN — MEROPENEM 500 MG: 500 INJECTION, POWDER, FOR SOLUTION INTRAVENOUS at 09:28

## 2022-03-16 RX ADMIN — AMLODIPINE BESYLATE 5 MG: 5 TABLET ORAL at 08:42

## 2022-03-16 RX ADMIN — SODIUM CHLORIDE, PRESERVATIVE FREE 10 ML: 5 INJECTION INTRAVENOUS at 09:29

## 2022-03-16 RX ADMIN — OXYCODONE HYDROCHLORIDE AND ACETAMINOPHEN 1 TABLET: 5; 325 TABLET ORAL at 07:58

## 2022-03-16 NOTE — PROGRESS NOTES
[address]       3/16/2022      RE: Elisha Frost      To Whom it May Concern: This is to certify that Elisha Frost was admitted to hospital on 3/9/2022   to  He may return to work on 3/21/2022       Thank you for your assistance in this matter.     Sincerely,      Vinicius Black MD

## 2022-03-16 NOTE — PROGRESS NOTES
Discharge instructions discussed with patient. Patient expressed understanding of the instructions and the prescriptions given. Patient was instructed to keep the follow-up appointments arranged for him. Patient was awake and oriented when he was taken off the unit in a wheelchair at time of discharge.

## 2022-03-16 NOTE — PROGRESS NOTES
ISAEL  RUR 14  LOS 7d  ELOS Discharge today    Patient discharging home today. Has own transportation home. Medications called into [address] on [facility].  these medications from [facility], [address] [place] [address]   1 finasteride  2 oxyCODONE-acetaminophen   Icon Checkbox    Follow up with [facility] on 3/18/2022   [address]   [phone]    3-18- 22 @ 10:45AM  Must keep appointment -Kevon Alan will be going to the [address]  [address]     Icon Checkbox    Follow up with Yasmeen Sagastume MD on 3/24/2022   Specialty: Internal Medicine   [address]   [phone]   Mar NEW TO PROVIDER 10:00 AM     CM met with patient to discuss 2nd IM letter. Patient stated understanding. Signed letter placed in chart and noted on Documentation List.  Copy of letter given to patient. Care Management Interventions  PCP Verified by CM: Yes  Mode of Transport at Discharge:  Other (see comment)  Transition of Care Consult (CM Consult): Discharge Planning  Support Systems: Other Family Member(s)  Confirm Follow Up Transport: Self  The Plan for Transition of Care is Related to the Following Treatment Goals : PCP  & urology  The Patient and/or Patient Representative was Provided with a Choice of Provider and Agrees with the Discharge Plan?: Yes  Name of the Patient Representative Who was Provided with a Choice of Provider and Agrees with the Discharge Plan: MD, Patieint, Nursing, community providers  Freedom of Choice List was Provided with Basic Dialogue that Supports the Patient's Individualized Plan of Care/Goals, Treatment Preferences and Shares the Quality Data Associated with the Providers?: Yes  Discharge Location  Patient Expects to be Discharged to[de-identified] JENAE Erwin/RAUDEL  [phone]

## 2022-03-16 NOTE — DISCHARGE SUMMARY
Hospitalist Discharge Summary     Patient ID:  Stephen Garrido  [id number]  44 y.o.  1950    PCP on record: None    Admit date: 3/9/2022  Discharge date and time: 3/16/2022    Please note that this dictation was completed with Apiary, the Kiwi voice recognition software. Quite often unanticipated grammatical, syntax, homophones, and other interpretive errors are inadvertently transcribed by the computer software. Please disregard these errors. Please excuse any errors that have escaped final proofreading. Admission Diagnoses: ESBL (extended spectrum beta-lactamase) producing bacteria infection [A49.9, Z16.12]    Discharge Diagnoses:     Active Problems:    ESBL (extended spectrum beta-lactamase) producing bacteria infection (2/18/2022)           Hospital Course:     ESBL UTI   Urinary Pain POA   Urinary retention and LUTS due to BPH POA  Hematuria POA  -UA with WBC, RBC, Bacteria   -UCX w/ ESBL previously  -Patient currently performs CIC using 14 Fr catheter  -Patient completed 7-day course of IV meropenem for ESBL UTI  -Patient will continue Flomax and finasteride  at home  -Patient was given percocet 5 mg- 325 mg Q6 PRN for three days for urinary pain  and patient to follow up w/ PCP for further management   -Follow-up with urology as an outpatient further management of hematuria and urinary retention w/  LUTS  -Patient agreed and verbalized understanding       Anemia due to hematuria POA  -Hemoglobin stable during this admission  -Patient to follow-up with urology as an outpatient for management of hematuria             CKD 2 POA   Hx of LUTS s/p EBRT ( 9/2019)  and ADT( 1/2021)  for GG5 prostate cancer 9/2019   History of complex radiation related bulbomembranous stricture ( 6/23/21) status post urethral dilatation at outside hospital  Hx of T2 DM   HTN   Asthma    -Resume home regimen           CONSULTATIONS:  None    Excerpted HPI from H&P of Cesar Ledbetter MD:  Stephen Garrido is a 66 y.o.  male with PMH of above-mentioned problems who presents to ED with c/o burning sensation while urination since last night. Patient has been diagnosed with ESBL UTI in the past and was discharged home on 4 more days of ertapenem. Patient states that he only received 1 more doses outpatient and did not complete the entire course due to hematuria. Patient stated he did not attend his urology appointment. Patient endorsed pain while urination    ______________________________________________________________________  DISCHARGE SUMMARY/HOSPITAL COURSE:  for full details see H&P, daily progress notes, labs, consult notes. _______________________________________________________________________  Patient seen and examined by me on discharge day. Pertinent Findings:  Gen:    Not in distress  Chest: Clear lungs  CVS:   Regular rhythm. No edema  Abd:  Soft, not distended, not tender  Neuro:  Alert with good insight. Oriented to person, place, and time   _______________________________________________________________________  DISCHARGE MEDICATIONS:   Current Discharge Medication List      START taking these medications    Details   finasteride (PROSCAR) 5 mg tablet Take 1 Tablet by mouth daily. Qty: 30 Tablet, Refills: 1  Start date: 3/17/2022      oxyCODONE-acetaminophen (PERCOCET) 5-325 mg per tablet Take 1 Tablet by mouth every six (6) hours as needed for Pain for up to 3 days. Max Daily Amount: 4 Tablets. Qty: 10 Tablet, Refills: 0  Start date: 3/16/2022, End date: 3/19/2022    Associated Diagnoses: Urinary tract pain         CONTINUE these medications which have NOT CHANGED    Details   tamsulosin (FLOMAX) 0.4 mg capsule Take 0.4 mg by mouth two (2) times a day. acetaminophen (TylenoL) 325 mg tablet Take 650 mg by mouth every four (4) hours as needed for Pain. amLODIPine (NORVASC) 5 mg tablet Take 1 Tablet by mouth daily.       atorvastatin (LIPITOR) 80 mg tablet Take 1 Tablet by mouth nightly. latanoprost (XALATAN) 0.005 % ophthalmic solution Administer 1 Drop to both eyes nightly. pantoprazole (PROTONIX) 40 mg tablet Take 1 Tablet by mouth daily. albuterol (PROVENTIL HFA, VENTOLIN HFA, PROAIR HFA) 90 mcg/actuation inhaler Take 2 Puffs by inhalation every four (4) hours as needed for Wheezing. Qty: 1 Each, Refills: 0      guaiFENesin-dextromethorphan (Adult Robitussin Peak Cold DM)  mg/5 mL liqd Take 10 mL by mouth every four (4) hours as needed for Cough or Congestion. Qty: 118 mL, Refills: 0      metFORMIN (GLUCOPHAGE) 500 mg tablet Take  by mouth two (2) times daily (with meals). STOP taking these medications       losartan (COZAAR) 100 mg tablet Comments:   Reason for Stopping:               My Recommended Diet, Activity, Wound Care, and follow-up labs are listed in the patient's Discharge Insturctions which I have personally completed and reviewed.     _______________________________________________________________________  DISPOSITION:     Home with Family: x   Home with HH/PT/OT/RN:    AMY/LTC:    ARMANDO:    OTHER:        Condition at Discharge:  Stable  _______________________________________________________________________  Follow up with:   PCP : None  Follow-up Information     Follow up With Specialties Details Why Contact Info    Catracho Mendez MD Internal Medicine On 3/24/2022 Mar24 NEW TO PROVIDER 10:00 AM  [address] [address]  [phone]      [facility]  On 3/18/2022  3-18- 22 @ 10:45AM  Must keep appointment -Uriah Collazo will be going to the  Adult Outpatient building [address] [place] [address] 11th Floor   [address]  [phone]              Total time in minutes spent coordinating this discharge (includes going over instructions, follow-up, prescriptions, and preparing report for sign off to her PCP) :  45 minutes    Signed:  Sylvia Jones MD

## 2022-03-16 NOTE — DISCHARGE INSTRUCTIONS

## 2022-03-16 NOTE — PROGRESS NOTES
Pharmacist Discharge Medication Reconciliation    Discharge Provider:  Luis Daniel Mendosa       Discharge Medications:      My Medications        START taking these medications        Instructions Each Dose to Equal Morning Noon Evening Bedtime   finasteride 5 mg tablet  Commonly known as: PROSCAR  Start taking on: March 17, 2022    Your last dose was: Your next dose is: Take 1 Tablet by mouth daily. 5 mg                        CONTINUE taking these medications        Instructions Each Dose to Equal Morning Noon Evening Bedtime   albuterol 90 mcg/actuation inhaler  Commonly known as: PROVENTIL HFA, VENTOLIN HFA, PROAIR HFA    Your last dose was: Your next dose is: Take 2 Puffs by inhalation every four (4) hours as needed for Wheezing. 2 Puff                 amLODIPine 5 mg tablet  Commonly known as: NORVASC    Your last dose was: Your next dose is: Take 1 Tablet by mouth daily. 1 Tablet                 atorvastatin 80 mg tablet  Commonly known as: LIPITOR    Your last dose was: Your next dose is: Take 1 Tablet by mouth nightly. 1 Tablet                 guaiFENesin-dextromethorphan  mg/5 mL Liqd  Commonly known as: Adult Robitussin Peak Cold DM    Your last dose was: Your next dose is: Take 10 mL by mouth every four (4) hours as needed for Cough or Congestion. 10 mL                 latanoprost 0.005 % ophthalmic solution  Commonly known as: XALATAN    Your last dose was: Your next dose is:         Administer 1 Drop to both eyes nightly. 1 Drop                 metFORMIN 500 mg tablet  Commonly known as: GLUCOPHAGE    Your last dose was: Your next dose is: Take  by mouth two (2) times daily (with meals). pantoprazole 40 mg tablet  Commonly known as: PROTONIX    Your last dose was: Your next dose is: Take 1 Tablet by mouth daily.    1 Tablet                 tamsulosin 0.4 mg capsule  Commonly known as: [facility]    Your last dose was: Your next dose is: Take 0.4 mg by mouth two (2) times a day. 0.4 mg                 TylenoL 325 mg tablet  Generic drug: acetaminophen    Your last dose was: Your next dose is: Take 650 mg by mouth every four (4) hours as needed for Pain.    650 mg                        STOP taking these medications      losartan 100 mg tablet  Commonly known as: COZAAR                  Where to Get Your Medications        These medications were sent to [address] [facility] [address], [address] [facility] [address]      Phone: [phone]   finasteride 5 mg tablet       The patient's chart, MAR, and AVS were reviewed by   Flaquita Conde, [address],   Contact: [phone]

## 2022-03-20 PROBLEM — A49.9 ESBL (EXTENDED SPECTRUM BETA-LACTAMASE) PRODUCING BACTERIA INFECTION: Status: ACTIVE | Noted: 2022-02-18

## 2022-03-20 PROBLEM — Z16.12 ESBL (EXTENDED SPECTRUM BETA-LACTAMASE) PRODUCING BACTERIA INFECTION: Status: ACTIVE | Noted: 2022-02-18

## 2022-03-27 ENCOUNTER — HOSPITAL ENCOUNTER (INPATIENT)
Age: 72
LOS: 3 days | Discharge: HOME HEALTH | DRG: 690 | End: 2022-03-30
Attending: EMERGENCY MEDICINE | Admitting: STUDENT IN AN ORGANIZED HEALTH CARE EDUCATION/TRAINING PROGRAM
Payer: MEDICARE

## 2022-03-27 DIAGNOSIS — R39.14 BENIGN PROSTATIC HYPERPLASIA WITH INCOMPLETE BLADDER EMPTYING: ICD-10-CM

## 2022-03-27 DIAGNOSIS — N23 URINARY TRACT PAIN: Primary | ICD-10-CM

## 2022-03-27 DIAGNOSIS — Z16.12 ESBL (EXTENDED SPECTRUM BETA-LACTAMASE) PRODUCING BACTERIA INFECTION: ICD-10-CM

## 2022-03-27 DIAGNOSIS — N40.1 BENIGN PROSTATIC HYPERPLASIA WITH INCOMPLETE BLADDER EMPTYING: ICD-10-CM

## 2022-03-27 DIAGNOSIS — A49.9 ESBL (EXTENDED SPECTRUM BETA-LACTAMASE) PRODUCING BACTERIA INFECTION: ICD-10-CM

## 2022-03-27 DIAGNOSIS — N18.2 STAGE 2 CHRONIC KIDNEY DISEASE: ICD-10-CM

## 2022-03-27 DIAGNOSIS — N39.0 COMPLICATED URINARY TRACT INFECTION: ICD-10-CM

## 2022-03-27 LAB
ALBUMIN SERPL-MCNC: 3.3 G/DL (ref 3.5–5)
ALBUMIN/GLOB SERPL: 0.7 {RATIO} (ref 1.1–2.2)
ALP SERPL-CCNC: 89 U/L (ref 45–117)
ALT SERPL-CCNC: 31 U/L (ref 12–78)
ANION GAP SERPL CALC-SCNC: 12 MMOL/L (ref 5–15)
APPEARANCE UR: ABNORMAL
AST SERPL-CCNC: 12 U/L (ref 15–37)
BACTERIA URNS QL MICRO: ABNORMAL /HPF
BASOPHILS # BLD: 0 K/UL (ref 0–0.1)
BASOPHILS NFR BLD: 1 % (ref 0–1)
BILIRUB SERPL-MCNC: 0.3 MG/DL (ref 0.2–1)
BILIRUB UR QL: NEGATIVE
BUN SERPL-MCNC: 18 MG/DL (ref 6–20)
BUN/CREAT SERPL: 13 (ref 12–20)
CALCIUM SERPL-MCNC: 8.9 MG/DL (ref 8.5–10.1)
CHLORIDE SERPL-SCNC: 104 MMOL/L (ref 97–108)
CO2 SERPL-SCNC: 22 MMOL/L (ref 21–32)
COLOR UR: ABNORMAL
CREAT SERPL-MCNC: 1.38 MG/DL (ref 0.7–1.3)
DIFFERENTIAL METHOD BLD: ABNORMAL
EOSINOPHIL # BLD: 0.2 K/UL (ref 0–0.4)
EOSINOPHIL NFR BLD: 3 % (ref 0–7)
EPITH CASTS URNS QL MICRO: ABNORMAL /LPF
ERYTHROCYTE [DISTWIDTH] IN BLOOD BY AUTOMATED COUNT: 15.9 % (ref 11.5–14.5)
GLOBULIN SER CALC-MCNC: 4.9 G/DL (ref 2–4)
GLUCOSE BLD STRIP.AUTO-MCNC: 244 MG/DL (ref 65–117)
GLUCOSE SERPL-MCNC: 147 MG/DL (ref 65–100)
GLUCOSE UR STRIP.AUTO-MCNC: 100 MG/DL
HCT VFR BLD AUTO: 30.2 % (ref 36.6–50.3)
HGB BLD-MCNC: 9 G/DL (ref 12.1–17)
HGB UR QL STRIP: ABNORMAL
IMM GRANULOCYTES # BLD AUTO: 0 K/UL (ref 0–0.04)
IMM GRANULOCYTES NFR BLD AUTO: 0 % (ref 0–0.5)
KETONES UR QL STRIP.AUTO: NEGATIVE MG/DL
LEUKOCYTE ESTERASE UR QL STRIP.AUTO: ABNORMAL
LYMPHOCYTES # BLD: 1.9 K/UL (ref 0.8–3.5)
LYMPHOCYTES NFR BLD: 38 % (ref 12–49)
MCH RBC QN AUTO: 24.7 PG (ref 26–34)
MCHC RBC AUTO-ENTMCNC: 29.8 G/DL (ref 30–36.5)
MCV RBC AUTO: 83 FL (ref 80–99)
MONOCYTES # BLD: 0.6 K/UL (ref 0–1)
MONOCYTES NFR BLD: 12 % (ref 5–13)
NEUTS SEG # BLD: 2.3 K/UL (ref 1.8–8)
NEUTS SEG NFR BLD: 46 % (ref 32–75)
NITRITE UR QL STRIP.AUTO: POSITIVE
NRBC # BLD: 0 K/UL (ref 0–0.01)
NRBC BLD-RTO: 0 PER 100 WBC
PH UR STRIP: 6 [PH] (ref 5–8)
PLATELET # BLD AUTO: 306 K/UL (ref 150–400)
PMV BLD AUTO: 8.5 FL (ref 8.9–12.9)
POTASSIUM SERPL-SCNC: 3.8 MMOL/L (ref 3.5–5.1)
PROT SERPL-MCNC: 8.2 G/DL (ref 6.4–8.2)
PROT UR STRIP-MCNC: 100 MG/DL
RBC # BLD AUTO: 3.64 M/UL (ref 4.1–5.7)
RBC #/AREA URNS HPF: ABNORMAL /HPF (ref 0–5)
SERVICE CMNT-IMP: ABNORMAL
SODIUM SERPL-SCNC: 138 MMOL/L (ref 136–145)
SP GR UR REFRACTOMETRY: 1.01 (ref 1–1.03)
UA: UC IF INDICATED,UAUC: ABNORMAL
UROBILINOGEN UR QL STRIP.AUTO: 0.2 EU/DL (ref 0.2–1)
WBC # BLD AUTO: 5.1 K/UL (ref 4.1–11.1)
WBC URNS QL MICRO: >100 /HPF (ref 0–4)

## 2022-03-27 PROCEDURE — 74011250637 HC RX REV CODE- 250/637: Performed by: INTERNAL MEDICINE

## 2022-03-27 PROCEDURE — 99285 EMERGENCY DEPT VISIT HI MDM: CPT

## 2022-03-27 PROCEDURE — 82962 GLUCOSE BLOOD TEST: CPT

## 2022-03-27 PROCEDURE — 74011000258 HC RX REV CODE- 258: Performed by: PHYSICIAN ASSISTANT

## 2022-03-27 PROCEDURE — 81001 URINALYSIS AUTO W/SCOPE: CPT

## 2022-03-27 PROCEDURE — 74011636637 HC RX REV CODE- 636/637: Performed by: STUDENT IN AN ORGANIZED HEALTH CARE EDUCATION/TRAINING PROGRAM

## 2022-03-27 PROCEDURE — 87077 CULTURE AEROBIC IDENTIFY: CPT

## 2022-03-27 PROCEDURE — 87186 SC STD MICRODIL/AGAR DIL: CPT

## 2022-03-27 PROCEDURE — 80053 COMPREHEN METABOLIC PANEL: CPT

## 2022-03-27 PROCEDURE — 87086 URINE CULTURE/COLONY COUNT: CPT

## 2022-03-27 PROCEDURE — 74011000250 HC RX REV CODE- 250: Performed by: STUDENT IN AN ORGANIZED HEALTH CARE EDUCATION/TRAINING PROGRAM

## 2022-03-27 PROCEDURE — 74011250636 HC RX REV CODE- 250/636: Performed by: PHYSICIAN ASSISTANT

## 2022-03-27 PROCEDURE — 96374 THER/PROPH/DIAG INJ IV PUSH: CPT

## 2022-03-27 PROCEDURE — 74011250637 HC RX REV CODE- 250/637: Performed by: STUDENT IN AN ORGANIZED HEALTH CARE EDUCATION/TRAINING PROGRAM

## 2022-03-27 PROCEDURE — 85025 COMPLETE CBC W/AUTO DIFF WBC: CPT

## 2022-03-27 PROCEDURE — 65270000032 HC RM SEMIPRIVATE

## 2022-03-27 PROCEDURE — 36415 COLL VENOUS BLD VENIPUNCTURE: CPT

## 2022-03-27 RX ORDER — TAMSULOSIN HYDROCHLORIDE 0.4 MG/1
0.4 CAPSULE ORAL 2 TIMES DAILY
Status: DISCONTINUED | OUTPATIENT
Start: 2022-03-27 | End: 2022-03-30 | Stop reason: HOSPADM

## 2022-03-27 RX ORDER — OXYCODONE AND ACETAMINOPHEN 5; 325 MG/1; MG/1
1 TABLET ORAL
Status: DISCONTINUED | OUTPATIENT
Start: 2022-03-27 | End: 2022-03-30 | Stop reason: HOSPADM

## 2022-03-27 RX ORDER — ATORVASTATIN CALCIUM 40 MG/1
80 TABLET, FILM COATED ORAL
Status: DISCONTINUED | OUTPATIENT
Start: 2022-03-27 | End: 2022-03-30 | Stop reason: HOSPADM

## 2022-03-27 RX ORDER — SODIUM CHLORIDE 0.9 % (FLUSH) 0.9 %
5-40 SYRINGE (ML) INJECTION EVERY 8 HOURS
Status: DISCONTINUED | OUTPATIENT
Start: 2022-03-27 | End: 2022-03-30 | Stop reason: HOSPADM

## 2022-03-27 RX ORDER — HYDROCODONE BITARTRATE AND ACETAMINOPHEN 7.5; 325 MG/1; MG/1
1 TABLET ORAL
Status: DISCONTINUED | OUTPATIENT
Start: 2022-03-27 | End: 2022-03-27

## 2022-03-27 RX ORDER — DEXTROSE 50 % IN WATER (D50W) INTRAVENOUS SYRINGE
25-50 AS NEEDED
Status: DISCONTINUED | OUTPATIENT
Start: 2022-03-27 | End: 2022-03-30 | Stop reason: HOSPADM

## 2022-03-27 RX ORDER — PANTOPRAZOLE SODIUM 40 MG/1
40 TABLET, DELAYED RELEASE ORAL DAILY
Status: DISCONTINUED | OUTPATIENT
Start: 2022-03-28 | End: 2022-03-30 | Stop reason: HOSPADM

## 2022-03-27 RX ORDER — ACETAMINOPHEN 325 MG/1
650 TABLET ORAL
Status: DISCONTINUED | OUTPATIENT
Start: 2022-03-27 | End: 2022-03-30 | Stop reason: HOSPADM

## 2022-03-27 RX ORDER — MAGNESIUM SULFATE 100 %
4 CRYSTALS MISCELLANEOUS AS NEEDED
Status: DISCONTINUED | OUTPATIENT
Start: 2022-03-27 | End: 2022-03-30 | Stop reason: HOSPADM

## 2022-03-27 RX ORDER — POLYETHYLENE GLYCOL 3350 17 G/17G
17 POWDER, FOR SOLUTION ORAL DAILY PRN
Status: DISCONTINUED | OUTPATIENT
Start: 2022-03-27 | End: 2022-03-30 | Stop reason: HOSPADM

## 2022-03-27 RX ORDER — SODIUM CHLORIDE 0.9 % (FLUSH) 0.9 %
5-40 SYRINGE (ML) INJECTION AS NEEDED
Status: DISCONTINUED | OUTPATIENT
Start: 2022-03-27 | End: 2022-03-30 | Stop reason: HOSPADM

## 2022-03-27 RX ORDER — LATANOPROST 50 UG/ML
1 SOLUTION/ DROPS OPHTHALMIC
Status: DISCONTINUED | OUTPATIENT
Start: 2022-03-27 | End: 2022-03-30 | Stop reason: HOSPADM

## 2022-03-27 RX ORDER — INSULIN LISPRO 100 [IU]/ML
INJECTION, SOLUTION INTRAVENOUS; SUBCUTANEOUS
Status: DISCONTINUED | OUTPATIENT
Start: 2022-03-27 | End: 2022-03-30 | Stop reason: HOSPADM

## 2022-03-27 RX ORDER — FINASTERIDE 5 MG/1
5 TABLET, FILM COATED ORAL DAILY
Status: DISCONTINUED | OUTPATIENT
Start: 2022-03-28 | End: 2022-03-30 | Stop reason: HOSPADM

## 2022-03-27 RX ORDER — ONDANSETRON 4 MG/1
4 TABLET, ORALLY DISINTEGRATING ORAL
Status: DISCONTINUED | OUTPATIENT
Start: 2022-03-27 | End: 2022-03-30 | Stop reason: HOSPADM

## 2022-03-27 RX ORDER — ENOXAPARIN SODIUM 100 MG/ML
40 INJECTION SUBCUTANEOUS DAILY
Status: DISCONTINUED | OUTPATIENT
Start: 2022-03-28 | End: 2022-03-30 | Stop reason: HOSPADM

## 2022-03-27 RX ORDER — ACETAMINOPHEN 650 MG/1
650 SUPPOSITORY RECTAL
Status: DISCONTINUED | OUTPATIENT
Start: 2022-03-27 | End: 2022-03-30 | Stop reason: HOSPADM

## 2022-03-27 RX ORDER — ONDANSETRON 2 MG/ML
4 INJECTION INTRAMUSCULAR; INTRAVENOUS
Status: DISCONTINUED | OUTPATIENT
Start: 2022-03-27 | End: 2022-03-30 | Stop reason: HOSPADM

## 2022-03-27 RX ORDER — INSULIN GLARGINE 100 [IU]/ML
12 INJECTION, SOLUTION SUBCUTANEOUS DAILY
Status: DISCONTINUED | OUTPATIENT
Start: 2022-03-28 | End: 2022-03-30 | Stop reason: HOSPADM

## 2022-03-27 RX ORDER — AMLODIPINE BESYLATE 5 MG/1
5 TABLET ORAL DAILY
Status: DISCONTINUED | OUTPATIENT
Start: 2022-03-28 | End: 2022-03-30 | Stop reason: HOSPADM

## 2022-03-27 RX ADMIN — TAMSULOSIN HYDROCHLORIDE 0.4 MG: 0.4 CAPSULE ORAL at 21:49

## 2022-03-27 RX ADMIN — OXYCODONE HYDROCHLORIDE AND ACETAMINOPHEN 1 TABLET: 5; 325 TABLET ORAL at 22:11

## 2022-03-27 RX ADMIN — MEROPENEM 500 MG: 500 INJECTION, POWDER, FOR SOLUTION INTRAVENOUS at 18:15

## 2022-03-27 RX ADMIN — Medication 2 UNITS: at 21:49

## 2022-03-27 RX ADMIN — SODIUM CHLORIDE, PRESERVATIVE FREE 10 ML: 5 INJECTION INTRAVENOUS at 21:52

## 2022-03-27 RX ADMIN — ATORVASTATIN CALCIUM 80 MG: 40 TABLET, FILM COATED ORAL at 21:49

## 2022-03-27 NOTE — PROGRESS NOTES
1851:  TRANSFER - IN REPORT:    Verbal report received from [address] [place] [address] on Melissa Forman  being received from ER for routine progression of care      Report consisted of patients Situation, Background, Assessment and   Recommendations(SBAR). Report also included review of accordion report, results review, orders, meds, ROS, and POC. Opportunity for questions and clarification was provided. Pt to be admitted into room 218, room set up with basic admission supplies, emergency bedside equipment confirmed. Awaiting pt arrival to room/unit. 1905:  Shift change report given to Flo Mireles RN and [employer], RN. Report included review of SBAR, accordion report, results review, orders, meds, ROS, and POC. All questions answered. Transfer of care complete.

## 2022-03-27 NOTE — ED NOTES
Emergency Department Nursing Plan of Care       The Nursing Plan of Care is developed from the Nursing assessment and Emergency Department Attending provider initial evaluation. The plan of care may be reviewed in the ED Provider note.     The Plan of Care was developed with the following considerations:   Patient / Family readiness to learn indicated by:verbalized understanding  Persons(s) to be included in education: patient  Barriers to Learning/Limitations:No    Signed   Cristhian Lancaster RN      1/28/2018   1:31 AM

## 2022-03-27 NOTE — ED NOTES
Bedside and Verbal shift change report given to Bo Shah RN (oncoming nurse) by Ruddy Stanley RN (offgoing nurse). Report included the following information SBAR.

## 2022-03-27 NOTE — ED NOTES
TRANSFER - OUT REPORT:    Verbal report given to Kimberly Castellon RN (name) on Steffanie Pratt  being transferred to Med/Surg (unit) for routine progression of care       Report consisted of patients Situation, Background, Assessment and   Recommendations(SBAR). Information from the following report(s) SBAR was reviewed with the receiving nurse. Lines:   Peripheral IV 03/27/22 Anterior;Right Hand (Active)        Opportunity for questions and clarification was provided.       Patient transported with:   Registered Nurse

## 2022-03-27 NOTE — ED PROVIDER NOTES
EMERGENCY DEPARTMENT HISTORY AND PHYSICAL EXAM      Date: 3/27/2022  Patient Name: Carolin July    History of Presenting Illness     Chief Complaint   Patient presents with    Bladder Infection     per pt reports self catheterization, +penis pain and possible UTI, PMHx of enlarged prostate. History Provided By: Patient    HPI: Carolin July, 66 y.o. male with significant PMHx for recurrent UTIs followed by urology at [facility], DM, HTN, hyperlipidemia, per record review, presents  to the ED for concern of a UTI. States he has pain just prior to urination, but when he actually starts voiding urine there is no associated pain/dysuria. Recently instructed by urology to self cath 4x daily, but only intermittently self cathing at this time. States he noticed his urine is cloudy, but denies hematuria. Denies any penile pain or discharge, testicular pain or swelling. Denies any rashes. Denies fevers or chills, abdominal pain, back pain, blood in urine or stool, weakness or LOC. Denies cough, chest pain or SOB. Patient states he is otherwise in his usual state of health. There are no other complaints, changes, or physical findings at this time. PCP: None    No current facility-administered medications on file prior to encounter. Current Outpatient Medications on File Prior to Encounter   Medication Sig Dispense Refill    finasteride (PROSCAR) 5 mg tablet Take 1 Tablet by mouth daily. 30 Tablet 1    tamsulosin (FLOMAX) 0.4 mg capsule Take 0.4 mg by mouth two (2) times a day.  acetaminophen (TylenoL) 325 mg tablet Take 650 mg by mouth every four (4) hours as needed for Pain.  amLODIPine (NORVASC) 5 mg tablet Take 1 Tablet by mouth daily.  atorvastatin (LIPITOR) 80 mg tablet Take 1 Tablet by mouth nightly.  latanoprost (XALATAN) 0.005 % ophthalmic solution Administer 1 Drop to both eyes nightly.  pantoprazole (PROTONIX) 40 mg tablet Take 1 Tablet by mouth daily.       albuterol (PROVENTIL HFA, VENTOLIN HFA, PROAIR HFA) 90 mcg/actuation inhaler Take 2 Puffs by inhalation every four (4) hours as needed for Wheezing. 1 Each 0    guaiFENesin-dextromethorphan (Adult Robitussin Peak Cold DM)  mg/5 mL liqd Take 10 mL by mouth every four (4) hours as needed for Cough or Congestion. 118 mL 0    metFORMIN (GLUCOPHAGE) 500 mg tablet Take  by mouth two (2) times daily (with meals). Past History     Past Medical History:  Past Medical History:   Diagnosis Date    Diabetes ([facility] [address]     Gastrointestinal disorder     Hypertension     DEJA (obstructive sleep apnea)     AHI: 8.9 per hour       Past Surgical History:  Past Surgical History:   Procedure Laterality Date    HX PROSTATE SURGERY      CO ABDOMEN SURGERY PROC UNLISTED      Hernia Repair       Family History:  Family History   Problem Relation Age of Onset    Hypertension Mother     Diabetes Mother     Hypertension Father     Diabetes Father        Social History:  Social History     Tobacco Use    Smoking status: Never Smoker    Smokeless tobacco: Never Used   Substance Use Topics    Alcohol use: No    Drug use: Not Currently       Allergies: Allergies   Allergen Reactions    Aspirin Anxiety     Patient states not allergic to medication but reports he does not wish to take it          Review of Systems   Review of Systems   Constitutional: Negative for appetite change, chills and fever. HENT: Negative for congestion. Eyes: Negative for pain. Respiratory: Negative for cough and shortness of breath. Cardiovascular: Negative for chest pain. Gastrointestinal: Negative for abdominal pain, constipation, diarrhea, nausea and vomiting. Genitourinary: Positive for dysuria (pain just prior to voiding as noted in HPI). Negative for decreased urine volume, difficulty urinating, frequency, hematuria, penile discharge, penile pain, penile swelling, scrotal swelling, testicular pain and urgency.    Musculoskeletal: Negative for back pain, neck pain and neck stiffness. Skin: Negative for rash. Neurological: Negative for syncope and headaches. All other systems reviewed and are negative. Physical Exam   Physical Exam  Vitals and nursing note reviewed. Exam conducted with a chaperone present. Constitutional:       General: He is not in acute distress. Appearance: Normal appearance. He is not ill-appearing or toxic-appearing. Comments: 66 y.o. male   HENT:      Head: Normocephalic and atraumatic. Right Ear: External ear normal.      Left Ear: External ear normal.      Nose: Nose normal.   Eyes:      Extraocular Movements: Extraocular movements intact. Conjunctiva/sclera: Conjunctivae normal.   Cardiovascular:      Rate and Rhythm: Normal rate and regular rhythm. Pulses: Normal pulses. Heart sounds: Normal heart sounds. No murmur heard. No friction rub. No gallop. Pulmonary:      Effort: Pulmonary effort is normal. No respiratory distress. Breath sounds: Normal breath sounds. No wheezing. Abdominal:      General: There is no distension. Palpations: Abdomen is soft. There is no mass. Tenderness: There is no abdominal tenderness. There is no right CVA tenderness, left CVA tenderness or guarding. Genitourinary:     Penis: Normal. No tenderness, discharge, swelling or lesions. Musculoskeletal:         General: Normal range of motion. Cervical back: Normal range of motion. Skin:     General: Skin is warm and dry. Neurological:      General: No focal deficit present. Mental Status: He is alert and oriented to person, place, and time.    Psychiatric:         Mood and Affect: Mood normal.         Behavior: Behavior normal.         Diagnostic Study Results     Labs -     Recent Results (from the past 12 hour(s))   URINALYSIS W/ REFLEX CULTURE    Collection Time: 03/27/22  4:38 PM    Specimen: Urine   Result Value Ref Range    Color YELLOW/STRAW      Appearance TURBID (A) CLEAR      Specific gravity 1.010 1.003 - 1.030      pH (UA) 6.0 5.0 - 8.0      Protein 100 (A) NEG mg/dL    Glucose 100 (A) NEG mg/dL    Ketone Negative NEG mg/dL    Bilirubin Negative NEG      Blood MODERATE (A) NEG      Urobilinogen 0.2 0.2 - 1.0 EU/dL    Nitrites Positive (A) NEG      Leukocyte Esterase LARGE (A) NEG      WBC >100 (H) 0 - 4 /hpf    RBC 0-5 0 - 5 /hpf    Epithelial cells MODERATE (A) FEW /lpf    Bacteria 3+ (A) NEG /hpf    UA:UC IF INDICATED URINE CULTURE ORDERED (A) CNI     CBC WITH AUTOMATED DIFF    Collection Time: 03/27/22  4:54 PM   Result Value Ref Range    WBC 5.1 4.1 - 11.1 K/uL    RBC 3.64 (L) 4.10 - 5.70 M/uL    HGB 9.0 (L) 12.1 - 17.0 g/dL    HCT 30.2 (L) 36.6 - 50.3 %    MCV 83.0 80.0 - 99.0 FL    MCH 24.7 (L) 26.0 - 34.0 PG    MCHC 29.8 (L) 30.0 - 36.5 g/dL    RDW 15.9 (H) 11.5 - 14.5 %    PLATELET 940 321 - 076 K/uL    MPV 8.5 (L) 8.9 - 12.9 FL    NRBC 0.0 0  WBC    ABSOLUTE NRBC 0.00 0.00 - 0.01 K/uL    NEUTROPHILS 46 32 - 75 %    LYMPHOCYTES 38 12 - 49 %    MONOCYTES 12 5 - 13 %    EOSINOPHILS 3 0 - 7 %    BASOPHILS 1 0 - 1 %    IMMATURE GRANULOCYTES 0 0.0 - 0.5 %    ABS. NEUTROPHILS 2.3 1.8 - 8.0 K/UL    ABS. LYMPHOCYTES 1.9 0.8 - 3.5 K/UL    ABS. MONOCYTES 0.6 0.0 - 1.0 K/UL    ABS. EOSINOPHILS 0.2 0.0 - 0.4 K/UL    ABS. BASOPHILS 0.0 0.0 - 0.1 K/UL    ABS. IMM.  GRANS. 0.0 0.00 - 0.04 K/UL    DF AUTOMATED     METABOLIC PANEL, COMPREHENSIVE    Collection Time: 03/27/22  4:54 PM   Result Value Ref Range    Sodium 138 136 - 145 mmol/L    Potassium 3.8 3.5 - 5.1 mmol/L    Chloride 104 97 - 108 mmol/L    CO2 22 21 - 32 mmol/L    Anion gap 12 5 - 15 mmol/L    Glucose 147 (H) 65 - 100 mg/dL    BUN 18 6 - 20 MG/DL    Creatinine 1.38 (H) 0.70 - 1.30 MG/DL    BUN/Creatinine ratio 13 12 - 20      GFR est AA >60 >60 ml/min/1.73m2    GFR est non-AA 51 (L) >60 ml/min/1.73m2    Calcium 8.9 8.5 - 10.1 MG/DL    Bilirubin, total 0.3 0.2 - 1.0 MG/DL    ALT (SGPT) 31 12 - 78 U/L AST (SGOT) 12 (L) 15 - 37 U/L    Alk. phosphatase 89 45 - 117 U/L    Protein, total 8.2 6.4 - 8.2 g/dL    Albumin 3.3 (L) 3.5 - 5.0 g/dL    Globulin 4.9 (H) 2.0 - 4.0 g/dL    A-G Ratio 0.7 (L) 1.1 - 2.2         Radiologic Studies -   No orders to display     CT Results  (Last 48 hours)    None        CXR Results  (Last 48 hours)    None            Medical Decision Making   I am the first provider for this patient. I reviewed the vital signs, available nursing notes, past medical history, past surgical history, family history and social history. Vital Signs-Reviewed the patient's vital signs. Patient Vitals for the past 12 hrs:   Temp Pulse Resp BP SpO2   03/27/22 1555 98.2 °F (36.8 °C) 74 19 (!) 143/87 96 %       Records Reviewed: Nursing Notes, Old Medical Records and Previous Laboratory Studies    Provider Notes (Medical Decision Making):   Spoke with pharmacy after reviewing prior urine culture results. Differential diagnosis includes but not limited to UTI, STD, balanitis, pyelonephritis, bladder obstruction    Given known history of ESBL, will admit patient for IV antibiotics. Patient actually prefers and is agreeable with this plan. Spoke with hospitalist, Angel Foreman, who will kindly be admitting the patient for further evaluation and management including IV antibiotics. ED Course:   Initial assessment performed. The patients presenting problems have been discussed, and they are in agreement with the care plan formulated and outlined with them. I have encouraged them to ask questions as they arise throughout their visit. ED Course as of 03/27/22 1854   Phillip Jones Mar 27, 2022   [facility] [address] Spoke with hospitalist, Dr. Judson Joseph, who will kindly be admitting the patient for further evaluation and management/IV abx for ESBL. [TL]   [address]* [TL]      ED Course User Index  [TL] ZAID Crwoley       Critical Care Time: None    Disposition:  Admit    PLAN:  1.    Current Discharge Medication List        2. Follow-up Information    None       Return to ED if worse     Diagnosis     Clinical Impression: No diagnosis found. Please note that this dictation was completed with [employer], the computer voice recognition software. Quite often unanticipated grammatical, syntax, homophones, and other interpretive errors are inadvertently transcribed by the computer software. Please disregards these errors. Please excuse any errors that have escaped final proofreading.

## 2022-03-27 NOTE — H&P
Hospitalist Admission Note    NAME: Elena Hubbard   :  1950   MRN:  [id number]   Room Number: 719/00  @ [facility]     Date/Time:  3/27/2022 7:53 PM    Patient PCP: None  ______________________________________________________________________  Given the patient's current clinical presentation, I have a high level of concern for decompensation if discharged from the emergency department. Complex decision making was performed, which includes reviewing the patient's available past medical records, laboratory results, and x-ray films. My assessment of this patient's clinical condition and my plan of care is as follows. Assessment / Plan: Active Problems:    Complicated urinary tract infection (3/27/5818)      Complicated UTI POA  Urinary Pain POA   Urinary retention and LUTS due to BPH POA  Hematuria POA  -UA with WBC, RBC, Bacteria   -UCX w/ ESBL previously  -Patient currently performs CIC using 14 Fr catheter  -Patient did not complete previous course for ESBL UTI  -Patient did not see urology appointment made for him previously on   -Hemoglobin at baseline         -IV meropenem  -Follow up urine Cx  -Continue Flomax  - Percocet PRN for pain   -Bladder check  as needed  -Patient to follow-up with urology as an outpatient for management of hematuria and urinary retention due to BPH w/ LUTS           CKD 2 POA   Hx of LUTS s/p EBRT ( 2019)  and ADT( 2021)  for GG5 prostate cancer 2019   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   - Duo neb as needed   - resume HTN meds        Body mass index is 33.28 kg/m².   Code Status: full   Surrogate Decision Maker:    DVT Prophylaxis: Lovenox  GI Prophylaxis: not indicated  Baseline: ambulatory independently Subjective:   CHIEF COMPLAINT: bladder infection    HISTORY OF PRESENT ILLNESS:     Anthony Hood is a 66 y.o.  male with PMH of recurrent UTI, BPH, HTN, DM who presents to ED with c/o UTI. Patient reports concerns that he is about to develop UTI. Reports bladder discomfort and penile pain prior to micturition but no dysuria. He was recently admitted to [facility] for treatment of ESBL UTI suspected to be related to urinary retention and improper self cath technique. Patient has since been seen by [facility] and instructed to continue self cath. He apparently does not perform CIC 4x daily as instructed and tries to urinate by himself because he is able to 'push' out urine. No fever,chills    We were asked to admit for work up and evaluation of the above problems. Past Medical History:   Diagnosis Date    Diabetes ([address]     Gastrointestinal disorder     Hypertension     DEJA (obstructive sleep apnea)     AHI: 8.9 per hour        Past Surgical History:   Procedure Laterality Date    HX PROSTATE SURGERY      CT ABDOMEN SURGERY PROC UNLISTED      Hernia Repair       Social History     Tobacco Use    Smoking status: Never Smoker    Smokeless tobacco: Never Used   Substance Use Topics    Alcohol use: No        Family History   Problem Relation Age of Onset    Hypertension Mother     Diabetes Mother     Hypertension Father     Diabetes Father      Allergies   Allergen Reactions    Aspirin Anxiety     Patient states not allergic to medication but reports he does not wish to take it         Prior to Admission medications    Medication Sig Start Date End Date Taking? Authorizing Provider   finasteride (PROSCAR) 5 mg tablet Take 1 Tablet by mouth daily. 3/17/22   Naheed Jones MD   tamsulosin (FLOMAX) 0.4 mg capsule Take 0.4 mg by mouth two (2) times a day. Provider, Historical   acetaminophen (TylenoL) 325 mg tablet Take 650 mg by mouth every four (4) hours as needed for Pain.     Provider, Historical amLODIPine (NORVASC) 5 mg tablet Take 1 Tablet by mouth daily. 3/29/21   Provider, Historical   atorvastatin (LIPITOR) 80 mg tablet Take 1 Tablet by mouth nightly. 3/28/21   Provider, Historical   latanoprost (XALATAN) 0.005 % ophthalmic solution Administer 1 Drop to both eyes nightly. Provider, Historical   pantoprazole (PROTONIX) 40 mg tablet Take 1 Tablet by mouth daily. 8/26/21   Provider, Historical   albuterol (PROVENTIL HFA, VENTOLIN HFA, PROAIR HFA) 90 mcg/actuation inhaler Take 2 Puffs by inhalation every four (4) hours as needed for Wheezing. 1/8/22   Ramos Power NP   guaiFENesin-dextromethorphan (Adult Robitussin Peak Cold DM)  mg/5 mL liqd Take 10 mL by mouth every four (4) hours as needed for Cough or Congestion. 1/8/22   Ramos Power NP   metFORMIN (GLUCOPHAGE) 500 mg tablet Take  by mouth two (2) times daily (with meals). Provider, Historical       REVIEW OF SYSTEMS:     I am not able to complete the review of systems because:    The patient is intubated and sedated    The patient has altered mental status due to his acute medical problems    The patient has baseline aphasia from prior stroke(s)    The patient has baseline dementia and is not reliable historian    The patient is in acute medical distress and unable to provide information           Total of 12 systems reviewed as follows:       POSITIVE= underlined text  Negative = text not underlined  General:  fever, chills, sweats, generalized weakness, weight loss/gain,      loss of appetite   Eyes:    blurred vision, eye pain, loss of vision, double vision  ENT:    rhinorrhea, pharyngitis   Respiratory:   cough, sputum production, SOB, ALEJO, wheezing, pleuritic pain   Cardiology:   chest pain, palpitations, orthopnea, PND, edema, syncope   Gastrointestinal:  abdominal pain , N/V, diarrhea, dysphagia, constipation, bleeding   Genitourinary:  frequency, urgency, dysuria, hematuria, incontinence   Muskuloskeletal : arthralgia, myalgia, back pain  Hematology:  easy bruising, nose or gum bleeding, lymphadenopathy   Dermatological: rash, ulceration, pruritis, color change / jaundice  Endocrine:   hot flashes or polydipsia   Neurological:  headache, dizziness, confusion, focal weakness, paresthesia,     Speech difficulties, memory loss, gait difficulty  Psychological: Feelings of anxiety, depression, agitation    Objective:   VITALS:    Visit Vitals  BP (!) 156/99 (BP 1 Location: Left upper arm, BP Patient Position: Sitting)   Pulse 71   Temp 99 °F (37.2 °C)   Resp 18   Ht 5' 8\" (1.727 m)   Wt 99.3 kg (218 lb 14.4 oz)   SpO2 94%   BMI 33.28 kg/m²       PHYSICAL EXAM:    General:    Alert, cooperative, no distress, appears stated age. HEENT: Atraumatic, anicteric sclerae, pink conjunctivae     No oral ulcers, mucosa moist, throat clear, dentition fair  Neck:  Supple, symmetrical,  thyroid: non tender  Lungs:   Clear to auscultation bilaterally. No Wheezing or Rhonchi. No rales. Chest wall:  No tenderness  No Accessory muscle use. Heart:   Regular  rhythm,  No  murmur   No edema  Abdomen:   Soft, non-tender. Not distended. Bowel sounds normal  Extremities: No cyanosis. No clubbing,      Skin turgor normal, Capillary refill normal, Radial dial pulse 2+  Skin:     Not pale. Not Jaundiced  No rashes   Psych:  Good insight. Not depressed. Not anxious or agitated. Neurologic: EOMs intact. No facial asymmetry. No aphasia or slurred speech. Symmetrical strength, Sensation grossly intact.  Alert and oriented X 4.     ______________________________________________________________________    Care Plan discussed with:  Patient/Family    Expected  Disposition:  Home w/Family  ________________________________________________________________________  TOTAL TIME:  40 Minutes    Critical Care Provided     Minutes non procedure based      Comments    x Reviewed previous records   >50% of visit spent in counseling and coordination of care x Discussion with patient and/or family and questions answered       ________________________________________________________________________  Signed: Julisa Yusuf MD    Procedures: see electronic medical records for all procedures/Xrays and details which were not copied into this note but were reviewed prior to creation of Plan. LAB DATA REVIEWED:    Recent Results (from the past 24 hour(s))   URINALYSIS W/ REFLEX CULTURE    Collection Time: 03/27/22  4:38 PM    Specimen: Urine   Result Value Ref Range    Color YELLOW/STRAW      Appearance TURBID (A) CLEAR      Specific gravity 1.010 1.003 - 1.030      pH (UA) 6.0 5.0 - 8.0      Protein 100 (A) NEG mg/dL    Glucose 100 (A) NEG mg/dL    Ketone Negative NEG mg/dL    Bilirubin Negative NEG      Blood MODERATE (A) NEG      Urobilinogen 0.2 0.2 - 1.0 EU/dL    Nitrites Positive (A) NEG      Leukocyte Esterase LARGE (A) NEG      WBC >100 (H) 0 - 4 /hpf    RBC 0-5 0 - 5 /hpf    Epithelial cells MODERATE (A) FEW /lpf    Bacteria 3+ (A) NEG /hpf    UA:UC IF INDICATED URINE CULTURE ORDERED (A) CNI     CBC WITH AUTOMATED DIFF    Collection Time: 03/27/22  4:54 PM   Result Value Ref Range    WBC 5.1 4.1 - 11.1 K/uL    RBC 3.64 (L) 4.10 - 5.70 M/uL    HGB 9.0 (L) 12.1 - 17.0 g/dL    HCT 30.2 (L) 36.6 - 50.3 %    MCV 83.0 80.0 - 99.0 FL    MCH 24.7 (L) 26.0 - 34.0 PG    MCHC 29.8 (L) 30.0 - 36.5 g/dL    RDW 15.9 (H) 11.5 - 14.5 %    PLATELET 070 757 - 199 K/uL    MPV 8.5 (L) 8.9 - 12.9 FL    NRBC 0.0 0  WBC    ABSOLUTE NRBC 0.00 0.00 - 0.01 K/uL    NEUTROPHILS 46 32 - 75 %    LYMPHOCYTES 38 12 - 49 %    MONOCYTES 12 5 - 13 %    EOSINOPHILS 3 0 - 7 %    BASOPHILS 1 0 - 1 %    IMMATURE GRANULOCYTES 0 0.0 - 0.5 %    ABS. NEUTROPHILS 2.3 1.8 - 8.0 K/UL    ABS. LYMPHOCYTES 1.9 0.8 - 3.5 K/UL    ABS. MONOCYTES 0.6 0.0 - 1.0 K/UL    ABS. EOSINOPHILS 0.2 0.0 - 0.4 K/UL    ABS. BASOPHILS 0.0 0.0 - 0.1 K/UL    ABS. IMM.  GRANS. 0.0 0.00 - 0.04 K/UL    DF AUTOMATED METABOLIC PANEL, COMPREHENSIVE    Collection Time: 03/27/22  4:54 PM   Result Value Ref Range    Sodium 138 136 - 145 mmol/L    Potassium 3.8 3.5 - 5.1 mmol/L    Chloride 104 97 - 108 mmol/L    CO2 22 21 - 32 mmol/L    Anion gap 12 5 - 15 mmol/L    Glucose 147 (H) 65 - 100 mg/dL    BUN 18 6 - 20 MG/DL    Creatinine 1.38 (H) 0.70 - 1.30 MG/DL    BUN/Creatinine ratio 13 12 - 20      GFR est AA >60 >60 ml/min/1.73m2    GFR est non-AA 51 (L) >60 ml/min/1.73m2    Calcium 8.9 8.5 - 10.1 MG/DL    Bilirubin, total 0.3 0.2 - 1.0 MG/DL    ALT (SGPT) 31 12 - 78 U/L    AST (SGOT) 12 (L) 15 - 37 U/L    Alk.  phosphatase 89 45 - 117 U/L    Protein, total 8.2 6.4 - 8.2 g/dL    Albumin 3.3 (L) 3.5 - 5.0 g/dL    Globulin 4.9 (H) 2.0 - 4.0 g/dL    A-G Ratio 0.7 (L) 1.1 - 2.2

## 2022-03-28 LAB
ANION GAP SERPL CALC-SCNC: 10 MMOL/L (ref 5–15)
BASOPHILS # BLD: 0 K/UL (ref 0–0.1)
BASOPHILS NFR BLD: 1 % (ref 0–1)
BUN SERPL-MCNC: 15 MG/DL (ref 6–20)
BUN/CREAT SERPL: 11 (ref 12–20)
CALCIUM SERPL-MCNC: 8.9 MG/DL (ref 8.5–10.1)
CHLORIDE SERPL-SCNC: 106 MMOL/L (ref 97–108)
CO2 SERPL-SCNC: 24 MMOL/L (ref 21–32)
CREAT SERPL-MCNC: 1.34 MG/DL (ref 0.7–1.3)
DIFFERENTIAL METHOD BLD: ABNORMAL
EOSINOPHIL # BLD: 0.2 K/UL (ref 0–0.4)
EOSINOPHIL NFR BLD: 3 % (ref 0–7)
ERYTHROCYTE [DISTWIDTH] IN BLOOD BY AUTOMATED COUNT: 15.8 % (ref 11.5–14.5)
GLUCOSE BLD STRIP.AUTO-MCNC: 174 MG/DL (ref 65–117)
GLUCOSE BLD STRIP.AUTO-MCNC: 199 MG/DL (ref 65–117)
GLUCOSE BLD STRIP.AUTO-MCNC: 200 MG/DL (ref 65–117)
GLUCOSE BLD STRIP.AUTO-MCNC: 206 MG/DL (ref 65–117)
GLUCOSE BLD STRIP.AUTO-MCNC: 217 MG/DL (ref 65–117)
GLUCOSE BLD STRIP.AUTO-MCNC: 234 MG/DL (ref 65–117)
GLUCOSE SERPL-MCNC: 189 MG/DL (ref 65–100)
HCT VFR BLD AUTO: 29.6 % (ref 36.6–50.3)
HGB BLD-MCNC: 8.8 G/DL (ref 12.1–17)
IMM GRANULOCYTES # BLD AUTO: 0 K/UL (ref 0–0.04)
IMM GRANULOCYTES NFR BLD AUTO: 0 % (ref 0–0.5)
LYMPHOCYTES # BLD: 1.9 K/UL (ref 0.8–3.5)
LYMPHOCYTES NFR BLD: 32 % (ref 12–49)
MAGNESIUM SERPL-MCNC: 1.9 MG/DL (ref 1.6–2.4)
MCH RBC QN AUTO: 24.7 PG (ref 26–34)
MCHC RBC AUTO-ENTMCNC: 29.7 G/DL (ref 30–36.5)
MCV RBC AUTO: 83.1 FL (ref 80–99)
MONOCYTES # BLD: 0.8 K/UL (ref 0–1)
MONOCYTES NFR BLD: 14 % (ref 5–13)
NEUTS SEG # BLD: 2.9 K/UL (ref 1.8–8)
NEUTS SEG NFR BLD: 50 % (ref 32–75)
NRBC # BLD: 0 K/UL (ref 0–0.01)
NRBC BLD-RTO: 0 PER 100 WBC
PHOSPHATE SERPL-MCNC: 3.4 MG/DL (ref 2.6–4.7)
PLATELET # BLD AUTO: 313 K/UL (ref 150–400)
PMV BLD AUTO: 8.6 FL (ref 8.9–12.9)
POTASSIUM SERPL-SCNC: 3.8 MMOL/L (ref 3.5–5.1)
RBC # BLD AUTO: 3.56 M/UL (ref 4.1–5.7)
SERVICE CMNT-IMP: ABNORMAL
SODIUM SERPL-SCNC: 140 MMOL/L (ref 136–145)
WBC # BLD AUTO: 5.8 K/UL (ref 4.1–11.1)

## 2022-03-28 PROCEDURE — 74011250637 HC RX REV CODE- 250/637: Performed by: STUDENT IN AN ORGANIZED HEALTH CARE EDUCATION/TRAINING PROGRAM

## 2022-03-28 PROCEDURE — 36415 COLL VENOUS BLD VENIPUNCTURE: CPT

## 2022-03-28 PROCEDURE — 82962 GLUCOSE BLOOD TEST: CPT

## 2022-03-28 PROCEDURE — 74011000258 HC RX REV CODE- 258: Performed by: STUDENT IN AN ORGANIZED HEALTH CARE EDUCATION/TRAINING PROGRAM

## 2022-03-28 PROCEDURE — 80048 BASIC METABOLIC PNL TOTAL CA: CPT

## 2022-03-28 PROCEDURE — 84100 ASSAY OF PHOSPHORUS: CPT

## 2022-03-28 PROCEDURE — 65270000032 HC RM SEMIPRIVATE

## 2022-03-28 PROCEDURE — 74011250637 HC RX REV CODE- 250/637: Performed by: INTERNAL MEDICINE

## 2022-03-28 PROCEDURE — 74011636637 HC RX REV CODE- 636/637: Performed by: STUDENT IN AN ORGANIZED HEALTH CARE EDUCATION/TRAINING PROGRAM

## 2022-03-28 PROCEDURE — 74011250636 HC RX REV CODE- 250/636: Performed by: STUDENT IN AN ORGANIZED HEALTH CARE EDUCATION/TRAINING PROGRAM

## 2022-03-28 PROCEDURE — 83735 ASSAY OF MAGNESIUM: CPT

## 2022-03-28 PROCEDURE — 93005 ELECTROCARDIOGRAM TRACING: CPT

## 2022-03-28 PROCEDURE — 85025 COMPLETE CBC W/AUTO DIFF WBC: CPT

## 2022-03-28 PROCEDURE — 74011000250 HC RX REV CODE- 250: Performed by: STUDENT IN AN ORGANIZED HEALTH CARE EDUCATION/TRAINING PROGRAM

## 2022-03-28 PROCEDURE — 94760 N-INVAS EAR/PLS OXIMETRY 1: CPT

## 2022-03-28 RX ORDER — NALOXONE HYDROCHLORIDE 0.4 MG/ML
0.4 INJECTION, SOLUTION INTRAMUSCULAR; INTRAVENOUS; SUBCUTANEOUS
Status: DISCONTINUED | OUTPATIENT
Start: 2022-03-28 | End: 2022-03-30 | Stop reason: HOSPADM

## 2022-03-28 RX ORDER — METFORMIN HYDROCHLORIDE 1000 MG/1
1000 TABLET ORAL 2 TIMES DAILY WITH MEALS
COMMUNITY
End: 2022-04-27 | Stop reason: SDUPTHER

## 2022-03-28 RX ADMIN — LATANOPROST 1 DROP: 50 SOLUTION OPHTHALMIC at 22:29

## 2022-03-28 RX ADMIN — TAMSULOSIN HYDROCHLORIDE 0.4 MG: 0.4 CAPSULE ORAL at 09:16

## 2022-03-28 RX ADMIN — SODIUM CHLORIDE, PRESERVATIVE FREE 10 ML: 5 INJECTION INTRAVENOUS at 05:04

## 2022-03-28 RX ADMIN — Medication 3 UNITS: at 17:35

## 2022-03-28 RX ADMIN — PANTOPRAZOLE SODIUM 40 MG: 40 TABLET, DELAYED RELEASE ORAL at 09:16

## 2022-03-28 RX ADMIN — Medication 2 UNITS: at 22:18

## 2022-03-28 RX ADMIN — AMLODIPINE BESYLATE 5 MG: 5 TABLET ORAL at 09:16

## 2022-03-28 RX ADMIN — ATORVASTATIN CALCIUM 80 MG: 40 TABLET, FILM COATED ORAL at 22:18

## 2022-03-28 RX ADMIN — Medication 12 UNITS: at 09:15

## 2022-03-28 RX ADMIN — MEROPENEM 500 MG: 500 INJECTION, POWDER, FOR SOLUTION INTRAVENOUS at 09:14

## 2022-03-28 RX ADMIN — SODIUM CHLORIDE, PRESERVATIVE FREE 10 ML: 5 INJECTION INTRAVENOUS at 14:00

## 2022-03-28 RX ADMIN — Medication 2 UNITS: at 09:15

## 2022-03-28 RX ADMIN — TAMSULOSIN HYDROCHLORIDE 0.4 MG: 0.4 CAPSULE ORAL at 17:35

## 2022-03-28 RX ADMIN — SODIUM CHLORIDE, PRESERVATIVE FREE 10 ML: 5 INJECTION INTRAVENOUS at 22:19

## 2022-03-28 RX ADMIN — MEROPENEM 500 MG: 500 INJECTION, POWDER, FOR SOLUTION INTRAVENOUS at 16:24

## 2022-03-28 RX ADMIN — FINASTERIDE 5 MG: 5 TABLET, FILM COATED ORAL at 09:16

## 2022-03-28 RX ADMIN — SODIUM CHLORIDE, PRESERVATIVE FREE 10 ML: 5 INJECTION INTRAVENOUS at 15:38

## 2022-03-28 RX ADMIN — OXYCODONE HYDROCHLORIDE AND ACETAMINOPHEN 1 TABLET: 5; 325 TABLET ORAL at 16:26

## 2022-03-28 RX ADMIN — Medication 2 UNITS: at 12:03

## 2022-03-28 RX ADMIN — OXYCODONE HYDROCHLORIDE AND ACETAMINOPHEN 1 TABLET: 5; 325 TABLET ORAL at 06:10

## 2022-03-28 RX ADMIN — ENOXAPARIN SODIUM 40 MG: 100 INJECTION SUBCUTANEOUS at 09:17

## 2022-03-28 RX ADMIN — MEROPENEM 500 MG: 500 INJECTION, POWDER, FOR SOLUTION INTRAVENOUS at 01:38

## 2022-03-28 RX ADMIN — MEROPENEM 500 MG: 500 INJECTION, POWDER, FOR SOLUTION INTRAVENOUS at 22:18

## 2022-03-28 NOTE — PROGRESS NOTES
Hospitalist Progress Note    NAME: Yoana Becker   :  1950   MRN:  [id number]   Room Number:  084/58  @ [facility]       Interim Hospital Summary: 66 y.o. male whom presented on 3/27/2022 with      Assessment / Plan:      Complicated UTI POA  Urinary Pain POA   Urinary retention and LUTS due to BPH POA  Hematuria POA  -UA with WBC, RBC, Bacteria   -UCX w/ ESBL previously  -Patient currently performs CIC using 14 Fr catheter  -Patient did not complete previous course for ESBL UTI  -Patient did not see urology appointment made for him previously on   -Hemoglobin at baseline         -IV meropenem D2  -Follow up urine Cx  -Continue Flomax  - Percocet PRN for pain   -Bladder check  as needed  -Patient to follow-up with urology as an outpatient for management of hematuria and urinary retention due to BPH w/ LUTS           CKD 2 POA   Hx of LUTS s/p EBRT ( 2019)  and ADT( 2021)  for GG5 prostate cancer 2019   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   - Duo neb as needed   - resume HTN meds         Body mass index is 33.28 kg/m². Code Status: full   Surrogate Decision Maker:     DVT Prophylaxis: Lovenox  GI Prophylaxis: not indicated  Baseline: ambulatory independently             Subjective:     Chief Complaint / Reason for Physician Visit  Expressing concerns about CIC  Discussed with RN events overnight. Review of Systems:  No fevers, chills, appetite change, cough, sputum production, shortness of breath, dyspnea on exertion, nausea, vomitting, diarrhea, constipation, chest pain, leg edema, abdominal pain, joint pain, rash, itching. Tolerating PT/OT. Tolerating diet. Objective:     VITALS:   Last 24hrs VS reviewed since prior progress note.  Most recent are:  Patient Vitals for the past 24 hrs:   Temp Pulse Resp BP SpO2   03/28/22 0736 98.8 °F (37.1 °C) 62 20 123/76 96 %   03/27/22 2133 -- -- -- -- 94 %   03/27/22 1940 99 °F (37.2 °C) 71 18 (!) 156/99 94 %   03/27/22 1555 98.2 °F (36.8 °C) 74 19 (!) 143/87 96 %       Intake/Output Summary (Last 24 hours) at 3/28/2022 1026  Last data filed at 3/28/2022 0610  Gross per 24 hour   Intake 230 ml   Output --   Net 230 ml        PHYSICAL EXAM:  General: WD, WN. Alert, cooperative, no acute distress    EENT:  EOMI. Anicteric sclerae. MMM  Resp:  CTA bilaterally, no wheezing or rales. No accessory muscle use  CV:  Regular  rhythm,  normal S1/S2, no murmurs rubs gallops, No edema  GI:  Soft, Non distended, Non tender. +Bowel sounds  Neurologic:  Alert and oriented X 3, normal speech,   Psych:   Good insight. Not anxious nor agitated  Skin:  No rashes. No jaundice    Reviewed most current lab test results and cultures  YES  Reviewed most current radiology test results   YES  Review and summation of old records today    NO  Reviewed patient's current orders and MAR    YES  PMH/ reviewed - no change compared to H&P  ________________________________________________________________________  Care Plan discussed with:    Comments   Patient x    Family      RN x    Care Manager x    Consultant      x                 x Multidiciplinary team rounds were held today with , nursing, pharmacist and clinical coordinator. Patient's plan of care was discussed; medications were reviewed and discharge planning was addressed.      ________________________________________________________________________  Total NON critical care TIME: 25  Minutes    Total CRITICAL CARE TIME Spent:   Minutes non procedure based      Comments   >50% of visit spent in counseling and coordination of care     ________________________________________________________________________  Augusta Vidales MD     Procedures: see electronic medical records for all procedures/Xrays and details which were not copied into this note but were reviewed prior to creation of Plan. LABS:  I reviewed today's most current labs and imaging studies.   Pertinent labs include:  Recent Labs     03/28/22  0512 03/27/22  1654   WBC 5.8 5.1   HGB 8.8* 9.0*   HCT 29.6* 30.2*    306     Recent Labs     03/28/22  0512 03/27/22  1654    138   K 3.8 3.8    104   CO2 24 22   * 147*   BUN 15 18   CREA 1.34* 1.38*   CA 8.9 8.9   MG 1.9  --    PHOS 3.4  --    ALB  --  3.3*   TBILI  --  0.3   ALT  --  31       Signed: Faby Sanchez MD

## 2022-03-28 NOTE — PROGRESS NOTES
2000) Tele med physician notified of patient being admitted;Good evening Dr, the new admit is in the room now. Awaiting for your assessment. Thank you. 2006)  Telemed physician was notified that patient was seen by Dr Strong Rho to the earlier message sent, patient was seen by Dr Debra Rudolph at the ED. Thank you. 2022) Patient arrived in [address] [place], [place] [address]sur unit via Wheelchair.       2133) Admission assessment done. Dual skin assessment done with JOZEF Velazco. Patient is admitted with complicated UTI. Patient was oriented to his room. Call light within reach. Patient had unidentified individual medications in pill bottle and inhaler. Medications kept in the  drawer. All patient's questions were answered. 2105) Blood glucose checked with  mg/dl. 2149) Bedtime medications taken whole with 60 ml of water. Insulin coverage given for elevated blood glucose level. Xalatan eye drop not available, pharmacy was notified. 2211) PRN Percocet 5-325 mg tablet given with 60 ml of water per patient's request. Two oatmeal cookie given per patient's request.    8532) Scheduled Meropenem 500 mg in 0.9% Nacl 50 ml IV given. Patient is awake and watching TV.    0230) EKG test done,result transmitted to Command Information. Patient went to the bathroom to void. 9597) Blood drawn and sent to the lab.    0584) Patient was rounded on. Patient is resting comfortably in bed.    0610) PRN Percocet 5-325 mg tablet given with 60 ml of water per patient's request. Patient went to the bathroom.

## 2022-03-28 NOTE — PROGRESS NOTES
[address] with son. Pt discussed in rounds. He missed his new PCP appointment. CM called Dr. Penny Overcast office and rescheduled this new PCP appointment for 4/12/22. CM placed this on the pt's AVS. CM also placed a \"follow up\" Urology appointment on the AVS.    CM provided pt with a print out of both appointment in a large font for his convenience.  Dora Syed

## 2022-03-28 NOTE — PROGRESS NOTES
[facility] Dosing Services:     Pharmacist Renal Dosing Progress Note for meropenem   Physician: Maureen Morley     The following medication: meropenem was automatically dose-adjusted per [facility] P&T Committee Protocol, with respect to renal function. Consult provided for this   66 y.o. , male , for the indication of ESBL E coli UTI. Pt Weight:   Wt Readings from Last 1 Encounters:   03/27/22 99.3 kg (218 lb 14.4 oz)         Previous Regimen Meropenem 500 mg IV q 8 hours    Serum Creatinine Lab Results   Component Value Date/Time    Creatinine 1.34 (H) 03/28/2022 05:12 AM       Creatinine Clearance Estimated Creatinine Clearance: 56.9 mL/min (A) (based on SCr of 1.34 mg/dL (H)). BUN Lab Results   Component Value Date/Time    BUN 15 03/28/2022 05:12 AM       Dosage changed to:  meropenem 500 mg IV q 6 hours           Pharmacy to continue to monitor patient daily. Will make dosage adjustments based upon changing renal function.   Signed JAGRUTI FregosoD. Contact information:  [phone]

## 2022-03-28 NOTE — PROGRESS NOTES
Care Management:    Transition of Care Plan:   Home with sonOriana  · RUR:19%  · Disposition:Home with Oriana garcia  · Transportation:Pt stated that he would have a friend to pick him up when he is discharged. Pt is independent and is a . He will be able to transport himself to any follow up appointments. Reason for Readmission:     Complicated UTI         RUR Score/Risk Level:  19%       PCP: First and Last name:  Pt has not seen new provider yet. Name of Practice:    Are you a current patient: Yes/No:    Approximate date of last visit:    Can you participate in a virtual visit with your PCP:     Is a Care Conference indicated: no      Did you attend your follow up appointment (s): If not, why not:Pt attended the urology appt. He did not go to the new PCP appt. Per pt, he was not aware that he had a new PCP appt. Resources/supports as identified by patient/family:   Pt lives with a supportive son. Top Challenges facing patient (as identified by patient/family and CM): Finances/Medication cost?  None voicedl     Transportation    Pt has a vehicle and is a . Support system or lack thereof? Pt has a supportive son   Living arrangements? Pt lives with his son, Oriana Ayala. Self-care/ADLs/Cognition? Pt is independent with his ADL's. Current Advanced Directive/Advance Care Plan:  None           Plan for utilizing home health:   TBD           Transition of Care Plan:    Based on readmission, the patient's previous Plan of Care   has been evaluated and/or modified. The current Transition of Care Plan is: This pt continues to live with his son in an apartment. Pt is independent with his ADL's and IADL's. He uses no assistive devices and is a . He has a vehicle as well. Per pt, he just applied for [place] over the phone, as he had Medicaid in [place]. This pt did go to his urology appointment, but missed his new PCP appointment.  When CM asked him about missing the PCP appointment, this pt stated that he was not aware that he had a new PCP appointment. CM told him that this CM will call to reschedule this appointment and then bring the information to him as well as place the information on his AVS. Will follow.  [facility] Management Interventions  PCP Verified by CM: No  Palliative Care Criteria Met (RRAT>21 & CHF Dx)?: No  Transition of Care Consult (CM Consult): Discharge Planning  MyChart Signup: No  Discharge Durable Medical Equipment: No  Physical Therapy Consult: No  Occupational Therapy Consult: No  Speech Therapy Consult: No  Support Systems: Child(jaylan)  The Plan for Transition of Care is Related to the Following Treatment Goals : safe d/c  The Patient and/or Patient Representative was Provided with a Choice of Provider and Agrees with the Discharge Plan?: Yes  Freedom of Choice List was Provided with Basic Dialogue that Supports the Patient's Individualized Plan of Care/Goals, Treatment Preferences and Shares the Quality Data Associated with the Providers?: Yes  [place] Resource Information Provided?: No  Discharge Location  Patient Expects to be Discharged to[de-identified] Home        Readmission Assessment  Number of days since last admission?: 8-30 days (11 days)  Previous disposition: Home with Family  Who is being interviewed?: Patient  What was the patient's/caregiver's perception as to why they think they needed to return back to the hospital?: Other (Comment)  Did you visit your Primary Care Physician after you left the hospital, before you returned this time?: No (Pt clained that he was unaware of the appointment.)  Why weren't you able to visit your PCP?: Other (Comment)  Did you see a specialist, such as Cardiac, Pulmonary, Orthopedic Physician, etc. after you left the hospital?: Yes  Who advised the patient to return to the hospital?: Self-referral  Does the patient report anything that got in the way of taking their medications?: No  In our efforts to provide the best possible care to you and others like you, can you think of anything that we could have done to help you after you left the hospital the first time, so that you might not have needed to return so soon?: Discharge instructions that are concise, clear, and non contradictory,Improved written discharge instructions

## 2022-03-28 NOTE — PROGRESS NOTES
[facility]: MED RECONCILIATION    Comments/Recommendations:   Med rec performed by recent pharmacy performed med rec on 3/9 and recent discharge from [address] [place] [address] on 3/16. Allergies: Aspirin    Prior to Admission Medications:   Prior to Admission Medications   Prescriptions Last Dose Informant Patient Reported? Taking?   acetaminophen (TylenoL) 325 mg tablet 3/27/2022 at Unknown time  Yes Yes   Sig: Take 650 mg by mouth every four (4) hours as needed for Pain. albuterol (PROVENTIL HFA, VENTOLIN HFA, PROAIR HFA) 90 mcg/actuation inhaler  Self No Yes   Sig: Take 2 Puffs by inhalation every four (4) hours as needed for Wheezing. amLODIPine (NORVASC) 5 mg tablet  Self Yes Yes   Sig: Take 1 Tablet by mouth daily. atorvastatin (LIPITOR) 80 mg tablet  Self Yes Yes   Sig: Take 1 Tablet by mouth nightly. finasteride (PROSCAR) 5 mg tablet   No Yes   Sig: Take 1 Tablet by mouth daily. latanoprost (XALATAN) 0.005 % ophthalmic solution  Self Yes Yes   Sig: Administer 1 Drop to both eyes nightly. metFORMIN (GLUCOPHAGE) 1,000 mg tablet   Yes Yes   Sig: Take 1,000 mg by mouth two (2) times daily (with meals). pantoprazole (PROTONIX) 40 mg tablet  Self Yes Yes   Sig: Take 1 Tablet by mouth daily. tamsulosin (FLOMAX) 0.4 mg capsule   Yes Yes   Sig: Take 0.4 mg by mouth two (2) times a day.       Facility-Administered Medications: None               MARTINE Dawn   Contact: [phone]

## 2022-03-28 NOTE — PROGRESS NOTES
Bedside shift change report given to Yessy (oncoming nurse) by Yessy (offgoing nurse). Report included the following information SBAR, Kardex and MAR.      2987 patient resting in bed, received due med's, expressed no needs. 1035 patient in bed resting, expressed no needs. 1120 patient ambulating in hallway. 1213 patient in chair, nurse at bedside due medication administered. 1330 patient straight cath self, nurse present sterile technique observed. 1500 patient in room resting. 1615 patient ambulating in hallway, no needs expressed. 1747 patient sitting in chair, nurse at bedside. 1820 patient in room eating dinner no needs expressed. 1930 Bedside shift change report given to Geoff Kim RN (oncoming nurse) by Chris Sears  (offgoing nurse). Report included the following information SBAR, Kardex and MAR.

## 2022-03-28 NOTE — PROGRESS NOTES
Bedside and Verbal shift change report given to [facility] Jolanta RN(oncoming nurse) by Derrell Dunlap  (offgoing nurse). Report included the following information SBAR, Kardex, Intake/Output and MAR.

## 2022-03-28 NOTE — PROGRESS NOTES
2022-This writer's message to on call provider:    Patient admitted with complicated UTI & is c/o penal pain \"9/10\", and only has Tylenol 650 mg ordered for pain score 1-3. Patient reports that during his recent admission he was give oxycodone which worked well for him. Please advise. 2111-This writer's message to on call provider:    Informed patient of Norco 5-325 order, placed by Dr. Ortega Perea, patient declined and stated he only wants oxycodone for pain because that is what he had during his last admission.  Please advise

## 2022-03-28 NOTE — PROGRESS NOTES
1020) IDR with Dr. Fuad Jennings (MD), Iván Sol (pharmacist), Joe Reyes and Lakisha Forman (), Mode Lundy ([address])  and Luis Carlos Minor (RN) to discuss plan of care including discuss urine cultures pending, pt follow-up with PCP and urology, straight cath here, consider [place] at discharge  [phone]) pt in restroom. 1110) Perfect serve Dr. Fuad Jnenings-- Hannah Quevedo I clarify straight cath order--would you like four times daily? \"  [phone] pt refuse self cath kit  8494) pt refuse self cath kit  9421) pt refuse self cath kit  1920) pt request self cath kit during shift report.

## 2022-03-29 LAB
ATRIAL RATE: 68 BPM
CALCULATED P AXIS, ECG09: 49 DEGREES
CALCULATED R AXIS, ECG10: 5 DEGREES
CALCULATED T AXIS, ECG11: -7 DEGREES
DIAGNOSIS, 93000: NORMAL
GLUCOSE BLD STRIP.AUTO-MCNC: 171 MG/DL (ref 65–117)
GLUCOSE BLD STRIP.AUTO-MCNC: 188 MG/DL (ref 65–117)
GLUCOSE BLD STRIP.AUTO-MCNC: 215 MG/DL (ref 65–117)
GLUCOSE BLD STRIP.AUTO-MCNC: 218 MG/DL (ref 65–117)
P-R INTERVAL, ECG05: 158 MS
Q-T INTERVAL, ECG07: 380 MS
QRS DURATION, ECG06: 80 MS
QTC CALCULATION (BEZET), ECG08: 404 MS
SERVICE CMNT-IMP: ABNORMAL
VENTRICULAR RATE, ECG03: 68 BPM

## 2022-03-29 PROCEDURE — 65270000032 HC RM SEMIPRIVATE

## 2022-03-29 PROCEDURE — 74011250636 HC RX REV CODE- 250/636: Performed by: STUDENT IN AN ORGANIZED HEALTH CARE EDUCATION/TRAINING PROGRAM

## 2022-03-29 PROCEDURE — 82962 GLUCOSE BLOOD TEST: CPT

## 2022-03-29 PROCEDURE — 94760 N-INVAS EAR/PLS OXIMETRY 1: CPT

## 2022-03-29 PROCEDURE — 74011000250 HC RX REV CODE- 250: Performed by: STUDENT IN AN ORGANIZED HEALTH CARE EDUCATION/TRAINING PROGRAM

## 2022-03-29 PROCEDURE — 99222 1ST HOSP IP/OBS MODERATE 55: CPT | Performed by: INTERNAL MEDICINE

## 2022-03-29 PROCEDURE — 74011636637 HC RX REV CODE- 636/637: Performed by: STUDENT IN AN ORGANIZED HEALTH CARE EDUCATION/TRAINING PROGRAM

## 2022-03-29 PROCEDURE — 74011250637 HC RX REV CODE- 250/637: Performed by: INTERNAL MEDICINE

## 2022-03-29 PROCEDURE — 74011250637 HC RX REV CODE- 250/637: Performed by: STUDENT IN AN ORGANIZED HEALTH CARE EDUCATION/TRAINING PROGRAM

## 2022-03-29 PROCEDURE — 74011000258 HC RX REV CODE- 258: Performed by: STUDENT IN AN ORGANIZED HEALTH CARE EDUCATION/TRAINING PROGRAM

## 2022-03-29 RX ADMIN — FINASTERIDE 5 MG: 5 TABLET, FILM COATED ORAL at 08:17

## 2022-03-29 RX ADMIN — POLYETHYLENE GLYCOL 3350 17 G: 17 POWDER, FOR SOLUTION ORAL at 08:46

## 2022-03-29 RX ADMIN — Medication 2 UNITS: at 16:44

## 2022-03-29 RX ADMIN — MEROPENEM 500 MG: 500 INJECTION, POWDER, FOR SOLUTION INTRAVENOUS at 10:22

## 2022-03-29 RX ADMIN — SODIUM CHLORIDE, PRESERVATIVE FREE 10 ML: 5 INJECTION INTRAVENOUS at 05:38

## 2022-03-29 RX ADMIN — TAMSULOSIN HYDROCHLORIDE 0.4 MG: 0.4 CAPSULE ORAL at 08:17

## 2022-03-29 RX ADMIN — PANTOPRAZOLE SODIUM 40 MG: 40 TABLET, DELAYED RELEASE ORAL at 08:17

## 2022-03-29 RX ADMIN — OXYCODONE HYDROCHLORIDE AND ACETAMINOPHEN 1 TABLET: 5; 325 TABLET ORAL at 04:31

## 2022-03-29 RX ADMIN — OXYCODONE HYDROCHLORIDE AND ACETAMINOPHEN 1 TABLET: 5; 325 TABLET ORAL at 21:25

## 2022-03-29 RX ADMIN — MEROPENEM 500 MG: 500 INJECTION, POWDER, FOR SOLUTION INTRAVENOUS at 16:15

## 2022-03-29 RX ADMIN — SODIUM CHLORIDE, PRESERVATIVE FREE 10 ML: 5 INJECTION INTRAVENOUS at 13:01

## 2022-03-29 RX ADMIN — ATORVASTATIN CALCIUM 80 MG: 40 TABLET, FILM COATED ORAL at 21:25

## 2022-03-29 RX ADMIN — AMLODIPINE BESYLATE 5 MG: 5 TABLET ORAL at 08:17

## 2022-03-29 RX ADMIN — SODIUM CHLORIDE, PRESERVATIVE FREE 10 ML: 5 INJECTION INTRAVENOUS at 21:25

## 2022-03-29 RX ADMIN — Medication 12 UNITS: at 08:17

## 2022-03-29 RX ADMIN — Medication 2 UNITS: at 08:17

## 2022-03-29 RX ADMIN — MEROPENEM 500 MG: 500 INJECTION, POWDER, FOR SOLUTION INTRAVENOUS at 21:25

## 2022-03-29 RX ADMIN — LATANOPROST 1 DROP: 50 SOLUTION OPHTHALMIC at 21:30

## 2022-03-29 RX ADMIN — ENOXAPARIN SODIUM 40 MG: 100 INJECTION SUBCUTANEOUS at 08:19

## 2022-03-29 RX ADMIN — MEROPENEM 500 MG: 500 INJECTION, POWDER, FOR SOLUTION INTRAVENOUS at 04:31

## 2022-03-29 RX ADMIN — Medication 2 UNITS: at 21:57

## 2022-03-29 RX ADMIN — Medication 3 UNITS: at 11:59

## 2022-03-29 RX ADMIN — OXYCODONE HYDROCHLORIDE AND ACETAMINOPHEN 1 TABLET: 5; 325 TABLET ORAL at 13:00

## 2022-03-29 RX ADMIN — TAMSULOSIN HYDROCHLORIDE 0.4 MG: 0.4 CAPSULE ORAL at 17:27

## 2022-03-29 NOTE — PROGRESS NOTES
Hospitalist Progress Note    NAME: Jyothi Shearer   :  1950   MRN:  [id number]   Room Number:  747/12  @ [facility]       Interim Hospital Summary: 66 y.o. male whom presented on 3/27/2022 with      Assessment / Plan:      Complicated UTI POA  Urinary Pain POA   Urinary retention and LUTS due to BPH POA  Hematuria POA  -UA with WBC, RBC, Bacteria   -UCX w/ ESBL previously  -Patient currently performs CIC using 14 Fr catheter  -Patient did not complete previous course for ESBL UTI  -Patient did not see urology appointment made for him previously on   -Hemoglobin at baseline         -IV meropenem D3  -Follow up urine Cx - preliminarily GNR  -Continue Flomax  - Percocet PRN for pain   -Bladder check  as needed  -Patient to follow-up with urology as an outpatient for management of hematuria and urinary retention due to BPH w/ LUTS           CKD 2 POA   Hx of LUTS s/p EBRT ( 2019)  and ADT( 2021)  for GG5 prostate cancer 2019   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   - Duo neb as needed   - resume HTN meds         Body mass index is 33.28 kg/m². Code Status: full   Surrogate Decision Maker:     DVT Prophylaxis: Lovenox  GI Prophylaxis: not indicated  Baseline: ambulatory independently           Subjective:     Chief Complaint / Reason for Physician Visit  Expressing concerns about CIC  Discussed with RN events overnight. Review of Systems:  No fevers, chills, appetite change, cough, sputum production, shortness of breath, dyspnea on exertion, nausea, vomitting, diarrhea, constipation, chest pain, leg edema, abdominal pain, joint pain, rash, itching. Tolerating PT/OT. Tolerating diet. Objective:     VITALS:   Last 24hrs VS reviewed since prior progress note.  Most recent are:  Patient Vitals for the past 24 hrs:   Temp Pulse Resp BP SpO2   03/29/22 0735 98.1 °F (36.7 °C) 64 16 (!) 147/89 98 %   03/28/22 2003 98.3 °F (36.8 °C) 70 18 120/77 98 %   03/28/22 1538 98.3 °F (36.8 °C) 72 16 120/75 98 %       Intake/Output Summary (Last 24 hours) at 3/29/2022 1040  Last data filed at 3/29/2022 0837  Gross per 24 hour   Intake 120 ml   Output 720 ml   Net -600 ml        PHYSICAL EXAM:  General: WD, WN. Alert, cooperative, no acute distress    EENT:  EOMI. Anicteric sclerae. MMM  Resp:  CTA bilaterally, no wheezing or rales. No accessory muscle use  CV:  Regular  rhythm,  normal S1/S2, no murmurs rubs gallops, No edema  GI:  Soft, Non distended, Non tender. +Bowel sounds  Neurologic:  Alert and oriented X 3, normal speech,   Psych:   Good insight. Not anxious nor agitated  Skin:  No rashes. No jaundice    Reviewed most current lab test results and cultures  YES  Reviewed most current radiology test results   YES  Review and summation of old records today    NO  Reviewed patient's current orders and MAR    YES  PMH/SH reviewed - no change compared to H&P  ________________________________________________________________________  Care Plan discussed with:    Comments   Patient x    Family      RN x    Care Manager x    Consultant      x                 x Multidiciplinary team rounds were held today with , nursing, pharmacist and clinical coordinator. Patient's plan of care was discussed; medications were reviewed and discharge planning was addressed.      ________________________________________________________________________  Total NON critical care TIME: 25  Minutes    Total CRITICAL CARE TIME Spent:   Minutes non procedure based      Comments   >50% of visit spent in counseling and coordination of care     ________________________________________________________________________  Martha Moreno MD     Procedures: see electronic medical records for all procedures/Xrays and details which were not copied into this note but were reviewed prior to creation of Plan. LABS:  I reviewed today's most current labs and imaging studies.   Pertinent labs include:  Recent Labs     03/28/22  0512 03/27/22  1654   WBC 5.8 5.1   HGB 8.8* 9.0*   HCT 29.6* 30.2*    306     Recent Labs     03/28/22  0512 03/27/22  1654    138   K 3.8 3.8    104   CO2 24 22   * 147*   BUN 15 18   CREA 1.34* 1.38*   CA 8.9 8.9   MG 1.9  --    PHOS 3.4  --    ALB  --  3.3*   TBILI  --  0.3   ALT  --  31       Signed: Gabriela Mera MD

## 2022-03-29 NOTE — PROGRESS NOTES
Physician Progress Note      PATIENT:               Daljit Wallis  CSN #:                  [id number]  :                       1950  ADMIT DATE:       3/27/2022 4:00 PM  [address] [place] DATE:  RESPONDING  PROVIDER #:        Skye Smith MD [facility] MD          QUERY TEXT:    Pt admitted with UTI. Pt noted to have Hx: of self- catheterization . If possible, please document in the progress notes and discharge summary if you are evaluating and/or treating any of the following: The medical record reflects the following:  Risk Factors: Hx: Self-catheterization; bph  Clinical Indicators: UA: nitrites: +; le: large: wbc: >100; bact: 3+. ... Steven Zimmerman Noted: Recently instructed by urology to self cath 4x daily, but only intermittently self cathing at this time. States he noticed his urine is cloudy, but denies hematuria  Treatment: UA/UCx; IVF; IV Abx; bladder check as needed; pain management    Thank you,    Esdras Ken  [facility]  [phone]  Options provided:  -- UTI due to self catheterization  -- UTI not due to self catheterization  -- Other - I will add my own diagnosis  -- Disagree - Not applicable / Not valid  -- Disagree - Clinically unable to determine / Unknown  -- Refer to Clinical Documentation Reviewer    PROVIDER RESPONSE TEXT:    UTI is not due to self catheterization.     Query created by: Maisha Camarillo on 3/28/2022 10:40 AM      Electronically signed by:  Skye Smith MD [facility] MD 3/29/2022 8:56 AM

## 2022-03-29 NOTE — PROGRESS NOTES
Bedside and Verbal shift change report given to Megan Barr RN (oncoming nurse) by Luis Carlos Minor RN (offgoing nurse). Report included the following information SBAR, Kardex, Intake/Output, MAR and Recent Results.

## 2022-03-29 NOTE — PROGRESS NOTES
0710) Bedside shift change report given to Dawson Bryant RN (oncoming nurse) by Chelsea Llamas RN (offgoing nurse). Report included the following information SBAR, Kardex and MAR. 1010) IDR with Golden CAMARILLO), Cornelius Hedrick (pharmacist), Darwin Chisholm (), Luis Armando Fiore ([facility]),  and Dawson Bryant (RN) to discuss plan of care including pt self catheterized this morning, waiting urine cultures. 1303) pt ask to come back at 2 for straight cath  [phone]) pt self cath  1930) Bedside shift change report given to Jose Machado RN (oncoming nurse) by Dawson Bryant RN (offgoing nurse). Report included the following information SBAR, Kardex and MAR.

## 2022-03-29 NOTE — CONSULTS
Infectious Disease Consult    Impression/Plan   · GNR UTI in the setting of urinary retention due to BPH requiring self catheterization. ESBL E. coli was isolated from urine on 3/9/2022 which was treated with a 7-day course of meropenem. Agree with meropenem pending further culture data. Patient will need Ball catheter changed if this has not already been done. Further recommendations will be made once organism has been identified and sensitivities reported  · History of BPH requiring CIC. Patient reportedly noncompliant  · Diabetes mellitus  · CKD. · History of prostate cancer. Status post radiation in 2019 followed by hormone therapy   Patient also required urethral dilatation due to radiation related stricture    Anti-infectives:   1. Meropenem    Subjective:   Date of Consultation:  March 29, 2022  Date of Admission: 3/27/2022. Referring Physician:     Rick Garcia is a 66 y.o.  male with PMH of recurrent UTI, BPH, HTN, DM who is being seen for a gram-negative silvana UTI. Patient has a history of BPH requiring self-catheterization. He was recently admitted to [facility] earlier this month at which time he was treated with a 7-day course of meropenem for an ESBL E. coli. Patient returns with complaints of  bladder discomfort and penile pain prior to micturition but no dysuria. He denies fever or chills. He is afebrile with a normal white count. Preliminary urine culture is growing a GNR. He is on meropenem.   The infectious diseases service has been asked to assist with antibiotic management       Patient Active Problem List   Diagnosis Code    ESBL (extended spectrum beta-lactamase) producing bacteria infection A49.9, I76.69    Complicated urinary tract infection N39.0     Past Medical History:   Diagnosis Date    Diabetes ([address]     Gastrointestinal disorder     Hypertension     DEJA (obstructive sleep apnea)     AHI: 8.9 per hour      Family History   Problem Relation Age of Onset    Hypertension Mother     Diabetes Mother     Hypertension Father     Diabetes Father       Social History     Tobacco Use    Smoking status: Never Smoker    Smokeless tobacco: Never Used   Substance Use Topics    Alcohol use: No     Past Surgical History:   Procedure Laterality Date    HX PROSTATE SURGERY      LA ABDOMEN SURGERY PROC UNLISTED      Hernia Repair      Prior to Admission medications    Medication Sig Start Date End Date Taking? Authorizing Provider   metFORMIN (GLUCOPHAGE) 1,000 mg tablet Take 1,000 mg by mouth two (2) times daily (with meals). Yes Provider, Historical   finasteride (PROSCAR) 5 mg tablet Take 1 Tablet by mouth daily. 3/17/22  Yes Rhianna Bennett MD   tamsulosin (FLOMAX) 0.4 mg capsule Take 0.4 mg by mouth two (2) times a day. Yes Provider, Historical   acetaminophen (TylenoL) 325 mg tablet Take 650 mg by mouth every four (4) hours as needed for Pain. Yes Provider, Historical   amLODIPine (NORVASC) 5 mg tablet Take 1 Tablet by mouth daily. 3/29/21  Yes Provider, Historical   atorvastatin (LIPITOR) 80 mg tablet Take 1 Tablet by mouth nightly. 3/28/21  Yes Provider, Historical   latanoprost (XALATAN) 0.005 % ophthalmic solution Administer 1 Drop to both eyes nightly. Yes Provider, Historical   pantoprazole (PROTONIX) 40 mg tablet Take 1 Tablet by mouth daily. 8/26/21  Yes Provider, Historical   albuterol (PROVENTIL HFA, VENTOLIN HFA, PROAIR HFA) 90 mcg/actuation inhaler Take 2 Puffs by inhalation every four (4) hours as needed for Wheezing. 1/8/22  Yes Cherelle Jiménez NP     Allergies   Allergen Reactions    Aspirin Anxiety     Patient states not allergic to medication but reports he does not wish to take it         Review of Systems:  A comprehensive review of systems was negative except for that written in the History of Present Illness. Objective:   Blood pressure (!) 147/89, pulse 64, temperature 98.1 °F (36.7 °C), resp.  rate 16, height 5' 8\" (1.727 m), weight 218 lb 14.4 oz (99.3 kg), SpO2 98 %. Temp (24hrs), Av.2 °F (36.8 °C), Min:98.1 °F (36.7 °C), Max:98.3 °F (36.8 °C)       Exam:    General:  Alert, cooperative, well noursished, well developed, appears stated age   Eyes:  Sclera anicteric.     Mouth/Throat: Mucous membranes normal   Neck: Supple   Lungs:    No distress   CV:     Abdomen:    Nondistended   Extremities:  Moves all   Skin:  No rash on exposed skin   Lymph nodes:    Musculoskeletal:    Lines/Devices:   Peripheral   Psych: Alert and oriented, normal mood affect given the setting       Data Review:   Recent Results (from the past 24 hour(s))   GLUCOSE, POC    Collection Time: 22  4:47 PM   Result Value Ref Range    Glucose (POC) 217 (H) 65 - 117 mg/dL    Performed by Raza Aguirre RN    GLUCOSE, POC    Collection Time: 22  5:22 PM   Result Value Ref Range    Glucose (POC) 234 (H) 65 - 117 mg/dL    Performed by Sunil Reyes, POC    Collection Time: 22  9:10 PM   Result Value Ref Range    Glucose (POC) 206 (H) 65 - 117 mg/dL    Performed by Jesse Rushing    GLUCOSE, POC    Collection Time: 22  7:11 AM   Result Value Ref Range    Glucose (POC) 171 (H) 65 - 117 mg/dL    Performed by Chano Valles LPN    GLUCOSE, POC    Collection Time: 22 11:27 AM   Result Value Ref Range    Glucose (POC) 218 (H) 65 - 117 mg/dL    Performed by Chano Valles LPN         Microbiology:      Studies:      Signed By: Aly Monsivais DO     2022

## 2022-03-29 NOTE — PROGRESS NOTES
4202) Blood sugar collected= 171.     0735) Vital signs obtained and stable with exception to elevated BP= 147/89. Primary nurse notified. Large gloves and paper scrubs provided to the pt per request.    2235) Scheduled medications administered. 2 units of Insulin Humalog administered per parameters. 1127) Blood sugar collected= 218.    1159) 3 units of Insulin Humalog administered per parameters. No additional needs at this time. 1630) Blood sugar collected= 188.    1644) 2 units of Insulin Humalog administered per parameters. Pt left sitting up in bed playing on phone with no additional needs at this time.

## 2022-03-29 NOTE — PROGRESS NOTES
Bedside shift change report given to [facility] VENITA (oncoming nurse) by Luis Angel Lua (offgoing nurse). Report included the following information SBAR, Kardex, Intake/Output, MAR and Recent Results.

## 2022-03-30 VITALS
TEMPERATURE: 97.3 F | HEART RATE: 62 BPM | WEIGHT: 218.9 LBS | DIASTOLIC BLOOD PRESSURE: 72 MMHG | BODY MASS INDEX: 33.18 KG/M2 | SYSTOLIC BLOOD PRESSURE: 140 MMHG | HEIGHT: 68 IN | RESPIRATION RATE: 18 BRPM | OXYGEN SATURATION: 98 %

## 2022-03-30 LAB
BACTERIA SPEC CULT: ABNORMAL
BACTERIA SPEC CULT: ABNORMAL
CC UR VC: ABNORMAL
GLUCOSE BLD STRIP.AUTO-MCNC: 161 MG/DL (ref 65–117)
GLUCOSE BLD STRIP.AUTO-MCNC: 216 MG/DL (ref 65–117)
SERVICE CMNT-IMP: ABNORMAL

## 2022-03-30 PROCEDURE — 74011636637 HC RX REV CODE- 636/637: Performed by: STUDENT IN AN ORGANIZED HEALTH CARE EDUCATION/TRAINING PROGRAM

## 2022-03-30 PROCEDURE — 74011000250 HC RX REV CODE- 250: Performed by: STUDENT IN AN ORGANIZED HEALTH CARE EDUCATION/TRAINING PROGRAM

## 2022-03-30 PROCEDURE — 74011250637 HC RX REV CODE- 250/637: Performed by: INTERNAL MEDICINE

## 2022-03-30 PROCEDURE — 82962 GLUCOSE BLOOD TEST: CPT

## 2022-03-30 PROCEDURE — 74011250637 HC RX REV CODE- 250/637: Performed by: STUDENT IN AN ORGANIZED HEALTH CARE EDUCATION/TRAINING PROGRAM

## 2022-03-30 PROCEDURE — 74011000258 HC RX REV CODE- 258: Performed by: STUDENT IN AN ORGANIZED HEALTH CARE EDUCATION/TRAINING PROGRAM

## 2022-03-30 PROCEDURE — 74011250636 HC RX REV CODE- 250/636: Performed by: STUDENT IN AN ORGANIZED HEALTH CARE EDUCATION/TRAINING PROGRAM

## 2022-03-30 RX ORDER — OXYCODONE AND ACETAMINOPHEN 5; 325 MG/1; MG/1
1 TABLET ORAL
Qty: 9 TABLET | Refills: 0 | Status: SHIPPED | OUTPATIENT
Start: 2022-03-30 | End: 2022-04-02

## 2022-03-30 RX ORDER — NITROFURANTOIN 25; 75 MG/1; MG/1
100 CAPSULE ORAL 2 TIMES DAILY WITH MEALS
Qty: 10 CAPSULE | Refills: 0 | Status: SHIPPED | OUTPATIENT
Start: 2022-03-30 | End: 2022-04-04

## 2022-03-30 RX ORDER — NITROFURANTOIN 25; 75 MG/1; MG/1
100 CAPSULE ORAL 2 TIMES DAILY WITH MEALS
Status: DISCONTINUED | OUTPATIENT
Start: 2022-03-30 | End: 2022-03-30 | Stop reason: HOSPADM

## 2022-03-30 RX ADMIN — OXYCODONE HYDROCHLORIDE AND ACETAMINOPHEN 1 TABLET: 5; 325 TABLET ORAL at 05:44

## 2022-03-30 RX ADMIN — OXYCODONE HYDROCHLORIDE AND ACETAMINOPHEN 1 TABLET: 5; 325 TABLET ORAL at 14:09

## 2022-03-30 RX ADMIN — SODIUM CHLORIDE, PRESERVATIVE FREE 10 ML: 5 INJECTION INTRAVENOUS at 05:46

## 2022-03-30 RX ADMIN — Medication 12 UNITS: at 09:15

## 2022-03-30 RX ADMIN — Medication 2 UNITS: at 09:15

## 2022-03-30 RX ADMIN — TAMSULOSIN HYDROCHLORIDE 0.4 MG: 0.4 CAPSULE ORAL at 09:16

## 2022-03-30 RX ADMIN — PANTOPRAZOLE SODIUM 40 MG: 40 TABLET, DELAYED RELEASE ORAL at 09:16

## 2022-03-30 RX ADMIN — ENOXAPARIN SODIUM 40 MG: 100 INJECTION SUBCUTANEOUS at 09:15

## 2022-03-30 RX ADMIN — MEROPENEM 500 MG: 500 INJECTION, POWDER, FOR SOLUTION INTRAVENOUS at 03:56

## 2022-03-30 RX ADMIN — MEROPENEM 500 MG: 500 INJECTION, POWDER, FOR SOLUTION INTRAVENOUS at 09:15

## 2022-03-30 RX ADMIN — ACETAMINOPHEN 650 MG: 325 TABLET ORAL at 03:52

## 2022-03-30 RX ADMIN — Medication 2 UNITS: at 12:38

## 2022-03-30 RX ADMIN — FINASTERIDE 5 MG: 5 TABLET, FILM COATED ORAL at 09:16

## 2022-03-30 RX ADMIN — SODIUM CHLORIDE, PRESERVATIVE FREE 10 ML: 5 INJECTION INTRAVENOUS at 09:15

## 2022-03-30 RX ADMIN — AMLODIPINE BESYLATE 5 MG: 5 TABLET ORAL at 09:16

## 2022-03-30 NOTE — PROGRESS NOTES
[address]       3/30/2022      RE: David Pryor      To Whom it May Concern: This is to certify that David Pryor was admitted to hospital on 3/27/2022   to  He may return to work on 4/01/2022       Thank you for your assistance in this matter.     Sincerely,      Zi Massey MD

## 2022-03-30 NOTE — PROGRESS NOTES
Transition of Care Plan   RUR- Medium 19%    DISPOSITION: The disposition plan is home with family assistance and [facility]  o Per conversation with the pt; This cm inquired if the pt had hx of HH. The pt reported that he has not utilized [facility] in the past and he does not have a preference.  o HH Accepted:  - At [facility]; AVS updated      F/U with PCP/Specialist     Transport: Private Car        Transition of Care Plan:     The Plan for Transition of Care is related to the following treatment goals: Hh    The Patient was provided with a choice of provider and agrees  with the discharge plan. Yes [x] No []    A Freedom of choice list was provided with basic dialogue that supports the patient's individualized plan of care/goals and shares the quality data associated with the providers.        Yes [x] No []      CM: [address] [place],   [phone]

## 2022-03-30 NOTE — PROGRESS NOTES
This writer took a critical from [place] at [facility]. This pt urine culture is positive for E. Coli and ESBL. This writer notified Dr. Russella Cockayne. No new orders. Will notify primary RN.

## 2022-03-30 NOTE — DISCHARGE SUMMARY
Hospitalist Discharge Summary     Patient ID:  Jocelin Ordonez  [id number]  14 y.o.  1950    PCP on record: None    Admit date: 3/27/2022  Discharge date and time: 3/30/2022    Please note that this dictation was completed with [employer], the [employer] voice recognition software. Quite often unanticipated grammatical, syntax, homophones, and other interpretive errors are inadvertently transcribed by the computer software. Please disregard these errors. Please excuse any errors that have escaped final proofreading. Admission Diagnoses: Complicated urinary tract infection [N39.0]    Discharge Diagnoses: Active Problems:    Complicated urinary tract infection (3/27/2022)           Hospital Course:       Complicated UTI POA  Urinary Pain POA   Urinary retention and LUTS due to BPH POA  Hematuria POA  -UA with WBC, RBC, Bacteria   -UCX w/ ESBL  Sensitive to Macrobid  -Patient to complete 5-day course of Macrobid twice daily with treatment of ESBL E. coli. Case discussed with infectious disease (Dr. Leroy Lowry)   -Patient to follow-up with urology at [facility] for management of BPH  -Patient continues self-catheterization at home is at least 4 times a day  -Patient to continue finasteride and Flomax  -Patient discharged on Percocet every 8 hours for 3 days and to follow with primary care further management of urinary tract pain  - Patient agreed and verbalized understanding     CKD 2 POA   Hx of LUTS s/p EBRT ( 9/2019)  and ADT( 1/2021)  for GG5 prostate cancer 9/2019   History of complex radiation related bulbomembranous stricture ( 6/23/21) status post urethral dilatation at outside hospital  Hx of T2 DM   HTN   Asthma    -resume home meds       CONSULTATIONS:  IP CONSULT TO INFECTIOUS DISEASES    Excerpted HPI from H&P of Christian Ledesma MD:  Jocelin Ordonez is a 66 y.o.  male with PMH of recurrent UTI, BPH, HTN, DM who presents to ED with c/o UTI.  Patient reports concerns that he is about to develop UTI. Reports bladder discomfort and penile pain prior to micturition but no dysuria. He was recently admitted to [facility] for treatment of ESBL UTI suspected to be related to urinary retention and improper self cath technique. Patient has since been seen by [facility] and instructed to continue self cath. He apparently does not perform CIC 4x daily as instructed and tries to urinate by himself because he is able to 'push' out urine. No fever,chills     We were asked to admit for work up and evaluation of the above problems    ______________________________________________________________________  DISCHARGE SUMMARY/HOSPITAL COURSE:  for full details see H&P, daily progress notes, labs, consult notes. _______________________________________________________________________  Patient seen and examined by me on discharge day. Pertinent Findings:  Gen:    Not in distress  Chest: Clear lungs  CVS:   Regular rhythm. No edema  Abd:  Soft, not distended, not tender  Neuro:  Alert with good insight. Oriented to person, place, and time   _______________________________________________________________________  DISCHARGE MEDICATIONS:   Current Discharge Medication List      START taking these medications    Details   nitrofurantoin, macrocrystal-monohydrate, (MACROBID) 100 mg capsule Take 1 Capsule by mouth two (2) times daily (with meals) for 10 doses. Qty: 10 Capsule, Refills: 0  Start date: 3/30/2022, End date: 4/4/2022      oxyCODONE-acetaminophen (PERCOCET) 5-325 mg per tablet Take 1 Tablet by mouth every eight (8) hours as needed for Pain for up to 3 days. Max Daily Amount: 3 Tablets. Qty: 9 Tablet, Refills: 0  Start date: 3/30/2022, End date: 4/2/2022    Associated Diagnoses: Urinary tract pain         CONTINUE these medications which have NOT CHANGED    Details   metFORMIN (GLUCOPHAGE) 1,000 mg tablet Take 1,000 mg by mouth two (2) times daily (with meals).       finasteride (PROSCAR) 5 mg tablet Take 1 Tablet by mouth daily. Qty: 30 Tablet, Refills: 1      tamsulosin (FLOMAX) 0.4 mg capsule Take 0.4 mg by mouth two (2) times a day. acetaminophen (TylenoL) 325 mg tablet Take 650 mg by mouth every four (4) hours as needed for Pain. amLODIPine (NORVASC) 5 mg tablet Take 1 Tablet by mouth daily. atorvastatin (LIPITOR) 80 mg tablet Take 1 Tablet by mouth nightly. latanoprost (XALATAN) 0.005 % ophthalmic solution Administer 1 Drop to both eyes nightly. pantoprazole (PROTONIX) 40 mg tablet Take 1 Tablet by mouth daily. albuterol (PROVENTIL HFA, VENTOLIN HFA, PROAIR HFA) 90 mcg/actuation inhaler Take 2 Puffs by inhalation every four (4) hours as needed for Wheezing. Qty: 1 Each, Refills: 0             My Recommended Diet, Activity, Wound Care, and follow-up labs are listed in the patient's Discharge Insturctions which I have personally completed and reviewed.     _______________________________________________________________________  DISPOSITION:     Home with Family:    Home with HH/PT/OT/RN: x   SNF/LTC:    ARMANDO:    OTHER:        Condition at Discharge:  Stable  _______________________________________________________________________  Follow up with:   PCP : None  Follow-up Information     Follow up With Specialties Details Why Contact Obi Delgado MD Internal Medicine On 4/12/2022 Your appointment is at 2:30pm. [address] [place] [address]      Yariel Espinal MD Urology On 4/25/2022 Your appointment is at 2:30pm [facility] [phone]      None    None (395) Patient stated that they have no PCP      AT [address] [place] [address],[address]  [address] [facility] [address]  [phone]              Total time in minutes spent coordinating this discharge (includes going over instructions, follow-up, prescriptions, and preparing report for sign off to her PCP) :  45 minutes    Signed:  Laurel Albarran MD

## 2022-03-30 NOTE — PROGRESS NOTES
8214  Discharge AVS education completed, pt requesting additional items requiring a text communication with Dr. Sunny Clement. Message sent via perfect serve:  Tonya  or Facility: [facility] From: Manasa Lomax RE: Lawerance Shape 1950 RM: [id number] Pt is requesting a work note stating how long he was hospitalized and when he is able to return to work. Pt is also asking if he can have script for Percocet to manage his pain since pt will not see internal med doc until 4/12 and will not see urologist until 4/25. Please advise as pt is set to be discharged once he has work note and poss script for [employer]. Thanks!  Need Callback: NEEDS CALLBACK MED SURG UNIT

## 2022-03-30 NOTE — PROGRESS NOTES
Problem: Urinary Tract Infection - Adult  Goal: *Absence of infection signs and symptoms  Outcome: Progressing Towards Goal     Problem: Patient Education: Go to Patient Education Activity  Goal: Patient/Family Education  Outcome: Progressing Towards Goal     Problem: Patient Education: Go to Patient Education Activity  Goal: Patient/Family Education  Outcome: Progressing Towards Goal       Problem: Falls - Risk of  Goal: *Absence of Falls  Description: Document Elmer Garcia Fall Risk and appropriate interventions in the flowsheet.   Outcome: Progressing Towards Goal  Note: Fall Risk Interventions:            Medication Interventions: Teach patient to arise slowly

## 2022-03-30 NOTE — DISCHARGE INSTRUCTIONS
Patient Education        Learning About ESBL Infection  What is an ESBL infection? ESBL stands for extended spectrum beta-lactamase. It's an enzyme found in some strains of bacteria. ESBL-producing bacteria can't be killed by many of the antibiotics that doctors use to treat infections, like penicillins and some cephalosporins. This makes it harder to treat. An infection with ESBL germs can be in any part of the body, including blood, organs, skin, and sites where surgery was done. There are many ways ESBL germs can be spread. The most common ways are by touching a person or thing that has the bacteria on it. The infection is more likely to spread in a hospital. For some people, especially those who are weak or ill, an ESBL infection can be serious. What are the symptoms? Symptoms of an ESBL infection are similar to other infections. Which symptoms you have will depend on where the infection is. For example:  · If the infection is in the skin, that area may be red or tender. · If it's in the urinary tract, you may have back pain, a burning sensation when you urinate, or a need to urinate more often than usual.  · If it's in the lungs, you may have a cough and trouble breathing. You may also:  · Have diarrhea. · Feel weak and sick. · Have fever and chills. How is an ESBL infection treated? Your doctor will give you antibiotics to treat the infection. Take your antibiotics as directed. Do not stop taking them just because you feel better. You need to take the full course of antibiotics. Taking only some of the medicine may cause antibiotic-resistant bacteria to develop. You may have to stay in the hospital for treatment. How can you prevent the spread of an ESBL infection? If you have an ESBL infection in the hospital:  · Your doctor may want to keep you away from others to reduce the chances of spreading the bacteria. You may be in a special room, called an isolation room.  Visitors may be limited to prevent ESBL germs from being carried outside your room. Children, pregnant women, people who are ill, and some others might not be allowed into the room. That's because they can be more likely to get a serious infection. · Everyone who comes in the room will need to wash their hands with soap and water or use an alcohol-based hand . Clean hands help stop the germs from spreading. · Visitors and caregivers may have to use disposable gloves and a gown over their clothes. This helps prevent ESBL germs from spreading. Practice good hygiene  · Wash your hands often. Hand-washing is the best way to avoid spreading germs. When washing hands with soap and water:  ? Wet your hands with running water, and apply soap. ? Rub your hands together to make a lather. Scrub well for at least 20 seconds. ? Pay special attention to your wrists, the backs of your hands, between your fingers, and under your fingernails. ? Rinse your hands well under running water. ? Use a clean towel to dry your hands, or air-dry your hands. You may want to use a clean towel as a barrier between the faucet and your clean hands when you turn off the water. · You can also use an alcohol-based hand . If you use , rub your hands and fingers until they are dry. You don't need to use water. The alcohol quickly kills many types of germs on your hands. · If you're in the hospital, ask everyone to wash their hands before they come in the room. · Keep cuts and scrapes clean and covered with a bandage. Wash your hands after you touch elastic bandages or other dressings over a wound. This can keep bacteria from spreading. Wrap bandages in a plastic bag before you throw them away. Avoid contact with other people's wounds or bandages. · Do not share towels, washcloths, razors, clothing, or other items that touched your wound or bandage. Wash your sheets, towels, and clothes with warm water and detergent.  Dry them in a hot dryer, if you can. · Keep shared areas clean by wiping down surfaces (such as counters, doorknobs, and light switches) with a disinfectant. Follow-up care is a key part of your treatment and safety. Be sure to make and go to all appointments, and call your doctor if you are having problems. It's also a good idea to know your test results and keep a list of the medicines you take. Where can you learn more? Go to http://www.gray.com/  Enter P946 in the search box to learn more about \"Learning About ESBL Infection. \"  Current as of: July 1, 2021               Content Version: 13.2  © 3823-6570 [employer]. Care instructions adapted under license by [employer] (which disclaims liability or warranty for this information).  If you have questions about a medical condition or this instruction, always ask your healthcare professional. [facility] any warranty or liability for your use of this information.       - Please contact the Urology Clinic at [phone] should  have any concerns or questions  - Please complete macrobid 500 mg BID for 5 days

## 2022-03-30 NOTE — PROGRESS NOTES
0730  Shift change report received from [place], [address] [place] [address]. Report included review of SBAR, accordion report, results review, orders, meds, ROS, and POC. BG collected at 6196=796, pt due for correctional insulin with am med pass. All questions answered. Transfer of care complete. 2106-2118  AM VS and shift assessment completed; BP slightly elevated at 140/72, MEWS=1, pain 5/10 to back, next dose pain med not available until 1345. White board updated and reviewed with pt, POC reviewed with pt, pt to be updated on poss discharge after grand rounds conducted 1653-7844, pt verbalized understanding. Pt sitting in bed, eating breakfast, safety rounds completed. 8690-2760  AM scheduled meds admin, pt education completed with each med. Reviewed with pt tentative self cath schedule, pt aware POC is to self cath 4xdaily, plan for next self cath to be done at approx 1100.    1030  Interdisciplinary rounds completed, plan to discharge pt to home, this RN will be notifiied by CM once their portion of d/c completed then this RN to proceed with final discharge process. 9512-9387  Self cath completed and observed by nursing staff Dominguez Gonzalez LPN and this RN). Reviewed with pt the importance of sterile technique and tips on how to complete process sterile and efficiently. Pt inserted cath at 1100, indwelled for 4min, cath removed at 1104 and 200ml CAITLIN recorded. See nursing flow sheets for details. Pt denies any additional needs at this time. Safety rounds completed. 1216  Lunch tray delivered, BG collected =216, this RN to return with correctional insulin. 1240  Correctional insulin administered. Pt informed that this RN just got notified that CM cleared pt for official discharge. This RN to complete AVS and return to review with pt.    1310  IV access removed by [address] Mary Lou NGUYEN LPN.    3420-3708  Discharge AVS reviewed with pt with detailed discussion regarding new meds, follow-up[ appts.   Pt able to provide accurate teach back. Pt requesting additional things from MD before leaving hospital.  See below note. [address] or Facility: [facility] From: Guerda Pain RE: Nicolasa Rai 1950 RM: [phone] Pt is requesting a work note stating how long he was hospitalized and when he is able to return to work. Pt is also asking if he can have script for Percocet to manage his pain since pt will not see internal med doc until 4/12 and will not see urologist until 4/25. Please advise as pt is set to be discharged once he has work note and poss script for [employer]. Thanks! Need Callback: NEEDS CALLBACK MED SURG UNIT    1345  Work note received in pt chart from Dr. Cooper Rodriguez, AVS noted to have additional med added per pt request for percocet for pain mgmt. This RN to return with updated AVS and work note. 1409  Pt medicated with 1 dose of percocet prior to discharge for pain 7/10. Confirmed that pt not to drive while taking narcotics, opiod education included in discharge AVS.    1430  Pt assisted into w/c, all personal belongings in pt possession, room reviewed to ensure nothing left behind. Pt transported off unit via wheelchair to ER entrance by thisRN. Pt in car independently, personal items in back seat. No s/sx of distress, discharge complete.

## 2022-03-31 NOTE — PROGRESS NOTES
Pt meeting discharge criteria. Care plan reviewed and resolved for pt d/c to home. Problem: Risk for Spread of Infection  Goal: Prevent transmission of infectious organism to others  Description: Prevent the transmission of infectious organisms to other patients, staff members, and visitors. Outcome: Resolved/Met     Problem: Patient Education:  Go to Education Activity  Goal: Patient/Family Education  Outcome: Resolved/Met     Problem: Falls - Risk of  Goal: *Absence of Falls  Description: Document Sharlene Fall Risk and appropriate interventions in the flowsheet.   Outcome: Resolved/Met  Note: Fall Risk Interventions:            Medication Interventions: Evaluate medications/consider consulting pharmacy,Teach patient to arise slowly                   Problem: Patient Education: Go to Patient Education Activity  Goal: Patient/Family Education  Outcome: Resolved/Met     Problem: Urinary Tract Infection - Adult  Goal: *Absence of infection signs and symptoms  Outcome: Resolved/Met     Problem: Patient Education: Go to Patient Education Activity  Goal: Patient/Family Education  Outcome: Resolved/Met

## 2022-04-13 ENCOUNTER — TRANSCRIBE ORDER (OUTPATIENT)
Dept: SCHEDULING | Age: 72
End: 2022-04-13

## 2022-04-13 DIAGNOSIS — R31.9 HEMATURIA: Primary | ICD-10-CM

## 2022-04-18 ENCOUNTER — HOSPITAL ENCOUNTER (EMERGENCY)
Age: 72
Discharge: HOME | End: 2022-04-18
Attending: EMERGENCY MEDICINE | Admitting: EMERGENCY MEDICINE
Payer: MEDICARE

## 2022-04-18 VITALS
HEART RATE: 72 BPM | TEMPERATURE: 98.1 F | HEIGHT: 68 IN | SYSTOLIC BLOOD PRESSURE: 160 MMHG | BODY MASS INDEX: 33.34 KG/M2 | OXYGEN SATURATION: 99 % | WEIGHT: 220 LBS | DIASTOLIC BLOOD PRESSURE: 90 MMHG | RESPIRATION RATE: 16 BRPM

## 2022-04-18 DIAGNOSIS — N39.0 COMPLICATED UTI (URINARY TRACT INFECTION): Primary | ICD-10-CM

## 2022-04-18 LAB
APPEARANCE UR: ABNORMAL
BACTERIA URNS QL MICRO: NEGATIVE /HPF
BILIRUB UR QL: NEGATIVE
COLOR UR: ABNORMAL
EPITH CASTS URNS QL MICRO: ABNORMAL /LPF
GLUCOSE UR STRIP.AUTO-MCNC: 100 MG/DL
HGB UR QL STRIP: ABNORMAL
KETONES UR QL STRIP.AUTO: NEGATIVE MG/DL
LEUKOCYTE ESTERASE UR QL STRIP.AUTO: ABNORMAL
NITRITE UR QL STRIP.AUTO: NEGATIVE
PH UR STRIP: 6 [PH] (ref 5–8)
PROT UR STRIP-MCNC: 30 MG/DL
RBC #/AREA URNS HPF: ABNORMAL /HPF (ref 0–5)
SP GR UR REFRACTOMETRY: 1.01 (ref 1–1.03)
UA: UC IF INDICATED,UAUC: ABNORMAL
UROBILINOGEN UR QL STRIP.AUTO: 0.2 EU/DL (ref 0.2–1)
WBC URNS QL MICRO: >100 /HPF (ref 0–4)

## 2022-04-18 PROCEDURE — 81001 URINALYSIS AUTO W/SCOPE: CPT

## 2022-04-18 PROCEDURE — 99283 EMERGENCY DEPT VISIT LOW MDM: CPT

## 2022-04-18 PROCEDURE — 74011250637 HC RX REV CODE- 250/637: Performed by: PHYSICIAN ASSISTANT

## 2022-04-18 PROCEDURE — 87086 URINE CULTURE/COLONY COUNT: CPT

## 2022-04-18 RX ORDER — IBUPROFEN 400 MG/1
400 TABLET ORAL ONCE
Status: COMPLETED | OUTPATIENT
Start: 2022-04-18 | End: 2022-04-18

## 2022-04-18 RX ORDER — IBUPROFEN 400 MG/1
400 TABLET ORAL
Qty: 12 TABLET | Refills: 0 | OUTPATIENT
Start: 2022-04-18 | End: 2022-04-30

## 2022-04-18 RX ADMIN — IBUPROFEN 400 MG: 400 TABLET ORAL at 10:13

## 2022-04-18 NOTE — ED TRIAGE NOTES
Reports he is currently on antibiotics for a UTI but presents to the ER requesting refill of his pain medication - oxycodone/acetaminophen. Patient reports his pain is from his UTI.

## 2022-04-18 NOTE — ED NOTES
Discharge instructions discussed with patient. Patient was awake and alert when he ambulated out of the ED at time of discharge.

## 2022-04-18 NOTE — ED NOTES
Pt presents to ED ambulatory complaining of burning with urination and requesting oxycodone refill. Pt is alert and oriented x 4, RR even and unlabored, skin is warm and dry. Assessment completed and pt updated on plan of care. Call bell in reach. Emergency Department Nursing Plan of Care       The Nursing Plan of Care is developed from the Nursing assessment and Emergency Department Attending provider initial evaluation. The plan of care may be reviewed in the ED Provider note.     The Plan of Care was developed with the following considerations:   Patient / Family readiness to learn indicated by:verbalized understanding  Persons(s) to be included in education: patient and family  Barriers to Learning/Limitations:No    Signed     Joanna Hdez RN    4/18/2022   8:55 AM

## 2022-04-18 NOTE — DISCHARGE INSTRUCTIONS
We sent a urine culture at today's visit. We will be contacting you regarding antibiotics if your urine culture shows a UTI.   Please follow-up with your PCP and/your urologist.

## 2022-04-18 NOTE — ED PROVIDER NOTES
EMERGENCY DEPARTMENT HISTORY AND PHYSICAL EXAM      Date: 4/18/2022  Patient Name: Reshma Farooq    History of Presenting Illness     Chief Complaint   Patient presents with    Medication Refill       History Provided By: Patient    HPI: Reshma Farooq, 66 y.o. male with PMHx of recurrent ESBL UTI, BPH, HTN, DM, presents BIB self to the ED with cc of cloudy urine and request for Percocet refill. The patient was discharged from this hospital on 3/30/2022 diagnosed with a complicated UTI. He reports completing a 5-day course of Macrobid and a 3-day course of Percocet. He reportedly followed up with urology and was given a new script for macrobid which he is taking but was not given a refill of the percocet. He reports having an upcoming PCP appointment scheduled for this Friday to establish care but states he needs refills to get him to that point. He reports that he has noticed cloudy urine over the last few days and has some new dysuria. He denies fevers, abdominal pain, nausea/vomiting, hematuria, urethral discharge. There are no other complaints, changes, or physical findings at this time. PCP: None    No current facility-administered medications on file prior to encounter. Current Outpatient Medications on File Prior to Encounter   Medication Sig Dispense Refill    metFORMIN (GLUCOPHAGE) 1,000 mg tablet Take 1,000 mg by mouth two (2) times daily (with meals).  tamsulosin (FLOMAX) 0.4 mg capsule Take 0.4 mg by mouth two (2) times a day.  acetaminophen (TylenoL) 325 mg tablet Take 650 mg by mouth every four (4) hours as needed for Pain.  amLODIPine (NORVASC) 5 mg tablet Take 1 Tablet by mouth daily.  atorvastatin (LIPITOR) 80 mg tablet Take 1 Tablet by mouth nightly.  latanoprost (XALATAN) 0.005 % ophthalmic solution Administer 1 Drop to both eyes nightly.  pantoprazole (PROTONIX) 40 mg tablet Take 1 Tablet by mouth daily.       finasteride (PROSCAR) 5 mg tablet Take 1 Tablet by mouth daily. 30 Tablet 1    albuterol (PROVENTIL HFA, VENTOLIN HFA, PROAIR HFA) 90 mcg/actuation inhaler Take 2 Puffs by inhalation every four (4) hours as needed for Wheezing. 1 Each 0       Past History     Past Medical History:  Past Medical History:   Diagnosis Date    Diabetes ([address]     Gastrointestinal disorder     Hypertension     DEJA (obstructive sleep apnea)     AHI: 8.9 per hour       Past Surgical History:  Past Surgical History:   Procedure Laterality Date    HX PROSTATE SURGERY      [place] ABDOMEN SURGERY PROC UNLISTED      Hernia Repair       Family History:  Family History   Problem Relation Age of Onset    Hypertension Mother     Diabetes Mother     Hypertension Father     Diabetes Father        Social History:  Social History     Tobacco Use    Smoking status: Never Smoker    Smokeless tobacco: Never Used   Substance Use Topics    Alcohol use: No    Drug use: Not Currently       Allergies: Allergies   Allergen Reactions    Aspirin Anxiety     Patient states not allergic to medication but reports he does not wish to take it          Review of Systems   Review of Systems   Constitutional: Negative for chills and fever. Gastrointestinal: Negative for abdominal pain, nausea and vomiting. Genitourinary: Positive for dysuria. Negative for difficulty urinating and penile discharge. All other systems reviewed and are negative. Physical Exam   Physical Exam  Vitals and nursing note reviewed. Constitutional:       General: He is not in acute distress. Appearance: Normal appearance. HENT:      Head: Normocephalic and atraumatic. Nose: Nose normal.      Mouth/Throat:      Mouth: Mucous membranes are moist.   Eyes:      Extraocular Movements: Extraocular movements intact. Conjunctiva/sclera: Conjunctivae normal.   Neck:      Trachea: Phonation normal.   Cardiovascular:      Rate and Rhythm: Normal rate and regular rhythm.    Pulmonary:      Effort: Pulmonary effort is normal.      Breath sounds: Normal breath sounds. Abdominal:      Palpations: Abdomen is soft. Tenderness: There is no abdominal tenderness. There is no right CVA tenderness or left CVA tenderness. Musculoskeletal:         General: Normal range of motion. Cervical back: Normal range of motion and neck supple. Skin:     General: Skin is warm and dry. Neurological:      General: No focal deficit present. Mental Status: He is alert and oriented to person, place, and time. GCS: GCS eye subscore is 4. GCS verbal subscore is 5. GCS motor subscore is 6. Gait: Gait normal.   Psychiatric:         Mood and Affect: Mood normal.         Behavior: Behavior normal.         Diagnostic Study Results     Labs -     Recent Results (from the past 12 hour(s))   URINALYSIS W/ REFLEX CULTURE    Collection Time: 04/18/22  9:22 AM    Specimen: Urine   Result Value Ref Range    Color YELLOW/STRAW      Appearance CLOUDY (A) CLEAR      Specific gravity 1.010 1.003 - 1.030      pH (UA) 6.0 5.0 - 8.0      Protein 30 (A) NEG mg/dL    Glucose 100 (A) NEG mg/dL    Ketone Negative NEG mg/dL    Bilirubin Negative NEG      Blood MODERATE (A) NEG      Urobilinogen 0.2 0.2 - 1.0 EU/dL    Nitrites Negative NEG      Leukocyte Esterase LARGE (A) NEG      WBC >100 (H) 0 - 4 /hpf    RBC 10-20 0 - 5 /hpf    Epithelial cells FEW FEW /lpf    Bacteria Negative NEG /hpf    UA:UC IF INDICATED URINE CULTURE ORDERED (A) CNI         Radiologic Studies -   No orders to display     CT Results  (Last 48 hours)    None        CXR Results  (Last 48 hours)    None            Medical Decision Making   I am the first provider for this patient. I reviewed the vital signs, available nursing notes, past medical history, past surgical history, family history and social history. Vital Signs-Reviewed the patient's vital signs.   Patient Vitals for the past 12 hrs:   Temp Pulse Resp BP SpO2   04/18/22 0851 98.1 °F (36.7 °C) 72 16 (!) 160/90 99 %       Records Reviewed: Nursing Notes, Old Medical Records and Previous Laboratory Studies    Provider Notes (Medical Decision Making):   Reviewed UA and old labs. Prior urine cultures show consistent ESBL sensitive to Macrobid and IV antibiotics. Patient is currently completing another course of macrobid as prescribed by urology late last week. He is well appearing and with stable VS at this time. I had a long discussion with the patient regarding inability to refill narcotic pain medications from the emergency room. The patient declines a prescription for Tylenol. Low-dose NSAID prescribed at patient request.  He does have some mild CKD but I think a 3-day course of ibuprofen 400 mg is reasonable. Recommended that he follow-up with PCP as scheduled, urologist, or pain management. Patient voices understanding and agreement this plan. ED Course:   Initial assessment performed. The patients presenting problems have been discussed, and they are in agreement with the care plan formulated and outlined with them. I have encouraged them to ask questions as they arise throughout their visit. Critical Care Time: None    Disposition:  D/c    PLAN:  1. Current Discharge Medication List      START taking these medications    Details   ibuprofen (MOTRIN) 400 mg tablet Take 1 Tablet by mouth every eight (8) hours as needed for Pain. Qty: 12 Tablet, Refills: 0  Start date: 4/18/2022           2. Follow-up Information     Follow up With Specialties Details Why [address] EMERGENCY DEPT Emergency Medicine  As needed, If symptoms worsen Tony Haq  [phone]    Your PCP   For follow up as previously scheduled     Your urologist  Call  For follow up         Return to ED if worse     Diagnosis     Clinical Impression:   1.  Complicated UTI (urinary tract infection)          Please note that this dictation was completed with [employer], the computer voice recognition software. Quite often unanticipated grammatical, syntax, homophones, and other interpretive errors are inadvertently transcribed by the computer software. Please disregards these errors. Please excuse any errors that have escaped final proofreading.

## 2022-04-19 LAB
BACTERIA SPEC CULT: NORMAL
SERVICE CMNT-IMP: NORMAL

## 2022-04-27 ENCOUNTER — HOSPITAL ENCOUNTER (OUTPATIENT)
Dept: CT IMAGING | Age: 72
Discharge: HOME | End: 2022-04-27
Attending: UROLOGY
Payer: MEDICARE

## 2022-04-27 ENCOUNTER — OFFICE VISIT (OUTPATIENT)
Dept: INTERNAL MEDICINE CLINIC | Age: 72
End: 2022-04-27
Payer: MEDICARE

## 2022-04-27 VITALS
WEIGHT: 218 LBS | BODY MASS INDEX: 33.04 KG/M2 | RESPIRATION RATE: 20 BRPM | DIASTOLIC BLOOD PRESSURE: 70 MMHG | TEMPERATURE: 98.5 F | SYSTOLIC BLOOD PRESSURE: 120 MMHG | HEART RATE: 88 BPM | HEIGHT: 68 IN | OXYGEN SATURATION: 98 %

## 2022-04-27 DIAGNOSIS — R31.9 HEMATURIA: ICD-10-CM

## 2022-04-27 DIAGNOSIS — R30.0 DYSURIA: Primary | ICD-10-CM

## 2022-04-27 DIAGNOSIS — K21.9 GASTROESOPHAGEAL REFLUX DISEASE, UNSPECIFIED WHETHER ESOPHAGITIS PRESENT: ICD-10-CM

## 2022-04-27 DIAGNOSIS — H40.20X0 PRIMARY ANGLE CLOSURE GLAUCOMA OF BOTH EYES, UNSPECIFIED GLAUCOMA STAGE, UNSPECIFIED PRIMARY ANGLE-CLOSURE GLAUCOMA TYPE: ICD-10-CM

## 2022-04-27 DIAGNOSIS — Z76.89 ENCOUNTER TO ESTABLISH CARE WITH NEW DOCTOR: ICD-10-CM

## 2022-04-27 DIAGNOSIS — I10 ESSENTIAL HYPERTENSION: ICD-10-CM

## 2022-04-27 DIAGNOSIS — C61 PROSTATE CANCER (HCC): ICD-10-CM

## 2022-04-27 DIAGNOSIS — E11.22 TYPE 2 DIABETES MELLITUS WITH STAGE 3A CHRONIC KIDNEY DISEASE, WITHOUT LONG-TERM CURRENT USE OF INSULIN (HCC): ICD-10-CM

## 2022-04-27 DIAGNOSIS — N18.31 TYPE 2 DIABETES MELLITUS WITH STAGE 3A CHRONIC KIDNEY DISEASE, WITHOUT LONG-TERM CURRENT USE OF INSULIN (HCC): ICD-10-CM

## 2022-04-27 DIAGNOSIS — J45.20 MILD INTERMITTENT ASTHMA WITHOUT COMPLICATION: ICD-10-CM

## 2022-04-27 PROCEDURE — 1111F DSCHRG MED/CURRENT MED MERGE: CPT | Performed by: INTERNAL MEDICINE

## 2022-04-27 PROCEDURE — 74011000636 HC RX REV CODE- 636: Performed by: UROLOGY

## 2022-04-27 PROCEDURE — 3017F COLORECTAL CA SCREEN DOC REV: CPT | Performed by: INTERNAL MEDICINE

## 2022-04-27 PROCEDURE — G8417 CALC BMI ABV UP PARAM F/U: HCPCS | Performed by: INTERNAL MEDICINE

## 2022-04-27 PROCEDURE — 99204 OFFICE O/P NEW MOD 45 MIN: CPT | Performed by: INTERNAL MEDICINE

## 2022-04-27 PROCEDURE — G8536 NO DOC ELDER MAL SCRN: HCPCS | Performed by: INTERNAL MEDICINE

## 2022-04-27 PROCEDURE — 74178 CT ABD&PLV WO CNTR FLWD CNTR: CPT

## 2022-04-27 PROCEDURE — G8427 DOCREV CUR MEDS BY ELIG CLIN: HCPCS | Performed by: INTERNAL MEDICINE

## 2022-04-27 PROCEDURE — G8754 DIAS BP LESS 90: HCPCS | Performed by: INTERNAL MEDICINE

## 2022-04-27 PROCEDURE — 2022F DILAT RTA XM EVC RTNOPTHY: CPT | Performed by: INTERNAL MEDICINE

## 2022-04-27 PROCEDURE — 1101F PT FALLS ASSESS-DOCD LE1/YR: CPT | Performed by: INTERNAL MEDICINE

## 2022-04-27 PROCEDURE — G8752 SYS BP LESS 140: HCPCS | Performed by: INTERNAL MEDICINE

## 2022-04-27 PROCEDURE — G8510 SCR DEP NEG, NO PLAN REQD: HCPCS | Performed by: INTERNAL MEDICINE

## 2022-04-27 RX ORDER — METFORMIN HYDROCHLORIDE 1000 MG/1
1000 TABLET ORAL 2 TIMES DAILY WITH MEALS
Qty: 180 TABLET | Refills: 1 | Status: SHIPPED | OUTPATIENT
Start: 2022-04-27 | End: 2022-09-16

## 2022-04-27 RX ORDER — PHENAZOPYRIDINE HYDROCHLORIDE 100 MG/1
100 TABLET, FILM COATED ORAL
Qty: 30 TABLET | Refills: 0 | Status: SHIPPED | OUTPATIENT
Start: 2022-04-27 | End: 2022-05-07

## 2022-04-27 RX ORDER — ATORVASTATIN CALCIUM 80 MG/1
80 TABLET, FILM COATED ORAL
Qty: 90 TABLET | Refills: 1 | Status: ON HOLD | OUTPATIENT
Start: 2022-04-27 | End: 2022-09-16 | Stop reason: SDUPTHER

## 2022-04-27 RX ORDER — PANTOPRAZOLE SODIUM 40 MG/1
40 TABLET, DELAYED RELEASE ORAL DAILY
Qty: 30 TABLET | Refills: 3 | Status: ON HOLD | OUTPATIENT
Start: 2022-04-27 | End: 2022-09-16 | Stop reason: SDUPTHER

## 2022-04-27 RX ORDER — AMLODIPINE BESYLATE 5 MG/1
5 TABLET ORAL DAILY
Qty: 90 TABLET | Refills: 2 | Status: SHIPPED | OUTPATIENT
Start: 2022-04-27 | End: 2022-09-16

## 2022-04-27 RX ORDER — FINASTERIDE 5 MG/1
5 TABLET, FILM COATED ORAL DAILY
Qty: 30 TABLET | Refills: 1 | OUTPATIENT
Start: 2022-04-27 | End: 2022-05-25

## 2022-04-27 RX ORDER — TAMSULOSIN HYDROCHLORIDE 0.4 MG/1
0.4 CAPSULE ORAL DAILY
Qty: 30 CAPSULE | Refills: 3 | Status: ON HOLD | OUTPATIENT
Start: 2022-04-27 | End: 2022-09-16 | Stop reason: SDUPTHER

## 2022-04-27 RX ADMIN — IOPAMIDOL 100 ML: 755 INJECTION, SOLUTION INTRAVENOUS at 11:09

## 2022-04-27 NOTE — PATIENT INSTRUCTIONS
Asthma in Adults: Care Instructions  Overview     Asthma makes it hard for you to breathe. During an asthma attack, the airways swell and narrow. Severe asthma attacks can be dangerous, but you can usually prevent them. Controlling asthma and treating symptoms before they get bad can help you avoid bad attacks. You may also avoid future trips to the doctor. Follow-up care is a key part of your treatment and safety. Be sure to make and go to all appointments, and call your doctor if you are having problems. It's also a good idea to know your test results and keep a list of the medicines you take. How can you care for yourself at home? · Follow your asthma action plan so you can manage your symptoms at home. An asthma action plan will help you prevent and control airway reactions and will tell you what to do during an asthma attack. If you do not have an asthma action plan, work with your doctor to build one. · Take your asthma medicine exactly as prescribed. Medicine plays an important role in controlling asthma. Talk to your doctor right away if you have any questions about what to take and how to take it. ? Use your quick-relief medicine when you have symptoms of an asthma attack. Some people need to use quick-relief medicine before they exercise to prevent asthma symptoms. Albuterol is a quick-relief medicine that is often used. In some cases, a certain type of controller inhaler is used as a quick-relief medicine. Ask your doctor what to use for quick relief. ? Take your controller medicine. If you have symptoms often, you will likely need to take it every day. Controller medicine usually includes an inhaled corticosteroid. The goal is to prevent problems before they occur. ? If your doctor prescribed corticosteroid pills to use during an attack, take them as directed. They may take hours to work, but they may shorten the attack and help you breathe better. ?  Keep your quick-relief medicine with you at all times. · Talk to your doctor before using other medicines. Some medicines, such as aspirin, can cause asthma attacks in some people. · Check yourself for asthma symptoms to know which step to follow in your action plan. Watch for things like being short of breath, having chest tightness, coughing, and wheezing. Also notice if symptoms wake you up at night or if you get tired quickly when you exercise. · If you have a peak flow meter, use it to check how well you are breathing. This can help you predict when an asthma attack is going to occur. Then you can take medicine to prevent the asthma attack or make it less severe. · See your doctor regularly. These visits will help you learn more about asthma and what you can do to control it. Your doctor will monitor your treatment to make sure the medicine is helping you. · Keep track of your asthma attacks and your treatment. After you have had an attack, write down what triggered it, what helped end it, and any concerns you have about your asthma action plan. Take your diary when you see your doctor. You can then review your asthma action plan and decide if it is working. · Do not smoke or allow others to smoke around you. Avoid smoky places. Smoking makes asthma worse. If you need help quitting, talk to your doctor about stop-smoking programs and medicines. These can increase your chances of quitting for good. · Learn what triggers an asthma attack for you, and avoid the triggers when you can. Common triggers include colds, smoke, air pollution, dust, pollen, mold, pets, cockroaches, stress, and cold air. · Avoid colds and the flu. Talk to your doctor about getting a pneumococcal vaccine shot. If you have had one before, ask your doctor whether you need a second dose. Get a flu vaccine every fall. If you must be around people with colds or the flu, wash your hands often. When should you call for help?    Call 911 anytime you think you may need emergency care. For example, call if:    · You have severe trouble breathing. Call your doctor now or seek immediate medical care if:    · Your symptoms do not get better after you have followed your asthma action plan.     · You cough up yellow, dark brown, or bloody mucus (sputum). Watch closely for changes in your health, and be sure to contact your doctor if:    · Your coughing and wheezing get worse.     · You need to use quick-relief medicine on more than 2 days a week within a month (unless it is just for exercise).     · You need help figuring out what is triggering your asthma attacks. Where can you learn more? Go to http://www.gray.com/  Enter P597 in the search box to learn more about \"Asthma in Adults: Care Instructions. \"  Current as of: July 6, 2021               Content Version: 13.2  © 8482-8435 Healthwise, [employer]. Care instructions adapted under license by [employer] (which disclaims liability or warranty for this information). If you have questions about a medical condition or this instruction, always ask your healthcare professional. [address] any warranty or liability for your use of this information.

## 2022-04-27 NOTE — PROGRESS NOTES
Office Visit Note:    Assessment/Plan:  1. Dysuria    2. Type 2 diabetes mellitus with stage 3a chronic kidney disease, without long-term current use of insulin ([facility]    3. Essential hypertension    4. Mild intermittent asthma without complication    5. Primary angle closure glaucoma of both eyes, unspecified glaucoma stage, unspecified primary angle-closure glaucoma type    6. Gastroesophageal reflux disease, unspecified whether esophagitis present    7. Encounter to establish care with new doctor    8. Prostate cancer ([facility]      1. Dysuria-has history of multiple UTIs in the past including ESBL UTIs. He was recently in the ED and the UA was negative at that time. He does follow-up with urology. I have advised him to call his urologist and make an appointment to see them as soon as possible I will put him on a urinary analysis at this time. I also advised him to take Tylenol which he states does not work for his pain. 2. Hypertension blood pressure well controlled, continue on current regimen  3. Diabetes mellitus type 2-his last A1c was 7.8 on 2/19/2022. We will plan to check A1c again next visit. Continue on metformin  4. History of prostate cancer s/p XRT, follows up with urology  5. History of asthma on albuterol as needed  6. Hyperlipidemia-on statin, will check a lipid panel  7. Will discuss health maintenance in detail in next visit. Orders Placed This Encounter    CBC W/O DIFF     Standing Status:   Future     Standing Expiration Date:   5/75/3457    METABOLIC PANEL, COMPREHENSIVE     Standing Status:   Future     Standing Expiration Date:   4/27/2023    phenazopyridine (PYRIDIUM) 100 mg tablet     Sig: Take 1 Tablet by mouth three (3) times daily as needed for Pain for up to 10 days. Dispense:  30 Tablet     Refill:  0    amLODIPine (NORVASC) 5 mg tablet     Sig: Take 1 Tablet by mouth daily.      Dispense:  90 Tablet     Refill:  2    atorvastatin (LIPITOR) 80 mg tablet     Sig: Take 1 Tablet by mouth nightly. Dispense:  90 Tablet     Refill:  1    tamsulosin (FLOMAX) 0.4 mg capsule     Sig: Take 1 Capsule by mouth daily. Dispense:  30 Capsule     Refill:  3    finasteride (PROSCAR) 5 mg tablet     Sig: Take 1 Tablet by mouth daily. Dispense:  30 Tablet     Refill:  1    metFORMIN (GLUCOPHAGE) 1,000 mg tablet     Sig: Take 1 Tablet by mouth two (2) times daily (with meals). Dispense:  180 Tablet     Refill:  1    pantoprazole (PROTONIX) 40 mg tablet     Sig: Take 1 Tablet by mouth daily. Dispense:  30 Tablet     Refill:  3     Social Determinants of Health     Tobacco Use: Low Risk     Smoking Tobacco Use: Never Smoker    Smokeless Tobacco Use: Never Used   Alcohol Use:     Frequency of Alcohol Consumption: Not on file    Average Number of Drinks: Not on file    Frequency of Binge Drinking: Not on file   Financial Resource Strain:     Difficulty of Paying Living Expenses: Not on file   Food Insecurity:     Worried About Running Out of Food in the Last Year: Not on file    [place] of Food in the Last Year: Not on file   Transportation Needs:     Lack of Transportation (Medical): Not on file    Lack of Transportation (Non-Medical):  Not on file   Physical Activity:     Days of Exercise per Week: Not on file    Minutes of Exercise per Session: Not on file   Stress:     Feeling of Stress : Not on file   Social Connections:     Frequency of Communication with Friends and Family: Not on file    Frequency of Social Gatherings with Friends and Family: Not on file    Attends Zoroastrian Services: Not on file    Active Member of Clubs or Organizations: Not on file    Attends Club or Organization Meetings: Not on file    Marital Status: Not on file   Intimate Partner Violence:     Fear of Current or Ex-Partner: Not on file    Emotionally Abused: Not on file    Physically Abused: Not on file    Sexually Abused: Not on file   Depression: Not at risk    PHQ-2 Score: 0   Housing Stability:     Unable to Pay for Housing in the Last Year: Not on file    Number of Places Lived in the Last Year: Not on file    Unstable Housing in the Last Year: Not on file       Follow-up and Dispositions    · Return in about 2 months (around 6/27/2022). I have reviewed with the patient details of the assessment and plan and all questions were answered. Relevant patient education was performed. The most recent lab findings were reviewed with the patient. An After Visit Summary was printed and given to the patient. Reason for Visit: Establish Care and Groin Pain      Subjective:  66 y.o. male with h/o diabetes mellitus, hypertension, hyperlipidemia, history of prostate cancer s/p XRT, multiple UTIs in the past including ESBL UTIs who comes to establish care. He was recently admitted to the hospital with a UTI. He was also seen by urology as an outpatient at [facility] urology and they are working him up. He apparently had a CT scan of the abdomen done today. He still continues to complain of significant lower abdominal discomfort and dysuria. He is requesting a refill on Percocet. He denies any fevers or chills. He does complain of increased frequency of urination. No other new complaints. Review of Systems  A complete 11 system ROS was preformed (constitutional, eyes, ENT, cardiovascular, respiratory, gastrointestinal, genitourinary, musculoskeletal, skin, neurological, psychiatric) and was negative aside from the pertinent positives and negatives noted in the HPI.     Past Medical History:   Diagnosis Date    Diabetes ([address]     Gastrointestinal disorder     Hypertension     DEJA (obstructive sleep apnea)     AHI: 8.9 per hour     Past Surgical History:   Procedure Laterality Date    HX PROSTATE SURGERY      [place] ABDOMEN SURGERY PROC UNLISTED      Hernia Repair     Social History     Socioeconomic History    Marital status:    Tobacco Use    Smoking status: Never Smoker  Smokeless tobacco: Never Used   Vaping Use    Vaping Use: Never used   Substance and Sexual Activity    Alcohol use: No    Drug use: Not Currently    Sexual activity: Not Currently     Family History   Problem Relation Age of Onset    Hypertension Mother     Diabetes Mother     Hypertension Father     Diabetes Father      Current Outpatient Medications   Medication Sig Dispense Refill    phenazopyridine (PYRIDIUM) 100 mg tablet Take 1 Tablet by mouth three (3) times daily as needed for Pain for up to 10 days. 30 Tablet 0    amLODIPine (NORVASC) 5 mg tablet Take 1 Tablet by mouth daily. 90 Tablet 2    atorvastatin (LIPITOR) 80 mg tablet Take 1 Tablet by mouth nightly. 90 Tablet 1    tamsulosin (FLOMAX) 0.4 mg capsule Take 1 Capsule by mouth daily. 30 Capsule 3    finasteride (PROSCAR) 5 mg tablet Take 1 Tablet by mouth daily. 30 Tablet 1    metFORMIN (GLUCOPHAGE) 1,000 mg tablet Take 1 Tablet by mouth two (2) times daily (with meals). 180 Tablet 1    pantoprazole (PROTONIX) 40 mg tablet Take 1 Tablet by mouth daily. 30 Tablet 3    latanoprost (XALATAN) 0.005 % ophthalmic solution Administer 1 Drop to both eyes nightly.  albuterol (PROVENTIL HFA, VENTOLIN HFA, PROAIR HFA) 90 mcg/actuation inhaler Take 2 Puffs by inhalation every four (4) hours as needed for Wheezing. 1 Each 0    ibuprofen (MOTRIN) 400 mg tablet Take 1 Tablet by mouth every eight (8) hours as needed for Pain. (Patient not taking: Reported on 4/27/2022) 12 Tablet 0    acetaminophen (TylenoL) 325 mg tablet Take 650 mg by mouth every four (4) hours as needed for Pain.  (Patient not taking: Reported on 4/27/2022)       Allergies   Allergen Reactions    Aspirin Anxiety     Patient states not allergic to medication but reports he does not wish to take it        Objective:  Visit Vitals  /70 (BP 1 Location: Right arm, BP Patient Position: Sitting, BP Cuff Size: Adult)   Pulse 88   Temp 98.5 °F (36.9 °C) (Oral)   Resp 20   Ht 5' 8\" (1.727 m)   Wt 218 lb (98.9 kg)   SpO2 98%   BMI 33.15 kg/m²     Physical Exam:   AA&O x3. Not pale,  complains of significant abdominal discomfort and dysuria  HEENT: ENT negative. Lungs: clear  Heart: S1 S2 +, RRR  Abdomen: Soft, No tenderness  Neuro: No focal deficits. Skin: No erythema or lesions noted. Extremities: no pedal edema, good peripheral pulses  Psych: Normal affect and mood. Opal Barraza MD, [facility].   [address], [facility].

## 2022-04-28 LAB
ALBUMIN SERPL-MCNC: 3.9 G/DL (ref 3.5–5)
ALBUMIN/GLOB SERPL: 0.9 {RATIO} (ref 1.1–2.2)
ALP SERPL-CCNC: 95 U/L (ref 45–117)
ALT SERPL-CCNC: 24 U/L (ref 12–78)
ANION GAP SERPL CALC-SCNC: 6 MMOL/L (ref 5–15)
AST SERPL-CCNC: 22 U/L (ref 15–37)
BILIRUB SERPL-MCNC: 0.3 MG/DL (ref 0.2–1)
BUN SERPL-MCNC: 25 MG/DL (ref 6–20)
BUN/CREAT SERPL: 15 (ref 12–20)
CALCIUM SERPL-MCNC: 10 MG/DL (ref 8.5–10.1)
CHLORIDE SERPL-SCNC: 109 MMOL/L (ref 97–108)
CO2 SERPL-SCNC: 23 MMOL/L (ref 21–32)
CREAT SERPL-MCNC: 1.63 MG/DL (ref 0.7–1.3)
ERYTHROCYTE [DISTWIDTH] IN BLOOD BY AUTOMATED COUNT: 17.3 % (ref 11.5–14.5)
GLOBULIN SER CALC-MCNC: 4.4 G/DL (ref 2–4)
GLUCOSE SERPL-MCNC: 209 MG/DL (ref 65–100)
HCT VFR BLD AUTO: 34.6 % (ref 36.6–50.3)
HGB BLD-MCNC: 9.9 G/DL (ref 12.1–17)
MCH RBC QN AUTO: 24.6 PG (ref 26–34)
MCHC RBC AUTO-ENTMCNC: 28.6 G/DL (ref 30–36.5)
MCV RBC AUTO: 86.1 FL (ref 80–99)
NRBC # BLD: 0 K/UL (ref 0–0.01)
NRBC BLD-RTO: 0 PER 100 WBC
PLATELET # BLD AUTO: 320 K/UL (ref 150–400)
PMV BLD AUTO: 9.2 FL (ref 8.9–12.9)
POTASSIUM SERPL-SCNC: 4.4 MMOL/L (ref 3.5–5.1)
PROT SERPL-MCNC: 8.3 G/DL (ref 6.4–8.2)
RBC # BLD AUTO: 4.02 M/UL (ref 4.1–5.7)
SODIUM SERPL-SCNC: 138 MMOL/L (ref 136–145)
WBC # BLD AUTO: 6 K/UL (ref 4.1–11.1)

## 2022-04-30 ENCOUNTER — HOSPITAL ENCOUNTER (EMERGENCY)
Age: 72
Discharge: HOME | End: 2022-04-30
Attending: EMERGENCY MEDICINE
Payer: MEDICARE

## 2022-04-30 VITALS
BODY MASS INDEX: 33.65 KG/M2 | RESPIRATION RATE: 20 BRPM | TEMPERATURE: 98.8 F | SYSTOLIC BLOOD PRESSURE: 152 MMHG | OXYGEN SATURATION: 97 % | DIASTOLIC BLOOD PRESSURE: 83 MMHG | HEIGHT: 68 IN | HEART RATE: 84 BPM | WEIGHT: 222 LBS

## 2022-04-30 DIAGNOSIS — Z78.9 HISTORY OF URINARY SELF-CATHETERIZATION: ICD-10-CM

## 2022-04-30 DIAGNOSIS — N39.0 COMPLICATED UTI (URINARY TRACT INFECTION): Primary | ICD-10-CM

## 2022-04-30 LAB
APPEARANCE UR: ABNORMAL
BACTERIA URNS QL MICRO: ABNORMAL /HPF
BILIRUB UR QL: NEGATIVE
COLOR UR: ABNORMAL
EPITH CASTS URNS QL MICRO: ABNORMAL /LPF
GLUCOSE BLD STRIP.AUTO-MCNC: 222 MG/DL (ref 65–117)
GLUCOSE UR STRIP.AUTO-MCNC: 250 MG/DL
HGB UR QL STRIP: ABNORMAL
KETONES UR QL STRIP.AUTO: NEGATIVE MG/DL
LEUKOCYTE ESTERASE UR QL STRIP.AUTO: ABNORMAL
NITRITE UR QL STRIP.AUTO: POSITIVE
PH UR STRIP: 5.5 [PH] (ref 5–8)
PROT UR STRIP-MCNC: 100 MG/DL
RBC #/AREA URNS HPF: ABNORMAL /HPF (ref 0–5)
SERVICE CMNT-IMP: ABNORMAL
SP GR UR REFRACTOMETRY: 1.01
UA: UC IF INDICATED,UAUC: ABNORMAL
UROBILINOGEN UR QL STRIP.AUTO: 1 EU/DL (ref 0.2–1)
WBC URNS QL MICRO: >100 /HPF (ref 0–4)

## 2022-04-30 PROCEDURE — 87077 CULTURE AEROBIC IDENTIFY: CPT

## 2022-04-30 PROCEDURE — 87086 URINE CULTURE/COLONY COUNT: CPT

## 2022-04-30 PROCEDURE — 74011250637 HC RX REV CODE- 250/637: Performed by: PHYSICIAN ASSISTANT

## 2022-04-30 PROCEDURE — 82962 GLUCOSE BLOOD TEST: CPT

## 2022-04-30 PROCEDURE — 99283 EMERGENCY DEPT VISIT LOW MDM: CPT

## 2022-04-30 PROCEDURE — 87186 SC STD MICRODIL/AGAR DIL: CPT

## 2022-04-30 PROCEDURE — 81001 URINALYSIS AUTO W/SCOPE: CPT

## 2022-04-30 RX ORDER — TRAMADOL HYDROCHLORIDE 50 MG/1
50 TABLET ORAL
Status: COMPLETED | OUTPATIENT
Start: 2022-04-30 | End: 2022-04-30

## 2022-04-30 RX ORDER — TRAMADOL HYDROCHLORIDE 50 MG/1
50 TABLET ORAL
Qty: 10 TABLET | Refills: 0 | Status: SHIPPED | OUTPATIENT
Start: 2022-04-30 | End: 2022-05-03

## 2022-04-30 RX ORDER — NITROFURANTOIN 25; 75 MG/1; MG/1
100 CAPSULE ORAL
Status: COMPLETED | OUTPATIENT
Start: 2022-04-30 | End: 2022-04-30

## 2022-04-30 RX ORDER — NITROFURANTOIN 25; 75 MG/1; MG/1
100 CAPSULE ORAL 2 TIMES DAILY
Qty: 14 CAPSULE | Refills: 0 | Status: SHIPPED | OUTPATIENT
Start: 2022-04-30 | End: 2022-05-07

## 2022-04-30 RX ADMIN — NITROFURANTOIN MONOHYDRATE/MACROCRYSTALLINE 100 MG: 25; 75 CAPSULE ORAL at 21:27

## 2022-04-30 RX ADMIN — TRAMADOL HYDROCHLORIDE 50 MG: 50 TABLET, COATED ORAL at 21:27

## 2022-05-01 NOTE — ED TRIAGE NOTES
Triage Note: Patient arrives to ER complaining of urinary pain. Patient states he has a recurring UTI. Finished a course of Macrobid yesterday. States today he started with urinary pain and cloudy urine. Patient states he has an enlarged prostate and self caths at home 4x a day.

## 2022-05-01 NOTE — ED NOTES
Patient  given copy of dc instructions and 0 paper script(s) and 2 electronic scripts. Patient verbalized understanding of instructions and script (s). Patient given a current medication reconciliation form and verbalized understanding of their medications. Patient  verbalized understanding of the importance of discussing medications with  his or her physician or clinic they will be following up with. Patient alert and oriented and in no acute distress. Patient offered wheelchair from treatment area to hospital entrance, patient denies wheelchair.

## 2022-05-01 NOTE — ED PROVIDER NOTES
EMERGENCY DEPARTMENT HISTORY AND PHYSICAL EXAM    Date: 4/30/2022  Patient Name: Ileana Velazquez    History of Presenting Illness     Chief Complaint   Patient presents with    Urinary Pain         History Provided By: Patient    HPI: Ileana Velazquez is a 66 y.o. male with a PMH of HTN, DM, DEJA, prostate CA and BPH who presents with dsyuria and cloudy urine today after finishing 2wks of macrobid 2 days ago. Pt states as soon as the abx were finished his symptoms returned. Pt is being followed by urology, self cath's 4x a day and previous urine cultures come back with ESBL. Pt was admitted twice last month for the same symptoms. Pt denies any fevers, N/V or abd pain. PCP: Eloina Alaniz MD    Current Outpatient Medications   Medication Sig Dispense Refill    traMADoL (Ultram) 50 mg tablet Take 1 Tablet by mouth every six (6) hours as needed for Pain for up to 3 days. Max Daily Amount: 200 mg. 10 Tablet 0    nitrofurantoin, macrocrystal-monohydrate, (Macrobid) 100 mg capsule Take 1 Capsule by mouth two (2) times a day for 7 days. 14 Capsule 0    phenazopyridine (PYRIDIUM) 100 mg tablet Take 1 Tablet by mouth three (3) times daily as needed for Pain for up to 10 days. 30 Tablet 0    amLODIPine (NORVASC) 5 mg tablet Take 1 Tablet by mouth daily. 90 Tablet 2    atorvastatin (LIPITOR) 80 mg tablet Take 1 Tablet by mouth nightly. 90 Tablet 1    tamsulosin (FLOMAX) 0.4 mg capsule Take 1 Capsule by mouth daily. 30 Capsule 3    finasteride (PROSCAR) 5 mg tablet Take 1 Tablet by mouth daily. 30 Tablet 1    metFORMIN (GLUCOPHAGE) 1,000 mg tablet Take 1 Tablet by mouth two (2) times daily (with meals). 180 Tablet 1    pantoprazole (PROTONIX) 40 mg tablet Take 1 Tablet by mouth daily. 30 Tablet 3    latanoprost (XALATAN) 0.005 % ophthalmic solution Administer 1 Drop to both eyes nightly.       albuterol (PROVENTIL HFA, VENTOLIN HFA, PROAIR HFA) 90 mcg/actuation inhaler Take 2 Puffs by inhalation every four (4) hours as needed for Wheezing. 1 Each 0       Past History     Past Medical History:  Past Medical History:   Diagnosis Date    Diabetes ([address]     Gastrointestinal disorder     Hypertension     DEJA (obstructive sleep apnea)     AHI: 8.9 per hour       Past Surgical History:  Past Surgical History:   Procedure Laterality Date    HX PROSTATE SURGERY      ME ABDOMEN SURGERY PROC UNLISTED      Hernia Repair       Family History:  Family History   Problem Relation Age of Onset    Hypertension Mother     Diabetes Mother     Hypertension Father     Diabetes Father        Social History:  Social History     Tobacco Use    Smoking status: Never Smoker    Smokeless tobacco: Never Used   Vaping Use    Vaping Use: Never used   Substance Use Topics    Alcohol use: No    Drug use: Not Currently       Allergies: Allergies   Allergen Reactions    Aspirin Anxiety     Patient states not allergic to medication but reports he does not wish to take it          Review of Systems   Review of Systems   Constitutional: Negative for chills and fever. Respiratory: Negative for shortness of breath. Cardiovascular: Negative for chest pain. Gastrointestinal: Negative for abdominal pain, nausea and vomiting. Genitourinary: Positive for dysuria. Allergic/Immunologic: Negative for immunocompromised state. Neurological: Negative for speech difficulty and weakness. All other systems reviewed and are negative. Physical Exam     Vitals:    04/30/22 2035   BP: (!) 152/83   Pulse: 84   Resp: 20   Temp: 98.8 °F (37.1 °C)   SpO2: 97%   Weight: 100.7 kg (222 lb)   Height: 5' 8\" (1.727 m)     Physical Exam  Vitals and nursing note reviewed. Constitutional:       General: He is not in acute distress. Appearance: He is well-developed. HENT:      Head: Normocephalic and atraumatic. Mouth/Throat:      Pharynx: No oropharyngeal exudate.    Eyes:      Conjunctiva/sclera: Conjunctivae normal.   Cardiovascular: Rate and Rhythm: Normal rate and regular rhythm. Heart sounds: Normal heart sounds. Pulmonary:      Effort: Pulmonary effort is normal. No respiratory distress. Breath sounds: Normal breath sounds. No stridor. No wheezing, rhonchi or rales. Abdominal:      General: Bowel sounds are normal.      Palpations: Abdomen is soft. Tenderness: There is no abdominal tenderness. There is no guarding or rebound. Musculoskeletal:         General: Normal range of motion. Skin:     General: Skin is warm and dry. Neurological:      Mental Status: He is alert and oriented to person, place, and time. Diagnostic Study Results     Labs -     Recent Results (from the past 12 hour(s))   URINALYSIS W/ REFLEX CULTURE    Collection Time: 04/30/22  8:41 PM    Specimen: Urine   Result Value Ref Range    Color YELLOW/STRAW      Appearance TURBID (A) CLEAR      Specific gravity 1.015      pH (UA) 5.5 5.0 - 8.0      Protein 100 (A) NEG mg/dL    Glucose 250 (A) NEG mg/dL    Ketone Negative NEG mg/dL    Bilirubin Negative NEG      Blood LARGE (A) NEG      Urobilinogen 1.0 0.2 - 1.0 EU/dL    Nitrites Positive (A) NEG      Leukocyte Esterase LARGE (A) NEG      WBC >100 (H) 0 - 4 /hpf    RBC 5-10 0 - 5 /hpf    Epithelial cells FEW FEW /lpf    Bacteria 2+ (A) NEG /hpf    UA:UC IF INDICATED URINE CULTURE ORDERED (A) CNI     GLUCOSE, POC    Collection Time: 04/30/22  9:17 PM   Result Value Ref Range    Glucose (POC) 222 (H) 65 - 117 mg/dL    Performed by Parvin Zazueta RN        Radiologic Studies -   No orders to display     CT Results  (Last 48 hours)    None        CXR Results  (Last 48 hours)    None            Medical Decision Making   I am the first provider for this patient. I reviewed the vital signs, available nursing notes, past medical history, past surgical history, family history and social history. Vital Signs-Reviewed the patient's vital signs.     Records Reviewed: Nursing Notes, Old Medical Records and Previous Laboratory Studies    Provider Notes (Medical Decision Making):   Pt presents with dysuria today after just completing 2wks of macrobid. He has a hx of chronic UTI's which is likely from recurrent daily self catheterizations. Pt already has urology f/u so will start him back on macrobid for another week and he is advised to f/u with urology on Monday as he may need a chronic indwelling catheter at this point. Discussed case with attending, Dr Prosper Umanzor, who advises since pt is not septic do not feel he needs admission as that will only delay issue          Disposition:  Discharged    DISCHARGE NOTE:   9:31 PM      Care plan outlined and precautions discussed. Patient has no new complaints, changes, or physical findings. Results of UA were reviewed with the patient. All medications were reviewed with the patient; will d/c home. All of pt's questions and concerns were addressed. Patient was instructed and agrees to follow up with urology, as well as to return to the ED upon further deterioration. Patient is ready to go home. Follow-up Information     Follow up With Specialties Details Why [address] [facility] Urology  Go in 2 days  [address], MD Internal Medicine   [address] [facility]  [phone]            Current Discharge Medication List      START taking these medications    Details   traMADoL (Ultram) 50 mg tablet Take 1 Tablet by mouth every six (6) hours as needed for Pain for up to 3 days. Max Daily Amount: 200 mg. Qty: 10 Tablet, Refills: 0  Start date: 4/30/2022, End date: 5/3/2022    Associated Diagnoses: Complicated UTI (urinary tract infection)      nitrofurantoin, macrocrystal-monohydrate, (Macrobid) 100 mg capsule Take 1 Capsule by mouth two (2) times a day for 7 days.   Qty: 14 Capsule, Refills: 0  Start date: 4/30/2022, End date: 5/7/2022             Procedures:  Procedures    Please note that this dictation was completed with Dragon, computer voice recognition software. Quite often unanticipated grammatical, syntax, homophones, and other interpretive errors are inadvertently transcribed by the computer software. Please disregard these errors. Additionally, please excuse any errors that have escaped final proofreading. Diagnosis     Clinical Impression:   1. Complicated UTI (urinary tract infection)    2.  History of urinary self-catheterization

## 2022-05-03 LAB
BACTERIA SPEC CULT: ABNORMAL
CC UR VC: ABNORMAL
SERVICE CMNT-IMP: ABNORMAL

## 2022-05-03 RX ORDER — LEVOFLOXACIN 750 MG/1
750 TABLET ORAL EVERY OTHER DAY
Qty: 4 TABLET | Refills: 0 | OUTPATIENT
Start: 2022-05-03 | End: 2022-05-04

## 2022-05-04 ENCOUNTER — HOSPITAL ENCOUNTER (EMERGENCY)
Age: 72
Discharge: HOME | End: 2022-05-04
Attending: EMERGENCY MEDICINE
Payer: MEDICARE

## 2022-05-04 VITALS
SYSTOLIC BLOOD PRESSURE: 144 MMHG | TEMPERATURE: 98.1 F | DIASTOLIC BLOOD PRESSURE: 86 MMHG | RESPIRATION RATE: 16 BRPM | WEIGHT: 222 LBS | OXYGEN SATURATION: 98 % | BODY MASS INDEX: 33.65 KG/M2 | HEIGHT: 68 IN | HEART RATE: 96 BPM

## 2022-05-04 DIAGNOSIS — T50.905A MEDICATION SIDE EFFECT, INITIAL ENCOUNTER: ICD-10-CM

## 2022-05-04 DIAGNOSIS — N39.0 COMPLICATED UTI (URINARY TRACT INFECTION): Primary | ICD-10-CM

## 2022-05-04 PROCEDURE — 99283 EMERGENCY DEPT VISIT LOW MDM: CPT

## 2022-05-04 RX ORDER — CEFDINIR 300 MG/1
300 CAPSULE ORAL 2 TIMES DAILY
Qty: 20 CAPSULE | Refills: 0 | Status: SHIPPED | OUTPATIENT
Start: 2022-05-04 | End: 2022-05-14

## 2022-05-04 NOTE — ED TRIAGE NOTES
Pt arrives with c/o \"allergic reaction to medication. \" Pt reports that has was diagnosed with a UTI 1-2 weeks ago. Started on an antibiotics (unkown) then pt states yesterday that changed his antibiotic to levofloxacin. Since starting the new med pt reports diarrhea and urinary frequency. Pt also reports enlarged prostate.

## 2022-05-04 NOTE — ED PROVIDER NOTES
EMERGENCY DEPARTMENT HISTORY AND PHYSICAL EXAM      Date: 2022  Patient Name: Fred Larson    History of Presenting Illness     Chief Complaint   Patient presents with    Diarrhea    Urgency       History Provided By: Patient    HPI: Fred Larson, 66 y.o. male with history of hypertension, diabetes, and BPH who self caths and is followed by routine urology presents to the ED with cc of diarrhea, urinary frequency and urgency, and recent urinary tract infection. Patient was seen in the ED on  and diagnosed with another urinary tract infection secondary to straight cath. Urine culture obtained at that time demonstrated Klebsiella. Patient was initially started on an antibiotic which was changed after culture resulted to Levaquin. He took 1 dose of Levaquin yesterday and developed diarrhea, therefore he stopped taking it and presents to the emergency department asking for another antibiotic. He denies any fevers, abdominal pain, flank pain, nausea, vomiting. There are no other complaints, changes, or physical findings at this time. PCP: Landon Melton MD    No current facility-administered medications on file prior to encounter. Current Outpatient Medications on File Prior to Encounter   Medication Sig Dispense Refill    [DISCONTINUED] levoFLOXacin (Levaquin) 750 mg tablet Take 1 Tablet by mouth every other day for 8 days. 4 Tablet 0    [] traMADoL (Ultram) 50 mg tablet Take 1 Tablet by mouth every six (6) hours as needed for Pain for up to 3 days. Max Daily Amount: 200 mg. 10 Tablet 0    nitrofurantoin, macrocrystal-monohydrate, (Macrobid) 100 mg capsule Take 1 Capsule by mouth two (2) times a day for 7 days. 14 Capsule 0    phenazopyridine (PYRIDIUM) 100 mg tablet Take 1 Tablet by mouth three (3) times daily as needed for Pain for up to 10 days. 30 Tablet 0    amLODIPine (NORVASC) 5 mg tablet Take 1 Tablet by mouth daily.  90 Tablet 2    atorvastatin (LIPITOR) 80 mg tablet Take 1 Tablet by mouth nightly. 90 Tablet 1    tamsulosin (FLOMAX) 0.4 mg capsule Take 1 Capsule by mouth daily. 30 Capsule 3    finasteride (PROSCAR) 5 mg tablet Take 1 Tablet by mouth daily. 30 Tablet 1    metFORMIN (GLUCOPHAGE) 1,000 mg tablet Take 1 Tablet by mouth two (2) times daily (with meals). 180 Tablet 1    pantoprazole (PROTONIX) 40 mg tablet Take 1 Tablet by mouth daily. 30 Tablet 3    latanoprost (XALATAN) 0.005 % ophthalmic solution Administer 1 Drop to both eyes nightly.  albuterol (PROVENTIL HFA, VENTOLIN HFA, PROAIR HFA) 90 mcg/actuation inhaler Take 2 Puffs by inhalation every four (4) hours as needed for Wheezing. 1 Each 0       Past History     Past Medical History:  Past Medical History:   Diagnosis Date    [address] [address]     Gastrointestinal disorder     Hypertension     DEJA (obstructive sleep apnea)     AHI: 8.9 per hour       Past Surgical History:  Past Surgical History:   Procedure Laterality Date    HX PROSTATE SURGERY      GA ABDOMEN SURGERY PROC UNLISTED      Hernia Repair       Family History:  Family History   Problem Relation Age of Onset    Hypertension Mother     Diabetes Mother     Hypertension Father     Diabetes Father        Social History:  Social History     Tobacco Use    Smoking status: Never Smoker    Smokeless tobacco: Never Used   Vaping Use    Vaping Use: Never used   Substance Use Topics    Alcohol use: No    Drug use: Not Currently       Allergies: Allergies   Allergen Reactions    Aspirin Anxiety     Patient states not allergic to medication but reports he does not wish to take it          Review of Systems   Review of Systems   Constitutional: Negative for chills and fever. Gastrointestinal: Positive for diarrhea. Negative for abdominal pain, nausea and vomiting. Genitourinary: Positive for frequency and urgency. Musculoskeletal: Negative for back pain. Skin: Negative for rash.    All other systems reviewed and are negative. Physical Exam   Physical Exam  Vitals and nursing note reviewed. Constitutional:       General: He is not in acute distress. Appearance: Normal appearance. He is not ill-appearing, toxic-appearing or diaphoretic. HENT:      Head: Normocephalic and atraumatic. Cardiovascular:      Rate and Rhythm: Normal rate and regular rhythm. Heart sounds: Normal heart sounds. No murmur heard. Pulmonary:      Effort: Pulmonary effort is normal. No respiratory distress. Breath sounds: Normal breath sounds. No wheezing. Abdominal:      Palpations: Abdomen is soft. Tenderness: There is no abdominal tenderness. There is no right CVA tenderness, left CVA tenderness, guarding or rebound. Skin:     General: Skin is warm and dry. Findings: No erythema or rash. Neurological:      General: No focal deficit present. Mental Status: He is alert and oriented to person, place, and time. Diagnostic Study Results     Labs -   No results found for this or any previous visit (from the past 12 hour(s)). Radiologic Studies -   No orders to display     CT Results  (Last 48 hours)    None        CXR Results  (Last 48 hours)    None          Medical Decision Making   I am the first provider for this patient. I reviewed the vital signs, available nursing notes, past medical history, past surgical history, family history and social history. Vital Signs-Reviewed the patient's vital signs. Patient Vitals for the past 12 hrs:   Temp Pulse Resp BP SpO2   05/04/22 0710 98.1 °F (36.7 °C) 96 16 (!) 144/86 98 %       Records Reviewed: Nursing Notes    Provider Notes (Medical Decision Making):   76-year-old male with above history presenting with another complicated UTI. Urine culture from 4/30 demonstrating Klebsiella with many sensitivities to antibiotics. He is not tolerating Levaquin and requesting another antibiotic due to side effects.   I have reviewed his urine culture and will prescribe him cefdinir 300 mg twice daily x10 days. He does have a follow-up appointment with [place] [facility] this afternoon. He does not show any signs of sepsis or systemic illness, he is afebrile and vital signs stable without any complaints of nausea, vomiting, or flank pain. Encourage follow-up with [place] [facility] and to keep this afternoon appointment and given strict return precautions. ED Course:   Initial assessment performed. The patients presenting problems have been discussed, and they are in agreement with the care plan formulated and outlined with them. I have encouraged them to ask questions as they arise throughout their visit. Discharge Note:  The patient has been re-evaluated and is ready for discharge. Reviewed available results with patient. Counseled patient on diagnosis and care plan. Patient has expressed understanding, and all questions have been answered. Patient agrees with plan and agrees to follow up as recommended, or to return to the ED if their symptoms worsen. Discharge instructions have been provided and explained to the patient, along with reasons to return to the ED. Disposition:  Discharge    DISCHARGE PLAN:  1. Discharge Medication List as of 5/4/2022  8:07 AM      START taking these medications    Details   cefdinir (OMNICEF) 300 mg capsule Take 1 Capsule by mouth two (2) times a day for 10 days. , Normal, Disp-20 Capsule, R-0         CONTINUE these medications which have NOT CHANGED    Details   nitrofurantoin, macrocrystal-monohydrate, (Macrobid) 100 mg capsule Take 1 Capsule by mouth two (2) times a day for 7 days. , Normal, Disp-14 Capsule, R-0      phenazopyridine (PYRIDIUM) 100 mg tablet Take 1 Tablet by mouth three (3) times daily as needed for Pain for up to 10 days. , Normal, Disp-30 Tablet, R-0      amLODIPine (NORVASC) 5 mg tablet Take 1 Tablet by mouth daily. , Normal, Disp-90 Tablet, R-2      atorvastatin (LIPITOR) 80 mg tablet Take 1 Tablet by mouth nightly., Normal, Disp-90 Tablet, R-1      tamsulosin (FLOMAX) 0.4 mg capsule Take 1 Capsule by mouth daily. , Normal, Disp-30 Capsule, R-3      finasteride (PROSCAR) 5 mg tablet Take 1 Tablet by mouth daily. , Normal, Disp-30 Tablet, R-1      metFORMIN (GLUCOPHAGE) 1,000 mg tablet Take 1 Tablet by mouth two (2) times daily (with meals). , Normal, Disp-180 Tablet, R-1      pantoprazole (PROTONIX) 40 mg tablet Take 1 Tablet by mouth daily. , Normal, Disp-30 Tablet, R-3      latanoprost (XALATAN) 0.005 % ophthalmic solution Administer 1 Drop to both eyes nightly., Historical Med      albuterol (PROVENTIL HFA, VENTOLIN HFA, PROAIR HFA) 90 mcg/actuation inhaler Take 2 Puffs by inhalation every four (4) hours as needed for Wheezing., Normal, Disp-1 Each, R-0         STOP taking these medications       levoFLOXacin (Levaquin) 750 mg tablet Comments:   Reason for Stopping:         traMADoL (Ultram) 50 mg tablet Comments:   Reason for Stoppin.   Follow-up Information     Follow up With Specialties Details Why [address] [facility] Urology  Go to  for your appointment today as already scheduled [address] [facility] MD [facility] Internal Medicine   [address] [facility] [address] [facility] EMERGENCY DEPT Emergency Medicine Go to  As needed, If symptoms worsen [address]        3. Return to ED if worse     Diagnosis     Clinical Impression:   1. Complicated UTI (urinary tract infection)    2. Medication side effect, initial encounter        Attestations:  I am the first and primary provider of record for this patient's ED encounter. I personally performed the services described above in this documentation. Melvin Lujan MD    Please note that this dictation was completed with WebCh[employer]et, the [employer] voice recognition software.   Quite often unanticipated grammatical, syntax, homophones, and other interpretive errors are inadvertently transcribed by the computer software. Please disregard these errors. Please excuse any errors that have escaped final proofreading. Thank you.

## 2022-05-04 NOTE — DISCHARGE INSTRUCTIONS
You were seen in the emergency department for urinary tract infection and concern for medication intolerance with the Levaquin that you were started on. Your urine culture shows that you can take cefdinir 300 mg twice a day for 10 days. Please take this medication as directed. It will be very important for you to follow-up with your urologist as already scheduled today. If you develop any fevers, nausea, vomiting, or abdominal pain or back pain, please return to the emergency department immediately.

## 2022-05-10 ENCOUNTER — HOSPITAL ENCOUNTER (EMERGENCY)
Age: 72
Discharge: HOME | End: 2022-05-10
Attending: EMERGENCY MEDICINE
Payer: MEDICARE

## 2022-05-10 VITALS
BODY MASS INDEX: 33.19 KG/M2 | DIASTOLIC BLOOD PRESSURE: 96 MMHG | RESPIRATION RATE: 18 BRPM | HEART RATE: 72 BPM | SYSTOLIC BLOOD PRESSURE: 198 MMHG | OXYGEN SATURATION: 97 % | HEIGHT: 68 IN | TEMPERATURE: 98.2 F | WEIGHT: 219 LBS

## 2022-05-10 DIAGNOSIS — I10 ACCELERATED HYPERTENSION: ICD-10-CM

## 2022-05-10 DIAGNOSIS — N39.0 COMPLICATED UTI (URINARY TRACT INFECTION): Primary | ICD-10-CM

## 2022-05-10 DIAGNOSIS — R39.9 LOWER URINARY TRACT SYMPTOMS (LUTS): ICD-10-CM

## 2022-05-10 DIAGNOSIS — N32.89 BLADDER SPASM: ICD-10-CM

## 2022-05-10 DIAGNOSIS — R30.0 DYSURIA: ICD-10-CM

## 2022-05-10 LAB
APPEARANCE UR: ABNORMAL
BACTERIA URNS QL MICRO: ABNORMAL /HPF
BILIRUB UR QL: NEGATIVE
COLOR UR: ABNORMAL
EPITH CASTS URNS QL MICRO: ABNORMAL /LPF
GLUCOSE UR STRIP.AUTO-MCNC: 500 MG/DL
HGB UR QL STRIP: ABNORMAL
KETONES UR QL STRIP.AUTO: NEGATIVE MG/DL
LEUKOCYTE ESTERASE UR QL STRIP.AUTO: ABNORMAL
NITRITE UR QL STRIP.AUTO: POSITIVE
PH UR STRIP: 5.5 [PH] (ref 5–8)
PROT UR STRIP-MCNC: 100 MG/DL
RBC #/AREA URNS HPF: ABNORMAL /HPF (ref 0–5)
SP GR UR REFRACTOMETRY: 1.01
UA: UC IF INDICATED,UAUC: ABNORMAL
UROBILINOGEN UR QL STRIP.AUTO: 1 EU/DL (ref 0.2–1)
WBC URNS QL MICRO: >100 /HPF (ref 0–4)

## 2022-05-10 PROCEDURE — 74011250637 HC RX REV CODE- 250/637: Performed by: EMERGENCY MEDICINE

## 2022-05-10 PROCEDURE — 99283 EMERGENCY DEPT VISIT LOW MDM: CPT

## 2022-05-10 PROCEDURE — 87077 CULTURE AEROBIC IDENTIFY: CPT

## 2022-05-10 PROCEDURE — 87186 SC STD MICRODIL/AGAR DIL: CPT

## 2022-05-10 PROCEDURE — 87086 URINE CULTURE/COLONY COUNT: CPT

## 2022-05-10 PROCEDURE — 81001 URINALYSIS AUTO W/SCOPE: CPT

## 2022-05-10 RX ORDER — PHENAZOPYRIDINE HYDROCHLORIDE 100 MG/1
200 TABLET, FILM COATED ORAL
Status: COMPLETED | OUTPATIENT
Start: 2022-05-10 | End: 2022-05-10

## 2022-05-10 RX ORDER — OXYCODONE AND ACETAMINOPHEN 5; 325 MG/1; MG/1
1 TABLET ORAL
Qty: 10 TABLET | Refills: 0 | Status: SHIPPED | OUTPATIENT
Start: 2022-05-10 | End: 2022-05-15

## 2022-05-10 RX ORDER — PHENAZOPYRIDINE HYDROCHLORIDE 200 MG/1
200 TABLET, FILM COATED ORAL 3 TIMES DAILY
Qty: 6 TABLET | Refills: 0 | Status: SHIPPED | OUTPATIENT
Start: 2022-05-10 | End: 2022-05-12

## 2022-05-10 RX ORDER — OXYCODONE AND ACETAMINOPHEN 5; 325 MG/1; MG/1
1 TABLET ORAL
Status: COMPLETED | OUTPATIENT
Start: 2022-05-10 | End: 2022-05-10

## 2022-05-10 RX ORDER — CIPROFLOXACIN 500 MG/1
500 TABLET ORAL 2 TIMES DAILY
Qty: 14 TABLET | Refills: 0 | Status: SHIPPED | OUTPATIENT
Start: 2022-05-10 | End: 2022-05-17

## 2022-05-10 RX ADMIN — PHENAZOPYRIDINE 200 MG: 100 TABLET ORAL at 02:30

## 2022-05-10 RX ADMIN — OXYCODONE HYDROCHLORIDE AND ACETAMINOPHEN 1 TABLET: 5; 325 TABLET ORAL at 02:30

## 2022-05-10 NOTE — DISCHARGE INSTRUCTIONS
Thank You! It was a pleasure taking care of you in our Emergency Department today. We know that when you come to [address] [place] [address], you are entrusting us with your health, comfort, and safety. Our physicians and nurses honor that trust, and truly appreciate the opportunity to care for you and your loved ones. We also value your feedback. If you receive a survey about your Emergency Department experience today, please fill it out. We care about our patients' feedback, and we listen to what you have to say. Thank you. Dr. Lyndon Teague M.D.      ____________________________________________________________________  I have included a copy of your lab results and/or radiologic studies from today's visit so you can have them easily available at your follow-up visit. We hope you feel better and please do not hesitate to contact the ED if you have any questions at all! Recent Results (from the past 12 hour(s))   URINALYSIS W/ REFLEX CULTURE    Collection Time: 05/10/22  2:31 AM    Specimen: Urine   Result Value Ref Range    Color DARK YELLOW      Appearance TURBID (A) CLEAR      Specific gravity 1.015      pH (UA) 5.5 5.0 - 8.0      Protein 100 (A) NEG mg/dL    Glucose 500 (A) NEG mg/dL    Ketone Negative NEG mg/dL    Bilirubin Negative NEG      Blood LARGE (A) NEG      Urobilinogen 1.0 0.2 - 1.0 EU/dL    Nitrites Positive (A) NEG      Leukocyte Esterase LARGE (A) NEG      WBC PENDING /hpf    RBC PENDING /hpf    Epithelial cells PENDING /lpf    Bacteria PENDING /hpf    UA:UC IF INDICATED PENDING        No orders to display     CT Results  (Last 48 hours)      None          The exam and treatment you received in the Emergency Department were for an urgent problem and are not intended as complete care. It is important that you follow up with a doctor, nurse practitioner, or physician assistant for ongoing care.  If your symptoms become worse or you do not improve as expected and you are unable to reach your usual health care provider, you should return to the Emergency Department. We are available 24 hours a day. Please take your discharge instructions with you when you go to your follow-up appointment. If a prescription has been provided, please have it filled as soon as possible to prevent a delay in treatment. Read the entire medication instruction sheet provided to you by the pharmacy. If you have any questions or reservations about taking the medication due to side effects or interactions with other medications, please call your primary care physician or contact the ER to speak with the charge nurse. Please make an appointment with your family doctor or the physician you were referred to for follow-up of this visit as instructed on your discharge paperwork. Return to the ER if you are unable to be seen or if you are unable to be seen in a timely manner. If you have any problem arranging the follow-up visit, contact the Emergency Department immediately.

## 2022-05-10 NOTE — ED TRIAGE NOTES
Pt comes in reporting chronic urinary pain. Pt reports that he straight caths 4 x day. Pt has hx enlarged prostate and hx radiation.

## 2022-05-10 NOTE — ED PROVIDER NOTES
EMERGENCY DEPARTMENT HISTORY AND PHYSICAL EXAM      Please note that this dictation was completed with the assistance of \"Dragon\", the computer voice recognition software. Quite often unanticipated grammatical, syntax, homophones, and other interpretive errors are inadvertently transcribed by the computer software. Please disregard these errors and any errors that have escaped final proofreading. Thank you. Patient: Bryce Miller  DOS: 05/10/22  : 1950  MRN: [id number]  History of Presenting Illness     Chief Complaint   Patient presents with    Urinary Pain     History Provided By: Patient/family/EMS (if applicable)    HPI: Bryce Miller, 66 y.o. male with past medical history as documented below presents to the ED with c/o of acute on chronic dysuria and bladder spasms. Pt states he self caths 4 times daily. He reports having persistent dysuria and suprapubic discomfort for the past week. He was seen in the ED multiple times the past week and was prescribed Levofloxacin and then Cefdinir. Denies hematuria. Reports no flank pain or vomiting. Pt denies any other exacerbating or ameliorating factors. Additionally, pt specifically denies any recent fever, chills, headache, nausea, vomiting, abdominal pain, CP, SOB, lightheadedness, dizziness, numbness, weakness, lower extremity swelling, heart palpitations, diarrhea, constipation, melena, hematochezia, cough, or congestion.      Pt denies any other alleviating or exacerbating factors. No other  There are no other complaints, changes or physical findings pertinent to the HPI at this time.     PCP: Rylee Lawson MD  Past History   Past Medical History:  Past Medical History:   Diagnosis Date    Diabetes ([address]     Gastrointestinal disorder     Hypertension     DEJA (obstructive sleep apnea)     AHI: 8.9 per hour       Past Surgical History:  Past Surgical History:   Procedure Laterality Date    HX PROSTATE SURGERY      ND ABDOMEN SURGERY PROC UNLISTED Hernia Repair       Family History:   Family history reviewed and was non-contributory, unless specified below:  Family History   Problem Relation Age of Onset    Hypertension Mother     Diabetes Mother     Hypertension Father     Diabetes Father        Social History:  Social History     Tobacco Use    Smoking status: Never Smoker    Smokeless tobacco: Never Used   Vaping Use    Vaping Use: Never used   Substance Use Topics    Alcohol use: No    Drug use: Not Currently       Allergies: Allergies   Allergen Reactions    Aspirin Anxiety     Patient states not allergic to medication but reports he does not wish to take it        Current Medications:  No current facility-administered medications on file prior to encounter. Current Outpatient Medications on File Prior to Encounter   Medication Sig Dispense Refill    cefdinir (OMNICEF) 300 mg capsule Take 1 Capsule by mouth two (2) times a day for 10 days. 20 Capsule 0    amLODIPine (NORVASC) 5 mg tablet Take 1 Tablet by mouth daily. 90 Tablet 2    atorvastatin (LIPITOR) 80 mg tablet Take 1 Tablet by mouth nightly. 90 Tablet 1    tamsulosin (FLOMAX) 0.4 mg capsule Take 1 Capsule by mouth daily. 30 Capsule 3    finasteride (PROSCAR) 5 mg tablet Take 1 Tablet by mouth daily. 30 Tablet 1    metFORMIN (GLUCOPHAGE) 1,000 mg tablet Take 1 Tablet by mouth two (2) times daily (with meals). 180 Tablet 1    pantoprazole (PROTONIX) 40 mg tablet Take 1 Tablet by mouth daily. 30 Tablet 3    latanoprost (XALATAN) 0.005 % ophthalmic solution Administer 1 Drop to both eyes nightly.  albuterol (PROVENTIL HFA, VENTOLIN HFA, PROAIR HFA) 90 mcg/actuation inhaler Take 2 Puffs by inhalation every four (4) hours as needed for Wheezing. 1 Each 0     Review of Systems   A complete ROS was reviewed by me today and all other systems negative, unless otherwise specified below:  Review of Systems   Constitutional: Negative. Negative for chills and fever.    HENT: Negative. Negative for congestion and sore throat. Eyes: Negative. Respiratory: Negative. Negative for cough, chest tightness, shortness of breath and wheezing. Cardiovascular: Negative. Negative for chest pain, palpitations and leg swelling. Gastrointestinal: Negative. Negative for abdominal distention, abdominal pain, blood in stool, constipation, diarrhea, nausea and vomiting. Endocrine: Negative. Genitourinary: Positive for dysuria, frequency and urgency. Negative for flank pain and hematuria. Musculoskeletal: Negative. Negative for arthralgias, back pain and myalgias. Skin: Negative. Negative for color change and rash. Neurological: Negative. Negative for dizziness, syncope, speech difficulty, weakness, light-headedness, numbness and headaches. Hematological: Negative. Psychiatric/Behavioral: Negative. Negative for confusion and self-injury. The patient is not nervous/anxious. All other systems reviewed and are negative. Physical Exam   Physical Exam  Vitals and nursing note reviewed. Constitutional:       Appearance: He is well-developed. He is not toxic-appearing. HENT:      Head: Normocephalic and atraumatic. Mouth/Throat:      Pharynx: No posterior oropharyngeal erythema. Eyes:      Conjunctiva/sclera: Conjunctivae normal.      Pupils: Pupils are equal, round, and reactive to light. Cardiovascular:      Rate and Rhythm: Normal rate and regular rhythm. Heart sounds: Normal heart sounds. No murmur heard. No friction rub. No gallop. Pulmonary:      Effort: Pulmonary effort is normal. No respiratory distress. Breath sounds: Normal breath sounds. No wheezing or rales. Chest:      Chest wall: No tenderness. Abdominal:      General: Bowel sounds are normal. There is no distension. Palpations: Abdomen is soft. There is no mass. Tenderness: There is no abdominal tenderness. There is no guarding or rebound.    Musculoskeletal: General: No tenderness or deformity. Normal range of motion. Cervical back: Normal range of motion. Skin:     General: Skin is warm. Findings: No rash. Neurological:      Mental Status: He is alert and oriented to person, place, and time. Cranial Nerves: No cranial nerve deficit. Motor: No abnormal muscle tone. Coordination: Coordination normal.      Deep Tendon Reflexes: Reflexes normal.   Psychiatric:         Behavior: Behavior is cooperative. Diagnostic Study Results     Laboratory Data  I have personally reviewed and interpreted all available laboratory results. Recent Results (from the past 24 hour(s))   URINALYSIS W/ REFLEX CULTURE    Collection Time: 05/10/22  2:31 AM    Specimen: Urine   Result Value Ref Range    Color DARK YELLOW      Appearance TURBID (A) CLEAR      Specific gravity 1.015      pH (UA) 5.5 5.0 - 8.0      Protein 100 (A) NEG mg/dL    Glucose 500 (A) NEG mg/dL    Ketone Negative NEG mg/dL    Bilirubin Negative NEG      Blood LARGE (A) NEG      Urobilinogen 1.0 0.2 - 1.0 EU/dL    Nitrites Positive (A) NEG      Leukocyte Esterase LARGE (A) NEG      WBC >100 (H) 0 - 4 /hpf    RBC  0 - 5 /hpf    Epithelial cells FEW FEW /lpf    Bacteria 1+ (A) NEG /hpf    UA:UC IF INDICATED URINE CULTURE ORDERED (A) CNI         Radiologic Studies   I have personally reviewed and interpreted all available imaging studies and agree with radiology interpretation. No orders to display     CT Results  (Last 48 hours)    None        CXR Results  (Last 48 hours)    None        Medical Decision Making   I am the first and primary ED physician for this patient's ED visit today. I reviewed our electronic medical record system for any past medical records that may contribute to the patient's current condition, including their past medical history, surgical history, social and family history.  This also includes their most recent ED visits, previous hospitalizations and prior diagnostic data. I have reviewed and summarized the most pertinent findings in my HPI and MDM. Vital Signs Reviewed:  Patient Vitals for the past 24 hrs:   Temp Pulse Resp BP SpO2   05/10/22 0205 98.2 °F (36.8 °C) 72 18 (!) 198/96 97 %     Pulse Oximetry Analysis: 97% on RA    Cardiac Monitor:   Rate: 72 bpm  The cardiac monitor revealed the following rhythm as interpreted by me: Normal Sinus Rhythm  Cardiac monitoring was ordered to monitor patient for signs of cardiac dysrhythmia, which they are at risk for based on their history and/or risk for cardiovascular disease and/or metabolic abnormalities. Records Reviewed: Nursing Notes, Old Medical Records, Previous electrocardiograms, Previous Radiology Studies and Previous Laboratory Studies, EMS reports    Provider Notes (Medical Decision Making):   Patient presents with dysuria and urinary frequency. Stable vitals and benign abdominal exam. DDx: Acute cystitis, pyelonephritis, ureteral stone. Will obtain UA and treat accordingly. Will treat symptomatically for bladder spasms. Can safely defer labs and imaging. Discussed with the patient diagnosis and test results and all questioned fully answered. They understand the importance of staying well hydrated, taking antibiotics as prescribed to completion. He will follow-up with PCP if any problems arise. ED Course:   Initial assessment performed. I discussed presenting problems and concerns, and my formulated plan for today's visit with the patient and any available family members. I have encouraged them to ask questions as they arise throughout the visit.    Social History     Tobacco Use    Smoking status: Never Smoker    Smokeless tobacco: Never Used   Vaping Use    Vaping Use: Never used   Substance Use Topics    Alcohol use: No    Drug use: Not Currently       ED Orders Placed:  Orders Placed This Encounter    CULTURE, URINE    URINALYSIS W/ REFLEX CULTURE    phenazopyridine (PYRIDIUM) tablet 200 mg    oxyCODONE-acetaminophen (PERCOCET) 5-325 mg per tablet 1 Tablet    phenazopyridine (Pyridium) 200 mg tablet    oxyCODONE-acetaminophen (Percocet) 5-325 mg per tablet    ciprofloxacin HCl (Cipro) 500 mg tablet       ED Medications Administered During ED Course:  Medications   phenazopyridine (PYRIDIUM) tablet 200 mg (200 mg Oral Given 5/10/22 0230)   oxyCODONE-acetaminophen (PERCOCET) 5-325 mg per tablet 1 Tablet (1 Tablet Oral Given 5/10/22 0230)        Progress Note:  I have just re-evaluated the patient. Patient reports improvement of sx's. I have reviewed His vital signs and determined there is currently no worsening in their condition or physical exam. Results have been reviewed with them and their questions have been answered. We will continue to review further results as they come available. Progress Note:  I have re-examined the patient. Pt states he feels much better and symptoms improved. Tolerating oral intake. Abdomen is soft and without guarding, rebound or other peritoneal signs. I have discussed with patient the importance of close f/u and to return to the ED if symptoms don't improve or worsen. Progress Note:  Pt reassessed and symptoms noted to have improved significantly after ED treatment. Pt is clinically stable for discharge. Bernabe Torresy labs and imaging have been reviewed with him and available family. He verbally conveys understanding and agreement of the signs, symptoms, diagnosis, treatment and prognosis and additionally agrees to follow up as recommended with Dr. Soha Kwon MD and/or specialist as instructed. He agrees with the care plan we have created and conveys that all of his questions have been answered.  Additionally, I have put together a packet of discharge instructions for him that include: 1) educational information regarding their diagnosis, 2) how to care for their diagnosis at home, as well a 3) list of reasons why they would want to return to the ED prior to their follow-up appointment should the patient's condition change or symptoms worsen. I have answered all questions to the patient's satisfaction. Strict return precautions given. He conveyed understanding and agreement with care plan. Vital signs stable for discharge. Disposition:  DISCHARGE  The pt is ready for discharge. The pt's signs, symptoms, diagnosis, and discharge instructions have been discussed and pt has conveyed their understanding. The pt is to follow up as recommended or return to ER should their symptoms worsen. Plan has been discussed and pt is in agreement. Plan:  1. Return precautions as discussed with patient and available family/caregiver. 2.   Discharge Medication List as of 5/10/2022  3:13 AM      START taking these medications    Details   phenazopyridine (Pyridium) 200 mg tablet Take 1 Tablet by mouth three (3) times daily for 2 days. , Normal, Disp-6 Tablet, R-0      oxyCODONE-acetaminophen (Percocet) 5-325 mg per tablet Take 1 Tablet by mouth every eight (8) hours as needed for Pain for up to 5 days. Max Daily Amount: 3 Tablets. Indications: pain, Normal, Disp-10 Tablet, R-0      ciprofloxacin HCl (Cipro) 500 mg tablet Take 1 Tablet by mouth two (2) times a day for 7 days. , Normal, Disp-14 Tablet, R-0         CONTINUE these medications which have NOT CHANGED    Details   cefdinir (OMNICEF) 300 mg capsule Take 1 Capsule by mouth two (2) times a day for 10 days. , Normal, Disp-20 Capsule, R-0      amLODIPine (NORVASC) 5 mg tablet Take 1 Tablet by mouth daily. , Normal, Disp-90 Tablet, R-2      atorvastatin (LIPITOR) 80 mg tablet Take 1 Tablet by mouth nightly., Normal, Disp-90 Tablet, R-1      tamsulosin (FLOMAX) 0.4 mg capsule Take 1 Capsule by mouth daily. , Normal, Disp-30 Capsule, R-3      finasteride (PROSCAR) 5 mg tablet Take 1 Tablet by mouth daily. , Normal, Disp-30 Tablet, R-1      metFORMIN (GLUCOPHAGE) 1,000 mg tablet Take 1 Tablet by mouth two (2) times daily (with meals). , Normal, Disp-180 Tablet, R-1      pantoprazole (PROTONIX) 40 mg tablet Take 1 Tablet by mouth daily. , Normal, Disp-30 Tablet, R-3      latanoprost (XALATAN) 0.005 % ophthalmic solution Administer 1 Drop to both eyes nightly., Historical Med      albuterol (PROVENTIL HFA, VENTOLIN HFA, PROAIR HFA) 90 mcg/actuation inhaler Take 2 Puffs by inhalation every four (4) hours as needed for Wheezing., Normal, Disp-1 Each, R-0           3. Follow-up Information     Follow up With Specialties Details Why [address] [facility] EMERGENCY DEPT Emergency Medicine  As needed, If symptoms worsen [address] [facility]    Franchesca Fay MD Internal Medicine Physician   [facility] [place] [address]  [phone]          Instructed to return to ED if worse  Diagnosis   Clinical Impression:  1. Complicated UTI (urinary tract infection)    2. Dysuria    3. Lower urinary tract symptoms (LUTS)    4. Accelerated hypertension    5. Bladder spasm      Attestation:  Lonni Severs, MD, am the attending of record for this patient. I personally performed the services described in this documentation on this date, 5/10/2022 for patient, Jimmy Lorenzo. I have reviewed the chart and verified that the record is accurate and complete.

## 2022-05-11 ENCOUNTER — HOSPITAL ENCOUNTER (EMERGENCY)
Age: 72
Discharge: HOME | End: 2022-05-11
Attending: EMERGENCY MEDICINE
Payer: MEDICARE

## 2022-05-11 VITALS
DIASTOLIC BLOOD PRESSURE: 86 MMHG | RESPIRATION RATE: 20 BRPM | TEMPERATURE: 97.8 F | BODY MASS INDEX: 33.34 KG/M2 | OXYGEN SATURATION: 94 % | WEIGHT: 220 LBS | HEART RATE: 76 BPM | SYSTOLIC BLOOD PRESSURE: 156 MMHG | HEIGHT: 68 IN

## 2022-05-11 DIAGNOSIS — R30.0 DYSURIA: ICD-10-CM

## 2022-05-11 DIAGNOSIS — N39.0 COMPLICATED URINARY TRACT INFECTION: Primary | ICD-10-CM

## 2022-05-11 PROCEDURE — 74011250637 HC RX REV CODE- 250/637: Performed by: EMERGENCY MEDICINE

## 2022-05-11 PROCEDURE — 99283 EMERGENCY DEPT VISIT LOW MDM: CPT

## 2022-05-11 RX ORDER — DIAZEPAM 5 MG/1
5 TABLET ORAL
Status: COMPLETED | OUTPATIENT
Start: 2022-05-11 | End: 2022-05-11

## 2022-05-11 RX ADMIN — DIAZEPAM 5 MG: 5 TABLET ORAL at 04:03

## 2022-05-11 NOTE — ED NOTES
Pt reports urinary pain x 2 months. Taking antibiotics x 1 day. Seen at [facility]  1 day ago    Alert and oriented x4. Skin warm dry and intact. Ambulates independently. Emergency Department Nursing Plan of Care       The Nursing Plan of Care is developed from the Nursing assessment and Emergency Department Attending provider initial evaluation. The plan of care may be reviewed in the ED Provider note.     The Plan of Care was developed with the following considerations:   Patient / Family readiness to learn indicated by:verbalized understanding  Persons(s) to be included in education: patient  Barriers to Learning/Limitations:No    Signed     Hannah Clarity    5/11/2022   3:51 AM

## 2022-05-11 NOTE — LETTER
5/14/2022      [address] [address]      Dear Mr. Dimitrios Adams were seen in the Emergency Department of [address] [place] [address] on 5/11/22 and had lab tests performed. We would like to discuss these results with you . Please call the Emergency Department at your earliest convenience at [phone], to speak with one of our providers. The Urine culture from your Emergency Department visit on 5/11/22 was positive. Your antibiotic needs to be changed. If you have any questions please contact the Emergency Department at [phone].       Sincerely,          Ruben Ely PA-C    [address] EMERGENCY DEPT  [address] [facility] [id number] [phone]

## 2022-05-11 NOTE — ED TRIAGE NOTES
Pt presents to ED with c/o dysuria x1 week. Pt just seen at [facility] ED and states he was not given pyridium, which usually helps the pain/burning.

## 2022-05-11 NOTE — ED PROVIDER NOTES
EMERGENCY DEPARTMENT HISTORY AND PHYSICAL EXAM    Please note that this dictation was completed with [employer], the computer voice recognition software. Quite often unanticipated grammatical, syntax, homophones, and other interpretive errors are inadvertently transcribed by the computer software. Please disregard these errors. Please excuse any errors that have escaped final proofreading. Date: 5/11/2022  Patient Name: Marc Freitas  Patient Age and Sex: 66 y.o. male    History of Presenting Illness     Chief Complaint   Patient presents with    Urinary Pain       History Provided By: Patient    Chief Complaint: dysuria      HPI: Marc Freitas, is a 66 y.o. male whose history includes hypertension, diabetes, and BPH who self caths, ESBL UTIs, followed by urology, presents to the ED with persistent dysuria and suprapubic discomfort both of which he has had on/off for several months. He is continuing to straight cath and the urine appears more cloudy than normal.  He was seen in the emergency room on 5/4 and again yesterday, both times diagnosed with urinary tract infection. He was prescribed ciprofloxacin yesterday and says that he took the first dose but he also said that the suprapubic discomfort and dysuria are both persistent and usually improve with pyridium. He is asking if he can have pyridium now. He denies any flank pain, hematuria, fever, chills, nausea, vomiting or diarrhea. Pt denies any other alleviating or exacerbating factors. No other associated signs or symptoms. There are no other complaints, changes or physical findings at this time.      PCP: Deandre Navarro MD    Past History   All documented elements of the PSFH reviewed and verified by me. -Winston Carlton MD    Past Medical History:  Past Medical History:   Diagnosis Date    Diabetes ([facility] [address]     Gastrointestinal disorder     Hypertension     DEJA (obstructive sleep apnea)     AHI: 8.9 per hour       Past Surgical History:  Past Surgical History:   Procedure Laterality Date    HX PROSTATE SURGERY      SD ABDOMEN SURGERY PROC UNLISTED      Hernia Repair       Family History:   Family History   Problem Relation Age of Onset   [address] Hypertension Mother     Diabetes Mother     Hypertension Father     Diabetes Father        Social History:  Social History     Tobacco Use    Smoking status: Never Smoker    Smokeless tobacco: Never Used   Vaping Use    Vaping Use: Never used   Substance Use Topics    Alcohol use: No    Drug use: Not Currently       Current Medications:  No current facility-administered medications on file prior to encounter. Current Outpatient Medications on File Prior to Encounter   Medication Sig Dispense Refill    phenazopyridine (Pyridium) 200 mg tablet Take 1 Tablet by mouth three (3) times daily for 2 days. (Patient not taking: Reported on 5/11/2022) 6 Tablet 0    oxyCODONE-acetaminophen (Percocet) 5-325 mg per tablet Take 1 Tablet by mouth every eight (8) hours as needed for Pain for up to 5 days. Max Daily Amount: 3 Tablets. Indications: pain 10 Tablet 0    ciprofloxacin HCl (Cipro) 500 mg tablet Take 1 Tablet by mouth two (2) times a day for 7 days. 14 Tablet 0    cefdinir (OMNICEF) 300 mg capsule Take 1 Capsule by mouth two (2) times a day for 10 days. 20 Capsule 0    amLODIPine (NORVASC) 5 mg tablet Take 1 Tablet by mouth daily. 90 Tablet 2    atorvastatin (LIPITOR) 80 mg tablet Take 1 Tablet by mouth nightly. 90 Tablet 1    tamsulosin (FLOMAX) 0.4 mg capsule Take 1 Capsule by mouth daily. 30 Capsule 3    finasteride (PROSCAR) 5 mg tablet Take 1 Tablet by mouth daily. 30 Tablet 1    metFORMIN (GLUCOPHAGE) 1,000 mg tablet Take 1 Tablet by mouth two (2) times daily (with meals). 180 Tablet 1    pantoprazole (PROTONIX) 40 mg tablet Take 1 Tablet by mouth daily. 30 Tablet 3    latanoprost (XALATAN) 0.005 % ophthalmic solution Administer 1 Drop to both eyes nightly.       albuterol (PROVENTIL HFA, VENTOLIN HFA, PROAIR HFA) 90 mcg/actuation inhaler Take 2 Puffs by inhalation every four (4) hours as needed for Wheezing. 1 Each 0       Allergies: Allergies   Allergen Reactions    Aspirin Anxiety     Patient states not allergic to medication but reports he does not wish to take it        Review of Systems   All other systems reviewed and negative    Review of Systems   Constitutional: Negative for chills and fever. HENT: Negative for congestion. Eyes: Negative for visual disturbance. Respiratory: Negative for shortness of breath. Cardiovascular: Negative for chest pain and palpitations. Gastrointestinal: Negative for abdominal pain and vomiting. Endocrine: Negative. Genitourinary: Positive for dysuria. Negative for decreased urine volume, flank pain, hematuria and testicular pain. Musculoskeletal: Negative for joint swelling and myalgias. Skin: Negative. Negative for rash. Neurological: Negative for weakness and numbness. Psychiatric/Behavioral: Negative. Negative for confusion. All other systems reviewed and are negative. Physical Exam   Reviewed patients vital signs and nursing note    Physical Exam  Vitals and nursing note reviewed. Constitutional:       Appearance: He is not diaphoretic. HENT:      Head: Atraumatic. Nose: Nose normal.      Mouth/Throat:      Mouth: Mucous membranes are moist.   Eyes:      Extraocular Movements: Extraocular movements intact. Conjunctiva/sclera: Conjunctivae normal.      Pupils: Pupils are equal, round, and reactive to light. Cardiovascular:      Rate and Rhythm: Normal rate and regular rhythm. Pulses: Normal pulses. Heart sounds: Normal heart sounds. Pulmonary:      Effort: Pulmonary effort is normal.      Breath sounds: Normal breath sounds. Abdominal:      General: Bowel sounds are normal.      Palpations: Abdomen is soft. Tenderness: There is no abdominal tenderness.  There is no right CVA tenderness or left CVA tenderness. Musculoskeletal:         General: No tenderness. Normal range of motion. Cervical back: Normal range of motion. No rigidity. Skin:     General: Skin is warm and dry. Capillary Refill: Capillary refill takes less than 2 seconds. Neurological:      General: No focal deficit present. Mental Status: He is alert and oriented to person, place, and time. Psychiatric:         Mood and Affect: Mood normal.         Behavior: Behavior normal.         Diagnostic Study Results     Labs - I have personally reviewed and interpreted all laboratory results. Interpretation of available and pertinent results detailed below in MDM. Danette Croft MD, MSc  No results found for this or any previous visit (from the past 24 hour(s)). Radiologic Studies - I have personally reviewed and interpreted (see MDM for brief interpreation of available results) all imaging studies and agree with radiology interpretation and report. - Danette Croft MD, MSc  No orders to display         Medical Decision Making   I am the first provider for this patient. Records Reviewed:   I reviewed our electronic medical record system for any past medical records that were available that may contribute to the patient's current condition, including their PMH, surgical history, social and family history. This includes most recent ED visits, any available discharge summaries and prior ECGs, which I have reviewed and interpreted personally. I have summarized most salient findings in my HPI and MDM. Danette Croft MD, MSc    I also reviewed the nursing notes and vital signs from today's visit. Nursing notes will be reviewed as they become available in realtime while the pt has been in the ED. Danette Croft MD, MSc      Vital Signs-Reviewed the patient's vital signs.   Patient Vitals for the past 24 hrs:   Temp Pulse Resp BP SpO2   05/11/22 0339 97.8 °F (36.6 °C) 76 20 (!) 156/86 94 %         Provider Notes (Medical Decision Making): Assessment: Patient is a 54-year-old male with above history who was diagnosed yesterday in the emergency department with another complicated urinary tract infection. He was prescribed ciprofloxacin which he has started taking. He returns because his dysuria and suprapubic comfort persist.  He says he has not been given anything for his discomfort and usually Pyridium works. Ddx: Complicated urinary tract fraction, no fever or flank pain to suggest pyelonephritis, catheter associated pain. Planned Workup and Intervention: I reviewed the patient's medical record. He was indeed prescribed Pyridium as well as pain medications yesterday. Prescription was sent to his pharmacy. He was not aware of this and this is why he has not taken either of the medications yet. I do not think he needs repeat blood work or other testing today since she was seen here just yesterday and had everything done. I have reviewed his medical record and based on prior microbiology, most recent urine culture grew Klebsiella with multiple resistance patterns, prior to that E. coli also with resistance to several antibiotics. Prior E. coli infections have also been resistant to ciprofloxacin. The most recent Klebsiella was sensitive to Cipro. Without the most recent culture result it is difficult to determine if he requires a different antibiotic. He seems to be tolerating the ciprofloxacin so far, so we will keep him on that for now while awaiting culture results. I have checked the record and they are pending from yesterday. In the meantime, I have advised the patient that the Pyridium he is asking for is already at his pharmacy. I will administer dose here along with a dose of Valium to prevent bladder spasms. He confirms that he is able to go to the pharmacy and  the medication he was already prescribed.   He does not show any signs of sepsis or systemic illness, he is afebrile and vital signs stable without any complaints of nausea, vomiting, or flank pain. Encourage follow-up with [facility] and today and let them know that he has another infection. Personally reviewed in detail and given strict return precautions. ED Course:   Initial assessment performed. The patients presenting problems have been discussed, and they are in agreement with the care plan formulated and outlined with them. I have encouraged them to ask questions as they arise throughout their visit. Progress note:  Patient has been reassessed and reports feeling considerably better, has normal vital signs and feels comfortable going home. I think this is reasonable as no findings today suggest a life-threatening condition. DISPOSITION: DISCHARGE  The patient's results have been reviewed with patient and available family and/or caregiver. They verbally convey their understanding and agreement of the patient's signs, symptoms, diagnosis, treatment and prognosis and additionally agree to follow up as recommended in the discharge instructions or to return to the Emergency Department should the patient's condition change prior to their follow-up appointment. The patient and available family and/or caregiver verbally agree with the care plan and all of their questions have been answered. The discharge instructions have also been provided to the them with educational information regarding the patient's diagnosis as well a list of reasons why the patient would want to return to the ER prior to their follow-up appointment should any concerns arise, the patient's condition change or symptoms worsen. Mario Lanza MD, Msc    PLAN:  Current Discharge Medication List        2.      Follow-up Information     Follow up With Specialties Details Why [address] [facility]  Call today schedule a follow up this week [address] MD Internal Medicine Physician Schedule an appointment as soon as possible for a visit   [address] [facility]  [phone]          3. Return to ED if worse       I, Gladys Pena MD, am the attending of record for this patient encounter. Diagnosis     Clinical Impression:   1. Complicated urinary tract infection    2.  Dysuria        Attestation:  I personally performed the services described in this documentation on this date 5/11/2022 for patient Rufino Blue MD

## 2022-05-18 RX ORDER — NITROFURANTOIN 25; 75 MG/1; MG/1
100 CAPSULE ORAL 2 TIMES DAILY
Qty: 14 CAPSULE | Refills: 0 | OUTPATIENT
Start: 2022-05-18 | End: 2022-05-25

## 2022-05-25 ENCOUNTER — HOSPITAL ENCOUNTER (EMERGENCY)
Age: 72
Discharge: HOME | End: 2022-05-25
Attending: EMERGENCY MEDICINE
Payer: MEDICARE

## 2022-05-25 VITALS
HEIGHT: 68 IN | OXYGEN SATURATION: 96 % | TEMPERATURE: 98.3 F | RESPIRATION RATE: 16 BRPM | HEART RATE: 75 BPM | WEIGHT: 218.03 LBS | DIASTOLIC BLOOD PRESSURE: 93 MMHG | BODY MASS INDEX: 33.04 KG/M2 | SYSTOLIC BLOOD PRESSURE: 152 MMHG

## 2022-05-25 DIAGNOSIS — M79.671 PAIN IN BOTH FEET: Primary | ICD-10-CM

## 2022-05-25 DIAGNOSIS — M79.672 PAIN IN BOTH FEET: Primary | ICD-10-CM

## 2022-05-25 DIAGNOSIS — E11.40 TYPE 2 DIABETES MELLITUS WITH DIABETIC NEUROPATHY, WITHOUT LONG-TERM CURRENT USE OF INSULIN (HCC): ICD-10-CM

## 2022-05-25 PROCEDURE — 99283 EMERGENCY DEPT VISIT LOW MDM: CPT

## 2022-05-25 RX ORDER — GABAPENTIN 100 MG/1
100 CAPSULE ORAL 3 TIMES DAILY
Qty: 20 CAPSULE | Refills: 0 | Status: SHIPPED | OUTPATIENT
Start: 2022-05-25 | End: 2022-06-01

## 2022-05-25 NOTE — ED NOTES
Patient (s)  given copy of dc instructions and 1 script(s). Patient (s)  verbalized understanding of instructions and script (s). Patient given a current medication reconciliation form and verbalized understanding of their medications. Patient (s) verbalized understanding of the importance of discussing medications with  his or her physician or clinic they will be following up with. Patient alert and oriented and in no acute distress. Patient discharged home.  Taken to bus stop via wheelchair

## 2022-05-25 NOTE — ED PROVIDER NOTES
EMERGENCY DEPARTMENT HISTORY AND PHYSICAL EXAM      Date: 5/25/2022  Patient Name: Bryce Miller    History of Presenting Illness     Chief Complaint   Patient presents with    Foot Pain       History Provided By: Patient    HPI: Bryce Miller, 66 y.o. male presents ambulatory to the emergency dept with a can c/o bilat foot pain \"for a long time\". The patient denied injury or change in his physical routine. No redness, swelling, or warmth. He has diabetes but denied any issues with the nails. No drainage. He states the pain is intermittent throughout the day. No exacerbating/alleviating features. He has not taken anything for his pain in the past.  He rates his pain a 10/10 on the pain scale and describes it as an ache. Pt is o/w healthy without fever, chills, cough, congestion, ST, shortness of breath, chest pain, N/V/D. There are no other complaints, changes, or physical findings at this time. PCP: Rylee Lawson MD    Current Outpatient Medications   Medication Sig Dispense Refill    gabapentin (Neurontin) 100 mg capsule Take 1 Capsule by mouth three (3) times daily for 20 doses. Max Daily Amount: 300 mg. Indications: diabetic complication causing injury to some body nerves 20 Capsule 0    amLODIPine (NORVASC) 5 mg tablet Take 1 Tablet by mouth daily. 90 Tablet 2    atorvastatin (LIPITOR) 80 mg tablet Take 1 Tablet by mouth nightly. 90 Tablet 1    tamsulosin (FLOMAX) 0.4 mg capsule Take 1 Capsule by mouth daily. 30 Capsule 3    metFORMIN (GLUCOPHAGE) 1,000 mg tablet Take 1 Tablet by mouth two (2) times daily (with meals). 180 Tablet 1    pantoprazole (PROTONIX) 40 mg tablet Take 1 Tablet by mouth daily. 30 Tablet 3    latanoprost (XALATAN) 0.005 % ophthalmic solution Administer 1 Drop to both eyes nightly.  albuterol (PROVENTIL HFA, VENTOLIN HFA, PROAIR HFA) 90 mcg/actuation inhaler Take 2 Puffs by inhalation every four (4) hours as needed for Wheezing.  1 Each 0       Past History     Past Medical History:  Past Medical History:   Diagnosis Date    Diabetes ([facility] [address]     Gastrointestinal disorder     Hypertension     DEJA (obstructive sleep apnea)     AHI: 8.9 per hour       Past Surgical History:  Past Surgical History:   Procedure Laterality Date    HX PROSTATE SURGERY      NM ABDOMEN SURGERY PROC UNLISTED      Hernia Repair       Family History:  Family History   Problem Relation Age of Onset    Hypertension Mother     Diabetes Mother     Hypertension Father     Diabetes Father        Social History:  Social History     Tobacco Use    Smoking status: Never Smoker    Smokeless tobacco: Never Used   Vaping Use    Vaping Use: Never used   Substance Use Topics    Alcohol use: No    Drug use: Not Currently       Allergies: Allergies   Allergen Reactions    Aspirin Anxiety     Patient states not allergic to medication but reports he does not wish to take it          Review of Systems   Review of Systems   Constitutional: Negative for chills and fever. HENT: Negative for congestion, rhinorrhea and sore throat. Respiratory: Negative for cough and shortness of breath. Cardiovascular: Negative for chest pain and palpitations. Gastrointestinal: Negative for diarrhea, nausea and vomiting. Genitourinary: Negative for dysuria and hematuria. Musculoskeletal: Positive for arthralgias. Negative for neck pain and neck stiffness. Skin: Negative for pallor, rash and wound. Allergic/Immunologic: Negative for food allergies and immunocompromised state. Neurological: Negative for weakness, numbness and headaches. Hematological: Negative for adenopathy. Does not bruise/bleed easily. Psychiatric/Behavioral: Negative for agitation and confusion. All other systems reviewed and are negative. Physical Exam   Physical Exam  Vitals and nursing note reviewed. Constitutional:       General: He is not in acute distress. Appearance: Normal appearance. He is well-developed.  He is obese. He is not ill-appearing, toxic-appearing or diaphoretic. HENT:      Head: Normocephalic and atraumatic. Nose: Nose normal. No congestion or rhinorrhea. Eyes:      General: No scleral icterus. Right eye: No discharge. Left eye: No discharge. Conjunctiva/sclera: Conjunctivae normal.   Neck:      Thyroid: No thyromegaly. Vascular: No JVD. Trachea: No tracheal deviation. Cardiovascular:      Rate and Rhythm: Normal rate and regular rhythm. Pulses: Normal pulses. Heart sounds: Normal heart sounds. Pulmonary:      Effort: Pulmonary effort is normal. No respiratory distress. Breath sounds: Normal breath sounds. No wheezing. Musculoskeletal:         General: Tenderness present. No deformity. Cervical back: Normal range of motion and neck supple. Right lower leg: No edema. Left lower leg: No edema. Comments: Decreased A/P ROM to bilat feet due to tenderness with palpation/movement, no deformity, no crepitus, no erythema/rash/lesion/heat. 2+ distal pulses, NVI, sensation grossly intact to light touch. Pt observed to ambulate with a cane without deficit. Skin:     General: Skin is warm and dry. Coloration: Skin is not pale. Findings: No bruising, erythema or rash. Neurological:      General: No focal deficit present. Mental Status: He is alert and oriented to person, place, and time. Sensory: No sensory deficit. Motor: No weakness or abnormal muscle tone. Coordination: Coordination normal.   Psychiatric:         Mood and Affect: Mood normal.         Behavior: Behavior normal.         Judgment: Judgment normal.         Diagnostic Study Results     Labs -   No results found for this or any previous visit (from the past 12 hour(s)). Radiologic Studies -   No orders to display         Medical Decision Making   I am the first provider for this patient.     I reviewed the vital signs, available nursing notes, past medical history, past surgical history, family history and social history. Vital Signs-Reviewed the patient's vital signs. Patient Vitals for the past 12 hrs:   Temp Pulse Resp BP SpO2   05/25/22 1744 98.3 °F (36.8 °C) 75 16 (!) 152/93 96 %           Records Reviewed: Nursing Notes, Old Medical Records and Previous Radiology Studies    Provider Notes (Medical Decision Making):   Diabetic neuropathy, gout, DJD/OA    ED Course:   Initial assessment performed. The patients presenting problems have been discussed, and they are in agreement with the care plan formulated and outlined with them. I have encouraged them to ask questions as they arise throughout their visit. DISCHARGE NOTE:  The care plan has been outline with the patient and/or family, who verbally conveyed understanding and agreement. Available results have been reviewed. Patient and/or family understand the follow up plan as outlined and discharge instructions. Should their condition deterioration at any time after discharge the patient agrees to return, follow up sooner than outlined or seek medical assistance at the closest Emergency Room as soon as possible. Questions have been answered. Discharge instructions and educational information regarding the patient's diagnosis as well a list of reasons why the patient would want to seek immediate medical attention, should their condition change, were reviewed directly with the patient/family          PLAN:  1. Discharge Medication List as of 5/25/2022  7:06 PM      START taking these medications    Details   gabapentin (Neurontin) 100 mg capsule Take 1 Capsule by mouth three (3) times daily for 20 doses. Max Daily Amount: 300 mg. Indications: diabetic complication causing injury to some body nerves, Normal, Disp-20 Capsule, R-0         CONTINUE these medications which have NOT CHANGED    Details   amLODIPine (NORVASC) 5 mg tablet Take 1 Tablet by mouth daily. , Normal, Disp-90 Tablet, R-2 atorvastatin (LIPITOR) 80 mg tablet Take 1 Tablet by mouth nightly., Normal, Disp-90 Tablet, R-1      tamsulosin (FLOMAX) 0.4 mg capsule Take 1 Capsule by mouth daily. , Normal, Disp-30 Capsule, R-3      metFORMIN (GLUCOPHAGE) 1,000 mg tablet Take 1 Tablet by mouth two (2) times daily (with meals). , Normal, Disp-180 Tablet, R-1      pantoprazole (PROTONIX) 40 mg tablet Take 1 Tablet by mouth daily. , Normal, Disp-30 Tablet, R-3      latanoprost (XALATAN) 0.005 % ophthalmic solution Administer 1 Drop to both eyes nightly., Historical Med      albuterol (PROVENTIL HFA, VENTOLIN HFA, PROAIR HFA) 90 mcg/actuation inhaler Take 2 Puffs by inhalation every four (4) hours as needed for Wheezing., Normal, Disp-1 Each, R-0           2. Follow-up Information     Follow up With Specialties Details Why Contact Info    Sheridan Fuentes MD Internal Medicine Physician   [address] [facility] EMERGENCY DEPT Emergency Medicine  If symptoms worsen [facility] [place]  [phone]        Return to ED if worse     Diagnosis     Clinical Impression:   1. Pain in both feet    2.  Type 2 diabetes mellitus with diabetic neuropathy, without long-term current use of insulin ([facility] [address]

## 2022-05-28 ENCOUNTER — HOSPITAL ENCOUNTER (EMERGENCY)
Age: 72
Discharge: HOME | End: 2022-05-28
Attending: EMERGENCY MEDICINE
Payer: MEDICARE

## 2022-05-28 VITALS
DIASTOLIC BLOOD PRESSURE: 83 MMHG | TEMPERATURE: 98.4 F | OXYGEN SATURATION: 98 % | WEIGHT: 218 LBS | RESPIRATION RATE: 16 BRPM | SYSTOLIC BLOOD PRESSURE: 152 MMHG | BODY MASS INDEX: 33.04 KG/M2 | HEART RATE: 89 BPM | HEIGHT: 68 IN

## 2022-05-28 DIAGNOSIS — N39.0 COMPLICATED UTI (URINARY TRACT INFECTION): Primary | ICD-10-CM

## 2022-05-28 LAB
ALBUMIN SERPL-MCNC: 2.9 G/DL (ref 3.5–5)
ALBUMIN/GLOB SERPL: 0.6 {RATIO} (ref 1.1–2.2)
ALP SERPL-CCNC: 94 U/L (ref 45–117)
ALT SERPL-CCNC: 21 U/L (ref 12–78)
ANION GAP SERPL CALC-SCNC: 11 MMOL/L (ref 5–15)
APPEARANCE UR: ABNORMAL
AST SERPL-CCNC: 14 U/L (ref 15–37)
BACTERIA URNS QL MICRO: ABNORMAL /HPF
BASOPHILS # BLD: 0 K/UL (ref 0–0.1)
BASOPHILS NFR BLD: 0 % (ref 0–1)
BILIRUB SERPL-MCNC: 0.3 MG/DL (ref 0.2–1)
BILIRUB UR QL CFM: NEGATIVE
BUN SERPL-MCNC: 16 MG/DL (ref 6–20)
BUN/CREAT SERPL: 10 (ref 12–20)
CALCIUM SERPL-MCNC: 9 MG/DL (ref 8.5–10.1)
CHLORIDE SERPL-SCNC: 106 MMOL/L (ref 97–108)
CO2 SERPL-SCNC: 23 MMOL/L (ref 21–32)
COLOR UR: ABNORMAL
CREAT SERPL-MCNC: 1.54 MG/DL (ref 0.7–1.3)
DIFFERENTIAL METHOD BLD: ABNORMAL
EOSINOPHIL # BLD: 0.1 K/UL (ref 0–0.4)
EOSINOPHIL NFR BLD: 2 % (ref 0–7)
EPITH CASTS URNS QL MICRO: ABNORMAL /LPF
ERYTHROCYTE [DISTWIDTH] IN BLOOD BY AUTOMATED COUNT: 18.8 % (ref 11.5–14.5)
GLOBULIN SER CALC-MCNC: 5 G/DL (ref 2–4)
GLUCOSE SERPL-MCNC: 211 MG/DL (ref 65–100)
GLUCOSE UR STRIP.AUTO-MCNC: NEGATIVE MG/DL
HCT VFR BLD AUTO: 28.4 % (ref 36.6–50.3)
HGB BLD-MCNC: 8.3 G/DL (ref 12.1–17)
HGB UR QL STRIP: ABNORMAL
IMM GRANULOCYTES # BLD AUTO: 0 K/UL (ref 0–0.04)
IMM GRANULOCYTES NFR BLD AUTO: 1 % (ref 0–0.5)
KETONES UR QL STRIP.AUTO: NEGATIVE MG/DL
LEUKOCYTE ESTERASE UR QL STRIP.AUTO: ABNORMAL
LYMPHOCYTES # BLD: 1.4 K/UL (ref 0.8–3.5)
LYMPHOCYTES NFR BLD: 23 % (ref 12–49)
MCH RBC QN AUTO: 24.3 PG (ref 26–34)
MCHC RBC AUTO-ENTMCNC: 29.2 G/DL (ref 30–36.5)
MCV RBC AUTO: 83.3 FL (ref 80–99)
MONOCYTES # BLD: 0.5 K/UL (ref 0–1)
MONOCYTES NFR BLD: 8 % (ref 5–13)
NEUTS SEG # BLD: 4.2 K/UL (ref 1.8–8)
NEUTS SEG NFR BLD: 66 % (ref 32–75)
NITRITE UR QL STRIP.AUTO: POSITIVE
NRBC # BLD: 0 K/UL (ref 0–0.01)
NRBC BLD-RTO: 0 PER 100 WBC
PH UR STRIP: 5 [PH] (ref 5–8)
PLATELET # BLD AUTO: 355 K/UL (ref 150–400)
PMV BLD AUTO: 8.3 FL (ref 8.9–12.9)
POTASSIUM SERPL-SCNC: 4 MMOL/L (ref 3.5–5.1)
PROT SERPL-MCNC: 7.9 G/DL (ref 6.4–8.2)
PROT UR STRIP-MCNC: 100 MG/DL
RBC # BLD AUTO: 3.41 M/UL (ref 4.1–5.7)
RBC #/AREA URNS HPF: >100 /HPF (ref 0–5)
SODIUM SERPL-SCNC: 140 MMOL/L (ref 136–145)
SP GR UR REFRACTOMETRY: 1.01
UA: UC IF INDICATED,UAUC: ABNORMAL
UROBILINOGEN UR QL STRIP.AUTO: 0.2 EU/DL (ref 0.2–1)
WBC # BLD AUTO: 6.3 K/UL (ref 4.1–11.1)
WBC URNS QL MICRO: >100 /HPF (ref 0–4)

## 2022-05-28 PROCEDURE — 80053 COMPREHEN METABOLIC PANEL: CPT

## 2022-05-28 PROCEDURE — 87086 URINE CULTURE/COLONY COUNT: CPT

## 2022-05-28 PROCEDURE — 81001 URINALYSIS AUTO W/SCOPE: CPT

## 2022-05-28 PROCEDURE — 99283 EMERGENCY DEPT VISIT LOW MDM: CPT

## 2022-05-28 PROCEDURE — 85025 COMPLETE CBC W/AUTO DIFF WBC: CPT

## 2022-05-28 PROCEDURE — 36415 COLL VENOUS BLD VENIPUNCTURE: CPT

## 2022-05-28 RX ORDER — GRANULES FOR ORAL 3 G/1
3 POWDER ORAL ONCE
Qty: 1 PACKET | Refills: 0 | Status: SHIPPED | OUTPATIENT
Start: 2022-05-28 | End: 2022-05-28

## 2022-05-28 RX ORDER — NITROFURANTOIN (MACROCRYSTALS) 100 MG/1
100 CAPSULE ORAL 2 TIMES DAILY
COMMUNITY
End: 2022-07-04

## 2022-05-28 RX ORDER — GRANULES FOR ORAL 3 G/1
3 POWDER ORAL ONCE
Status: DISCONTINUED | OUTPATIENT
Start: 2022-05-28 | End: 2022-05-28

## 2022-05-28 NOTE — ED TRIAGE NOTES
Patient presents to the ED with c/o hematuria x 2 hours. Denies pain. Reports having a hx of enlarged prostate.

## 2022-05-29 LAB
BACTERIA SPEC CULT: NORMAL
SERVICE CMNT-IMP: NORMAL

## 2022-05-29 NOTE — ED PROVIDER NOTES
EMERGENCY DEPARTMENT HISTORY AND PHYSICAL EXAM    Date: 5/28/2022  Patient Name: Dorie Jackman    History of Presenting Illness     Chief Complaint   Patient presents with    Blood in Urine         History Provided By: Patient    Chief Complaint: blood in urine  Duration: onset today  Timing:  Acute  Location penis  Quality: small amount blood oozing from penis after self cath  Severity: Mild  Modifying Factors: none  Associated Symptoms: current UTI      HPI: Dorie Jackman is a 66 y.o. male with a PMH of DEJA DM HTN who presents with bleeding from penis. Patient states he self catherizesat  and  noticed some blood. at tip of penis. Patient states he has a UTI and is taking Macrobid. Patient denies fever back pain nausea vomiting. On chart review noted patient was diagnosed with UTI on May 10 , had been prescribed Cipro which was resistant to ESBL. Nolberto [place] was prescribed which patient filled on May 18 and has 4 doses remaining. PCP: Herb Mitchell MD    Current Outpatient Medications   Medication Sig Dispense Refill    nitrofurantoin (MACRODANTIN) 100 mg capsule Take 100 mg by mouth two (2) times a day.  fosfomycin (MONUROL) 3 gram pack oral packet Take 1 Packet by mouth once for 1 dose. 1 Packet 0    gabapentin (Neurontin) 100 mg capsule Take 1 Capsule by mouth three (3) times daily for 20 doses. Max Daily Amount: 300 mg. Indications: diabetic complication causing injury to some body nerves 20 Capsule 0    amLODIPine (NORVASC) 5 mg tablet Take 1 Tablet by mouth daily. 90 Tablet 2    atorvastatin (LIPITOR) 80 mg tablet Take 1 Tablet by mouth nightly. 90 Tablet 1    tamsulosin (FLOMAX) 0.4 mg capsule Take 1 Capsule by mouth daily. 30 Capsule 3    metFORMIN (GLUCOPHAGE) 1,000 mg tablet Take 1 Tablet by mouth two (2) times daily (with meals). 180 Tablet 1    pantoprazole (PROTONIX) 40 mg tablet Take 1 Tablet by mouth daily.  30 Tablet 3    latanoprost (XALATAN) 0.005 % ophthalmic solution Administer 1 Drop to both eyes nightly.  albuterol (PROVENTIL HFA, VENTOLIN HFA, PROAIR HFA) 90 mcg/actuation inhaler Take 2 Puffs by inhalation every four (4) hours as needed for Wheezing. 1 Each 0       Past History     Past Medical History:  Past Medical History:   Diagnosis Date    Diabetes ([address]     Gastrointestinal disorder     Hypertension     DEJA (obstructive sleep apnea)     AHI: 8.9 per hour       Past Surgical History:  Past Surgical History:   Procedure Laterality Date    HX PROSTATE SURGERY      [place] ABDOMEN SURGERY PROC UNLISTED      Hernia Repair       Family History:  Family History   Problem Relation Age of Onset    Hypertension Mother     Diabetes Mother     Hypertension Father     Diabetes Father        Social History:  Social History     Tobacco Use    Smoking status: Never Smoker    Smokeless tobacco: Never Used   Vaping Use    Vaping Use: Never used   Substance Use Topics    Alcohol use: No    Drug use: Not Currently       Allergies: Allergies   Allergen Reactions    Aspirin Anxiety     Patient states not allergic to medication but reports he does not wish to take it          Review of Systems   Review of Systems   Constitutional: Negative for chills, fatigue and fever. HENT: Negative for congestion and sore throat. Eyes: Negative for redness. Respiratory: Negative for cough, chest tightness and wheezing. Cardiovascular: Negative for chest pain. Gastrointestinal: Negative for abdominal pain. Genitourinary: Positive for hematuria. Negative for dysuria. Musculoskeletal: Negative for arthralgias, back pain, myalgias, neck pain and neck stiffness. Skin: Negative for rash. Neurological: Negative for dizziness, syncope, weakness, light-headedness, numbness and headaches. All other systems reviewed and are negative.       Physical Exam     Vitals:    05/28/22 1943   BP: (!) 152/83   Pulse: 89   Resp: 16   Temp: 98.4 °F (36.9 °C)   SpO2: 98%   Weight: 98.9 kg (218 lb)   Height: 5' 8\" (1.727 m)     Physical Exam  Vitals and nursing note reviewed. Constitutional:       Appearance: He is well-developed. HENT:      Head: Normocephalic and atraumatic. Right Ear: External ear normal.      Left Ear: External ear normal.      Nose: Nose normal.      Mouth/Throat:      Mouth: Mucous membranes are moist.   Eyes:      General:         Right eye: No discharge. Left eye: No discharge. Conjunctiva/sclera: Conjunctivae normal.   Cardiovascular:      Rate and Rhythm: Normal rate and regular rhythm. Heart sounds: Normal heart sounds. Pulmonary:      Effort: Pulmonary effort is normal. No respiratory distress. Breath sounds: Normal breath sounds. No wheezing. Abdominal:      General: Bowel sounds are normal.      Palpations: Abdomen is soft. Tenderness: There is no abdominal tenderness. Musculoskeletal:         General: Normal range of motion. Cervical back: Normal range of motion and neck supple. Lymphadenopathy:      Cervical: No cervical adenopathy. Skin:     General: Skin is warm and dry. Neurological:      Mental Status: He is alert and oriented to person, place, and time. Cranial Nerves: No cranial nerve deficit. Psychiatric:         Behavior: Behavior normal.         Thought Content:  Thought content normal.         Judgment: Judgment normal.           Diagnostic Study Results     Labs -     Recent Results (from the past 12 hour(s))   URINALYSIS W/ REFLEX CULTURE    Collection Time: 05/28/22  8:24 PM    Specimen: Miscellaneous sample; Urine    Urine specimen   Result Value Ref Range    Color LUCY      Appearance TURBID (A) CLEAR      Specific gravity 1.015      pH (UA) 5.0 5.0 - 8.0      Protein 100 (A) NEG mg/dL    Glucose Negative NEG mg/dL    Ketone Negative NEG mg/dL    Blood MODERATE (A) NEG      Urobilinogen 0.2 0.2 - 1.0 EU/dL    Nitrites Positive (A) NEG      Leukocyte Esterase LARGE (A) NEG      WBC >100 (H) 0 - 4 /hpf    RBC >100 (H) 0 - 5 /hpf    Epithelial cells FEW FEW /lpf    Bacteria 1+ (A) NEG /hpf    UA:UC IF INDICATED URINE CULTURE ORDERED (A) CNI     BILIRUBIN, CONFIRM    Collection Time: 05/28/22  8:24 PM   Result Value Ref Range    Bilirubin UA, confirm Negative NEG     CBC WITH AUTOMATED DIFF    Collection Time: 05/28/22  8:50 PM   Result Value Ref Range    WBC 6.3 4.1 - 11.1 K/uL    RBC 3.41 (L) 4.10 - 5.70 M/uL    HGB 8.3 (L) 12.1 - 17.0 g/dL    HCT 28.4 (L) 36.6 - 50.3 %    MCV 83.3 80.0 - 99.0 FL    MCH 24.3 (L) 26.0 - 34.0 PG    MCHC 29.2 (L) 30.0 - 36.5 g/dL    RDW 18.8 (H) 11.5 - 14.5 %    PLATELET 114 610 - 001 K/uL    MPV 8.3 (L) 8.9 - 12.9 FL    NRBC 0.0 0  WBC    ABSOLUTE NRBC 0.00 0.00 - 0.01 K/uL    NEUTROPHILS 66 32 - 75 %    LYMPHOCYTES 23 12 - 49 %    MONOCYTES 8 5 - 13 %    EOSINOPHILS 2 0 - 7 %    BASOPHILS 0 0 - 1 %    IMMATURE GRANULOCYTES 1 (H) 0.0 - 0.5 %    ABS. NEUTROPHILS 4.2 1.8 - 8.0 K/UL    ABS. LYMPHOCYTES 1.4 0.8 - 3.5 K/UL    ABS. MONOCYTES 0.5 0.0 - 1.0 K/UL    ABS. EOSINOPHILS 0.1 0.0 - 0.4 K/UL    ABS. BASOPHILS 0.0 0.0 - 0.1 K/UL    ABS. IMM. GRANS. 0.0 0.00 - 0.04 K/UL    DF AUTOMATED     METABOLIC PANEL, COMPREHENSIVE    Collection Time: 05/28/22  8:50 PM   Result Value Ref Range    Sodium 140 136 - 145 mmol/L    Potassium 4.0 3.5 - 5.1 mmol/L    Chloride 106 97 - 108 mmol/L    CO2 23 21 - 32 mmol/L    Anion gap 11 5 - 15 mmol/L    Glucose 211 (H) 65 - 100 mg/dL    BUN 16 6 - 20 MG/DL    Creatinine 1.54 (H) 0.70 - 1.30 MG/DL    BUN/Creatinine ratio 10 (L) 12 - 20      GFR est AA 54 (L) >60 ml/min/1.73m2    GFR est non-AA 45 (L) >60 ml/min/1.73m2    Calcium 9.0 8.5 - 10.1 MG/DL    Bilirubin, total 0.3 0.2 - 1.0 MG/DL    ALT (SGPT) 21 12 - 78 U/L    AST (SGOT) 14 (L) 15 - 37 U/L    Alk.  phosphatase 94 45 - 117 U/L    Protein, total 7.9 6.4 - 8.2 g/dL    Albumin 2.9 (L) 3.5 - 5.0 g/dL    Globulin 5.0 (H) 2.0 - 4.0 g/dL    A-G Ratio 0.6 (L) 1.1 - 2.2         Radiologic Studies -   No orders to display     CT Results  (Last 48 hours)    None        CXR Results  (Last 48 hours)    None            Medical Decision Making   I am the first provider for this patient. I reviewed the vital signs, available nursing notes, past medical history, past surgical history, family history and social history. Vital Signs-Reviewed the patient's vital signs. Records Reviewed: Nursing Notes, Old Medical Records and Previous Laboratory Studies    Provider Notes (Medical Decision Making):   67-year-old male presents with bleeding from penis after self cathing. Patient notes history of complicated UTIs recently taking Macrobid has 4 doses remaining. Will recheck urine labs          Disposition:  home  Noted previous urine culture ESBL, patient taking Macrobid urine infection has not cleared will will order fosfomycin. DISCHARGE NOTE:         Care plan outlined and precautions discussed. Patient has no new complaints, changes, or physical findings. Results of tests were reviewed with the patient. All medications were reviewed with the patient; will d/c home with fosfomycin. All of pt's questions and concerns were addressed. Patient was instructed and agrees to follow up with urology, as well as to return to the ED upon further deterioration. Patient is ready to go home.     Follow-up Information     Follow up With Specialties Details Why [address] [facility] In 1 week  [address] [id number] [phone]          Discharge Medication List as of 5/28/2022 10:05 PM      START taking these medications    Details   fosfomycin (MONUROL) 3 gram pack oral packet Take 1 Packet by mouth once for 1 dose., Normal, Disp-1 Packet, R-0         CONTINUE these medications which have NOT CHANGED    Details   nitrofurantoin (MACRODANTIN) 100 mg capsule Take 100 mg by mouth two (2) times a day., Historical Med      gabapentin (Neurontin) 100 mg capsule Take 1 Capsule by mouth three (3) times daily for 20 doses. Max Daily Amount: 300 mg. Indications: diabetic complication causing injury to some body nerves, Normal, Disp-20 Capsule, R-0      amLODIPine (NORVASC) 5 mg tablet Take 1 Tablet by mouth daily. , Normal, Disp-90 Tablet, R-2      atorvastatin (LIPITOR) 80 mg tablet Take 1 Tablet by mouth nightly., Normal, Disp-90 Tablet, R-1      tamsulosin (FLOMAX) 0.4 mg capsule Take 1 Capsule by mouth daily. , Normal, Disp-30 Capsule, R-3      metFORMIN (GLUCOPHAGE) 1,000 mg tablet Take 1 Tablet by mouth two (2) times daily (with meals). , Normal, Disp-180 Tablet, R-1      pantoprazole (PROTONIX) 40 mg tablet Take 1 Tablet by mouth daily. , Normal, Disp-30 Tablet, R-3      latanoprost (XALATAN) 0.005 % ophthalmic solution Administer 1 Drop to both eyes nightly., Historical Med      albuterol (PROVENTIL HFA, VENTOLIN HFA, PROAIR HFA) 90 mcg/actuation inhaler Take 2 Puffs by inhalation every four (4) hours as needed for Wheezing., Normal, Disp-1 Each, R-0             Procedures:  Procedures    Please note that this dictation was completed with Dragon, computer voice recognition software. Quite often unanticipated grammatical, syntax, homophones, and other interpretive errors are inadvertently transcribed by the computer software. Please disregard these errors. Additionally, please excuse any errors that have escaped final proofreading. Diagnosis     Clinical Impression:   1.  Complicated UTI (urinary tract infection)

## 2022-06-01 ENCOUNTER — VIRTUAL VISIT (OUTPATIENT)
Dept: INTERNAL MEDICINE CLINIC | Age: 72
End: 2022-06-01

## 2022-06-03 ENCOUNTER — HOSPITAL ENCOUNTER (EMERGENCY)
Age: 72
Discharge: HOME | End: 2022-06-03
Attending: STUDENT IN AN ORGANIZED HEALTH CARE EDUCATION/TRAINING PROGRAM
Payer: MEDICARE

## 2022-06-03 VITALS
WEIGHT: 218 LBS | SYSTOLIC BLOOD PRESSURE: 131 MMHG | DIASTOLIC BLOOD PRESSURE: 85 MMHG | BODY MASS INDEX: 33.04 KG/M2 | OXYGEN SATURATION: 97 % | HEIGHT: 68 IN | RESPIRATION RATE: 16 BRPM | TEMPERATURE: 97.9 F | HEART RATE: 77 BPM

## 2022-06-03 DIAGNOSIS — R31.9 HEMATURIA, UNSPECIFIED TYPE: Primary | ICD-10-CM

## 2022-06-03 LAB
ALBUMIN SERPL-MCNC: 3.4 G/DL (ref 3.5–5)
ALBUMIN/GLOB SERPL: 0.7 {RATIO} (ref 1.1–2.2)
ALP SERPL-CCNC: 90 U/L (ref 45–117)
ALT SERPL-CCNC: 20 U/L (ref 12–78)
ANION GAP SERPL CALC-SCNC: 10 MMOL/L (ref 5–15)
APPEARANCE UR: ABNORMAL
AST SERPL-CCNC: 14 U/L (ref 15–37)
BACTERIA URNS QL MICRO: ABNORMAL /HPF
BASOPHILS # BLD: 0 K/UL (ref 0–0.1)
BASOPHILS NFR BLD: 1 % (ref 0–1)
BILIRUB SERPL-MCNC: 0.3 MG/DL (ref 0.2–1)
BILIRUB UR QL CFM: NEGATIVE
BUN SERPL-MCNC: 22 MG/DL (ref 6–20)
BUN/CREAT SERPL: 15 (ref 12–20)
CALCIUM SERPL-MCNC: 9.3 MG/DL (ref 8.5–10.1)
CHLORIDE SERPL-SCNC: 106 MMOL/L (ref 97–108)
CO2 SERPL-SCNC: 24 MMOL/L (ref 21–32)
COLOR UR: ABNORMAL
CREAT SERPL-MCNC: 1.43 MG/DL (ref 0.7–1.3)
DIFFERENTIAL METHOD BLD: ABNORMAL
EOSINOPHIL # BLD: 0.1 K/UL (ref 0–0.4)
EOSINOPHIL NFR BLD: 2 % (ref 0–7)
EPITH CASTS URNS QL MICRO: ABNORMAL /LPF
ERYTHROCYTE [DISTWIDTH] IN BLOOD BY AUTOMATED COUNT: 19.5 % (ref 11.5–14.5)
GLOBULIN SER CALC-MCNC: 4.7 G/DL (ref 2–4)
GLUCOSE SERPL-MCNC: 119 MG/DL (ref 65–100)
GLUCOSE UR STRIP.AUTO-MCNC: NEGATIVE MG/DL
HCT VFR BLD AUTO: 30.1 % (ref 36.6–50.3)
HGB BLD-MCNC: 9.1 G/DL (ref 12.1–17)
HGB UR QL STRIP: ABNORMAL
IMM GRANULOCYTES # BLD AUTO: 0 K/UL (ref 0–0.04)
IMM GRANULOCYTES NFR BLD AUTO: 0 % (ref 0–0.5)
KETONES UR QL STRIP.AUTO: NEGATIVE MG/DL
LEUKOCYTE ESTERASE UR QL STRIP.AUTO: ABNORMAL
LYMPHOCYTES # BLD: 2.1 K/UL (ref 0.8–3.5)
LYMPHOCYTES NFR BLD: 37 % (ref 12–49)
MCH RBC QN AUTO: 25.4 PG (ref 26–34)
MCHC RBC AUTO-ENTMCNC: 30.2 G/DL (ref 30–36.5)
MCV RBC AUTO: 84.1 FL (ref 80–99)
MONOCYTES # BLD: 0.5 K/UL (ref 0–1)
MONOCYTES NFR BLD: 10 % (ref 5–13)
NEUTS SEG # BLD: 2.9 K/UL (ref 1.8–8)
NEUTS SEG NFR BLD: 50 % (ref 32–75)
NITRITE UR QL STRIP.AUTO: NEGATIVE
NRBC # BLD: 0 K/UL (ref 0–0.01)
NRBC BLD-RTO: 0 PER 100 WBC
PH UR STRIP: 6 [PH] (ref 5–8)
PLATELET # BLD AUTO: 267 K/UL (ref 150–400)
PMV BLD AUTO: 8.7 FL (ref 8.9–12.9)
POTASSIUM SERPL-SCNC: 4.2 MMOL/L (ref 3.5–5.1)
PROT SERPL-MCNC: 8.1 G/DL (ref 6.4–8.2)
PROT UR STRIP-MCNC: >300 MG/DL
RBC # BLD AUTO: 3.58 M/UL (ref 4.1–5.7)
RBC #/AREA URNS HPF: >100 /HPF (ref 0–5)
SODIUM SERPL-SCNC: 140 MMOL/L (ref 136–145)
SP GR UR REFRACTOMETRY: 1.02 (ref 1–1.03)
UA: UC IF INDICATED,UAUC: ABNORMAL
UROBILINOGEN UR QL STRIP.AUTO: 0.2 EU/DL (ref 0.2–1)
WBC # BLD AUTO: 5.6 K/UL (ref 4.1–11.1)
WBC URNS QL MICRO: ABNORMAL /HPF (ref 0–4)

## 2022-06-03 PROCEDURE — 99283 EMERGENCY DEPT VISIT LOW MDM: CPT

## 2022-06-03 PROCEDURE — 87086 URINE CULTURE/COLONY COUNT: CPT

## 2022-06-03 PROCEDURE — 80053 COMPREHEN METABOLIC PANEL: CPT

## 2022-06-03 PROCEDURE — 51798 US URINE CAPACITY MEASURE: CPT

## 2022-06-03 PROCEDURE — 36415 COLL VENOUS BLD VENIPUNCTURE: CPT

## 2022-06-03 PROCEDURE — 85025 COMPLETE CBC W/AUTO DIFF WBC: CPT

## 2022-06-03 PROCEDURE — 81001 URINALYSIS AUTO W/SCOPE: CPT

## 2022-06-03 NOTE — ED NOTES
Pt presents to the ED complaining of bloody urine. He reports that he has burning when he urinates. Pt reports that he was seen last week for the same problem and it has not resolved. Pt reports that he has called the urologist office several times but no one answered the phone. Pt has gross hematuria, no clots seen. Pt is alert and oriented x 4, RR even and unlabored, skin is warm and dry. Assessment completed and pt updated on plan of care. Call bell in reach. Emergency Department Nursing Plan of Care       The Nursing Plan of Care is developed from the Nursing assessment and Emergency Department Attending provider initial evaluation. The plan of care may be reviewed in the ED Provider note.     The Plan of Care was developed with the following considerations:   Patient / Family readiness to learn indicated by:verbalized understanding  Persons(s) to be included in education: patient  Barriers to Learning/Limitations:No    Signed     Alla Geronimo RN    6/3/2022

## 2022-06-03 NOTE — ED NOTES
Bedside and Verbal shift change report given to Yunier Cochran  (oncoming nurse) by Ava Carpenter RN  (offgoing nurse). Report included the following information SBAR, Kardex, ED Summary and Recent Results.

## 2022-06-03 NOTE — PROGRESS NOTES
CM receive consult patient needs a Urology follow-up. Patient is known to CM he has a urologist office is  Close CM to call on Monday for follow-up and patient can follow-up as well.     Talia Hunter CM

## 2022-06-04 LAB
BACTERIA SPEC CULT: NORMAL
SERVICE CMNT-IMP: NORMAL

## 2022-06-04 NOTE — ED NOTES
Patient ambulated to restroom to void post-bladder scan, patient was able to void an unmeasurable amount of urine. NP notified.

## 2022-06-04 NOTE — ED PROVIDER NOTES
EMERGENCY DEPARTMENT HISTORY AND PHYSICAL EXAM    Date: 6/3/2022  Patient Name: Ana Matamoros    History of Presenting Illness     Chief Complaint   Patient presents with    Blood in Urine         History Provided By: Patient    Chief Complaint:blood in urine  Duration: onset past few weeks   Timing:  Gradual and Progressive  Quality: dark blood in urine  Severity: Moderate  Modifying Factors: none  Associated Symptoms: denies any other associated signs or symptoms      HPI: Ana Matamoros is a 66 y.o. male with a PMH of prostate cancer frequent UTIs hypertension diabetes who presents with blood in urine which has been ongoing for the past few weeks. Patient on chart review has been evaluated this department a week ago and treated for a UTI. Patient states he had been doing self caths 4 times a day but has totally stopped and is voiding on his own. Patient states his urine is bloody. Patient had been advised to follow-up with urology but states that they do not answer the phone. Denies fever nausea vomiting flank pain. PCP: Nathanael Jackson MD    Current Outpatient Medications   Medication Sig Dispense Refill    amLODIPine (NORVASC) 5 mg tablet Take 1 Tablet by mouth daily. 90 Tablet 2    atorvastatin (LIPITOR) 80 mg tablet Take 1 Tablet by mouth nightly. 90 Tablet 1    tamsulosin (FLOMAX) 0.4 mg capsule Take 1 Capsule by mouth daily. 30 Capsule 3    metFORMIN (GLUCOPHAGE) 1,000 mg tablet Take 1 Tablet by mouth two (2) times daily (with meals). 180 Tablet 1    pantoprazole (PROTONIX) 40 mg tablet Take 1 Tablet by mouth daily. 30 Tablet 3    nitrofurantoin (MACRODANTIN) 100 mg capsule Take 100 mg by mouth two (2) times a day. (Patient not taking: Reported on 6/3/2022)      latanoprost (XALATAN) 0.005 % ophthalmic solution Administer 1 Drop to both eyes nightly.  (Patient not taking: Reported on 6/1/2022)      albuterol (PROVENTIL HFA, VENTOLIN HFA, PROAIR HFA) 90 mcg/actuation inhaler Take 2 Puffs by inhalation every four (4) hours as needed for Wheezing. (Patient not taking: Reported on 6/3/2022) 1 Each 0       Past History     Past Medical History:  Past Medical History:   Diagnosis Date    Diabetes ([address]     Gastrointestinal disorder     Hypertension     DEJA (obstructive sleep apnea)     AHI: 8.9 per hour       Past Surgical History:  Past Surgical History:   Procedure Laterality Date    HX PROSTATE SURGERY      [place] ABDOMEN SURGERY PROC UNLISTED      Hernia Repair       Family History:  Family History   Problem Relation Age of Onset    Hypertension Mother     Diabetes Mother     Hypertension Father     Diabetes Father        Social History:  Social History     Tobacco Use    Smoking status: Never Smoker    Smokeless tobacco: Never Used   Vaping Use    Vaping Use: Never used   Substance Use Topics    Alcohol use: No    Drug use: Not Currently       Allergies: Allergies   Allergen Reactions    Aspirin Anxiety     Patient states not allergic to medication but reports he does not wish to take it          Review of Systems   Review of Systems   Constitutional: Negative for chills, fatigue and fever. HENT: Negative for congestion and sore throat. Respiratory: Negative for cough, chest tightness and wheezing. Cardiovascular: Negative for chest pain. Gastrointestinal: Negative for abdominal pain. Genitourinary: Positive for hematuria. Musculoskeletal: Negative for arthralgias, back pain and myalgias. Skin: Negative for rash. Neurological: Negative for dizziness, weakness, light-headedness, numbness and headaches. All other systems reviewed and are negative. Physical Exam     Vitals:    06/03/22 1623 06/03/22 1711   BP: (!) 154/95 131/85   Pulse: 77    Resp: 16    Temp: 97.9 °F (36.6 °C)    SpO2: 96% 97%   Weight: 98.9 kg (218 lb)    Height: 5' 8\" (1.727 m)      Physical Exam  Vitals and nursing note reviewed. Constitutional:       Appearance: He is well-developed.    HENT: Head: Normocephalic and atraumatic. Right Ear: External ear normal.   Eyes:      General:         Right eye: No discharge. Left eye: No discharge. Conjunctiva/sclera: Conjunctivae normal.   Cardiovascular:      Rate and Rhythm: Normal rate and regular rhythm. Heart sounds: Normal heart sounds. Pulmonary:      Effort: Pulmonary effort is normal. No respiratory distress. Breath sounds: Normal breath sounds. No wheezing. Abdominal:      General: Bowel sounds are normal.      Palpations: Abdomen is soft. Tenderness: There is no abdominal tenderness. Genitourinary:     Comments: Urine dark red colored  Musculoskeletal:         General: Normal range of motion. Cervical back: Normal range of motion and neck supple. Lymphadenopathy:      Cervical: No cervical adenopathy. Skin:     General: Skin is warm and dry. Neurological:      Mental Status: He is alert and oriented to person, place, and time. Cranial Nerves: No cranial nerve deficit. Psychiatric:         Behavior: Behavior normal.         Thought Content:  Thought content normal.         Judgment: Judgment normal.           Diagnostic Study Results     Labs -     Recent Results (from the past 12 hour(s))   URINALYSIS W/ REFLEX CULTURE    Collection Time: 06/03/22  5:43 PM    Specimen: Urine   Result Value Ref Range    Color RED      Appearance BLOODY (A) CLEAR      Specific gravity 1.025 1.003 - 1.030      pH (UA) 6.0 5.0 - 8.0      Protein >300 (A) NEG mg/dL    Glucose Negative NEG mg/dL    Ketone Negative NEG mg/dL    Blood LARGE (A) NEG      Urobilinogen 0.2 0.2 - 1.0 EU/dL    Nitrites Negative NEG      Leukocyte Esterase SMALL (A) NEG      Bilirubin UA, confirm Negative NEG      WBC 10-20 0 - 4 /hpf    RBC >100 (H) 0 - 5 /hpf    Epithelial cells FEW FEW /lpf    Bacteria 3+ (A) NEG /hpf    UA:UC IF INDICATED URINE CULTURE ORDERED (A) CNI     CBC WITH AUTOMATED DIFF    Collection Time: 06/03/22  5:50 PM Result Value Ref Range    WBC 5.6 4.1 - 11.1 K/uL    RBC 3.58 (L) 4.10 - 5.70 M/uL    HGB 9.1 (L) 12.1 - 17.0 g/dL    HCT 30.1 (L) 36.6 - 50.3 %    MCV 84.1 80.0 - 99.0 FL    MCH 25.4 (L) 26.0 - 34.0 PG    MCHC 30.2 30.0 - 36.5 g/dL    RDW 19.5 (H) 11.5 - 14.5 %    PLATELET 942 638 - 451 K/uL    MPV 8.7 (L) 8.9 - 12.9 FL    NRBC 0.0 0  WBC    ABSOLUTE NRBC 0.00 0.00 - 0.01 K/uL    NEUTROPHILS 50 32 - 75 %    LYMPHOCYTES 37 12 - 49 %    MONOCYTES 10 5 - 13 %    EOSINOPHILS 2 0 - 7 %    BASOPHILS 1 0 - 1 %    IMMATURE GRANULOCYTES 0 0.0 - 0.5 %    ABS. NEUTROPHILS 2.9 1.8 - 8.0 K/UL    ABS. LYMPHOCYTES 2.1 0.8 - 3.5 K/UL    ABS. MONOCYTES 0.5 0.0 - 1.0 K/UL    ABS. EOSINOPHILS 0.1 0.0 - 0.4 K/UL    ABS. BASOPHILS 0.0 0.0 - 0.1 K/UL    ABS. IMM. GRANS. 0.0 0.00 - 0.04 K/UL    DF AUTOMATED     METABOLIC PANEL, COMPREHENSIVE    Collection Time: 06/03/22  5:50 PM   Result Value Ref Range    Sodium 140 136 - 145 mmol/L    Potassium 4.2 3.5 - 5.1 mmol/L    Chloride 106 97 - 108 mmol/L    CO2 24 21 - 32 mmol/L    Anion gap 10 5 - 15 mmol/L    Glucose 119 (H) 65 - 100 mg/dL    BUN 22 (H) 6 - 20 MG/DL    Creatinine 1.43 (H) 0.70 - 1.30 MG/DL    BUN/Creatinine ratio 15 12 - 20      GFR est AA 59 (L) >60 ml/min/1.73m2    GFR est non-AA 49 (L) >60 ml/min/1.73m2    Calcium 9.3 8.5 - 10.1 MG/DL    Bilirubin, total 0.3 0.2 - 1.0 MG/DL    ALT (SGPT) 20 12 - 78 U/L    AST (SGOT) 14 (L) 15 - 37 U/L    Alk. phosphatase 90 45 - 117 U/L    Protein, total 8.1 6.4 - 8.2 g/dL    Albumin 3.4 (L) 3.5 - 5.0 g/dL    Globulin 4.7 (H) 2.0 - 4.0 g/dL    A-G Ratio 0.7 (L) 1.1 - 2.2         Radiologic Studies -   No orders to display     CT Results  (Last 48 hours)    None        CXR Results  (Last 48 hours)    None            Medical Decision Making   I am the first provider for this patient. I reviewed the vital signs, available nursing notes, past medical history, past surgical history, family history and social history.     Vital Signs-Reviewed the patient's vital signs. Records Reviewed: Nursing Notes    Provider Notes (Medical Decision Making):   DDX UTI hematuria history of prostate cancer   71-year-old male presents with chronic hematuria. Will order labs and urinalysis. Disposition:  Home  Urine much improved since last urinalysis last week patient was treated with fosfomycin, history of ESBL. We will not treat urine as is most likely colonized and have patient follow-up with urology. Advised patient that he needs to make an appointment with urology for the beginning of next week. Patient states that he will call his urologist to make an appointment. DISCHARGE NOTE:         Care plan outlined and precautions discussed. Patient has no new complaints, changes, or physical findings. Results of tests were reviewed with the patient. All medications were reviewed with the patient; will d/c home . All of pt's questions and concerns were addressed. Patient was instructed and agrees to follow up with Urology, as well as to return to the ED upon further deterioration. Patient is ready to go home. Follow-up Information     Follow up With Specialties Details Why [address] [facility]  In 2 days  [address]          Discharge Medication List as of 6/3/2022  7:53 PM          Procedures:  Procedures    Please note that this dictation was completed with Dragon, computer voice recognition software. Quite often unanticipated grammatical, syntax, homophones, and other interpretive errors are inadvertently transcribed by the computer software. Please disregard these errors. Additionally, please excuse any errors that have escaped final proofreading. Diagnosis     Clinical Impression:   1.  Hematuria, unspecified type

## 2022-06-16 ENCOUNTER — HOSPITAL ENCOUNTER (EMERGENCY)
Age: 72
Discharge: HOME | End: 2022-06-17
Attending: EMERGENCY MEDICINE
Payer: MEDICARE

## 2022-06-16 VITALS
HEART RATE: 73 BPM | WEIGHT: 219.14 LBS | TEMPERATURE: 98 F | RESPIRATION RATE: 18 BRPM | HEIGHT: 68 IN | BODY MASS INDEX: 33.21 KG/M2 | DIASTOLIC BLOOD PRESSURE: 85 MMHG | SYSTOLIC BLOOD PRESSURE: 148 MMHG | OXYGEN SATURATION: 98 %

## 2022-06-16 DIAGNOSIS — N39.0 COMPLICATED URINARY TRACT INFECTION: Primary | ICD-10-CM

## 2022-06-16 DIAGNOSIS — A49.9 ESBL (EXTENDED SPECTRUM BETA-LACTAMASE) PRODUCING BACTERIA INFECTION: ICD-10-CM

## 2022-06-16 DIAGNOSIS — Z16.12 ESBL (EXTENDED SPECTRUM BETA-LACTAMASE) PRODUCING BACTERIA INFECTION: ICD-10-CM

## 2022-06-16 PROCEDURE — 74011250637 HC RX REV CODE- 250/637: Performed by: EMERGENCY MEDICINE

## 2022-06-16 PROCEDURE — 99283 EMERGENCY DEPT VISIT LOW MDM: CPT

## 2022-06-16 RX ORDER — PHENAZOPYRIDINE HYDROCHLORIDE 100 MG/1
200 TABLET, FILM COATED ORAL
Status: COMPLETED | OUTPATIENT
Start: 2022-06-16 | End: 2022-06-16

## 2022-06-16 RX ADMIN — PHENAZOPYRIDINE HYDROCHLORIDE 200 MG: 100 TABLET ORAL at 23:08

## 2022-06-17 LAB
APPEARANCE UR: ABNORMAL
BACTERIA URNS QL MICRO: ABNORMAL /HPF
BILIRUB UR QL: NEGATIVE
COLOR UR: ABNORMAL
EPITH CASTS URNS QL MICRO: ABNORMAL /LPF
GLUCOSE UR STRIP.AUTO-MCNC: 250 MG/DL
HGB UR QL STRIP: ABNORMAL
KETONES UR QL STRIP.AUTO: ABNORMAL MG/DL
LEUKOCYTE ESTERASE UR QL STRIP.AUTO: ABNORMAL
NITRITE UR QL STRIP.AUTO: NEGATIVE
PH UR STRIP: 5.5 [PH] (ref 5–8)
PROT UR STRIP-MCNC: 100 MG/DL
RBC #/AREA URNS HPF: ABNORMAL /HPF (ref 0–5)
SP GR UR REFRACTOMETRY: 1.01
UROBILINOGEN UR QL STRIP.AUTO: 1 EU/DL (ref 0.2–1)
WBC URNS QL MICRO: >100 /HPF (ref 0–4)

## 2022-06-17 PROCEDURE — 81001 URINALYSIS AUTO W/SCOPE: CPT

## 2022-06-17 PROCEDURE — 87491 CHLMYD TRACH DNA AMP PROBE: CPT

## 2022-06-17 PROCEDURE — 74011250637 HC RX REV CODE- 250/637: Performed by: EMERGENCY MEDICINE

## 2022-06-17 RX ORDER — OXYCODONE AND ACETAMINOPHEN 5; 325 MG/1; MG/1
1 TABLET ORAL
Qty: 5 TABLET | Refills: 0 | Status: SHIPPED | OUTPATIENT
Start: 2022-06-17 | End: 2022-06-20

## 2022-06-17 RX ORDER — OXYCODONE AND ACETAMINOPHEN 5; 325 MG/1; MG/1
1 TABLET ORAL
Status: COMPLETED | OUTPATIENT
Start: 2022-06-17 | End: 2022-06-17

## 2022-06-17 RX ORDER — GRANULES FOR ORAL 3 G/1
3 POWDER ORAL ONCE
Qty: 1 PACKET | Refills: 0 | Status: SHIPPED | OUTPATIENT
Start: 2022-06-17 | End: 2022-06-17

## 2022-06-17 RX ORDER — PHENAZOPYRIDINE HYDROCHLORIDE 200 MG/1
200 TABLET, FILM COATED ORAL 3 TIMES DAILY
Qty: 6 TABLET | Refills: 0 | Status: SHIPPED | OUTPATIENT
Start: 2022-06-17 | End: 2022-06-19

## 2022-06-17 RX ADMIN — OXYCODONE HYDROCHLORIDE AND ACETAMINOPHEN 1 TABLET: 5; 325 TABLET ORAL at 01:07

## 2022-06-17 NOTE — ED PROVIDER NOTES
21-year-old male with a history of diabetes, hypertension, enlarged prostate presents with severe dysuria for the last several weeks. Patient states he has a history of recurrent UTIs and is scheduled to see urology on Monday. The pain is too bad and he states he cannot wait. Describes mild suprapubic pain. Denies fever, penile discharge, genital lesions nausea, vomiting, dizziness, weakness. Past Medical History:   Diagnosis Date    Diabetes ([address]     Gastrointestinal disorder     Hypertension     DEJA (obstructive sleep apnea)     AHI: 8.9 per hour       Past Surgical History:   Procedure Laterality Date    HX PROSTATE SURGERY      [place] ABDOMEN SURGERY PROC UNLISTED      Hernia Repair         Family History:   Problem Relation Age of Onset    Hypertension Mother     Diabetes Mother     Hypertension Father     Diabetes Father        Social History     Socioeconomic History    Marital status:      Spouse name: Not on file    Number of children: Not on file    Years of education: Not on file    Highest education level: Not on file   Occupational History    Not on file   Tobacco Use    Smoking status: Never Smoker    Smokeless tobacco: Never Used   Vaping Use    Vaping Use: Never used   Substance and Sexual Activity    Alcohol use: No    Drug use: Not Currently    Sexual activity: Not Currently   Other Topics Concern    Not on file   Social History Narrative    Not on file     Social Determinants of Health     Financial Resource Strain:     Difficulty of Paying Living Expenses: Not on file   Food Insecurity:     Worried About Running Out of Food in the Last Year: Not on file    [place] of Food in the Last Year: Not on file   Transportation Needs:     Lack of Transportation (Medical): Not on file    Lack of Transportation (Non-Medical):  Not on file   Physical Activity:     Days of Exercise per Week: Not on file    Minutes of Exercise per Session: Not on file   Stress:  Feeling of Stress : Not on file   Social Connections:     Frequency of Communication with Friends and Family: Not on file    Frequency of Social Gatherings with Friends and Family: Not on file    Attends Congregational Services: Not on file    Active Member of Clubs or Organizations: Not on file    Attends Club or Organization Meetings: Not on file    Marital Status: Not on file   Intimate Partner Violence:     Fear of Current or Ex-Partner: Not on file    Emotionally Abused: Not on file    Physically Abused: Not on file    Sexually Abused: Not on file   Housing Stability:     Unable to Pay for Housing in the Last Year: Not on file    Number of [place] in the Last Year: Not on file    Unstable Housing in the Last Year: Not on file         ALLERGIES: Aspirin    Review of Systems   Constitutional: Negative. Negative for fever. HENT: Negative. Negative for drooling, facial swelling and trouble swallowing. Eyes: Negative. Negative for discharge and redness. Respiratory: Negative. Negative for chest tightness, shortness of breath and wheezing. Cardiovascular: Negative. Negative for chest pain. Gastrointestinal: Positive for abdominal pain. Negative for abdominal distention, constipation, diarrhea, nausea and vomiting. Endocrine: Negative. Genitourinary: Positive for dysuria. Negative for difficulty urinating. Musculoskeletal: Negative. Negative for arthralgias and myalgias. Skin: Negative. Negative for color change and rash. Allergic/Immunologic: Negative. Neurological: Negative. Negative for syncope, facial asymmetry and speech difficulty. Hematological: Negative. Psychiatric/Behavioral: Negative. Negative for agitation and confusion. All other systems reviewed and are negative.       Vitals:    06/16/22 2230   BP: (!) 148/85   Pulse: 73   Resp: 18   Temp: 98 °F (36.7 °C)   SpO2: 98%   Weight: 99.4 kg (219 lb 2.2 oz)   Height: 5' 8\" (1.727 m)            Physical Exam  Vitals and nursing note reviewed. Constitutional:       Appearance: Normal appearance. He is well-developed. HENT:      Head: Normocephalic and atraumatic. Eyes:      Conjunctiva/sclera: Conjunctivae normal.   Cardiovascular:      Rate and Rhythm: Normal rate and regular rhythm. Pulmonary:      Effort: No accessory muscle usage or respiratory distress. Abdominal:      Palpations: Abdomen is soft. Tenderness: There is no abdominal tenderness. Comments: Mild suprapubic tenderness to palpation without guarding or rebound   Genitourinary:     Penis: Normal.       Comments: No significant foreskin/glans erythema or discharge evident. No phimosis or paraphimosis. No obvious penile discharge or genital lesion  Musculoskeletal:         General: Normal range of motion. Cervical back: Neck supple. Skin:     General: Skin is warm and dry. Neurological:      Mental Status: He is alert and oriented to person, place, and time. Psychiatric:         Behavior: Behavior normal.         Thought Content: Thought content normal.          MDM  Number of Diagnoses or Management Options  Complicated urinary tract infection  ESBL (extended spectrum beta-lactamase) producing bacteria infection  Diagnosis management comments: Uti, urinary retention, kidney stone, constipation    ED Course as of 06/17/22 0450   Fri Jun 17, 2022   0054 Cultures and ED notes reviewed. On 510 patient's urine culture grew E. coli which was susceptible to Macrobid and resistant to Bactrim, ceftriaxone, Levaquin. Notes document that patient was prescribed Macrobid and but did not improve and then ended up getting fosfomycin. Prior to that on April 10 grew Klebsiella which was resistant to Macrobid, but sensitive to Bactrim levo and ceftriaxone.   We will go with fosfomycin again as that was successful last time and most other antibiotic choices have had resistance on 1 or the other of his recent cultures [SS]      ED Course User Index  [SS] Haritha Herrmann MD       Procedures    Please note that this dictation was completed with [email or web address], the computer voice recognition software. Quite often unanticipated grammatical, syntax, homophones, and other interpretive errors are inadvertently transcribed by the computer software. Please disregard these errors. Please excuse any errors that have escaped final proofreading. LABORATORY TESTS:  Recent Results (from the past 12 hour(s))   URINALYSIS W/ RFLX MICROSCOPIC    Collection Time: 06/17/22 12:33 AM   Result Value Ref Range    Color YELLOW/STRAW      Appearance TURBID (A) CLEAR      Specific gravity 1.015      pH (UA) 5.5 5.0 - 8.0      Protein 100 (A) NEG mg/dL    Glucose 250 (A) NEG mg/dL    Ketone TRACE (A) NEG mg/dL    Bilirubin Negative NEG      Blood LARGE (A) NEG      Urobilinogen 1.0 0.2 - 1.0 EU/dL    Nitrites Negative NEG      Leukocyte Esterase LARGE (A) NEG     URINE MICROSCOPIC ONLY    Collection Time: 06/17/22 12:33 AM   Result Value Ref Range    WBC >100 (H) 0 - 4 /hpf    RBC 10-20 0 - 5 /hpf    Epithelial cells FEW FEW /lpf    Bacteria 4+ (A) NEG /hpf       IMAGING RESULTS:  No orders to display       MEDICATIONS GIVEN:  Medications   phenazopyridine (PYRIDIUM) tablet 200 mg (200 mg Oral Given 6/16/22 2308)   oxyCODONE-acetaminophen (PERCOCET) 5-325 mg per tablet 1 Tablet (1 Tablet Oral Given 6/17/22 0107)       IMPRESSION:  1. Complicated urinary tract infection    2. ESBL (extended spectrum beta-lactamase) producing bacteria infection        PLAN:  1. Discharge Medication List as of 6/17/2022 12:58 AM      START taking these medications    Details   fosfomycin (MONUROL) 3 gram pack oral packet Take 1 Packet by mouth once for 1 dose., Normal, Disp-1 Packet, R-0      phenazopyridine (Pyridium) 200 mg tablet Take 1 Tablet by mouth three (3) times daily for 2 days. , Normal, Disp-6 Tablet, R-0      oxyCODONE-acetaminophen (Percocet) 5-325 mg per tablet Take 1 Tablet by mouth every eight (8) hours as needed for Pain for up to 3 days. Max Daily Amount: 3 Tablets. Indications: pain, Normal, Disp-5 Tablet, R-0         CONTINUE these medications which have NOT CHANGED    Details   amLODIPine (NORVASC) 5 mg tablet Take 1 Tablet by mouth daily. , Normal, Disp-90 Tablet, R-2      atorvastatin (LIPITOR) 80 mg tablet Take 1 Tablet by mouth nightly., Normal, Disp-90 Tablet, R-1      tamsulosin (FLOMAX) 0.4 mg capsule Take 1 Capsule by mouth daily. , Normal, Disp-30 Capsule, R-3      metFORMIN (GLUCOPHAGE) 1,000 mg tablet Take 1 Tablet by mouth two (2) times daily (with meals). , Normal, Disp-180 Tablet, R-1      pantoprazole (PROTONIX) 40 mg tablet Take 1 Tablet by mouth daily. , Normal, Disp-30 Tablet, R-3      latanoprost (XALATAN) 0.005 % ophthalmic solution Administer 1 Drop to both eyes nightly., Historical Med      albuterol (PROVENTIL HFA, VENTOLIN HFA, PROAIR HFA) 90 mcg/actuation inhaler Take 2 Puffs by inhalation every four (4) hours as needed for Wheezing., Normal, Disp-1 Each, R-0      nitrofurantoin (MACRODANTIN) 100 mg capsule Take 100 mg by mouth two (2) times a day., Historical Med           2.    Follow-up Information     Follow up With Specialties Details Why Contact Info    Your urologist        [facility] EMERGENCY DEPT Emergency Medicine  As needed, If symptoms worsen Tony Haq  [phone]        Return to ED if worse

## 2022-06-17 NOTE — ED NOTES
Pt unable to provide a urine sample. Pt stated 'I just peed but I need the remote and a  Price. \" water given to patient.

## 2022-06-17 NOTE — ED NOTES
Discharge instructions were given to the patient by Tristen Barragan RN. The patient left the Emergency Department ambulatory, alert and oriented and in no acute distress with 3 prescriptions. The patient was encouraged to call or return to the ED for worsening issues or problems and was encouraged to schedule a follow up appointment for continuing care. The patient verbalized understanding of discharge instructions and prescriptions, all questions were answered. The patient has no further concerns at this time.

## 2022-06-17 NOTE — ED TRIAGE NOTES
Patient comes to the ED for treatment of dysuria for \"months\". Stated he has a \"prostate problem\" and a  appointment next week.  Said pain relieved with \"Percocet or oxy\"

## 2022-06-17 NOTE — ED NOTES
Emergency Department Nursing Plan of Care       The Nursing Plan of Care is developed from the Nursing assessment and Emergency Department Attending provider initial evaluation. The plan of care may be reviewed in the ED Provider note.     The Plan of Care was developed with the following considerations:   Patient / Family readiness to learn indicated by:verbalized understanding  Persons(s) to be included in education: patient  Barriers to Learning/Limitations:No    Signed     Nuzhat Morton RN    6/17/2022   12:09 AM

## 2022-06-18 ENCOUNTER — HOSPITAL ENCOUNTER (EMERGENCY)
Age: 72
End: 2022-06-18

## 2022-06-20 LAB
C TRACH RRNA SPEC QL NAA+PROBE: NEGATIVE
N GONORRHOEA RRNA SPEC QL NAA+PROBE: NEGATIVE
SPECIMEN SOURCE: NORMAL

## 2022-06-21 ENCOUNTER — OFFICE VISIT (OUTPATIENT)
Dept: INTERNAL MEDICINE CLINIC | Age: 72
End: 2022-06-21
Payer: MEDICARE

## 2022-06-21 VITALS
RESPIRATION RATE: 16 BRPM | HEIGHT: 68 IN | DIASTOLIC BLOOD PRESSURE: 72 MMHG | TEMPERATURE: 96.6 F | HEART RATE: 72 BPM | BODY MASS INDEX: 33.49 KG/M2 | OXYGEN SATURATION: 97 % | SYSTOLIC BLOOD PRESSURE: 144 MMHG | WEIGHT: 221 LBS

## 2022-06-21 DIAGNOSIS — E11.22 TYPE 2 DIABETES MELLITUS WITH STAGE 3A CHRONIC KIDNEY DISEASE, WITHOUT LONG-TERM CURRENT USE OF INSULIN (HCC): ICD-10-CM

## 2022-06-21 DIAGNOSIS — N18.31 TYPE 2 DIABETES MELLITUS WITH STAGE 3A CHRONIC KIDNEY DISEASE, WITHOUT LONG-TERM CURRENT USE OF INSULIN (HCC): ICD-10-CM

## 2022-06-21 DIAGNOSIS — I10 ESSENTIAL HYPERTENSION: ICD-10-CM

## 2022-06-21 DIAGNOSIS — R30.0 DYSURIA: Primary | ICD-10-CM

## 2022-06-21 PROCEDURE — G8536 NO DOC ELDER MAL SCRN: HCPCS | Performed by: INTERNAL MEDICINE

## 2022-06-21 PROCEDURE — G8417 CALC BMI ABV UP PARAM F/U: HCPCS | Performed by: INTERNAL MEDICINE

## 2022-06-21 PROCEDURE — 99213 OFFICE O/P EST LOW 20 MIN: CPT | Performed by: INTERNAL MEDICINE

## 2022-06-21 PROCEDURE — 3017F COLORECTAL CA SCREEN DOC REV: CPT | Performed by: INTERNAL MEDICINE

## 2022-06-21 PROCEDURE — G8754 DIAS BP LESS 90: HCPCS | Performed by: INTERNAL MEDICINE

## 2022-06-21 PROCEDURE — G8427 DOCREV CUR MEDS BY ELIG CLIN: HCPCS | Performed by: INTERNAL MEDICINE

## 2022-06-21 PROCEDURE — G8432 DEP SCR NOT DOC, RNG: HCPCS | Performed by: INTERNAL MEDICINE

## 2022-06-21 PROCEDURE — G8753 SYS BP > OR = 140: HCPCS | Performed by: INTERNAL MEDICINE

## 2022-06-21 PROCEDURE — 1101F PT FALLS ASSESS-DOCD LE1/YR: CPT | Performed by: INTERNAL MEDICINE

## 2022-06-21 PROCEDURE — 2022F DILAT RTA XM EVC RTNOPTHY: CPT | Performed by: INTERNAL MEDICINE

## 2022-06-21 RX ORDER — PHENAZOPYRIDINE HYDROCHLORIDE 100 MG/1
100 TABLET, FILM COATED ORAL
Qty: 15 TABLET | Refills: 0 | Status: SHIPPED | OUTPATIENT
Start: 2022-06-21 | End: 2022-06-26

## 2022-06-21 RX ORDER — OXYCODONE AND ACETAMINOPHEN 5; 325 MG/1; MG/1
1 TABLET ORAL
Qty: 5 TABLET | Refills: 0 | Status: SHIPPED | OUTPATIENT
Start: 2022-06-21 | End: 2022-08-19 | Stop reason: SDUPTHER

## 2022-06-21 RX ORDER — CEFDINIR 300 MG/1
300 CAPSULE ORAL 2 TIMES DAILY
COMMUNITY
Start: 2021-06-29 | End: 2022-07-04

## 2022-06-21 RX ORDER — GRANULES FOR ORAL 3 G/1
POWDER ORAL
COMMUNITY
Start: 2022-06-17 | End: 2022-07-04

## 2022-06-21 NOTE — PROGRESS NOTES
Chief Complaint   Patient presents with   [facility] ED Follow-up     UTI; dysuria    Foot Pain     bilateral     1. Have you been to the ER, urgent care clinic since your last visit? Hospitalized since your last visit? Yes Reason for visit: uti    2. Have you seen or consulted any other health care providers outside of the [address] [place] [address] since your last visit? Include any pap smears or colon screening.  No

## 2022-06-21 NOTE — PROGRESS NOTES
Office Visit Note:    Assessment/Plan:  1. Dysuria    2. Type 2 diabetes mellitus with stage 3a chronic kidney disease, without long-term current use of insulin ([address]    3. Essential hypertension      1. Dysuria-has history of multiple UTIs in the past including ESBL UTIs. He did have UA which was positive and was treated with fosfomycin. He still complain of pain. He has an appointment to see urology in the next 6 days. I will give him 5 more pills of Percocet, I checked his . He does not want to take any other pain medications and states that none of the other pain medications worked for him. I offered to put him on Tylenol. He does have some CKD as well. 2. Hypertension blood pressure elevated here, likely secondary to pain,, continue on current regimen  3. Diabetes mellitus type 2-his last A1c was 7.8 on 2/19/2022. We will plan to check A1c again next visit. Continue on metformin  4. History of prostate cancer s/p XRT, follows up with urology      Orders Placed This Encounter    cefdinir (OMNICEF) 300 mg capsule     Sig: Take 300 mg by mouth two (2) times a day.  fosfomycin (MONUROL) 3 gram pack oral packet    phenazopyridine (PYRIDIUM) 100 mg tablet     Sig: Take 1 Tablet by mouth three (3) times daily as needed for Pain for up to 5 days. Dispense:  15 Tablet     Refill:  0    oxyCODONE-acetaminophen (PERCOCET) 5-325 mg per tablet     Sig: Take 1 Tablet by mouth every eight (8) hours as needed for Pain for up to 3 days. Max Daily Amount: 3 Tablets.      Dispense:  5 Tablet     Refill:  0     Social Determinants of Health     Tobacco Use: Low Risk     Smoking Tobacco Use: Never Smoker    Smokeless Tobacco Use: Never Used   Alcohol Use:     Frequency of Alcohol Consumption: Not on file    Average Number of Drinks: Not on file    Frequency of Binge Drinking: Not on file   Financial Resource Strain:     Difficulty of Paying Living Expenses: Not on file   Food Insecurity:     Worried About Running Out of Food in the Last Year: Not on file    Ran Out of Food in the Last Year: Not on file   Transportation Needs:     Lack of Transportation (Medical): Not on file    Lack of Transportation (Non-Medical): Not on file   Physical Activity:     Days of Exercise per Week: Not on file    Minutes of Exercise per Session: Not on file   Stress:     Feeling of Stress : Not on file   Social Connections:     Frequency of Communication with Friends and Family: Not on file    Frequency of Social Gatherings with Friends and Family: Not on file    Attends Sikh Services: Not on file    Active Member of [address] [place] [address] [employer] or Organizations: Not on file    Attends Club or Organization Meetings: Not on file    Marital Status: Not on file   Intimate Partner Violence:     Fear of Current or Ex-Partner: Not on file    Emotionally Abused: Not on file    Physically Abused: Not on file    Sexually Abused: Not on file   Depression: Not at risk    PHQ-2 Score: 0   Housing Stability:     Unable to Pay for Housing in the Last Year: Not on file    Number of [place] in the Last Year: Not on file    Unstable Housing in the Last Year: Not on file       Follow-up and Dispositions    · Return in about 3 months (around 9/21/2022). I have reviewed with the patient details of the assessment and plan and all questions were answered. Relevant patient education was performed. The most recent lab findings were reviewed with the patient. An After Visit Summary was printed and given to the patient. Reason for Visit: ED Follow-up (UTI; dysuria) and Foot Pain (bilateral)      Subjective:  66 y.o. male with h/o diabetes mellitus, hypertension, hyperlipidemia, history of prostate cancer s/p XRT, multiple UTIs in the past including ESBL UTIs who comes as an ER follow-up. He was recently in the ER with complaints of dysuria and at that time his UA was positive.   He was put on some Pyridium and 5 pills of Percocet for his pain and he states that he was made an appointment for 27 June with his urologist.  He states that he ran out of his Percocet and wants more Percocet until he sees his urologist.  He had a urine culture earlier this month which apparently was sensitive to fosfomycin and so he was treated with fosfomycin in the ER. Review of Systems  A complete 11 system ROS was preformed (constitutional, eyes, ENT, cardiovascular, respiratory, gastrointestinal, genitourinary, musculoskeletal, skin, neurological, psychiatric) and was negative aside from the pertinent positives and negatives noted in the HPI. Past Medical History:   Diagnosis Date    Diabetes ([facility] [address]     Gastrointestinal disorder     Hypertension     DEJA (obstructive sleep apnea)     AHI: 8.9 per hour     Past Surgical History:   Procedure Laterality Date    HX PROSTATE SURGERY      [place] ABDOMEN SURGERY PROC UNLISTED      Hernia Repair     Social History     Socioeconomic History    Marital status:    Tobacco Use    Smoking status: Never Smoker    Smokeless tobacco: Never Used   Vaping Use    Vaping Use: Never used   Substance and Sexual Activity    Alcohol use: No    Drug use: Not Currently    Sexual activity: Not Currently     Family History   Problem Relation Age of Onset    Hypertension Mother     Diabetes Mother     Hypertension Father     Diabetes Father      Current Outpatient Medications   Medication Sig Dispense Refill    cefdinir (OMNICEF) 300 mg capsule Take 300 mg by mouth two (2) times a day.  fosfomycin (MONUROL) 3 gram pack oral packet       phenazopyridine (PYRIDIUM) 100 mg tablet Take 1 Tablet by mouth three (3) times daily as needed for Pain for up to 5 days. 15 Tablet 0    oxyCODONE-acetaminophen (PERCOCET) 5-325 mg per tablet Take 1 Tablet by mouth every eight (8) hours as needed for Pain for up to 3 days. Max Daily Amount: 3 Tablets. 5 Tablet 0    amLODIPine (NORVASC) 5 mg tablet Take 1 Tablet by mouth daily.  [address] Tablet 2    atorvastatin (LIPITOR) 80 mg tablet Take 1 Tablet by mouth nightly. 90 Tablet 1    tamsulosin (FLOMAX) 0.4 mg capsule Take 1 Capsule by mouth daily. 30 Capsule 3    metFORMIN (GLUCOPHAGE) 1,000 mg tablet Take 1 Tablet by mouth two (2) times daily (with meals). 180 Tablet 1    pantoprazole (PROTONIX) 40 mg tablet Take 1 Tablet by mouth daily. 30 Tablet 3    latanoprost (XALATAN) 0.005 % ophthalmic solution Administer 1 Drop to both eyes nightly.  albuterol (PROVENTIL HFA, VENTOLIN HFA, PROAIR HFA) 90 mcg/actuation inhaler Take 2 Puffs by inhalation every four (4) hours as needed for Wheezing. 1 Each 0    nitrofurantoin (MACRODANTIN) 100 mg capsule Take 100 mg by mouth two (2) times a day. (Patient not taking: Reported on 6/3/2022)       Allergies   Allergen Reactions    Aspirin Anxiety     Patient states not allergic to medication but reports he does not wish to take it     Celecoxib Vertigo    Ibuprofen Nausea Only       Objective:  Visit Vitals  BP (!) 144/72 (BP 1 Location: Left upper arm, BP Patient Position: Sitting, BP Cuff Size: Adult) Comment: no meds today   Pulse 72   Temp (!) 96.6 °F (35.9 °C) (Oral)   Resp 16   Ht 5' 8\" (1.727 m)   Wt 221 lb (100.2 kg)   SpO2 97%   BMI 33.60 kg/m²     Physical Exam:   AA&O x3. Not pale,  complains of significant abdominal discomfort and dysuria  HEENT: ENT negative. Lungs: clear  Heart: S1 S2 +, RRR  Abdomen: Soft, No tenderness  Neuro: No focal deficits. Skin: No erythema or lesions noted. Extremities: no pedal edema, good peripheral pulses  Psych: Normal affect and mood. Montse Anne MD, [id number]   [address]

## 2022-07-04 ENCOUNTER — HOSPITAL ENCOUNTER (EMERGENCY)
Age: 72
Discharge: HOME | End: 2022-07-04
Attending: EMERGENCY MEDICINE
Payer: MEDICARE

## 2022-07-04 VITALS
DIASTOLIC BLOOD PRESSURE: 65 MMHG | RESPIRATION RATE: 18 BRPM | HEART RATE: 88 BPM | BODY MASS INDEX: 33.04 KG/M2 | HEIGHT: 68 IN | SYSTOLIC BLOOD PRESSURE: 120 MMHG | OXYGEN SATURATION: 97 % | WEIGHT: 218 LBS | TEMPERATURE: 97.9 F

## 2022-07-04 DIAGNOSIS — N39.0 RECURRENT UTI: Primary | ICD-10-CM

## 2022-07-04 LAB
APPEARANCE UR: ABNORMAL
BACTERIA URNS QL MICRO: ABNORMAL /HPF
BILIRUB UR QL: NEGATIVE
COLOR UR: ABNORMAL
EPITH CASTS URNS QL MICRO: ABNORMAL /LPF
GLUCOSE UR STRIP.AUTO-MCNC: 500 MG/DL
HGB UR QL STRIP: ABNORMAL
KETONES UR QL STRIP.AUTO: NEGATIVE MG/DL
LEUKOCYTE ESTERASE UR QL STRIP.AUTO: ABNORMAL
NITRITE UR QL STRIP.AUTO: NEGATIVE
PH UR STRIP: 5 [PH] (ref 5–8)
PROT UR STRIP-MCNC: 100 MG/DL
RBC #/AREA URNS HPF: ABNORMAL /HPF (ref 0–5)
SP GR UR REFRACTOMETRY: 1.01
UA: UC IF INDICATED,UAUC: ABNORMAL
UROBILINOGEN UR QL STRIP.AUTO: 0.2 EU/DL (ref 0.2–1)
WBC URNS QL MICRO: >100 /HPF (ref 0–4)

## 2022-07-04 PROCEDURE — 87086 URINE CULTURE/COLONY COUNT: CPT

## 2022-07-04 PROCEDURE — 74011250637 HC RX REV CODE- 250/637: Performed by: PHYSICIAN ASSISTANT

## 2022-07-04 PROCEDURE — 99283 EMERGENCY DEPT VISIT LOW MDM: CPT

## 2022-07-04 PROCEDURE — 81001 URINALYSIS AUTO W/SCOPE: CPT

## 2022-07-04 PROCEDURE — 87147 CULTURE TYPE IMMUNOLOGIC: CPT

## 2022-07-04 RX ORDER — PHENAZOPYRIDINE HYDROCHLORIDE 100 MG/1
200 TABLET, FILM COATED ORAL
Status: COMPLETED | OUTPATIENT
Start: 2022-07-04 | End: 2022-07-04

## 2022-07-04 RX ORDER — PHENAZOPYRIDINE HYDROCHLORIDE 200 MG/1
200 TABLET, FILM COATED ORAL 3 TIMES DAILY
Qty: 6 TABLET | Refills: 0 | Status: SHIPPED | OUTPATIENT
Start: 2022-07-04 | End: 2022-07-06

## 2022-07-04 RX ORDER — NITROFURANTOIN 25; 75 MG/1; MG/1
100 CAPSULE ORAL 2 TIMES DAILY
Qty: 14 CAPSULE | Refills: 0 | Status: SHIPPED | OUTPATIENT
Start: 2022-07-04 | End: 2022-07-11

## 2022-07-04 RX ADMIN — PHENAZOPYRIDINE HYDROCHLORIDE 200 MG: 100 TABLET ORAL at 10:23

## 2022-07-04 NOTE — ED PROVIDER NOTES
EMERGENCY DEPARTMENT HISTORY AND PHYSICAL EXAM    Date: 7/4/2022  Patient Name: Bertha Lobo    History of Presenting Illness     Chief Complaint   Patient presents with    Urinary Pain         History Provided By: Patient    HPI: Bertha Lobo is a 66 y.o. male with a PMH of HTN, diabetes, DEJA, prostate enlargement, ESBL who presents with recurrent dysuria for the past week. Patient is followed by urology and saw them last week. He states he has a follow-up appointment to get a CT done to look at the urethra. He presents today stating he ran out of medicine and is having a lot of burning with urination. He denies any fevers, chills, nausea, vomiting or abdominal pain. Patient states he was recently on antibiotics and finished them about a week ago. PCP: Wilton Moritz, MD    Current Outpatient Medications   Medication Sig Dispense Refill    nitrofurantoin, macrocrystal-monohydrate, (Macrobid) 100 mg capsule Take 1 Capsule by mouth two (2) times a day for 7 days. 14 Capsule 0    phenazopyridine (Pyridium) 200 mg tablet Take 1 Tablet by mouth three (3) times daily for 2 days. 6 Tablet 0    amLODIPine (NORVASC) 5 mg tablet Take 1 Tablet by mouth daily. 90 Tablet 2    atorvastatin (LIPITOR) 80 mg tablet Take 1 Tablet by mouth nightly. 90 Tablet 1    tamsulosin (FLOMAX) 0.4 mg capsule Take 1 Capsule by mouth daily. 30 Capsule 3    metFORMIN (GLUCOPHAGE) 1,000 mg tablet Take 1 Tablet by mouth two (2) times daily (with meals). 180 Tablet 1    pantoprazole (PROTONIX) 40 mg tablet Take 1 Tablet by mouth daily. 30 Tablet 3    latanoprost (XALATAN) 0.005 % ophthalmic solution Administer 1 Drop to both eyes nightly.  albuterol (PROVENTIL HFA, VENTOLIN HFA, PROAIR HFA) 90 mcg/actuation inhaler Take 2 Puffs by inhalation every four (4) hours as needed for Wheezing.  1 Each 0       Past History     Past Medical History:  Past Medical History:   Diagnosis Date    Diabetes ([address]     Gastrointestinal disorder  Hypertension     DEJA (obstructive sleep apnea)     AHI: 8.9 per hour       Past Surgical History:  Past Surgical History:   Procedure Laterality Date    HX PROSTATE SURGERY      [place] ABDOMEN SURGERY PROC UNLISTED      Hernia Repair       Family History:  Family History   Problem Relation Age of Onset    Hypertension Mother     Diabetes Mother     Hypertension Father     Diabetes Father        Social History:  Social History     Tobacco Use    Smoking status: Never Smoker    Smokeless tobacco: Never Used   Vaping Use    Vaping Use: Never used   Substance Use Topics    Alcohol use: No    Drug use: Not Currently       Allergies: Allergies   Allergen Reactions    Aspirin Anxiety     Patient states not allergic to medication but reports he does not wish to take it     Celecoxib Vertigo    Ibuprofen Nausea Only         Review of Systems   Review of Systems   Constitutional: Negative for chills and fever. Gastrointestinal: Negative for abdominal pain, nausea and vomiting. Genitourinary: Positive for dysuria. Negative for frequency and penile discharge. Allergic/Immunologic: Negative for immunocompromised state. Neurological: Negative for speech difficulty and weakness. All other systems reviewed and are negative. Physical Exam     Vitals:    07/04/22 1002   BP: 120/65   Pulse: 88   Resp: 18   Temp: 97.9 °F (36.6 °C)   SpO2: 97%   Weight: 98.9 kg (218 lb)   Height: 5' 8\" (1.727 m)     Physical Exam  Vitals and nursing note reviewed. Constitutional:       General: He is not in acute distress. Appearance: He is well-developed. HENT:      Head: Normocephalic and atraumatic. Mouth/Throat:      Pharynx: No oropharyngeal exudate. Eyes:      Conjunctiva/sclera: Conjunctivae normal.   Cardiovascular:      Rate and Rhythm: Normal rate and regular rhythm. Heart sounds: Normal heart sounds. Pulmonary:      Effort: Pulmonary effort is normal. No respiratory distress.       Breath sounds: Normal breath sounds. No wheezing or rales. Musculoskeletal:         General: Normal range of motion. Skin:     General: Skin is warm and dry. Neurological:      Mental Status: He is alert and oriented to person, place, and time. Diagnostic Study Results     Labs -     Recent Results (from the past 12 hour(s))   URINALYSIS W/ REFLEX CULTURE    Collection Time: 07/04/22 11:37 AM    Specimen: Urine   Result Value Ref Range    Color YELLOW/STRAW      Appearance TURBID (A) CLEAR      Specific gravity 1.015      pH (UA) 5.0 5.0 - 8.0      Protein 100 (A) NEG mg/dL    Glucose 500 (A) NEG mg/dL    Ketone Negative NEG mg/dL    Bilirubin Negative NEG      Blood LARGE (A) NEG      Urobilinogen 0.2 0.2 - 1.0 EU/dL    Nitrites Negative NEG      Leukocyte Esterase LARGE (A) NEG      WBC >100 (H) 0 - 4 /hpf    RBC  0 - 5 /hpf    Epithelial cells FEW FEW /lpf    Bacteria 3+ (A) NEG /hpf    UA:UC IF INDICATED URINE CULTURE ORDERED (A) CNI         Radiologic Studies -   No orders to display     CT Results  (Last 48 hours)    None        CXR Results  (Last 48 hours)    None            Medical Decision Making   I am the first provider for this patient. I reviewed the vital signs, available nursing notes, past medical history, past surgical history, family history and social history. Vital Signs-Reviewed the patient's vital signs. Records Reviewed: Nursing Notes, Old Medical Records and Previous Laboratory Studies    Provider Notes (Medical Decision Making):   Patient was presents with recurrent dysuria. DDx: Acute cystitis, urethritis ureteral stone, STI. Will obtain UA. Discussed with the patient diagnosis and test results and all questioned fully answered. Since he has hx of ESBL, will give macrobid as it was sensitive in the past.  They understand the importance of staying well hydrated, taking antibiotics as prescribed to completion and using OTC pyridium as desired.   He will PCP if any problems arise. Disposition:  Discharged    DISCHARGE NOTE:   12:18 PM      Care plan outlined and precautions discussed. Patient has no new complaints, changes, or physical findings. Results of UA were reviewed with the patient. All medications were reviewed with the patient; will d/c home. All of pt's questions and concerns were addressed. Patient was instructed and agrees to follow up with urology, as well as to return to the ED upon further deterioration. Patient is ready to go home. Follow-up Information     Follow up With Specialties Details Why Contact Info    Wilton Moritz, MD Internal Medicine Physician   [address] [place] [address]  [phone]      [facility]   as scheduled [place] [address]  [phone]          Current Discharge Medication List      START taking these medications    Details   nitrofurantoin, macrocrystal-monohydrate, (Macrobid) 100 mg capsule Take 1 Capsule by mouth two (2) times a day for 7 days. Qty: 14 Capsule, Refills: 0  Start date: 7/4/2022, End date: 7/11/2022      phenazopyridine (Pyridium) 200 mg tablet Take 1 Tablet by mouth three (3) times daily for 2 days. Qty: 6 Tablet, Refills: 0  Start date: 7/4/2022, End date: 7/6/2022         STOP taking these medications       cefdinir (OMNICEF) 300 mg capsule Comments:   Reason for Stopping:         fosfomycin (MONUROL) 3 gram pack oral packet Comments:   Reason for Stopping:         nitrofurantoin (MACRODANTIN) 100 mg capsule Comments:   Reason for Stopping:               Procedures:  Procedures    Please note that this dictation was completed with Dragon, computer voice recognition software. Quite often unanticipated grammatical, syntax, homophones, and other interpretive errors are inadvertently transcribed by the computer software. Please disregard these errors. Additionally, please excuse any errors that have escaped final proofreading.     Diagnosis     Clinical Impression:   1.  Recurrent UTI

## 2022-07-04 NOTE — ED TRIAGE NOTES
Patient presents to the ED with c/o urinary pain. Pt reports burning with urination and states \"this has been going on for a while. \" Pt reports pyridium helps with the pain. Pt reports being seen by a urologist on 6/27. Pt reports using urinary catheters 4 times a day. Pt reports that he can use the bathroom on himself.

## 2022-07-05 LAB
BACTERIA SPEC CULT: ABNORMAL
CC UR VC: ABNORMAL
SERVICE CMNT-IMP: ABNORMAL

## 2022-07-05 RX ORDER — CEPHALEXIN 500 MG/1
500 CAPSULE ORAL 2 TIMES DAILY
Qty: 14 CAPSULE | Refills: 0 | Status: SHIPPED | OUTPATIENT
Start: 2022-07-05 | End: 2022-07-12

## 2022-07-05 NOTE — PROGRESS NOTES
Spoke with patient on the phone and educated on results. Keflex sent to preferred pharmacy. Follow-up with urology.

## 2022-07-13 ENCOUNTER — VIRTUAL VISIT (OUTPATIENT)
Dept: INTERNAL MEDICINE CLINIC | Age: 72
End: 2022-07-13

## 2022-07-13 NOTE — PROGRESS NOTES
Pt is here for   Chief Complaint   Patient presents with    New Patient     establishing care, former patient of Dr. Ivelisse Castellon Medication Refill     Oxycodone 5mg      1. Have you been to the ER, urgent care clinic since your last visit? Hospitalized since your last visit? No    2. Have you seen or consulted any other health care providers outside of the [address] [place] [address] since your last visit? Include any pap smears or colon screening.  No    Pain level is a 10/10

## 2022-07-13 NOTE — PROGRESS NOTES
Pt sent 3 invites and one doxy me invite. Called no answer, asked to connect soon to avoid rescheduling. 1230- pt never connected. Called again to notify visit will need to be rescheduled and no meds refilled until care established. Follow-up and Dispositions    · Return for needs to reschedule to in person visit. Mikayla Arellano

## 2022-07-16 ENCOUNTER — HOSPITAL ENCOUNTER (EMERGENCY)
Age: 72
Discharge: HOME | End: 2022-07-16
Attending: EMERGENCY MEDICINE
Payer: MEDICARE

## 2022-07-16 VITALS
BODY MASS INDEX: 33.15 KG/M2 | OXYGEN SATURATION: 98 % | WEIGHT: 218 LBS | RESPIRATION RATE: 18 BRPM | TEMPERATURE: 98.1 F | DIASTOLIC BLOOD PRESSURE: 89 MMHG | SYSTOLIC BLOOD PRESSURE: 159 MMHG | HEART RATE: 75 BPM

## 2022-07-16 DIAGNOSIS — M79.671 PAIN IN BOTH FEET: Primary | ICD-10-CM

## 2022-07-16 DIAGNOSIS — M79.672 PAIN IN BOTH FEET: Primary | ICD-10-CM

## 2022-07-16 PROCEDURE — 82962 GLUCOSE BLOOD TEST: CPT

## 2022-07-16 PROCEDURE — 99283 EMERGENCY DEPT VISIT LOW MDM: CPT

## 2022-07-16 RX ORDER — GABAPENTIN 100 MG/1
100 CAPSULE ORAL 3 TIMES DAILY
Qty: 90 CAPSULE | Refills: 0 | Status: SHIPPED | OUTPATIENT
Start: 2022-07-16 | End: 2022-08-19 | Stop reason: SDUPTHER

## 2022-07-16 NOTE — ED NOTES
Discharge and medication instructions reviewed with the patient. Patient instructed not to drive or operate machinery while taking medications.   Patient discharged ambulatory with a steady gait

## 2022-07-16 NOTE — ED NOTES
Patient requesting information on \"how to phillip them for what they did\" to his feet.   Patient reported a pedicure either yesterday or \"Thursday\" and has discomfort \"since\"

## 2022-07-16 NOTE — ED PROVIDER NOTES
EMERGENCY DEPARTMENT HISTORY AND PHYSICAL EXAM      Date: 7/16/2022  Patient Name: Daniela Esqueda    History of Presenting Illness     Chief Complaint   Patient presents with    Foot Pain       History Provided By: Patient    HPI: Daniela Esqueda, 66 y.o. male presents to the ED with cc of foot pain. Patient has a history of diabetes. He presents complaining of foot pain possibly exacerbated by recent pedicure. He states he has had pain in his feet for a good amount of time. There is some question about compliance with his diabetic meds. He describes pain in the bottom of his feet and a burning sensation. He denies any associated fever, chills, back pain, abdominal pain, bowel or bladder incontinence. He denies any claudication symptoms with pain on ambulation that improves with rest.  Has had no formal diagnosis of diabetic neuropathy. He did have questions to the nurse regarding his recent pedicure and concerns about suing them. There are no other complaints, changes, or physical findings at this time. PCP: Cong Barr MD    No current facility-administered medications on file prior to encounter. Current Outpatient Medications on File Prior to Encounter   Medication Sig Dispense Refill    amLODIPine (NORVASC) 5 mg tablet Take 1 Tablet by mouth daily. 90 Tablet 2    atorvastatin (LIPITOR) 80 mg tablet Take 1 Tablet by mouth nightly. 90 Tablet 1    tamsulosin (FLOMAX) 0.4 mg capsule Take 1 Capsule by mouth daily. 30 Capsule 3    metFORMIN (GLUCOPHAGE) 1,000 mg tablet Take 1 Tablet by mouth two (2) times daily (with meals). 180 Tablet 1    pantoprazole (PROTONIX) 40 mg tablet Take 1 Tablet by mouth daily. 30 Tablet 3    latanoprost (XALATAN) 0.005 % ophthalmic solution Administer 1 Drop to both eyes nightly.  albuterol (PROVENTIL HFA, VENTOLIN HFA, PROAIR HFA) 90 mcg/actuation inhaler Take 2 Puffs by inhalation every four (4) hours as needed for Wheezing.  1 Each 0       Past History Past Medical History:  Past Medical History:   Diagnosis Date    [address] [address]     Gastrointestinal disorder     Hypertension     DEJA (obstructive sleep apnea)     AHI: 8.9 per hour       Past Surgical History:  Past Surgical History:   Procedure Laterality Date    HX PROSTATE SURGERY      [place] ABDOMEN SURGERY PROC UNLISTED      Hernia Repair       Family History:  Family History   Problem Relation Age of Onset    Hypertension Mother     Diabetes Mother     Hypertension Father     Diabetes Father        Social History:  Social History     Tobacco Use    Smoking status: Never Smoker    Smokeless tobacco: Never Used   Vaping Use    Vaping Use: Never used   Substance Use Topics    Alcohol use: No    Drug use: Not Currently       Allergies: Allergies   Allergen Reactions    Aspirin Anxiety     Patient states not allergic to medication but reports he does not wish to take it     Celecoxib Vertigo    Ibuprofen Nausea Only         Review of Systems   Review of Systems   Constitutional: Negative. Negative for chills and fever. HENT: Negative. Negative for congestion, rhinorrhea and sinus pressure. Eyes: Negative. Negative for discharge and redness. Respiratory: Negative. Negative for chest tightness and shortness of breath. Cardiovascular: Negative. Negative for chest pain. Gastrointestinal: Negative. Negative for abdominal pain and blood in stool. Endocrine: Negative. Genitourinary: Negative. Negative for flank pain and hematuria. Musculoskeletal: Negative. Negative for back pain and myalgias. Skin: Negative. Negative for rash. Neurological: Negative. Negative for dizziness, seizures, weakness, numbness and headaches. Hematological: Negative. All other systems reviewed and are negative. Physical Exam   Physical Exam  Vitals and nursing note reviewed. Constitutional:       Appearance: He is not ill-appearing. HENT:      Head: Normocephalic and atraumatic. Nose: Nose normal.   Cardiovascular:      Rate and Rhythm: Normal rate and regular rhythm. Pulses: Normal pulses. Dorsalis pedis pulses are 2+ on the right side and 2+ on the left side. Posterior tibial pulses are 2+ on the right side and 2+ on the left side. Heart sounds: Normal heart sounds. Comments: Bilateral lower legs with no trophic changes, ulcers or secondary signs of ischemia  Pulmonary:      Effort: Pulmonary effort is normal.      Breath sounds: Normal breath sounds. Abdominal:      General: Abdomen is flat. Tenderness: There is no abdominal tenderness. Musculoskeletal:         General: No swelling, tenderness, deformity or signs of injury. Normal range of motion. Cervical back: Normal range of motion and neck supple. Right lower leg: No edema. Left lower leg: No edema. Feet:      Comments: No clear kinds of secondary infection related to pedicure. There is no redness warmth or visible swelling noted. There is no joint pain in the ankle or foot. Patient has brisk pulses. Skin:     Capillary Refill: Capillary refill takes less than 2 seconds. Findings: No rash. Neurological:      Mental Status: He is alert. 10:14 AM his blood sugar 195. Have concerns about poorly controlled diabetes. This could represent diabetic neuropathy pain. Talked to him about starting a low-dose of gabapentin based on his age. He will follow-up with his PCP. Review of the records reveal he had a recent abdominal CT that showed no aortic aneurysm. Diagnostic Study Results     Labs -   No results found for this or any previous visit (from the past 12 hour(s)). Radiologic Studies -   No orders to display     CT Results  (Last 48 hours)    None        CXR Results  (Last 48 hours)    None          Medical Decision Making   I am the first provider for this patient.     I reviewed the vital signs, available nursing notes, past medical history, past surgical history, family history and social history. Vital Signs-Reviewed the patient's vital signs. Patient Vitals for the past 12 hrs:   Temp Pulse Resp BP SpO2   07/16/22 0918 98.1 °F (36.7 °C) 75 18 (!) 159/89 98 %           Records Reviewed: Nursing Notes and Old Medical Records    Provider Notes (Medical Decision Making):   Differential diagnosis-reticulocyte neuropathy, peripheral vascular disease, cellulitis, rheumatoid arthritis gout, DVT, aneurysm    Impression/plan-66year-old male here with bilateral foot pain that appears to be chronic in nature but possibly aggravated by recent pedicure. Clinically no signs of infection. Has good pulses no signs of trophic changes or ischemia. No warmth or redness to suggest infection. His description of pain sounds like burning pain on the bottom of his feet and numbness at times. Recent CT showed no aneurysm. Plan will be to treat empirically for possible neuropathy as there is concerns about compliance with his medications and have reinforced that he should follow-up with his PCP as he may need further evaluation including vascular studies and neurological evaluation. ED Course:   Initial assessment performed. The patients presenting problems have been discussed, and they are in agreement with the care plan formulated and outlined with them. I have encouraged them to ask questions as they arise throughout their visit. Main Ferguson MD      Disposition:      DC- Adult Discharges: All of the diagnostic tests were reviewed and questions answered. Diagnosis, care plan and treatment options were discussed. The patient understands the instructions and will follow up as directed. The patients results have been reviewed with them. They have been counseled regarding their diagnosis.   The patient verbally convey understanding and agreement of the signs, symptoms, diagnosis, treatment and prognosis and additionally agrees to follow up as recommended with their PCP in 24 - 48 hours. They also agree with the care-plan and convey that all of their questions have been answered. I have also put together some discharge instructions for them that include: 1) educational information regarding their diagnosis, 2) how to care for their diagnosis at home, as well a 3) list of reasons why they would want to return to the ED prior to their follow-up appointment, should their condition change. DISCHARGE PLAN:  1. Current Discharge Medication List      START taking these medications    Details   gabapentin (NEURONTIN) 100 mg capsule Take 1 Capsule by mouth three (3) times daily. Max Daily Amount: 300 mg. Qty: 90 Capsule, Refills: 0  Start date: 7/16/2022    Associated Diagnoses: Pain in both feet           2. Follow-up Information     Follow up With Specialties Details Why Contact Info    Cong Barr MD Internal Medicine Physician Schedule an appointment as soon as possible for a visit in 2 days  [address] [facility] EMERGENCY DEPT Emergency Medicine  If symptoms worsen [address]        3. Return to ED if worse     Diagnosis     Clinical Impression:   1. Pain in both feet        Attestations:    Chaz Lundy MD        Please note that this dictation was completed with [employer], the computer voice recognition software. Quite often unanticipated grammatical, syntax, homophones, and other interpretive errors are inadvertently transcribed by the computer software. Please disregard these errors. Please excuse any errors that have escaped final proofreading. Thank you.

## 2022-07-16 NOTE — ED NOTES
Emergency Department Nursing Plan of Care       The Nursing Plan of Care is developed from the Nursing assessment and Emergency Department Attending provider initial evaluation. The plan of care may be reviewed in the ED Provider note.     The Plan of Care was developed with the following considerations:   Patient / Family readiness to learn indicated by:verbalized understanding  Persons(s) to be included in education: patient  Barriers to Learning/Limitations:No    Signed     Lynne Lanza RN    7/16/2022   9:29 AM

## 2022-07-18 LAB
GLUCOSE BLD STRIP.AUTO-MCNC: 195 MG/DL (ref 65–117)
SERVICE CMNT-IMP: ABNORMAL

## 2022-07-20 ENCOUNTER — HOSPITAL ENCOUNTER (EMERGENCY)
Age: 72
Discharge: HOME | End: 2022-07-20
Attending: EMERGENCY MEDICINE
Payer: MEDICARE

## 2022-07-20 VITALS
HEIGHT: 68 IN | DIASTOLIC BLOOD PRESSURE: 79 MMHG | TEMPERATURE: 98.1 F | SYSTOLIC BLOOD PRESSURE: 134 MMHG | BODY MASS INDEX: 33.04 KG/M2 | OXYGEN SATURATION: 98 % | HEART RATE: 79 BPM | WEIGHT: 218 LBS | RESPIRATION RATE: 18 BRPM

## 2022-07-20 DIAGNOSIS — N39.0 RECURRENT UTI: Primary | ICD-10-CM

## 2022-07-20 LAB
APPEARANCE UR: ABNORMAL
BACTERIA URNS QL MICRO: ABNORMAL /HPF
BILIRUB UR QL: NEGATIVE
COLOR UR: ABNORMAL
EPITH CASTS URNS QL MICRO: ABNORMAL /LPF
GLUCOSE UR STRIP.AUTO-MCNC: 250 MG/DL
HGB UR QL STRIP: ABNORMAL
KETONES UR QL STRIP.AUTO: NEGATIVE MG/DL
LEUKOCYTE ESTERASE UR QL STRIP.AUTO: ABNORMAL
NITRITE UR QL STRIP.AUTO: NEGATIVE
PH UR STRIP: 5.5 [PH] (ref 5–8)
PROT UR STRIP-MCNC: 100 MG/DL
RBC #/AREA URNS HPF: ABNORMAL /HPF (ref 0–5)
SP GR UR REFRACTOMETRY: 1.01
UA: UC IF INDICATED,UAUC: ABNORMAL
UROBILINOGEN UR QL STRIP.AUTO: 0.2 EU/DL (ref 0.2–1)
WBC URNS QL MICRO: >100 /HPF (ref 0–4)

## 2022-07-20 PROCEDURE — 74011250637 HC RX REV CODE- 250/637: Performed by: PHYSICIAN ASSISTANT

## 2022-07-20 PROCEDURE — 87086 URINE CULTURE/COLONY COUNT: CPT

## 2022-07-20 PROCEDURE — 99283 EMERGENCY DEPT VISIT LOW MDM: CPT

## 2022-07-20 PROCEDURE — 81001 URINALYSIS AUTO W/SCOPE: CPT

## 2022-07-20 RX ORDER — OXYCODONE AND ACETAMINOPHEN 5; 325 MG/1; MG/1
1 TABLET ORAL ONCE
Status: COMPLETED | OUTPATIENT
Start: 2022-07-20 | End: 2022-07-20

## 2022-07-20 RX ORDER — PHENAZOPYRIDINE HYDROCHLORIDE 100 MG/1
200 TABLET, FILM COATED ORAL
Status: COMPLETED | OUTPATIENT
Start: 2022-07-20 | End: 2022-07-20

## 2022-07-20 RX ORDER — PHENAZOPYRIDINE HYDROCHLORIDE 200 MG/1
200 TABLET, FILM COATED ORAL
Qty: 10 TABLET | Refills: 0 | Status: SHIPPED | OUTPATIENT
Start: 2022-07-20 | End: 2022-08-19 | Stop reason: SDUPTHER

## 2022-07-20 RX ORDER — CEFDINIR 300 MG/1
300 CAPSULE ORAL 2 TIMES DAILY
Qty: 14 CAPSULE | Refills: 0 | Status: SHIPPED | OUTPATIENT
Start: 2022-07-20 | End: 2022-07-27

## 2022-07-20 RX ADMIN — OXYCODONE HYDROCHLORIDE AND ACETAMINOPHEN 1 TABLET: 5; 325 TABLET ORAL at 16:45

## 2022-07-20 RX ADMIN — PHENAZOPYRIDINE HYDROCHLORIDE 200 MG: 100 TABLET ORAL at 16:45

## 2022-07-20 NOTE — ED PROVIDER NOTES
EMERGENCY DEPARTMENT HISTORY AND PHYSICAL EXAM    Date: 7/20/2022  Patient Name: Marky Richardson    History of Presenting Illness     Chief Complaint   Patient presents with    Dysuria    Medication Refill         History Provided By: Patient      HPI: Marky Richardson is a 66 y.o. male with a PMH of diabetes, hypertension, and DEJA  who presents with recurrent dysuria. Patient has been followed by urology for his chronic UTIs but states that he did not realize his cystoscopy was not until next month. Patient states his symptoms had improved somewhat after his last visit for UTI but worsened in the last 3 to 4 days. Patient requesting refill of his oxycodone, antibiotics and Pyridium. PCP: Devante Vicenet MD    Current Outpatient Medications   Medication Sig Dispense Refill    cefdinir (OMNICEF) 300 mg capsule Take 1 Capsule by mouth two (2) times a day for 7 days. 14 Capsule 0    phenazopyridine (Pyridium) 200 mg tablet Take 1 Tablet by mouth three (3) times daily as needed for Pain. 10 Tablet 0    gabapentin (NEURONTIN) 100 mg capsule Take 1 Capsule by mouth three (3) times daily. Max Daily Amount: 300 mg. 90 Capsule 0    amLODIPine (NORVASC) 5 mg tablet Take 1 Tablet by mouth daily. 90 Tablet 2    atorvastatin (LIPITOR) 80 mg tablet Take 1 Tablet by mouth nightly. 90 Tablet 1    tamsulosin (FLOMAX) 0.4 mg capsule Take 1 Capsule by mouth daily. 30 Capsule 3    metFORMIN (GLUCOPHAGE) 1,000 mg tablet Take 1 Tablet by mouth two (2) times daily (with meals). 180 Tablet 1    pantoprazole (PROTONIX) 40 mg tablet Take 1 Tablet by mouth daily. 30 Tablet 3    latanoprost (XALATAN) 0.005 % ophthalmic solution Administer 1 Drop to both eyes nightly. (Patient not taking: Reported on 7/20/2022)      albuterol (PROVENTIL HFA, VENTOLIN HFA, PROAIR HFA) 90 mcg/actuation inhaler Take 2 Puffs by inhalation every four (4) hours as needed for Wheezing.  1 Each 0       Past History     Past Medical History:  Past Medical History: Diagnosis Date    Diabetes ([facility] [address]     Gastrointestinal disorder     Hypertension     DEJA (obstructive sleep apnea)     AHI: 8.9 per hour       Past Surgical History:  Past Surgical History:   Procedure Laterality Date    HX PROSTATE SURGERY      [facility] ABDOMEN SURGERY PROC UNLISTED      Hernia Repair       Family History:  Family History   Problem Relation Age of Onset    Hypertension Mother     Diabetes Mother     Hypertension Father     Diabetes Father        Social History:  Social History     Tobacco Use    Smoking status: Never    Smokeless tobacco: Never   Vaping Use    Vaping Use: Never used   Substance Use Topics    Alcohol use: No    Drug use: Not Currently       Allergies: Allergies   Allergen Reactions    Aspirin Anxiety     Patient states not allergic to medication but reports he does not wish to take it     Celecoxib Vertigo    Ibuprofen Nausea Only         Review of Systems   Review of Systems   Constitutional:  Negative for chills and fever. Cardiovascular:  Negative for chest pain. Gastrointestinal:  Negative for abdominal pain. Genitourinary:  Positive for dysuria, frequency and penile pain. Allergic/Immunologic: Negative for immunocompromised state. Neurological:  Negative for speech difficulty and weakness. All other systems reviewed and are negative. Physical Exam     Vitals:    07/20/22 1507   BP: 134/79   Pulse: 79   Resp: 18   Temp: 98.1 °F (36.7 °C)   SpO2: 98%   Weight: 98.9 kg (218 lb)   Height: 5' 8\" (1.727 m)     Physical Exam  Vitals and nursing note reviewed. Constitutional:       General: He is not in acute distress. Appearance: He is well-developed. HENT:      Head: Normocephalic and atraumatic. Mouth/Throat:      Pharynx: No oropharyngeal exudate. Eyes:      Conjunctiva/sclera: Conjunctivae normal.   Cardiovascular:      Rate and Rhythm: Normal rate and regular rhythm. Heart sounds: Normal heart sounds.    Pulmonary:      Effort: Pulmonary effort is normal. No respiratory distress. Breath sounds: Normal breath sounds. No wheezing or rales. Abdominal:      General: Bowel sounds are normal.      Palpations: Abdomen is soft. Tenderness: There is no abdominal tenderness. There is no right CVA tenderness, left CVA tenderness, guarding or rebound. Musculoskeletal:         General: Normal range of motion. Skin:     General: Skin is warm and dry. Neurological:      Mental Status: He is alert and oriented to person, place, and time. Diagnostic Study Results     Labs -     Recent Results (from the past 12 hour(s))   URINALYSIS W/ REFLEX CULTURE    Collection Time: 07/20/22  5:19 PM    Specimen: Urine   Result Value Ref Range    Color YELLOW/STRAW      Appearance TURBID (A) CLEAR      Specific gravity 1.010      pH (UA) 5.5 5.0 - 8.0      Protein 100 (A) NEG mg/dL    Glucose 250 (A) NEG mg/dL    Ketone Negative NEG mg/dL    Bilirubin Negative NEG      Blood LARGE (A) NEG      Urobilinogen 0.2 0.2 - 1.0 EU/dL    Nitrites Negative NEG      Leukocyte Esterase LARGE (A) NEG      WBC >100 (H) 0 - 4 /hpf    RBC 20-50 0 - 5 /hpf    Epithelial cells FEW FEW /lpf    Bacteria 1+ (A) NEG /hpf    UA:UC IF INDICATED URINE CULTURE ORDERED (A) CNI         Radiologic Studies -   No orders to display     CT Results  (Last 48 hours)      None          CXR Results  (Last 48 hours)      None              Medical Decision Making   I am the first provider for this patient. I reviewed the vital signs, available nursing notes, past medical history, past surgical history, family history and social history. Vital Signs-Reviewed the patient's vital signs. Records Reviewed: Nursing Notes and Old Medical Records    Provider Notes (Medical Decision Making):   Patient was presents with recurrent dysuria x 3-4 days and chronic UTI's. Patient states he does not have a cystoscopy scheduled until next month with urology.   He states last antibiotics did help for a while but symptoms started back up and he was no longer able to tolerate the pain. Patient requesting a refill. Will obtain UA but patient likely has another UTI. Discussed with the patient diagnosis and test results and all questioned fully answered. Discussed with PCP about best antibiotic choice given history of recurrent UTIs, history of ESBL and most recently strep. Dr. Venecia Geller advises to start patient on [address]          Disposition:  Discharged    DISCHARGE NOTE:   6:04 PM      Care plan outlined and precautions discussed. Patient has no new complaints, changes, or physical findings. Results of UA were reviewed with the patient. All medications were reviewed with the patient; will d/c home. All of pt's questions and concerns were addressed. Patient was instructed and agrees to follow up with urology, as well as to return to the ED upon further deterioration. Patient is ready to go home. Follow-up Information       Follow up With Specialties Details Why [address] Edmundo Cannon MD Internal Medicine Physician   [facility],[address]      Ita Samson MD Urology Schedule an appointment as soon as possible for a visit   [place] [address]  [address] [facility] [id number]  [phone]              Discharge Medication List as of 7/20/2022  6:04 PM        START taking these medications    Details   cefdinir (OMNICEF) 300 mg capsule Take 1 Capsule by mouth two (2) times a day for 7 days. , Normal, Disp-14 Capsule, R-0      phenazopyridine (Pyridium) 200 mg tablet Take 1 Tablet by mouth three (3) times daily as needed for Pain., Normal, Disp-10 Tablet, R-0           CONTINUE these medications which have NOT CHANGED    Details   gabapentin (NEURONTIN) 100 mg capsule Take 1 Capsule by mouth three (3) times daily. Max Daily Amount: 300 mg., Normal, Disp-90 Capsule, R-0      amLODIPine (NORVASC) 5 mg tablet Take 1 Tablet by mouth daily. , Normal, Disp-90 Tablet, R-2 atorvastatin (LIPITOR) 80 mg tablet Take 1 Tablet by mouth nightly., Normal, Disp-90 Tablet, R-1      tamsulosin (FLOMAX) 0.4 mg capsule Take 1 Capsule by mouth daily. , Normal, Disp-30 Capsule, R-3      metFORMIN (GLUCOPHAGE) 1,000 mg tablet Take 1 Tablet by mouth two (2) times daily (with meals). , Normal, Disp-180 Tablet, R-1      pantoprazole (PROTONIX) 40 mg tablet Take 1 Tablet by mouth daily. , Normal, Disp-30 Tablet, R-3      latanoprost (XALATAN) 0.005 % ophthalmic solution Administer 1 Drop to both eyes nightly., Historical Med      albuterol (PROVENTIL HFA, VENTOLIN HFA, PROAIR HFA) 90 mcg/actuation inhaler Take 2 Puffs by inhalation every four (4) hours as needed for Wheezing., Normal, Disp-1 Each, R-0             Procedures:  Procedures    Please note that this dictation was completed with Dragon, computer voice recognition software. Quite often unanticipated grammatical, syntax, homophones, and other interpretive errors are inadvertently transcribed by the computer software. Please disregard these errors. Additionally, please excuse any errors that have escaped final proofreading. Diagnosis     Clinical Impression:   1.  Recurrent UTI

## 2022-07-20 NOTE — ED NOTES
Emergency Department Nursing Plan of Care       The Nursing Plan of Care is developed from the Nursing assessment and Emergency Department Attending provider initial evaluation. The plan of care may be reviewed in the ED Provider note.     The Plan of Care was developed with the following considerations:   Patient / Family readiness to learn indicated by:verbalized understanding  Persons(s) to be included in education: patient  Barriers to Learning/Limitations:No    Signed     Anaya Hightower RN    7/20/2022   4:50 PM

## 2022-07-20 NOTE — ED TRIAGE NOTES
Pt scheduled for cystoscopy in August but states he is still having pain while urinating. Pt was prescribed oxycodone and keflex but has run out of medication.

## 2022-07-21 LAB
BACTERIA SPEC CULT: NORMAL
SERVICE CMNT-IMP: NORMAL

## 2022-08-04 ENCOUNTER — HOSPITAL ENCOUNTER (EMERGENCY)
Age: 72
Discharge: STILL ADMITTED OR ADMINISTRATIVE | End: 2022-08-04

## 2022-08-04 RX ORDER — AMLODIPINE BESYLATE 5 MG/1
5 TABLET ORAL DAILY
Qty: 90 TABLET | Refills: 2 | Status: CANCELLED | OUTPATIENT
Start: 2022-08-04

## 2022-08-04 NOTE — TELEPHONE ENCOUNTER
Requested Prescriptions     Pending Prescriptions Disp Refills    amLODIPine (NORVASC) 5 mg tablet 90 Tablet 2     Sig: Take 1 Tablet by mouth in the morning.

## 2022-08-04 NOTE — TELEPHONE ENCOUNTER
Duplicate request: Norvasc 5 mg #90 with 2 refills was sent to the [address] #[id number] on 04/27/2022. If pt would like to have the prescription filled at a different pharmacy pt would need to contact the pharmacy of pt's choice to have the prescription transferred. (Non-controls only). Requested Prescriptions     Pending Prescriptions Disp Refills    amLODIPine (NORVASC) 5 mg tablet 90 Tablet 2     Sig: Take 1 Tablet by mouth in the morning.

## 2022-08-19 ENCOUNTER — OFFICE VISIT (OUTPATIENT)
Dept: INTERNAL MEDICINE CLINIC | Age: 72
End: 2022-08-19
Payer: MEDICARE

## 2022-08-19 VITALS
HEART RATE: 85 BPM | DIASTOLIC BLOOD PRESSURE: 76 MMHG | WEIGHT: 217 LBS | TEMPERATURE: 97.7 F | HEIGHT: 68 IN | SYSTOLIC BLOOD PRESSURE: 115 MMHG | OXYGEN SATURATION: 99 % | RESPIRATION RATE: 18 BRPM | BODY MASS INDEX: 32.89 KG/M2

## 2022-08-19 DIAGNOSIS — Z76.89 ENCOUNTER TO ESTABLISH CARE: ICD-10-CM

## 2022-08-19 DIAGNOSIS — N30.01 ACUTE CYSTITIS WITH HEMATURIA: Primary | ICD-10-CM

## 2022-08-19 DIAGNOSIS — F41.8 ANXIETY ABOUT HEALTH: ICD-10-CM

## 2022-08-19 DIAGNOSIS — R10.30 SEVERE GROIN PAIN: ICD-10-CM

## 2022-08-19 DIAGNOSIS — R39.12 BENIGN PROSTATIC HYPERPLASIA WITH WEAK URINARY STREAM: ICD-10-CM

## 2022-08-19 DIAGNOSIS — I10 ESSENTIAL HYPERTENSION: ICD-10-CM

## 2022-08-19 DIAGNOSIS — N40.1 BENIGN PROSTATIC HYPERPLASIA WITH WEAK URINARY STREAM: ICD-10-CM

## 2022-08-19 PROBLEM — N39.0 RECURRENT UTI: Status: ACTIVE | Noted: 2022-08-19

## 2022-08-19 PROBLEM — N18.30 CHRONIC RENAL DISEASE, STAGE III (HCC): Status: ACTIVE | Noted: 2022-08-19

## 2022-08-19 LAB
BILIRUB UR QL STRIP: NEGATIVE
GLUCOSE UR-MCNC: NORMAL MG/DL
KETONES P FAST UR STRIP-MCNC: NEGATIVE MG/DL
PH UR STRIP: 6 [PH] (ref 4.6–8)
PROT UR QL STRIP: NORMAL
SP GR UR STRIP: 1.03 (ref 1–1.03)
UA UROBILINOGEN AMB POC: NORMAL (ref 0.2–1)
URINALYSIS CLARITY POC: NORMAL
URINALYSIS COLOR POC: YELLOW
URINE BLOOD POC: NORMAL
URINE LEUKOCYTES POC: NORMAL
URINE NITRITES POC: NEGATIVE

## 2022-08-19 PROCEDURE — G8432 DEP SCR NOT DOC, RNG: HCPCS | Performed by: NURSE PRACTITIONER

## 2022-08-19 PROCEDURE — G8536 NO DOC ELDER MAL SCRN: HCPCS | Performed by: NURSE PRACTITIONER

## 2022-08-19 PROCEDURE — G8752 SYS BP LESS 140: HCPCS | Performed by: NURSE PRACTITIONER

## 2022-08-19 PROCEDURE — 1101F PT FALLS ASSESS-DOCD LE1/YR: CPT | Performed by: NURSE PRACTITIONER

## 2022-08-19 PROCEDURE — G8417 CALC BMI ABV UP PARAM F/U: HCPCS | Performed by: NURSE PRACTITIONER

## 2022-08-19 PROCEDURE — 99214 OFFICE O/P EST MOD 30 MIN: CPT | Performed by: NURSE PRACTITIONER

## 2022-08-19 PROCEDURE — 3017F COLORECTAL CA SCREEN DOC REV: CPT | Performed by: NURSE PRACTITIONER

## 2022-08-19 PROCEDURE — G8754 DIAS BP LESS 90: HCPCS | Performed by: NURSE PRACTITIONER

## 2022-08-19 PROCEDURE — 81003 URINALYSIS AUTO W/O SCOPE: CPT | Performed by: NURSE PRACTITIONER

## 2022-08-19 PROCEDURE — G8427 DOCREV CUR MEDS BY ELIG CLIN: HCPCS | Performed by: NURSE PRACTITIONER

## 2022-08-19 RX ORDER — PHENAZOPYRIDINE HYDROCHLORIDE 200 MG/1
200 TABLET, FILM COATED ORAL
Qty: 30 TABLET | Refills: 0 | Status: SHIPPED | OUTPATIENT
Start: 2022-08-19 | End: 2022-09-16

## 2022-08-19 RX ORDER — OXYCODONE HYDROCHLORIDE 5 MG/1
TABLET ORAL
COMMUNITY
Start: 2022-06-27 | End: 2022-08-19 | Stop reason: ALTCHOICE

## 2022-08-19 RX ORDER — OXYCODONE AND ACETAMINOPHEN 5; 325 MG/1; MG/1
1 TABLET ORAL
Qty: 20 TABLET | Refills: 0 | Status: SHIPPED | OUTPATIENT
Start: 2022-08-19 | End: 2022-08-26

## 2022-08-19 RX ORDER — GABAPENTIN 100 MG/1
100 CAPSULE ORAL
Qty: 30 CAPSULE | Refills: 0 | Status: ON HOLD | OUTPATIENT
Start: 2022-08-19 | End: 2022-09-16 | Stop reason: SDUPTHER

## 2022-08-19 RX ORDER — FESOTERODINE FUMARATE 4 MG/1
1 TABLET, FILM COATED, EXTENDED RELEASE ORAL DAILY
COMMUNITY
Start: 2022-06-28 | End: 2022-09-16

## 2022-08-19 RX ORDER — LEVOFLOXACIN 500 MG/1
500 TABLET, FILM COATED ORAL DAILY
Qty: 10 TABLET | Refills: 0 | Status: SHIPPED | OUTPATIENT
Start: 2022-08-19 | End: 2022-09-09 | Stop reason: ALTCHOICE

## 2022-08-19 NOTE — PROGRESS NOTES
Pt is here for   Chief Complaint   Patient presents with    New Patient     Here to establish care     Urinary Frequency     With burning, pt states x 2 weeks. Pt states that he does have prostate issues. States that he was seeing by urology, states that they made it so that he can urinate because one time he couldn't     Groin Pain     Pt states x 2 weeks, pain level is a 10/10      1. Have you been to the ER, urgent care clinic since your last visit? Hospitalized since your last visit? No    2. Have you seen or consulted any other health care providers outside of the [address] [place] [address] since your last visit? Include any pap smears or colon screening.  No    10/10 groin pain

## 2022-08-19 NOTE — PROGRESS NOTES
Kvng Rose (: 1950) is a 66 y.o. male, established patient, here for evaluation of the following chief complaint(s):  New Patient (Here to establish care ), Urinary Frequency (With burning, pt states x 2 weeks. Pt states that he does have prostate issues. States that he was seeing by urology, states that they made it so that he can urinate because one time he couldn't ), and Groin Pain (Pt states x 2 weeks, pain level is a 10/10 )       ASSESSMENT/PLAN:  Below is the assessment and plan developed based on review of pertinent history, physical exam, labs, studies, and medications. 1. Acute cystitis with hematuria  -     levoFLOXacin (Levaquin) 500 mg tablet; Take 1 Tablet by mouth daily. , Normal, Disp-10 Tablet, R-0  -     AMB POC URINALYSIS DIP STICK AUTO W/O MICRO  -     CULTURE, URINE  2. Essential hypertension  3. Benign prostatic hyperplasia with weak urinary stream  -     phenazopyridine (Pyridium) 200 mg tablet; Take 1 Tablet by mouth three (3) times daily as needed for Pain., Normal, Disp-30 Tablet, R-0  -     oxyCODONE-acetaminophen (PERCOCET) 5-325 mg per tablet; Take 1 Tablet by mouth every eight (8) hours as needed for Pain for up to 7 days. Max Daily Amount: 3 Tablets., Normal, Disp-20 Tablet, R-0  -     gabapentin (NEURONTIN) 100 mg capsule; Take 1 Capsule by mouth nightly. Max Daily Amount: 100 mg., Normal, Disp-30 Capsule, R-0  -     AMB POC URINALYSIS DIP STICK AUTO W/O MICRO  4. Anxiety about health  5. Encounter to establish care  6. Severe groin pain    Return in about 4 weeks (around 2022) for OV- HTN, DM, BPH/URO fu, labs-PSA/AIC. Pt asked to complete follow by next visit: UA + bld and leuk, sent for culture. Levaquin ordered. Short course of opiate until procedure next week prescribed. Resent DANICA, as pt seemingly NOT taking. Already taking 0.8mg of flomax. Pyridium sent, but reports high OOP cost, advised he may get OTC also.      SUBJECTIVE/OBJECTIVE:  HPI    Pt here to establish care. Seen and treated for BPH with urinary frequency and groin pain in the past. Would like something for pain. Will have cystoscopy next week Wednesday. Nocturia x 11 with dysuria, weak stream, urgency, and incontinence.  Pain Scale: 10 - Worst pain ever/10     Results for orders placed or performed in visit on 08/19/22   AMB POC URINALYSIS DIP STICK AUTO W/O MICRO   Result Value Ref Range    Color (UA POC) Yellow     Clarity (UA POC) Slightly Cloudy     Glucose (UA POC) 1+ Negative    Bilirubin (UA POC) Negative Negative    Ketones (UA POC) Negative Negative    Specific gravity (UA POC) 1.030 1.001 - 1.035    Blood (UA POC) 3+ Negative    pH (UA POC) 6.0 4.6 - 8.0    Protein (UA POC) 2+ Negative    Urobilinogen (UA POC) 0.2 mg/dL 0.2 - 1    Nitrites (UA POC) Negative Negative    Leukocyte esterase (UA POC) 3+ Negative             Review of Systems  Constitutional: negative for fevers, chills, anorexia and weight loss  Respiratory:  negative for cough, hemoptysis, dyspnea, and wheezing  CV:   negative for chest pain, palpitations, and lower extremity edema  GI:   negative for nausea, vomiting, diarrhea, abdominal pain, and melena  Endo:               negative for polyuria,polydipsia,polyphagia, and heat intolerance  Genitourinary: negative for retention, and hematuria  Integument:  negative for rash, ulcerations, and pruritus  Hematologic:  negative for easy bruising and bleeding  Musculoskel: negative for arthralgias, muscle weakness,and joint pain/swelling  Neurological:  negative for headaches, dizziness, vertigo,and memory/gait problems  Behavl/Psych: negative for feelings of anxiety, depression, suicide, and mood changes    Visit Vitals  /76 (BP 1 Location: Right upper arm, BP Patient Position: Sitting, BP Cuff Size: Large adult)   Pulse 85   Temp 97.7 °F (36.5 °C) (Temporal)   Resp 18   Ht 5' 8\" (1.727 m)   Wt 217 lb (98.4 kg)   SpO2 99%   BMI 32.99 kg/m²       Wt Readings from Last 3 Encounters:   08/19/22 217 lb (98.4 kg)   07/20/22 218 lb (98.9 kg)   07/16/22 218 lb (98.9 kg)         Physical Exam:   General appearance - alert, well appearing, and in mild distress. Restless. Mental status - A/O x 4, anxious mood and affect. +hyperverbal.   Chest -  Symmetric chest rise. No wheezing. No distress. Heart - Normal rate. Abdomen- Soft, round. Non-distended, NT. No pulsatile masses or hernias. No CVAT. Ext-  No pedal edema, clubbing, or cyanosis. Skin-Warm and dry. No hyperpigmentation, ulcerations, or suspicious lesions. Neuro - pressured speech, no focal findings or movement disorder. Normal strength, gait, and muscle tone. An electronic signature was used to authenticate this note.   -- Yuko Bryant NP

## 2022-08-19 NOTE — PATIENT INSTRUCTIONS
GO down the AISLES of the pharmacy and  AZO or store brand equivalent INSTEAD of pyridium if cost is too high. Follow directions on the box. Drink plenty of fluids, empty your bladder frequently,drink cranberry juice and wipe from front to back.

## 2022-08-30 ENCOUNTER — OFFICE VISIT (OUTPATIENT)
Dept: URGENT CARE | Age: 72
End: 2022-08-30
Payer: MEDICARE

## 2022-08-30 VITALS — TEMPERATURE: 98 F | HEART RATE: 90 BPM | RESPIRATION RATE: 18 BRPM | OXYGEN SATURATION: 99 %

## 2022-08-30 DIAGNOSIS — Z20.822 ENCOUNTER FOR LABORATORY TESTING FOR COVID-19 VIRUS: Primary | ICD-10-CM

## 2022-08-30 LAB — SARS-COV-2 PCR, POC: POSITIVE

## 2022-08-30 PROCEDURE — 99211 OFF/OP EST MAY X REQ PHY/QHP: CPT | Performed by: NURSE PRACTITIONER

## 2022-08-30 PROCEDURE — 87635 SARS-COV-2 COVID-19 AMP PRB: CPT | Performed by: NURSE PRACTITIONER

## 2022-08-31 ENCOUNTER — HOSPITAL ENCOUNTER (EMERGENCY)
Age: 72
Discharge: HOME | End: 2022-09-01
Attending: EMERGENCY MEDICINE
Payer: MEDICARE

## 2022-08-31 VITALS
BODY MASS INDEX: 32.58 KG/M2 | OXYGEN SATURATION: 97 % | DIASTOLIC BLOOD PRESSURE: 78 MMHG | TEMPERATURE: 98.2 F | HEART RATE: 85 BPM | HEIGHT: 68 IN | RESPIRATION RATE: 15 BRPM | SYSTOLIC BLOOD PRESSURE: 140 MMHG | WEIGHT: 215 LBS

## 2022-08-31 DIAGNOSIS — U07.1 COVID: Primary | ICD-10-CM

## 2022-08-31 DIAGNOSIS — M25.521 ARTHRALGIA OF RIGHT ELBOW: ICD-10-CM

## 2022-08-31 PROCEDURE — 74011250636 HC RX REV CODE- 250/636: Performed by: EMERGENCY MEDICINE

## 2022-08-31 PROCEDURE — 99284 EMERGENCY DEPT VISIT MOD MDM: CPT

## 2022-08-31 PROCEDURE — 96372 THER/PROPH/DIAG INJ SC/IM: CPT

## 2022-08-31 RX ORDER — KETOROLAC TROMETHAMINE 30 MG/ML
30 INJECTION, SOLUTION INTRAMUSCULAR; INTRAVENOUS
Status: COMPLETED | OUTPATIENT
Start: 2022-08-31 | End: 2022-08-31

## 2022-08-31 RX ORDER — NAPROXEN 500 MG/1
500 TABLET ORAL 2 TIMES DAILY WITH MEALS
Qty: 20 TABLET | Refills: 0 | Status: SHIPPED | OUTPATIENT
Start: 2022-08-31 | End: 2022-09-16

## 2022-08-31 RX ADMIN — KETOROLAC TROMETHAMINE 30 MG: 30 INJECTION, SOLUTION INTRAMUSCULAR; INTRAVENOUS at 23:49

## 2022-09-01 NOTE — ED NOTES
..Discharge summary and discharge medications reviewed with patient and appropriate educational materials and side effects teaching were provided. patient  Given 0 paper prescriptions and 1 electronic prescriptions sent to pt's listed pharmacy. Patient verbalized understanding of the importance of discussing medications with his or her physician or clinic they will be following up with. No si/s of acute distress prior to discharge. Patient offered wheelchair from treatment area to hospital entrance, patient declined wheelchair.
Pt presents to ED ambulatory complaining of bilateral shoulder pain and right arm pain. Pt denies injury. Reports he is unable to move his right arm independently. Pt reports testing positive for Covid yesterday. Pt is alert and oriented x 4, RR even and unlabored, skin is warm and dry. Assessment completed and pt updated on plan of care. Call bell in reach. Emergency Department Nursing Plan of Care       The Nursing Plan of Care is developed from the Nursing assessment and Emergency Department Attending provider initial evaluation. The plan of care may be reviewed in the ED Provider note.     The Plan of Care was developed with the following considerations:   Patient / Family readiness to learn indicated by:verbalized understanding  Persons(s) to be included in education: patient  Barriers to Learning/Limitations:No    Signed
caffeine

## 2022-09-01 NOTE — ED TRIAGE NOTES
Pt positive for COVID. Pt reporting nontraumatic bilateral shoulder pain and right arm pain x yesterday. Pt states his arm hurts so bad he is unable to move it. Pt denies trauma/injury.

## 2022-09-01 NOTE — ED PROVIDER NOTES
50-year-old male with a history of diabetes, hypertension, sleep apnea and recent COVID diagnosis presents to the ED complaining of his shoulder pain and right elbow pain. Denies injury and change in activity. Pain is worse with palpation and movement. Past Medical History:   Diagnosis Date    Diabetes ([address]     Gastrointestinal disorder     Hypertension     DEJA (obstructive sleep apnea)     AHI: 8.9 per hour       Past Surgical History:   Procedure Laterality Date    HX PROSTATE SURGERY      [facility] ABDOM[facility]GERY PROC UNLISTED      Hernia Repair         Family History:   Problem Relation Age of Onset    Hypertension Mother     Diabetes Mother     Hypertension Father     Diabetes Father        Social History     Socioeconomic History    Marital status:      Spouse name: Not on file    Number of children: Not on file    Years of education: Not on file    Highest education level: Not on file   Occupational History    Not on file   Tobacco Use    Smoking status: Never    Smokeless tobacco: Never   Vaping Use    Vaping Use: Never used   Substance and Sexual Activity    Alcohol use: No    Drug use: Not Currently    Sexual activity: Not Currently   Other Topics Concern    Not on file   Social History Narrative    Not on file     Social Determinants of Health     Financial Resource Strain: Not on file   Food Insecurity: Not on file   Transportation Needs: Not on file   Physical Activity: Not on file   Stress: Not on file   Social Connections: Not on file   Intimate Partner Violence: Not on file   Housing Stability: Not on file         ALLERGIES: Aspirin, Celecoxib, and Ibuprofen    Review of Systems   Constitutional: Negative. Negative for fever. HENT: Negative. Negative for drooling, facial swelling and trouble swallowing. Eyes: Negative. Negative for discharge and redness. Respiratory: Negative. Negative for chest tightness, shortness of breath and wheezing. Cardiovascular: Negative.   Negative for chest pain. Gastrointestinal: Negative. Negative for abdominal distention, abdominal pain, constipation, diarrhea, nausea and vomiting. Endocrine: Negative. Genitourinary: Negative. Negative for difficulty urinating and dysuria. Musculoskeletal:  Positive for arthralgias. Negative for myalgias. Skin: Negative. Negative for color change and rash. Allergic/Immunologic: Negative. Neurological: Negative. Negative for syncope, facial asymmetry and speech difficulty. Hematological: Negative. Psychiatric/Behavioral: Negative. Negative for agitation and confusion. All other systems reviewed and are negative. Vitals:    08/31/22 2248   BP: (!) 140/78   Pulse: 85   Resp: 15   Temp: 98.2 °F (36.8 °C)   SpO2: 97%   Weight: 97.5 kg (215 lb)   Height: 5' 8\" (1.727 m)            Physical Exam  Vitals and nursing note reviewed. Constitutional:       Appearance: Normal appearance. He is well-developed. HENT:      Head: Normocephalic and atraumatic. Mouth/Throat:      Mouth: Mucous membranes are moist.   Eyes:      Extraocular Movements: Extraocular movements intact. Conjunctiva/sclera: Conjunctivae normal.   Cardiovascular:      Rate and Rhythm: Normal rate and regular rhythm. Pulmonary:      Effort: Pulmonary effort is normal. No accessory muscle usage or respiratory distress. Abdominal:      General: Abdomen is flat. Palpations: Abdomen is soft. Tenderness: There is no abdominal tenderness. Musculoskeletal:         General: Normal range of motion. Cervical back: Normal range of motion. Comments: No neck tenderness. Tenderness of right elbow lateral epicondyle   Skin:     General: Skin is warm and dry. Neurological:      Mental Status: He is alert and oriented to person, place, and time. Psychiatric:         Behavior: Behavior normal.         Thought Content:  Thought content normal.        MDM  Number of Diagnoses or Management Options  Arthralgia of right elbow  COVID  Diagnosis management comments: Arthralgia/myalgia related to COVID, cervical radiculopathy, elbow arthritis, epicondylitis, bursitis. Joint is not swollen or hot just septic arthritis           Procedures    LABORATORY TESTS:  No results found for this or any previous visit (from the past 12 hour(s)). IMAGING RESULTS:  No orders to display       MEDICATIONS GIVEN:  Medications   ketorolac (TORADOL) injection 30 mg (30 mg IntraMUSCular Given 8/31/22 6316)       IMPRESSION:  1. COVID    2. Arthralgia of right elbow        PLAN:  1. Discharge Medication List as of 8/31/2022 11:56 PM        START taking these medications    Details   naproxen (Naprosyn) 500 mg tablet Take 1 Tablet by mouth two (2) times daily (with meals) for 10 days. , Normal, Disp-20 Tablet, R-0           CONTINUE these medications which have NOT CHANGED    Details   Toviaz 4 mg SR tablet Take 1 Tablet by mouth daily. , Historical Med, SHAYE      phenazopyridine (Pyridium) 200 mg tablet Take 1 Tablet by mouth three (3) times daily as needed for Pain., Normal, Disp-30 Tablet, R-0      gabapentin (NEURONTIN) 100 mg capsule Take 1 Capsule by mouth nightly. Max Daily Amount: 100 mg., Normal, Disp-30 Capsule, R-0      levoFLOXacin (Levaquin) 500 mg tablet Take 1 Tablet by mouth daily. , Normal, Disp-10 Tablet, R-0      amLODIPine (NORVASC) 5 mg tablet Take 1 Tablet by mouth daily. , Normal, Disp-90 Tablet, R-2      atorvastatin (LIPITOR) 80 mg tablet Take 1 Tablet by mouth nightly., Normal, Disp-90 Tablet, R-1      tamsulosin (FLOMAX) 0.4 mg capsule Take 1 Capsule by mouth daily. , Normal, Disp-30 Capsule, R-3      metFORMIN (GLUCOPHAGE) 1,000 mg tablet Take 1 Tablet by mouth two (2) times daily (with meals). , Normal, Disp-180 Tablet, R-1      pantoprazole (PROTONIX) 40 mg tablet Take 1 Tablet by mouth daily. , Normal, Disp-30 Tablet, R-3      latanoprost (XALATAN) 0.005 % ophthalmic solution Administer 1 Drop to both eyes nightly. , Historical Med      albuterol (PROVENTIL HFA, VENTOLIN HFA, PROAIR HFA) 90 mcg/actuation inhaler Take 2 Puffs by inhalation every four (4) hours as needed for Wheezing., Normal, Disp-1 Each, R-0           2.    Follow-up Information    None       Return to ED if worse

## 2022-09-02 ENCOUNTER — PATIENT OUTREACH (OUTPATIENT)
Dept: CASE MANAGEMENT | Age: 72
End: 2022-09-02

## 2022-09-02 NOTE — PROGRESS NOTES
Patient contacted regarding COVID-19 diagnosis. Discussed COVID-19 related testing which was  done prior to this ED visit. Test results were positive. Patient informed of results, if available? yes. LPN Care Coordinator contacted the patient by telephone to perform post discharge assessment. Call within 2 business days of discharge: Yes Verified name and  with patient as identifiers. Provided introduction to self, and explanation of the CTN/ACM role, and reason for call due to risk factors for infection and/or exposure to COVID-19. Symptoms reviewed with patient who verbalized the following symptoms: no new symptoms and no worsening symptoms   Patient reports should pain has improved. Due to no new or worsening symptoms encounter was not routed to provider for escalation. Discussed follow-up appointments. If no appointment was previously scheduled, appointment scheduling offered:  no. [facility] follow up appointment(s):   Future Appointments   Date Time Provider Coleen Barrett   10/13/2022  9:45 AM [facility]     Non-[facility] follow up appointment(s): n/s    Interventions to address risk factors: Reviewed and followed up on pending diagnostic tests and treatments-COVID-19  and Education of patient/family/caregiver/guardian to support self-management-patient to follow up with PCP     Advance Care Planning:   Does patient have an Advance Directive: not on file. Educated patient about risk for severe COVID-19 due to risk factors according to CDC guidelines. LPN CC reviewed discharge instructions, medical action plan and red flag symptoms with the patient who verbalized understanding. Discussed COVID vaccination status: yes. Education provided on COVID-19 vaccination as appropriate. Discussed exposure protocols and quarantine with CDC Guidelines.  Patient was given an opportunity to verbalize any questions and concerns and agrees to contact LPN CC or health care provider for questions related to their healthcare. Reviewed and educated patient on any new and changed medications related to discharge diagnosis     Was patient discharged with a pulse oximeter? no    LPN CC provided contact information. No further follow-up call identified based on severity of symptoms and risk factors.

## 2022-09-06 ENCOUNTER — HOSPITAL ENCOUNTER (EMERGENCY)
Age: 72
Discharge: HOME | End: 2022-09-06
Attending: EMERGENCY MEDICINE
Payer: MEDICARE

## 2022-09-06 VITALS
HEIGHT: 68 IN | SYSTOLIC BLOOD PRESSURE: 147 MMHG | HEART RATE: 98 BPM | BODY MASS INDEX: 32.58 KG/M2 | TEMPERATURE: 98.2 F | WEIGHT: 215 LBS | DIASTOLIC BLOOD PRESSURE: 116 MMHG | OXYGEN SATURATION: 96 % | RESPIRATION RATE: 15 BRPM

## 2022-09-06 DIAGNOSIS — U07.1 COVID-19: Primary | ICD-10-CM

## 2022-09-06 DIAGNOSIS — M79.10 MYALGIA: ICD-10-CM

## 2022-09-06 DIAGNOSIS — Z59.00 HOMELESSNESS: ICD-10-CM

## 2022-09-06 PROCEDURE — 99283 EMERGENCY DEPT VISIT LOW MDM: CPT

## 2022-09-06 PROCEDURE — 74011250637 HC RX REV CODE- 250/637: Performed by: EMERGENCY MEDICINE

## 2022-09-06 RX ORDER — NAPROXEN 250 MG/1
500 TABLET ORAL
Status: COMPLETED | OUTPATIENT
Start: 2022-09-06 | End: 2022-09-06

## 2022-09-06 RX ADMIN — NAPROXEN 500 MG: 250 TABLET ORAL at 04:05

## 2022-09-06 SDOH — ECONOMIC STABILITY - HOUSING INSECURITY: HOMELESSNESS UNSPECIFIED: Z59.00

## 2022-09-06 NOTE — ED PROVIDER NOTES
EMERGENCY DEPARTMENT HISTORY AND PHYSICAL EXAM        Date: 9/6/2022  Patient Name: Kaity Carpenter    History of Presenting Illness     Chief Complaint   Patient presents with    Homeless    Generalized Body Aches       History Provided By: Patient    HPI: Kaity Carpenter, 66 y.o. male with history of high ED utilization, hypertension presents to the ED with cc of generalized body aches, homelessness. Patient reports not feeling well for the past 1 to 2 weeks. He recently tested positive for COVID-19. He reports taking Naprosyn to help with his pain, he has since run out. He endorses generalized body aches and myalgias. Denies any fevers, chills, chest pain, shortness of breath, nausea, vomiting, diarrhea, abdominal pain, cough. He has been staying in a homeless shelter and states that he was recently kicked out. He has been sleeping in his car and states that this does not provide him with good rest.    There are no other complaints, changes, or physical findings at this time. PCP: None    No current facility-administered medications on file prior to encounter. Current Outpatient Medications on File Prior to Encounter   Medication Sig Dispense Refill    naproxen (Naprosyn) 500 mg tablet Take 1 Tablet by mouth two (2) times daily (with meals) for 10 days. 20 Tablet 0    Toviaz 4 mg SR tablet Take 1 Tablet by mouth daily. phenazopyridine (Pyridium) 200 mg tablet Take 1 Tablet by mouth three (3) times daily as needed for Pain. 30 Tablet 0    gabapentin (NEURONTIN) 100 mg capsule Take 1 Capsule by mouth nightly. Max Daily Amount: 100 mg. 30 Capsule 0    levoFLOXacin (Levaquin) 500 mg tablet Take 1 Tablet by mouth daily. 10 Tablet 0    amLODIPine (NORVASC) 5 mg tablet Take 1 Tablet by mouth daily. 90 Tablet 2    atorvastatin (LIPITOR) 80 mg tablet Take 1 Tablet by mouth nightly. 90 Tablet 1    tamsulosin (FLOMAX) 0.4 mg capsule Take 1 Capsule by mouth daily.  30 Capsule 3    metFORMIN (GLUCOPHAGE) 1,000 mg tablet Take 1 Tablet by mouth two (2) times daily (with meals). 180 Tablet 1    pantoprazole (PROTONIX) 40 mg tablet Take 1 Tablet by mouth daily. 30 Tablet 3    latanoprost (XALATAN) 0.005 % ophthalmic solution Administer 1 Drop to both eyes nightly. (Patient not taking: No sig reported)      albuterol (PROVENTIL HFA, VENTOLIN HFA, PROAIR HFA) 90 mcg/actuation inhaler Take 2 Puffs by inhalation every four (4) hours as needed for Wheezing. 1 Each 0       Past History     Past Medical History:  Past Medical History:   Diagnosis Date    Diabetes ([facility] [address]     Gastrointestinal disorder     Hypertension     DEJA (obstructive sleep apnea)     AHI: 8.9 per hour       Past Surgical History:  Past Surgical History:   Procedure Laterality Date    HX PROSTATE SURGERY      RI ABDOMEN SURGERY PROC UNLISTED      Hernia Repair       Family History:  Family History   Problem Relation Age of Onset    Hypertension Mother     Diabetes Mother     Hypertension Father     Diabetes Father        Social History:  Social History     Tobacco Use    Smoking status: Never    Smokeless tobacco: Never   Vaping Use    Vaping Use: Never used   Substance Use Topics    Alcohol use: No    Drug use: Not Currently       Allergies: Allergies   Allergen Reactions    Aspirin Anxiety     Patient states not allergic to medication but reports he does not wish to take it     Celecoxib Vertigo    Ibuprofen Nausea Only         Review of Systems   Review of Systems   Constitutional:  Negative for chills and fever. Respiratory:  Negative for shortness of breath. Cardiovascular:  Negative for chest pain. Gastrointestinal:  Negative for abdominal pain, diarrhea, nausea and vomiting. Musculoskeletal:  Positive for myalgias. Skin:  Negative for rash. All other systems reviewed and are negative. Physical Exam   Physical Exam  Constitutional:       General: He is not in acute distress. Appearance: Normal appearance.  He is not ill-appearing or toxic-appearing. HENT:      Head: Normocephalic and atraumatic. Cardiovascular:      Rate and Rhythm: Normal rate and regular rhythm. Pulmonary:      Effort: Pulmonary effort is normal. No respiratory distress. Skin:     General: Skin is warm and dry. Findings: No erythema or rash. Neurological:      General: No focal deficit present. Mental Status: He is alert and oriented to person, place, and time. Diagnostic Study Results     Labs -   No results found for this or any previous visit (from the past 12 hour(s)). Radiologic Studies -   No orders to display     CT Results  (Last 48 hours)      None          CXR Results  (Last 48 hours)      None            Medical Decision Making   I am the first provider for this patient. I reviewed the vital signs, available nursing notes, past medical history, past surgical history, family history and social history. Vital Signs-Reviewed the patient's vital signs. Patient Vitals for the past 12 hrs:   Temp Pulse Resp BP SpO2   09/06/22 0324 98.2 °F (36.8 °C) 98 15 (!) 147/116 96 %       Records Reviewed: Nursing Notes    Provider Notes (Medical Decision Making):   76-year-old male presenting with myalgias from recent COVID-19 infection, complaining of homelessness after being kicked out of a homeless shelter. Afebrile and vital signs stable. No cardiopulmonary complaints today. Appears clinically well and nontoxic, no increased work of breathing or hypoxia. He is asking for something to help with aches and pains from COVID as well as a place to rest and something to eat. I will provide [facility] homelessness resources. Stable for discharge at this time. ED Course:   Initial assessment performed. The patients presenting problems have been discussed, and they are in agreement with the care plan formulated and outlined with them. I have encouraged them to ask questions as they arise throughout their visit.            Discharge Note:  The patient has been re-evaluated and is ready for discharge. Reviewed available results with patient. Counseled patient on diagnosis and care plan. Patient has expressed understanding, and all questions have been answered. Patient agrees with plan and agrees to follow up as recommended, or to return to the ED if their symptoms worsen. Discharge instructions have been provided and explained to the patient, along with reasons to return to the ED. Disposition:  Discharge    DISCHARGE PLAN:  1. Current Discharge Medication List        2. Follow-up Information       Follow up With Specialties Details Why [address] Internal Medicine Schedule an appointment as soon as possible for a visit  to establish with a PCP Coleen Barrett  [address] [place] [address] - [facility] Emergency Medicine Go to  As needed, If symptoms worsen [address]          3. Return to ED if worse     Diagnosis     Clinical Impression:   1. COVID-19    2. Myalgia    3. Homelessness        Attestations:  I am the first and primary provider of record for this patient's ED encounter. I personally performed the services described above in this documentation. Savana Katz MD    Please note that this dictation was completed with [employer], the computer voice recognition software. Quite often unanticipated grammatical, syntax, homophones, and other interpretive errors are inadvertently transcribed by the computer software. Please disregard these errors. Please excuse any errors that have escaped final proofreading. Thank you.

## 2022-09-06 NOTE — ED NOTES
Emergency Department Nursing Plan of Care       The Nursing Plan of Care is developed from the Nursing assessment and Emergency Department Attending provider initial evaluation. The plan of care may be reviewed in the ED Provider note.     The Plan of Care was developed with the following considerations:   Patient / Family readiness to learn indicated by:verbalized understanding  Persons(s) to be included in education: patient  Barriers to Learning/Limitations:No    Signed     David Fernandez RN    9/6/2022   3:44 AM

## 2022-09-06 NOTE — ED TRIAGE NOTES
Pt reporting generalized body aches x few days. Dx with COVID on 08/21. Reports he was kicked out of a shelter but the woman who runs it because he wanted to use a urinal instead of getting up to urinate in the toilet frequently. Hx prostate/urinary issues. States he is now living in his car. Reports he has been unable to sleep x2wks. Requesting blankets and briefs.

## 2022-09-08 LAB — BACTERIA UR CULT: ABNORMAL

## 2022-09-09 DIAGNOSIS — N39.0 E-COLI UTI: Primary | ICD-10-CM

## 2022-09-09 DIAGNOSIS — B96.20 E-COLI UTI: Primary | ICD-10-CM

## 2022-09-09 RX ORDER — NITROFURANTOIN 25; 75 MG/1; MG/1
100 CAPSULE ORAL 2 TIMES DAILY
Qty: 14 CAPSULE | Refills: 0 | Status: SHIPPED | OUTPATIENT
Start: 2022-09-09 | End: 2022-09-16

## 2022-09-09 NOTE — PROGRESS NOTES
Unfortunately, his UTI was resistant to levaquin which I placed him on. I am sending for MACROBID alternatively to take for this.

## 2022-09-10 ENCOUNTER — APPOINTMENT (OUTPATIENT)
Dept: GENERAL RADIOLOGY | Age: 72
End: 2022-09-10
Attending: EMERGENCY MEDICINE
Payer: MEDICARE

## 2022-09-10 ENCOUNTER — HOSPITAL ENCOUNTER (EMERGENCY)
Age: 72
Discharge: HOME | End: 2022-09-10
Attending: EMERGENCY MEDICINE
Payer: MEDICARE

## 2022-09-10 ENCOUNTER — HOSPITAL ENCOUNTER (INPATIENT)
Age: 72
LOS: 6 days | Discharge: HOME | DRG: 683 | End: 2022-09-16
Attending: STUDENT IN AN ORGANIZED HEALTH CARE EDUCATION/TRAINING PROGRAM | Admitting: INTERNAL MEDICINE
Payer: MEDICARE

## 2022-09-10 VITALS
RESPIRATION RATE: 21 BRPM | OXYGEN SATURATION: 99 % | SYSTOLIC BLOOD PRESSURE: 119 MMHG | DIASTOLIC BLOOD PRESSURE: 79 MMHG | HEART RATE: 107 BPM | TEMPERATURE: 97.6 F | WEIGHT: 218 LBS | BODY MASS INDEX: 33.04 KG/M2 | HEIGHT: 68 IN

## 2022-09-10 DIAGNOSIS — R39.12 BENIGN PROSTATIC HYPERPLASIA WITH WEAK URINARY STREAM: ICD-10-CM

## 2022-09-10 DIAGNOSIS — N39.0 URINARY TRACT INFECTION WITHOUT HEMATURIA, SITE UNSPECIFIED: ICD-10-CM

## 2022-09-10 DIAGNOSIS — Z22.39 ESBL ESCHERICHIA COLI CARRIER: ICD-10-CM

## 2022-09-10 DIAGNOSIS — N28.9 ACUTE ON CHRONIC RENAL INSUFFICIENCY: Primary | ICD-10-CM

## 2022-09-10 DIAGNOSIS — E87.5 ACUTE HYPERKALEMIA: ICD-10-CM

## 2022-09-10 DIAGNOSIS — N18.9 ACUTE ON CHRONIC RENAL INSUFFICIENCY: Primary | ICD-10-CM

## 2022-09-10 DIAGNOSIS — N40.1 BENIGN PROSTATIC HYPERPLASIA WITH WEAK URINARY STREAM: ICD-10-CM

## 2022-09-10 DIAGNOSIS — D64.9 CHRONIC ANEMIA: ICD-10-CM

## 2022-09-10 PROBLEM — N17.9 ACUTE KIDNEY INJURY (HCC): Status: ACTIVE | Noted: 2022-09-10

## 2022-09-10 LAB
ALBUMIN SERPL-MCNC: 3.1 G/DL (ref 3.5–5)
ALBUMIN/GLOB SERPL: 0.5 {RATIO} (ref 1.1–2.2)
ALP SERPL-CCNC: 127 U/L (ref 45–117)
ALT SERPL-CCNC: 20 U/L (ref 12–78)
ANION GAP SERPL CALC-SCNC: 12 MMOL/L (ref 5–15)
APPEARANCE UR: ABNORMAL
AST SERPL-CCNC: 18 U/L (ref 15–37)
BACTERIA URNS QL MICRO: ABNORMAL /HPF
BASOPHILS # BLD: 0.1 K/UL (ref 0–0.1)
BASOPHILS NFR BLD: 1 % (ref 0–1)
BILIRUB SERPL-MCNC: 0.4 MG/DL (ref 0.2–1)
BILIRUB UR QL CFM: NEGATIVE
BUN SERPL-MCNC: 34 MG/DL (ref 6–20)
BUN/CREAT SERPL: 12 (ref 12–20)
CALCIUM SERPL-MCNC: 9.6 MG/DL (ref 8.5–10.1)
CHLORIDE SERPL-SCNC: 98 MMOL/L (ref 97–108)
CO2 SERPL-SCNC: 19 MMOL/L (ref 21–32)
COLOR UR: ABNORMAL
CREAT SERPL-MCNC: 2.87 MG/DL (ref 0.7–1.3)
DIFFERENTIAL METHOD BLD: ABNORMAL
EOSINOPHIL # BLD: 0.2 K/UL (ref 0–0.4)
EOSINOPHIL NFR BLD: 2 % (ref 0–7)
EPITH CASTS URNS QL MICRO: ABNORMAL /LPF
ERYTHROCYTE [DISTWIDTH] IN BLOOD BY AUTOMATED COUNT: 17 % (ref 11.5–14.5)
GLOBULIN SER CALC-MCNC: 6.5 G/DL (ref 2–4)
GLUCOSE SERPL-MCNC: 228 MG/DL (ref 65–100)
GLUCOSE UR STRIP.AUTO-MCNC: NEGATIVE MG/DL
HCT VFR BLD AUTO: 25.1 % (ref 36.6–50.3)
HGB BLD-MCNC: 7.7 G/DL (ref 12.1–17)
HGB UR QL STRIP: ABNORMAL
IMM GRANULOCYTES # BLD AUTO: 0.1 K/UL (ref 0–0.04)
IMM GRANULOCYTES NFR BLD AUTO: 1 % (ref 0–0.5)
KETONES UR QL STRIP.AUTO: NEGATIVE MG/DL
LEUKOCYTE ESTERASE UR QL STRIP.AUTO: ABNORMAL
LYMPHOCYTES # BLD: 1.6 K/UL (ref 0.8–3.5)
LYMPHOCYTES NFR BLD: 18 % (ref 12–49)
MCH RBC QN AUTO: 25.2 PG (ref 26–34)
MCHC RBC AUTO-ENTMCNC: 30.7 G/DL (ref 30–36.5)
MCV RBC AUTO: 82 FL (ref 80–99)
MONOCYTES # BLD: 0.5 K/UL (ref 0–1)
MONOCYTES NFR BLD: 6 % (ref 5–13)
NEUTS SEG # BLD: 6.6 K/UL (ref 1.8–8)
NEUTS SEG NFR BLD: 72 % (ref 32–75)
NITRITE UR QL STRIP.AUTO: POSITIVE
NRBC # BLD: 0 K/UL (ref 0–0.01)
NRBC BLD-RTO: 0 PER 100 WBC
PH UR STRIP: 5.5 [PH] (ref 5–8)
PLATELET # BLD AUTO: 451 K/UL (ref 150–400)
PMV BLD AUTO: 8.1 FL (ref 8.9–12.9)
POTASSIUM SERPL-SCNC: 6.4 MMOL/L (ref 3.5–5.1)
PROT SERPL-MCNC: 9.6 G/DL (ref 6.4–8.2)
PROT UR STRIP-MCNC: 100 MG/DL
RBC # BLD AUTO: 3.06 M/UL (ref 4.1–5.7)
RBC #/AREA URNS HPF: ABNORMAL /HPF (ref 0–5)
SODIUM SERPL-SCNC: 129 MMOL/L (ref 136–145)
SP GR UR REFRACTOMETRY: 1.01
TROPONIN-HIGH SENSITIVITY: 7 NG/L (ref 0–76)
UA: UC IF INDICATED,UAUC: ABNORMAL
UROBILINOGEN UR QL STRIP.AUTO: 1 EU/DL (ref 0.2–1)
WBC # BLD AUTO: 9.1 K/UL (ref 4.1–11.1)
WBC URNS QL MICRO: ABNORMAL /HPF (ref 0–4)

## 2022-09-10 PROCEDURE — 74011250637 HC RX REV CODE- 250/637: Performed by: EMERGENCY MEDICINE

## 2022-09-10 PROCEDURE — 96365 THER/PROPH/DIAG IV INF INIT: CPT

## 2022-09-10 PROCEDURE — 74011636637 HC RX REV CODE- 636/637: Performed by: EMERGENCY MEDICINE

## 2022-09-10 PROCEDURE — 81001 URINALYSIS AUTO W/SCOPE: CPT

## 2022-09-10 PROCEDURE — 71045 X-RAY EXAM CHEST 1 VIEW: CPT

## 2022-09-10 PROCEDURE — 96375 TX/PRO/DX INJ NEW DRUG ADDON: CPT

## 2022-09-10 PROCEDURE — 74011000250 HC RX REV CODE- 250: Performed by: INTERNAL MEDICINE

## 2022-09-10 PROCEDURE — 65270000046 HC RM TELEMETRY

## 2022-09-10 PROCEDURE — 87040 BLOOD CULTURE FOR BACTERIA: CPT

## 2022-09-10 PROCEDURE — 74011250636 HC RX REV CODE- 250/636: Performed by: NURSE PRACTITIONER

## 2022-09-10 PROCEDURE — 74011636637 HC RX REV CODE- 636/637: Performed by: INTERNAL MEDICINE

## 2022-09-10 PROCEDURE — 74011250636 HC RX REV CODE- 250/636: Performed by: EMERGENCY MEDICINE

## 2022-09-10 PROCEDURE — 93005 ELECTROCARDIOGRAM TRACING: CPT

## 2022-09-10 PROCEDURE — 74011000250 HC RX REV CODE- 250: Performed by: EMERGENCY MEDICINE

## 2022-09-10 PROCEDURE — 87086 URINE CULTURE/COLONY COUNT: CPT

## 2022-09-10 PROCEDURE — 84484 ASSAY OF TROPONIN QUANT: CPT

## 2022-09-10 PROCEDURE — 80053 COMPREHEN METABOLIC PANEL: CPT

## 2022-09-10 PROCEDURE — 36415 COLL VENOUS BLD VENIPUNCTURE: CPT

## 2022-09-10 PROCEDURE — 74011250636 HC RX REV CODE- 250/636: Performed by: INTERNAL MEDICINE

## 2022-09-10 PROCEDURE — 74011000258 HC RX REV CODE- 258: Performed by: EMERGENCY MEDICINE

## 2022-09-10 PROCEDURE — 99285 EMERGENCY DEPT VISIT HI MDM: CPT

## 2022-09-10 PROCEDURE — 85025 COMPLETE CBC W/AUTO DIFF WBC: CPT

## 2022-09-10 RX ORDER — POLYETHYLENE GLYCOL 3350 17 G/17G
17 POWDER, FOR SOLUTION ORAL DAILY
Status: DISCONTINUED | OUTPATIENT
Start: 2022-09-11 | End: 2022-09-16 | Stop reason: HOSPADM

## 2022-09-10 RX ORDER — MAGNESIUM SULFATE 100 %
4 CRYSTALS MISCELLANEOUS AS NEEDED
Status: DISCONTINUED | OUTPATIENT
Start: 2022-09-10 | End: 2022-09-16 | Stop reason: HOSPADM

## 2022-09-10 RX ORDER — ACETAMINOPHEN 650 MG/1
650 SUPPOSITORY RECTAL
Status: DISCONTINUED | OUTPATIENT
Start: 2022-09-10 | End: 2022-09-16 | Stop reason: HOSPADM

## 2022-09-10 RX ORDER — HEPARIN SODIUM 5000 [USP'U]/ML
5000 INJECTION, SOLUTION INTRAVENOUS; SUBCUTANEOUS EVERY 8 HOURS
Status: DISCONTINUED | OUTPATIENT
Start: 2022-09-11 | End: 2022-09-16 | Stop reason: HOSPADM

## 2022-09-10 RX ORDER — ACETAMINOPHEN 325 MG/1
650 TABLET ORAL
Status: DISCONTINUED | OUTPATIENT
Start: 2022-09-10 | End: 2022-09-16 | Stop reason: HOSPADM

## 2022-09-10 RX ORDER — SODIUM CHLORIDE 9 MG/ML
100 INJECTION, SOLUTION INTRAVENOUS CONTINUOUS
Status: DISCONTINUED | OUTPATIENT
Start: 2022-09-10 | End: 2022-09-14

## 2022-09-10 RX ORDER — CALCIUM GLUCONATE 20 MG/ML
1 INJECTION, SOLUTION INTRAVENOUS ONCE
Status: COMPLETED | OUTPATIENT
Start: 2022-09-10 | End: 2022-09-10

## 2022-09-10 RX ORDER — SODIUM CHLORIDE 0.9 % (FLUSH) 0.9 %
5-40 SYRINGE (ML) INJECTION EVERY 8 HOURS
Status: DISCONTINUED | OUTPATIENT
Start: 2022-09-10 | End: 2022-09-16 | Stop reason: HOSPADM

## 2022-09-10 RX ORDER — INSULIN LISPRO 100 [IU]/ML
INJECTION, SOLUTION INTRAVENOUS; SUBCUTANEOUS
Status: DISCONTINUED | OUTPATIENT
Start: 2022-09-10 | End: 2022-09-16 | Stop reason: HOSPADM

## 2022-09-10 RX ORDER — SODIUM BICARBONATE 84 MG/ML
50 INJECTION, SOLUTION INTRAVENOUS ONCE
Status: COMPLETED | OUTPATIENT
Start: 2022-09-10 | End: 2022-09-10

## 2022-09-10 RX ORDER — ONDANSETRON 2 MG/ML
4 INJECTION INTRAMUSCULAR; INTRAVENOUS
Status: DISCONTINUED | OUTPATIENT
Start: 2022-09-10 | End: 2022-09-16 | Stop reason: HOSPADM

## 2022-09-10 RX ORDER — PROMETHAZINE HYDROCHLORIDE 25 MG/1
12.5 TABLET ORAL
Status: DISCONTINUED | OUTPATIENT
Start: 2022-09-10 | End: 2022-09-16 | Stop reason: HOSPADM

## 2022-09-10 RX ORDER — SODIUM POLYSTYRENE SULFONATE 15 G/60ML
30 SUSPENSION ORAL; RECTAL
Status: COMPLETED | OUTPATIENT
Start: 2022-09-10 | End: 2022-09-10

## 2022-09-10 RX ORDER — SODIUM CHLORIDE 0.9 % (FLUSH) 0.9 %
5-40 SYRINGE (ML) INJECTION AS NEEDED
Status: DISCONTINUED | OUTPATIENT
Start: 2022-09-10 | End: 2022-09-16 | Stop reason: HOSPADM

## 2022-09-10 RX ORDER — HYDROMORPHONE HYDROCHLORIDE 1 MG/ML
0.5 INJECTION, SOLUTION INTRAMUSCULAR; INTRAVENOUS; SUBCUTANEOUS ONCE
Status: COMPLETED | OUTPATIENT
Start: 2022-09-10 | End: 2022-09-10

## 2022-09-10 RX ORDER — DEXTROSE 50 % IN WATER (D50W) INTRAVENOUS SYRINGE
25
Status: COMPLETED | OUTPATIENT
Start: 2022-09-10 | End: 2022-09-10

## 2022-09-10 RX ORDER — BISACODYL 5 MG
5 TABLET, DELAYED RELEASE (ENTERIC COATED) ORAL DAILY PRN
Status: DISCONTINUED | OUTPATIENT
Start: 2022-09-10 | End: 2022-09-16 | Stop reason: HOSPADM

## 2022-09-10 RX ORDER — DEXTROSE MONOHYDRATE 100 MG/ML
0-250 INJECTION, SOLUTION INTRAVENOUS AS NEEDED
Status: DISCONTINUED | OUTPATIENT
Start: 2022-09-10 | End: 2022-09-16 | Stop reason: HOSPADM

## 2022-09-10 RX ADMIN — SODIUM CHLORIDE, PRESERVATIVE FREE 10 ML: 5 INJECTION INTRAVENOUS at 22:40

## 2022-09-10 RX ADMIN — SODIUM CHLORIDE 100 ML/HR: 9 INJECTION, SOLUTION INTRAVENOUS at 22:40

## 2022-09-10 RX ADMIN — Medication 10 UNITS: at 11:22

## 2022-09-10 RX ADMIN — SODIUM BICARBONATE 50 MEQ: 84 INJECTION, SOLUTION INTRAVENOUS at 11:21

## 2022-09-10 RX ADMIN — HYDROMORPHONE HYDROCHLORIDE 0.5 MG: 1 INJECTION, SOLUTION INTRAMUSCULAR; INTRAVENOUS; SUBCUTANEOUS at 22:39

## 2022-09-10 RX ADMIN — Medication 2 UNITS: at 22:39

## 2022-09-10 RX ADMIN — SODIUM POLYSTYRENE SULFONATE 30 G: 15 SUSPENSION ORAL; RECTAL at 14:23

## 2022-09-10 RX ADMIN — DEXTROSE MONOHYDRATE 12.5 G: 25 INJECTION, SOLUTION INTRAVENOUS at 11:21

## 2022-09-10 RX ADMIN — CALCIUM GLUCONATE 1000 MG: 20 INJECTION, SOLUTION INTRAVENOUS at 11:21

## 2022-09-10 RX ADMIN — MEROPENEM 1 G: 1 INJECTION, POWDER, FOR SOLUTION INTRAVENOUS at 11:23

## 2022-09-10 NOTE — ED NOTES
Patient unhooked from blood pressure and sat monitor for bathroom at this time. Patient ambulates to bathroom without assistance for another bowel movement.

## 2022-09-10 NOTE — ED NOTES
Call to [facility] for report for transfer to room 2178. Phone answered by  but nurse not available for report on phone transfer. Will try back again soon.

## 2022-09-10 NOTE — ED NOTES
Patient informed of pending transfer to [address] [place] [address].  Patient expresses concern that he is going to die. Patient asking for water, states he needs to take one of his pills. Patient unable to drink water at this time per provider. Patient states that he needs to call his \"people\" to let them know, but states that his phone is in his car. Patient okayed by provider to go to his car to get his cell phone at this time.

## 2022-09-10 NOTE — ED NOTES
TRANSFER - OUT REPORT:    Tamiko Noonan report given to Ty Stevens RN (name) on Lorie Mehta  being transferred to [facility] AT [facility], [address] (unit) for routine progression of care       Report consisted of patients Situation, Background, Assessment and   Recommendations(SBAR). Information from the following report(s) SBAR, ED Summary, Intake/Output, MAR, and Recent Results was reviewed with the receiving nurse. Lines:   Peripheral IV 09/10/22 Left Hand (Active)       Peripheral IV 09/10/22 Right Hand (Active)        Opportunity for questions and clarification was provided.       Patient transported with:      EMS

## 2022-09-10 NOTE — ED NOTES
Patient states he needs to have a bowel movement. Patient disconnected from equipment and ambulated himself to the bathroom.

## 2022-09-10 NOTE — ED PROVIDER NOTES
EMERGENCY DEPARTMENT HISTORY AND PHYSICAL EXAM      Date: 9/10/2022  Patient Name: Brandie Scott    History of Presenting Illness     Chief Complaint   Patient presents with    Urinary Retention     States hx of prostate problems, woke up this morning with retention and pain       History Provided By: Patient    HPI: Brandie Scott, 66 y.o. male presents to the ED with cc of difficulty urinating. Patient states he has a history of enlarged prostate and occasionally has to self cath. He states this morning he was having difficulty urinating and subsequently stood and felt like he was going to pass out. He denied any preceding chest pain, palpitations, abdominal pain, dizziness, focal weakness, nausea or vomiting. He was able to urinate at home and in the ED. He is a diabetic. He did not check his blood sugar. He denies any chest pain, abdominal pain, nausea or vomiting. There are no other complaints, changes, or physical findings at this time. PCP: None    No current facility-administered medications on file prior to encounter. Current Outpatient Medications on File Prior to Encounter   Medication Sig Dispense Refill    nitrofurantoin, macrocrystal-monohydrate, (Macrobid) 100 mg capsule Take 1 Capsule by mouth two (2) times a day for 7 days. 14 Capsule 0    naproxen (Naprosyn) 500 mg tablet Take 1 Tablet by mouth two (2) times daily (with meals) for 10 days. 20 Tablet 0    Toviaz 4 mg SR tablet Take 1 Tablet by mouth daily. phenazopyridine (Pyridium) 200 mg tablet Take 1 Tablet by mouth three (3) times daily as needed for Pain. 30 Tablet 0    gabapentin (NEURONTIN) 100 mg capsule Take 1 Capsule by mouth nightly. Max Daily Amount: 100 mg. 30 Capsule 0    amLODIPine (NORVASC) 5 mg tablet Take 1 Tablet by mouth daily. 90 Tablet 2    atorvastatin (LIPITOR) 80 mg tablet Take 1 Tablet by mouth nightly. 90 Tablet 1    tamsulosin (FLOMAX) 0.4 mg capsule Take 1 Capsule by mouth daily.  30 Capsule 3 metFORMIN (GLUCOPHAGE) 1,000 mg tablet Take 1 Tablet by mouth two (2) times daily (with meals). 180 Tablet 1    pantoprazole (PROTONIX) 40 mg tablet Take 1 Tablet by mouth daily. 30 Tablet 3    latanoprost (XALATAN) 0.005 % ophthalmic solution Administer 1 Drop to both eyes nightly. (Patient not taking: No sig reported)      albuterol (PROVENTIL HFA, VENTOLIN HFA, PROAIR HFA) 90 mcg/actuation inhaler Take 2 Puffs by inhalation every four (4) hours as needed for Wheezing. 1 Each 0       Past History     Past Medical History:  Past Medical History:   Diagnosis Date    Diabetes ([address]     Gastrointestinal disorder     Hypertension     DEJA (obstructive sleep apnea)     AHI: 8.9 per hour       Past Surgical History:  Past Surgical History:   Procedure Laterality Date    HX PROSTATE SURGERY      [place] ABDOMEN SURGERY PROC UNLISTED      Hernia Repair       Family History:  Family History   Problem Relation Age of Onset    Hypertension Mother     Diabetes Mother     Hypertension Father     Diabetes Father        Social History:  Social History     Tobacco Use    Smoking status: Never    Smokeless tobacco: Never   Vaping Use    Vaping Use: Never used   Substance Use Topics    Alcohol use: No    Drug use: Not Currently       Allergies: Allergies   Allergen Reactions    Aspirin Anxiety     Patient states not allergic to medication but reports he does not wish to take it     Celecoxib Vertigo    Ibuprofen Nausea Only         Review of Systems   Review of Systems   Constitutional: Negative. Negative for chills and fever. HENT: Negative. Negative for congestion, rhinorrhea and sinus pressure. Eyes: Negative. Negative for discharge and redness. Respiratory: Negative. Negative for chest tightness and shortness of breath. Cardiovascular: Negative. Negative for chest pain. Gastrointestinal: Negative. Negative for abdominal pain and blood in stool. Endocrine: Negative. Genitourinary: Negative.   Negative for flank pain and hematuria. Musculoskeletal: Negative. Negative for back pain. Skin: Negative. Negative for rash. Neurological: Negative. Negative for dizziness, seizures, weakness, numbness and headaches. Hematological: Negative. All other systems reviewed and are negative. Physical Exam   Physical Exam  Vitals and nursing note reviewed. Constitutional:       General: He is not in acute distress. Appearance: He is well-developed. He is not diaphoretic. HENT:      Head: Normocephalic and atraumatic. Nose: Nose normal.      Mouth/Throat:      Pharynx: No oropharyngeal exudate. Eyes:      General: No scleral icterus. Conjunctiva/sclera: Conjunctivae normal.      Pupils: Pupils are equal, round, and reactive to light. Neck:      Thyroid: No thyromegaly. Vascular: No JVD. Cardiovascular:      Rate and Rhythm: Normal rate and regular rhythm. Chest Wall: PMI is not displaced. Pulses: Normal pulses. Heart sounds: Normal heart sounds. No murmur heard. No friction rub. No gallop. Pulmonary:      Effort: Pulmonary effort is normal. No respiratory distress. Breath sounds: Normal breath sounds. No stridor. No decreased breath sounds, wheezing, rhonchi or rales. Chest:      Chest wall: No tenderness. Abdominal:      General: Bowel sounds are normal. There is no distension or abdominal bruit. Palpations: Abdomen is soft. There is no mass. Tenderness: There is no abdominal tenderness. There is no guarding or rebound. Hernia: No hernia is present. Comments: No Suprapubic abdominal pain   Musculoskeletal:      Cervical back: Normal range of motion and neck supple. Skin:     General: Skin is warm. Findings: No erythema or rash. Neurological:      Mental Status: He is alert and oriented to person, place, and time. GCS: GCS eye subscore is 4. GCS verbal subscore is 5. GCS motor subscore is 6.       Cranial Nerves: No cranial nerve deficit. Sensory: No sensory deficit. Motor: No tremor, atrophy, abnormal muscle tone or seizure activity. Coordination: Coordination normal.      Deep Tendon Reflexes: Reflexes are normal and symmetric. Reflexes normal.      Reflex Scores:       Patellar reflexes are 2+ on the right side and 2+ on the left side. 10:39 AM Talk with Dr Francia Valente hospitalist here at [facility]. Made her aware of the patient's current clinical presentation and findings. She has concerns that the patient will possibly need urology and nephrology which are services we do not have access to here. We will consult [facility] for further recommendations and probable admission. Talked with  hospitalist at [address] [place] [address] him aware of the patient's current clinical presentation and findings. He agrees with treatment for hyperkalemia. He also agrees with antibiotic usage for the patient's UTI and history of ESBL positive urine. Talked with the pharmacist and she recommends Merrem 1 g and she will adjust dose as needed for patient's GFR. Patient made aware of the need for transfer and admission and agrees with transfer to [facility].    Diagnostic Study Results     Labs -     Recent Results (from the past 12 hour(s))   CBC WITH AUTOMATED DIFF    Collection Time: 09/10/22  8:34 AM   Result Value Ref Range    WBC 9.1 4.1 - 11.1 K/uL    RBC 3.06 (L) 4.10 - 5.70 M/uL    HGB 7.7 (L) 12.1 - 17.0 g/dL    HCT 25.1 (L) 36.6 - 50.3 %    MCV 82.0 80.0 - 99.0 FL    MCH 25.2 (L) 26.0 - 34.0 PG    MCHC 30.7 30.0 - 36.5 g/dL    RDW 17.0 (H) 11.5 - 14.5 %    PLATELET 465 (H) 976 - 400 K/uL    MPV 8.1 (L) 8.9 - 12.9 FL    NRBC 0.0 0  WBC    ABSOLUTE NRBC 0.00 0.00 - 0.01 K/uL    NEUTROPHILS 72 32 - 75 %    LYMPHOCYTES 18 12 - 49 %    MONOCYTES 6 5 - 13 %    EOSINOPHILS 2 0 - 7 %    BASOPHILS 1 0 - 1 %    IMMATURE GRANULOCYTES 1 (H) 0.0 - 0.5 %    ABS. NEUTROPHILS 6.6 1.8 - 8.0 K/UL    ABS. LYMPHOCYTES 1.6 0.8 - 3.5 K/UL    ABS. MONOCYTES 0.5 0.0 - 1.0 K/UL    ABS. EOSINOPHILS 0.2 0.0 - 0.4 K/UL    ABS. BASOPHILS 0.1 0.0 - 0.1 K/UL    ABS. IMM. GRANS. 0.1 (H) 0.00 - 0.04 K/UL    DF AUTOMATED     METABOLIC PANEL, COMPREHENSIVE    Collection Time: 09/10/22  8:34 AM   Result Value Ref Range    Sodium 129 (L) 136 - 145 mmol/L    Potassium 6.4 (H) 3.5 - 5.1 mmol/L    Chloride 98 97 - 108 mmol/L    CO2 19 (L) 21 - 32 mmol/L    Anion gap 12 5 - 15 mmol/L    Glucose 228 (H) 65 - 100 mg/dL    BUN 34 (H) 6 - 20 MG/DL    Creatinine 2.87 (H) 0.70 - 1.30 MG/DL    BUN/Creatinine ratio 12 12 - 20      GFR est AA 26 (L) >60 ml/min/1.73m2    GFR est non-AA 22 (L) >60 ml/min/1.73m2    Calcium 9.6 8.5 - 10.1 MG/DL    Bilirubin, total 0.4 0.2 - 1.0 MG/DL    ALT (SGPT) 20 12 - 78 U/L    AST (SGOT) 18 15 - 37 U/L    Alk.  phosphatase 127 (H) 45 - 117 U/L    Protein, total 9.6 (H) 6.4 - 8.2 g/dL    Albumin 3.1 (L) 3.5 - 5.0 g/dL    Globulin 6.5 (H) 2.0 - 4.0 g/dL    A-G Ratio 0.5 (L) 1.1 - 2.2     TROPONIN-HIGH SENSITIVITY    Collection Time: 09/10/22  8:34 AM   Result Value Ref Range    Troponin-High Sensitivity 7 0 - 76 ng/L   URINALYSIS W/ REFLEX CULTURE    Collection Time: 09/10/22  8:34 AM    Specimen: Miscellaneous sample    Urine specimen   Result Value Ref Range    Color DARK YELLOW      Appearance TURBID (A) CLEAR      Specific gravity 1.010      pH (UA) 5.5 5.0 - 8.0      Protein 100 (A) NEG mg/dL    Glucose Negative NEG mg/dL    Ketone Negative NEG mg/dL    Blood LARGE (A) NEG      Urobilinogen 1.0 0.2 - 1.0 EU/dL    Nitrites Positive (A) NEG      Leukocyte Esterase LARGE (A) NEG      WBC  0 - 4 /hpf    RBC 0-5 0 - 5 /hpf    Epithelial cells FEW FEW /lpf    Bacteria 1+ (A) NEG /hpf    UA:UC IF INDICATED URINE CULTURE ORDERED (A) CNI     BILIRUBIN, CONFIRM    Collection Time: 09/10/22  8:34 AM   Result Value Ref Range    Bilirubin UA, confirm Negative NEG     EKG, 12 LEAD, INITIAL    Collection Time: 09/10/22 8:40 AM   Result Value Ref Range    Ventricular Rate 93 BPM    Atrial Rate 93 BPM    P-R Interval 146 ms    QRS Duration 74 ms    Q-T Interval 318 ms    QTC Calculation (Bezet) 395 ms    Calculated P Axis 59 degrees    Calculated R Axis -8 degrees    Calculated T Axis 31 degrees    Diagnosis       Normal sinus rhythm  Normal ECG  When compared with ECG of 28-MAR-2022 02:23,  ST elevation now present in Inferior leads  Non-specific change in ST segment in Lateral leads  Nonspecific T wave abnormality, worse in Lateral leads         Radiologic Studies -   XR CHEST PORT   Final Result   No evidence of acute cardiopulmonary process. CT Results  (Last 48 hours)      None          CXR Results  (Last 48 hours)                 09/10/22 0917  XR CHEST PORT Final result    Impression:  No evidence of acute cardiopulmonary process. Narrative:  INDICATION:  near syncope        COMPARISON: January 2022       FINDINGS: Single AP portable view of the chest obtained at 912 demonstrates a   stable cardiomediastinal silhouette. The lungs are hypoinspiratory but clear   bilaterally. No osseous abnormalities are seen. Medical Decision Making   I am the first provider for this patient. I reviewed the vital signs, available nursing notes, past medical history, past surgical history, family history and social history. Vital Signs-Reviewed the patient's vital signs.   Patient Vitals for the past 12 hrs:   Temp Pulse Resp BP SpO2   09/10/22 1439 -- 99 19 -- 99 %   09/10/22 1438 -- 92 22 -- 99 %   09/10/22 1437 -- 88 17 -- 99 %   09/10/22 1430 -- 88 21 118/72 98 %   09/10/22 1230 -- (!) 105 21 -- 100 %   09/10/22 1225 -- (!) 107 19 -- (!) 80 %   09/10/22 1215 -- 100 25 114/83 99 %   09/10/22 1210 -- (!) 101 20 -- 99 %   09/10/22 1205 -- 98 19 -- 96 %   09/10/22 1200 -- 99 17 119/75 98 %   09/10/22 1145 -- -- -- 110/73 98 %   09/10/22 1137 -- -- -- 122/80 98 %   09/10/22 1100 -- -- -- 123/78 100 % 09/10/22 1050 -- -- -- -- 97 %   09/10/22 1016 -- -- -- -- 99 %   09/10/22 1014 -- -- -- 108/67 --   09/10/22 0830 -- -- -- -- 95 %   09/10/22 0800 -- -- -- (!) 139/90 100 %   09/10/22 0750 97.6 °F (36.4 °C) 95 18 125/76 99 %       EKG interpretation: (Preliminary)  Rhythm: normal sinus rhythm; and regular . Rate (approx.): 93; Axis: normal; AL interval: normal; QRS interval: normal ; ST/T wave: normal; Other findings: abnormal ekg. Records Reviewed: Nursing Notes and Old Medical Records    Provider Notes (Medical Decision Making):   Differential diagnosis-BPH, urinary retention, UTI, arrhythmia, CVA, coronary syndrome, dehydration, electrolyte abnormality, micturitional syncope     impression/plan-66year-old male with history of hypertension and diabetes to the ER with near syncope while trying to urinate. Suspect this represents micturitional syncope although he did not pass out. Plan will be to screen with blood work EKG. He is able to urinate in the ER and declines catheterization. ED Course:   Initial assessment performed. The patients presenting problems have been discussed, and they are in agreement with the care plan formulated and outlined with them. I have encouraged them to ask questions as they arise throughout their visit. Critical Care Time:   CRITICAL CARE NOTE :    8:09 AM    IMPENDING DETERIORATION -Metabolic and Renal  ASSOCIATED RISK FACTORS - Dysrhythmia, Metabolic changes, Dehydration, and Vascular Compromise  MANAGEMENT- Bedside Assessment, Supervision of Care, and Transfer  INTERPRETATION -  Xrays and Blood Pressure  INTERVENTIONS - hemodynamic mngmt and Metobolic interventions  CASE REVIEW - Hospitalist/Intensivist and Nursing  TREATMENT RESPONSE -Stable  PERFORMED BY - Self    NOTES   :  I have spent 35 minutes of critical care time involved in lab review, consultations with specialist, family decision- making, bedside attention and documentation.  This time excludes time spent in any separate billed procedures. During this entire length of time I was immediately available to the patient . Pilar Quinones MD      Disposition:          Diagnosis     Clinical Impression:   1. Acute on chronic renal insufficiency    2. Acute hyperkalemia    3. Chronic anemia    4. 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 Urinary tract infection without hematuria, site unspecified        Attestations:    Pilar Quinones MD        Please note that this dictation was completed with [employer], the computer voice recognition software. Quite often unanticipated grammatical, syntax, homophones, and other interpretive errors are inadvertently transcribed by the computer software. Please disregard these errors. Please excuse any errors that have escaped final proofreading. Thank you.

## 2022-09-10 NOTE — ED NOTES
Patient take at this time by EMS stretcher for ambulance transfer to [facility]. Patient A&Ox4,respirations even and unlabored, in no acute distress at time of transport.

## 2022-09-11 ENCOUNTER — APPOINTMENT (OUTPATIENT)
Dept: ULTRASOUND IMAGING | Age: 72
DRG: 683 | End: 2022-09-11
Attending: INTERNAL MEDICINE
Payer: MEDICARE

## 2022-09-11 LAB
ALBUMIN SERPL-MCNC: 2.9 G/DL (ref 3.5–5)
ALBUMIN/GLOB SERPL: 0.5 {RATIO} (ref 1.1–2.2)
ALP SERPL-CCNC: 126 U/L (ref 45–117)
ALT SERPL-CCNC: 17 U/L (ref 12–78)
ANION GAP SERPL CALC-SCNC: 6 MMOL/L (ref 5–15)
AST SERPL-CCNC: 14 U/L (ref 15–37)
ATRIAL RATE: 93 BPM
BACTERIA SPEC CULT: ABNORMAL
BACTERIA SPEC CULT: ABNORMAL
BASOPHILS # BLD: 0 K/UL (ref 0–0.1)
BASOPHILS NFR BLD: 1 % (ref 0–1)
BILIRUB SERPL-MCNC: 0.4 MG/DL (ref 0.2–1)
BUN SERPL-MCNC: 35 MG/DL (ref 6–20)
BUN/CREAT SERPL: 13 (ref 12–20)
CALCIUM SERPL-MCNC: 9.9 MG/DL (ref 8.5–10.1)
CALCULATED P AXIS, ECG09: 59 DEGREES
CALCULATED R AXIS, ECG10: -8 DEGREES
CALCULATED T AXIS, ECG11: 31 DEGREES
CC UR VC: ABNORMAL
CHLORIDE SERPL-SCNC: 102 MMOL/L (ref 97–108)
CK SERPL-CCNC: 122 U/L (ref 39–308)
CO2 SERPL-SCNC: 23 MMOL/L (ref 21–32)
CREAT SERPL-MCNC: 2.73 MG/DL (ref 0.7–1.3)
DIAGNOSIS, 93000: NORMAL
DIFFERENTIAL METHOD BLD: ABNORMAL
EOSINOPHIL # BLD: 0.2 K/UL (ref 0–0.4)
EOSINOPHIL NFR BLD: 3 % (ref 0–7)
ERYTHROCYTE [DISTWIDTH] IN BLOOD BY AUTOMATED COUNT: 17.3 % (ref 11.5–14.5)
EST. AVERAGE GLUCOSE BLD GHB EST-MCNC: 200 MG/DL
FERRITIN SERPL-MCNC: 258 NG/ML (ref 26–388)
GLOBULIN SER CALC-MCNC: 6.3 G/DL (ref 2–4)
GLUCOSE BLD STRIP.AUTO-MCNC: 219 MG/DL (ref 65–117)
GLUCOSE SERPL-MCNC: 190 MG/DL (ref 65–100)
HBA1C MFR BLD: 8.6 % (ref 4–5.6)
HCT VFR BLD AUTO: 24.6 % (ref 36.6–50.3)
HGB BLD-MCNC: 7.4 G/DL (ref 12.1–17)
IMM GRANULOCYTES # BLD AUTO: 0.1 K/UL (ref 0–0.04)
IMM GRANULOCYTES NFR BLD AUTO: 1 % (ref 0–0.5)
IRON SATN MFR SERPL: 15 % (ref 20–50)
IRON SERPL-MCNC: 42 UG/DL (ref 35–150)
LYMPHOCYTES # BLD: 2.6 K/UL (ref 0.8–3.5)
LYMPHOCYTES NFR BLD: 36 % (ref 12–49)
MAGNESIUM SERPL-MCNC: 2 MG/DL (ref 1.6–2.4)
MCH RBC QN AUTO: 24.8 PG (ref 26–34)
MCHC RBC AUTO-ENTMCNC: 30.1 G/DL (ref 30–36.5)
MCV RBC AUTO: 82.6 FL (ref 80–99)
MONOCYTES # BLD: 0.6 K/UL (ref 0–1)
MONOCYTES NFR BLD: 9 % (ref 5–13)
NEUTS SEG # BLD: 3.6 K/UL (ref 1.8–8)
NEUTS SEG NFR BLD: 50 % (ref 32–75)
NRBC # BLD: 0 K/UL (ref 0–0.01)
NRBC BLD-RTO: 0 PER 100 WBC
P-R INTERVAL, ECG05: 146 MS
PHOSPHATE SERPL-MCNC: 3.8 MG/DL (ref 2.6–4.7)
PLATELET # BLD AUTO: 416 K/UL (ref 150–400)
PMV BLD AUTO: 8 FL (ref 8.9–12.9)
POTASSIUM SERPL-SCNC: 4.9 MMOL/L (ref 3.5–5.1)
PROT SERPL-MCNC: 9.2 G/DL (ref 6.4–8.2)
Q-T INTERVAL, ECG07: 318 MS
QRS DURATION, ECG06: 74 MS
QTC CALCULATION (BEZET), ECG08: 395 MS
RBC # BLD AUTO: 2.98 M/UL (ref 4.1–5.7)
SAMPLES BEING HELD,HOLD: NORMAL
SERVICE CMNT-IMP: ABNORMAL
SERVICE CMNT-IMP: ABNORMAL
SODIUM SERPL-SCNC: 131 MMOL/L (ref 136–145)
TIBC SERPL-MCNC: 286 UG/DL (ref 250–450)
VENTRICULAR RATE, ECG03: 93 BPM
WBC # BLD AUTO: 7.1 K/UL (ref 4.1–11.1)

## 2022-09-11 PROCEDURE — 74011636637 HC RX REV CODE- 636/637: Performed by: INTERNAL MEDICINE

## 2022-09-11 PROCEDURE — 85025 COMPLETE CBC W/AUTO DIFF WBC: CPT

## 2022-09-11 PROCEDURE — 36415 COLL VENOUS BLD VENIPUNCTURE: CPT

## 2022-09-11 PROCEDURE — 83735 ASSAY OF MAGNESIUM: CPT

## 2022-09-11 PROCEDURE — 84100 ASSAY OF PHOSPHORUS: CPT

## 2022-09-11 PROCEDURE — 74011250637 HC RX REV CODE- 250/637: Performed by: NURSE PRACTITIONER

## 2022-09-11 PROCEDURE — 74011250637 HC RX REV CODE- 250/637: Performed by: INTERNAL MEDICINE

## 2022-09-11 PROCEDURE — 76770 US EXAM ABDO BACK WALL COMP: CPT

## 2022-09-11 PROCEDURE — 74011000258 HC RX REV CODE- 258: Performed by: INTERNAL MEDICINE

## 2022-09-11 PROCEDURE — 82550 ASSAY OF CK (CPK): CPT

## 2022-09-11 PROCEDURE — 83036 HEMOGLOBIN GLYCOSYLATED A1C: CPT

## 2022-09-11 PROCEDURE — 83540 ASSAY OF IRON: CPT

## 2022-09-11 PROCEDURE — 80053 COMPREHEN METABOLIC PANEL: CPT

## 2022-09-11 PROCEDURE — 74011000250 HC RX REV CODE- 250: Performed by: INTERNAL MEDICINE

## 2022-09-11 PROCEDURE — 82962 GLUCOSE BLOOD TEST: CPT

## 2022-09-11 PROCEDURE — 74011250636 HC RX REV CODE- 250/636: Performed by: INTERNAL MEDICINE

## 2022-09-11 PROCEDURE — 86900 BLOOD TYPING SEROLOGIC ABO: CPT

## 2022-09-11 PROCEDURE — 97165 OT EVAL LOW COMPLEX 30 MIN: CPT

## 2022-09-11 PROCEDURE — 82728 ASSAY OF FERRITIN: CPT

## 2022-09-11 PROCEDURE — 65270000046 HC RM TELEMETRY

## 2022-09-11 PROCEDURE — 97530 THERAPEUTIC ACTIVITIES: CPT

## 2022-09-11 PROCEDURE — 94760 N-INVAS EAR/PLS OXIMETRY 1: CPT

## 2022-09-11 RX ORDER — DICYCLOMINE HYDROCHLORIDE 20 MG/1
20 TABLET ORAL
Status: DISCONTINUED | OUTPATIENT
Start: 2022-09-11 | End: 2022-09-16 | Stop reason: HOSPADM

## 2022-09-11 RX ORDER — TAMSULOSIN HYDROCHLORIDE 0.4 MG/1
0.4 CAPSULE ORAL DAILY
Status: DISCONTINUED | OUTPATIENT
Start: 2022-09-11 | End: 2022-09-16 | Stop reason: HOSPADM

## 2022-09-11 RX ORDER — LIDOCAINE HYDROCHLORIDE 20 MG/ML
JELLY TOPICAL ONCE
Status: DISPENSED | OUTPATIENT
Start: 2022-09-11 | End: 2022-09-11

## 2022-09-11 RX ORDER — AMLODIPINE BESYLATE 5 MG/1
5 TABLET ORAL DAILY
Status: DISCONTINUED | OUTPATIENT
Start: 2022-09-11 | End: 2022-09-16 | Stop reason: HOSPADM

## 2022-09-11 RX ORDER — ATORVASTATIN CALCIUM 40 MG/1
80 TABLET, FILM COATED ORAL
Status: DISCONTINUED | OUTPATIENT
Start: 2022-09-11 | End: 2022-09-16 | Stop reason: HOSPADM

## 2022-09-11 RX ORDER — OXYCODONE AND ACETAMINOPHEN 5; 325 MG/1; MG/1
1 TABLET ORAL ONCE
Status: COMPLETED | OUTPATIENT
Start: 2022-09-11 | End: 2022-09-11

## 2022-09-11 RX ORDER — OXYBUTYNIN CHLORIDE 5 MG/1
10 TABLET, EXTENDED RELEASE ORAL DAILY
Status: DISCONTINUED | OUTPATIENT
Start: 2022-09-12 | End: 2022-09-16 | Stop reason: HOSPADM

## 2022-09-11 RX ORDER — MAG HYDROX/ALUMINUM HYD/SIMETH 200-200-20
30 SUSPENSION, ORAL (FINAL DOSE FORM) ORAL
Status: DISCONTINUED | OUTPATIENT
Start: 2022-09-11 | End: 2022-09-16 | Stop reason: HOSPADM

## 2022-09-11 RX ORDER — PANTOPRAZOLE SODIUM 40 MG/1
40 TABLET, DELAYED RELEASE ORAL DAILY
Status: DISCONTINUED | OUTPATIENT
Start: 2022-09-11 | End: 2022-09-16 | Stop reason: HOSPADM

## 2022-09-11 RX ADMIN — OXYCODONE AND ACETAMINOPHEN 1 TABLET: 5; 325 TABLET ORAL at 22:59

## 2022-09-11 RX ADMIN — HEPARIN SODIUM 5000 UNITS: 5000 INJECTION INTRAVENOUS; SUBCUTANEOUS at 15:10

## 2022-09-11 RX ADMIN — ATORVASTATIN CALCIUM 80 MG: 40 TABLET, FILM COATED ORAL at 04:13

## 2022-09-11 RX ADMIN — POLYETHYLENE GLYCOL 3350 17 G: 17 POWDER, FOR SOLUTION ORAL at 09:01

## 2022-09-11 RX ADMIN — Medication 3 UNITS: at 12:35

## 2022-09-11 RX ADMIN — TAMSULOSIN HYDROCHLORIDE 0.4 MG: 0.4 CAPSULE ORAL at 09:01

## 2022-09-11 RX ADMIN — PANTOPRAZOLE SODIUM 40 MG: 40 TABLET, DELAYED RELEASE ORAL at 09:01

## 2022-09-11 RX ADMIN — MEROPENEM 1 G: 1 INJECTION, POWDER, FOR SOLUTION INTRAVENOUS at 04:13

## 2022-09-11 RX ADMIN — SODIUM CHLORIDE, PRESERVATIVE FREE 10 ML: 5 INJECTION INTRAVENOUS at 15:10

## 2022-09-11 RX ADMIN — SODIUM CHLORIDE, PRESERVATIVE FREE 10 ML: 5 INJECTION INTRAVENOUS at 22:09

## 2022-09-11 RX ADMIN — ACETAMINOPHEN 650 MG: 325 TABLET ORAL at 09:01

## 2022-09-11 RX ADMIN — Medication 2 UNITS: at 17:10

## 2022-09-11 RX ADMIN — Medication 2 UNITS: at 22:09

## 2022-09-11 RX ADMIN — HEPARIN SODIUM 5000 UNITS: 5000 INJECTION INTRAVENOUS; SUBCUTANEOUS at 05:21

## 2022-09-11 RX ADMIN — Medication 3 UNITS: at 09:01

## 2022-09-11 RX ADMIN — ATORVASTATIN CALCIUM 80 MG: 40 TABLET, FILM COATED ORAL at 22:09

## 2022-09-11 RX ADMIN — AMLODIPINE BESYLATE 5 MG: 5 TABLET ORAL at 09:01

## 2022-09-11 RX ADMIN — SODIUM CHLORIDE 100 ML/HR: 9 INJECTION, SOLUTION INTRAVENOUS at 12:37

## 2022-09-11 RX ADMIN — MEROPENEM 1 G: 1 INJECTION, POWDER, FOR SOLUTION INTRAVENOUS at 15:10

## 2022-09-11 RX ADMIN — HEPARIN SODIUM 5000 UNITS: 5000 INJECTION INTRAVENOUS; SUBCUTANEOUS at 22:09

## 2022-09-11 NOTE — PROGRESS NOTES
Received notification from bedside RN about patient with regards to: abdominal discomfort, requesting medication for relief other than Tylenol  VS: /82, HR 98, RR 20, o2 sat 98%     Intervention given: Dilaudid 0.5 mg IV x 1 dose ordered    0150: Notified of 10/10 burning sensation/discomfort on top of his urethra    - Uro jet urethral x 1 dose ordered

## 2022-09-11 NOTE — H&P
Hospitalist Admission Note    NAME:  Kaity Carpenter   :   1950   MRN:   [id number]     Date of admit: 9/10/2022    PCP: None    Assessment/Plan:     Acute kidney injury POA creat 2.87  Hyperkalemia K 6.4 POA  Hyponatremia Na 129 POA  Stage 3 chronic kidney disease POA baseline creat 1.3 to 1.5  Possible urinary retention POA  Prostate cancer s/p XRT POA  Presented from [facility] ER with dysuria and decreased ability to urinate   Scheduled for unknown urology procedure as outpatient   Postponed due to COVID-19 + test ~ 12 days ago  Prior did I+O cath after XRT years ago, subsequently stopped  Now back with dysuria x 1-2 weeks   Worsening ability to urinate x 1 week  [facility] ED noted with AYESHA, hyperkalemia K    IV calcium gluconate, IV insulin and glucose, kayexalate, IVF  UA  WBC, 1+ bacteria, urine culture sent   IV meropenem with prior ESBL history  No clearly documented they did I+O cath or post-void residual  Currently not madison in place   Admit  IVF  Stat labs ordered, not drawn till hours later  IV meropenem, adjust based on urine culture  Check renal US and post void residual   Madison as needed for retention  Hold nephrotoxins  Consult renal in AM    COVID-19 positive ~ 12 days ago POA  Second time infected  Denies Cough or SOB, currently on room air  CXR with no ASD or edema  Check inflammatory markers  ?  Can come off isolation if okay with infection control    Possible UTI POA  History of ESBL E coli infections  Urine culture in lab  Dysuria  No SIRS criteria  Meropenem till culture back    Essential HTN POA  Hyperlipidemia POA  Continue ASA  Continue statin  Continue home BP regimen  PRN  hydralazine     Diabetes mellitus type 1 2 POA  Home regimen: metformin 1000 mg BID, denies taking insulin at home  Diabetic diet  Hold metformin with reduced GFR < 30  Sliding scale insulin  POC  Check HgBa1C    Anemia normocytic suspect chronic disease POA  HgB 7.7  No obvious bleeding  Check iron studies  Recheck retic count  Serial Hgbs    Given the patient's current clinical presentation, I have a high level of concern for decompensation if discharged from the emergency department. My assessment of this patient's clinical condition and my plan of care is as noted above. DVT prophylaxis with heparin SQ    Code status: full code  NOK:    History     CHIEF COMPLAINT: \" It burns when I pee for weeks and I have had trouble urinating for 1 week\"    HISTORY OF PRESENT ILLNESS:    66 Y. O. male    Presented from \[facility]\"" ER with dysuria and decreased ability to urinate   Scheduled for unknown urology procedure as outpatient    Postponed due to COVID-19 + test ~ 12 days ago    Prior did I+O cath after XRT years ago, subsequently stopped    Now back with dysuria x 1-2 weeks   Worsening ability to urinate x 1 week    \[facility]\"" ED   No leukocytosis or fevers, no SIRS criteria  Noted with AYESHA creatinine 2.87 with baseline , hyperkalemia K 6.4   IV calcium gluconate, IV insulin and glucose, kayexalate, IVF  UA  WBC, 1+ bacteria, urine culture sent   IV meropenem with prior ESBL history  CXR with no ASD  No clearly documented they did I+O cath or post-void residual  Currently no madison in place   Transferred to ED [facility] for further management      Past Medical History:   Diagnosis Date    Diabetes ([address]     Gastrointestinal disorder     Hypertension     DEJA (obstructive sleep apnea)     AHI: 8.9 per hour        Past Surgical History:   Procedure Laterality Date    HX PROSTATE SURGERY      AR ABDOMEN SURGERY PROC UNLISTED      Hernia Repair       Social History     Tobacco Use    Smoking status: Never    Smokeless tobacco: Never   Substance Use Topics    Alcohol use: No        Family History   Problem Relation Age of Onset    Hypertension Mother     Diabetes Mother     Hypertension Father     Diabetes Father         Allergies   Allergen Reactions    Aspirin Anxiety     Patient states not allergic to medication but reports he does not wish to take it     Celecoxib Vertigo    Ibuprofen Nausea Only        Prior to Admission medications    Medication Sig Start Date End Date Taking? Authorizing Provider   nitrofurantoin, macrocrystal-monohydrate, (Macrobid) 100 mg capsule Take 1 Capsule by mouth two (2) times a day for 7 days. 9/9/22 9/16/22  Gabe Porter NP   naproxen (Naprosyn) 500 mg tablet Take 1 Tablet by mouth two (2) times daily (with meals) for 10 days. 8/31/22 9/10/22  Helena Cheadle, MD   Toviaz 4 mg SR tablet Take 1 Tablet by mouth daily. 6/28/22   Provider, Historical   phenazopyridine (Pyridium) 200 mg tablet Take 1 Tablet by mouth three (3) times daily as needed for Pain. 8/19/22   Gabe Porter NP   gabapentin (NEURONTIN) 100 mg capsule Take 1 Capsule by mouth nightly. Max Daily Amount: 100 mg. 8/19/22   Gabe Porter NP   amLODIPine (NORVASC) 5 mg tablet Take 1 Tablet by mouth daily. 4/27/22   Rodney Gilman MD   atorvastatin (LIPITOR) 80 mg tablet Take 1 Tablet by mouth nightly. 4/27/22   Rodney Gilman MD   tamsulosin (FLOMAX) 0.4 mg capsule Take 1 Capsule by mouth daily. 4/27/22   Rodney Gilman MD   metFORMIN (GLUCOPHAGE) 1,000 mg tablet Take 1 Tablet by mouth two (2) times daily (with meals). 4/27/22   Rodney Gilman MD   pantoprazole (PROTONIX) 40 mg tablet Take 1 Tablet by mouth daily. 4/27/22   Rodney Gilman MD   latanoprost (XALATAN) 0.005 % ophthalmic solution Administer 1 Drop to both eyes nightly. Patient not taking: No sig reported    Provider, Historical   albuterol (PROVENTIL HFA, VENTOLIN HFA, PROAIR HFA) 90 mcg/actuation inhaler Take 2 Puffs by inhalation every four (4) hours as needed for Wheezing.  1/8/22   Manjit Gutierrez, NP       Review of symptoms:     POSITIVE= Bold  Negative = not bold  General:  fever, chills, sweats, generalized weakness, weight loss     loss of appetite   Eyes:    blurred vision, eye pain, double vision  ENT:    Coryza, sore throat, trouble swallowing  Respiratory:   cough, sputum, SOB  Cardiology:   chest pain, orthopnea, PND, edema  Gastrointestinal:  abdominal pain , N/V, diarrhea, constipation, melena or BRBPR  Genitourinary:  Urgency, dysuria, hematuria, decreased ability to pass urine  Muskuloskeletal :  Joint redness, swelling or acute joint pain, myalgias  Hematology:  easy bruising, nose or gum bleeding  Dermatological: rash, ulceration  Endocrine:   Polyuria or polydipsia, heat or hold intolerance  Neurological:  Headache, focal motor or sensory changes     Speech difficulties, memory loss  Psychological: depression, agitation      Objective:   VITALS:    No data found. Temp (24hrs), Av.6 °F (36.4 °C), Min:97.6 °F (36.4 °C), Max:97.6 °F (36.4 °C)           Wt Readings from Last 12 Encounters:   09/10/22 98.9 kg (218 lb)   22 97.5 kg (215 lb)   22 97.5 kg (215 lb)   22 98.4 kg (217 lb)   22 98.9 kg (218 lb)   22 98.9 kg (218 lb)   22 98.9 kg (218 lb)   22 100.2 kg (221 lb)   22 99.4 kg (219 lb 2.2 oz)   22 98.9 kg (218 lb)   22 98.9 kg (218 lb)   22 98.9 kg (218 lb 0.6 oz)         PHYSICAL EXAM:     I had a face to face encounter and independently examined this patient on 09/10/22  as outlined below:    General:    Alert, cooperative in no distress     HEENT: Normocephalic, atraumatic    PERRL, Sclera no icterus    Nasal mucosa without masses or discharge  Hearing intact to voice    Oropharynx without erythema or exudate  Neck:  No meningismus, trachea midline, no carotid bruits     Thyroid not enlarged, no nodules or tenderness  Lungs:   Clear to auscultation bilaterally. No wheezing or rales    No accessory muscle use or retractions. Heart:   Regular rate and rhythm,  no murmur or gallop. No LE edema     Dorsal pedis, Posterior tibial and radial pulses 2+ and symmetric  Abdomen:   Soft, non-tender. Not distended.   Bowel sounds normal.     No masses, No Hepatosplenomegaly, No Rebound or guarding  Lymph nodes: No cervical or inguinal DONNA  Musculoskeletal:  No Joint swelling, erythema, warmth. No Cyanosis or clubbing  Skin:      No rashes  No Ulcers. Not Jaundiced   No nodules or thickening    Capillary refill normal  Neurologic: Alert and oriented X 3, follows commands     Cranial nerves 2 to 12 intact    Symmetric motor strength bilaterally       LAB DATA REVIEWED:    Recent Results (from the past 12 hour(s))   CBC WITH AUTOMATED DIFF    Collection Time: 09/10/22  8:34 AM   Result Value Ref Range    WBC 9.1 4.1 - 11.1 K/uL    RBC 3.06 (L) 4.10 - 5.70 M/uL    HGB 7.7 (L) 12.1 - 17.0 g/dL    HCT 25.1 (L) 36.6 - 50.3 %    MCV 82.0 80.0 - 99.0 FL    MCH 25.2 (L) 26.0 - 34.0 PG    MCHC 30.7 30.0 - 36.5 g/dL    RDW 17.0 (H) 11.5 - 14.5 %    PLATELET 138 (H) 935 - 400 K/uL    MPV 8.1 (L) 8.9 - 12.9 FL    NRBC 0.0 0  WBC    ABSOLUTE NRBC 0.00 0.00 - 0.01 K/uL    NEUTROPHILS 72 32 - 75 %    LYMPHOCYTES 18 12 - 49 %    MONOCYTES 6 5 - 13 %    EOSINOPHILS 2 0 - 7 %    BASOPHILS 1 0 - 1 %    IMMATURE GRANULOCYTES 1 (H) 0.0 - 0.5 %    ABS. NEUTROPHILS 6.6 1.8 - 8.0 K/UL    ABS. LYMPHOCYTES 1.6 0.8 - 3.5 K/UL    ABS. MONOCYTES 0.5 0.0 - 1.0 K/UL    ABS. EOSINOPHILS 0.2 0.0 - 0.4 K/UL    ABS. BASOPHILS 0.1 0.0 - 0.1 K/UL    ABS. IMM. GRANS. 0.1 (H) 0.00 - 0.04 K/UL    DF AUTOMATED     METABOLIC PANEL, COMPREHENSIVE    Collection Time: 09/10/22  8:34 AM   Result Value Ref Range    Sodium 129 (L) 136 - 145 mmol/L    Potassium 6.4 (H) 3.5 - 5.1 mmol/L    Chloride 98 97 - 108 mmol/L    CO2 19 (L) 21 - 32 mmol/L    Anion gap 12 5 - 15 mmol/L    Glucose 228 (H) 65 - 100 mg/dL    BUN 34 (H) 6 - 20 MG/DL    Creatinine 2.87 (H) 0.70 - 1.30 MG/DL    BUN/Creatinine ratio 12 12 - 20      GFR est AA 26 (L) >60 ml/min/1.73m2    GFR est non-AA 22 (L) >60 ml/min/1.73m2    Calcium 9.6 8.5 - 10.1 MG/DL    Bilirubin, total 0.4 0.2 - 1.0 MG/DL    ALT (SGPT) 20 12 - 78 U/L    AST (SGOT) 18 15 - 37 U/L    Alk.  phosphatase 127 (H) 45 - 117 U/L    Protein, total 9.6 (H) 6.4 - 8.2 g/dL    Albumin 3.1 (L) 3.5 - 5.0 g/dL    Globulin 6.5 (H) 2.0 - 4.0 g/dL    A-G Ratio 0.5 (L) 1.1 - 2.2     TROPONIN-HIGH SENSITIVITY    Collection Time: 09/10/22  8:34 AM   Result Value Ref Range    Troponin-High Sensitivity 7 0 - 76 ng/L   URINALYSIS W/ REFLEX CULTURE    Collection Time: 09/10/22  8:34 AM    Specimen: Miscellaneous sample    Urine specimen   Result Value Ref Range    Color DARK YELLOW      Appearance TURBID (A) CLEAR      Specific gravity 1.010      pH (UA) 5.5 5.0 - 8.0      Protein 100 (A) NEG mg/dL    Glucose Negative NEG mg/dL    Ketone Negative NEG mg/dL    Blood LARGE (A) NEG      Urobilinogen 1.0 0.2 - 1.0 EU/dL    Nitrites Positive (A) NEG      Leukocyte Esterase LARGE (A) NEG      WBC  0 - 4 /hpf    RBC 0-5 0 - 5 /hpf    Epithelial cells FEW FEW /lpf    Bacteria 1+ (A) NEG /hpf    UA:UC IF INDICATED URINE CULTURE ORDERED (A) CNI     BILIRUBIN, CONFIRM    Collection Time: 09/10/22  8:34 AM   Result Value Ref Range    Bilirubin UA, confirm Negative NEG     EKG, 12 LEAD, INITIAL    Collection Time: 09/10/22  8:40 AM   Result Value Ref Range    Ventricular Rate 93 BPM    Atrial Rate 93 BPM    P-R Interval 146 ms    QRS Duration 74 ms    Q-T Interval 318 ms    QTC Calculation (Bezet) 395 ms    Calculated P Axis 59 degrees    Calculated R Axis -8 degrees    Calculated T Axis 31 degrees    Diagnosis       Normal sinus rhythm  Normal ECG  When compared with ECG of 28-MAR-2022 02:23,  ST elevation now present in Inferior leads  Non-specific change in ST segment in Lateral leads  Nonspecific T wave abnormality, worse in Lateral leads         EKG as read by me shows      CT Results  (Last 48 hours)      None              XR CHEST PORT    Result Date: 9/10/2022  No evidence of acute cardiopulmonary process. I saw the patient personally, took a history and did a complete physical exam at the bedside.  I performed complex decision making in coming up with a diagnostic and treatment plan for the patient. I reviewed the patient's past medical records, current laboratory and radiology results, and actual Xray films/EKG. I have also discussed this case with the involved ED physician.     Care Plan discussed with:    Patient, Family, ED Doc    Risk of deterioration:  High    Total Time Coordinating Admission:     minutes    Total Critical Care Time:         Brooke Haines MD

## 2022-09-11 NOTE — PROGRESS NOTES
Hospitalist Progress Note    NAME: Tyrone Le   :  1950   MRN:  [id number]       Assessment / Plan:  Acute kidney injury POA creat 2.87  Hyperkalemia K 6.4 POA  Hyponatremia Na 129 POA  Stage 3 chronic kidney disease POA baseline creat 1.3 to 1.5  Possible urinary retention POA  Prostate cancer s/p XRT POA  Presented from [facility] ER with dysuria and decreased ability to urinate              Scheduled for unknown urology procedure as outpatient              Postponed due to COVID-19 + test ~ 12 days ago  Prior did I+O cath after XRT years ago, subsequently stopped  Now back with dysuria x 1-2 weeks              Worsening ability to urinate x 1 week  [facility] ED noted with YAESHA, hyperkalemia K             s/p   IV calcium gluconate, IV insulin and glucose, kayexalate, IVF  UA  WBC, 1+ bacteria, urine culture sent              IV meropenem with prior ESBL history  No clearly documented they did I+O cath or post-void residual  Currently not madison in place   : Hyperkalemia resolved, potassium 4.9, creatinine trending down  Renal ultrasound pending, nephrology consult pending    COVID-19 positive ~ 12 days ago POA  Second time infected  Denies Cough or SOB, currently on room air  CXR with no ASD or edema  Check inflammatory markers  Currently off isolation per infection control     Possible UTI POA  History of ESBL E coli infections  Urine culture showed mixed urogenital rocío and yeast in the urine  Discontinue meropenem  Essential HTN POA  Hyperlipidemia POA  Continue ASA  Continue statin  Continue home BP regimen  PRN  hydralazine      Diabetes mellitus type 1 2 POA  Home regimen: metformin 1000 mg BID, denies taking insulin at home  Diabetic diet  Hold metformin with reduced GFR < 30  Sliding scale insulin  POC  A1c 8.6     Anemia normocytic suspect chronic disease POA  HgB 7.7  No obvious bleeding  Iron saturation 15%, serum iron and ferritin are within normal limit  Hemoglobin around 7.4      Estimated discharge date: September 14  Barriers:    Code status: Full  Prophylaxis: Hep SQ  Recommended Disposition:  TBD , pt is reportedly homeless     Subjective:     Chief Complaint / Reason for Physician Visit  AYESHA/hyperkalemia. Discussed with RN events overnight. No acute issues  Review of Systems:  Symptom Y/N Comments  Symptom Y/N Comments   Fever/Chills n   Chest Pain n    Poor Appetite    Edema     Cough    Abdominal Pain n    Sputum    Joint Pain     SOB/ALEJO n   Pruritis/Rash     Nausea/vomit n   Tolerating PT/OT     Diarrhea    Tolerating Diet y    Constipation    Other       Could NOT obtain due to:      Objective:     VITALS:   Last 24hrs VS reviewed since prior progress note. Most recent are:  Patient Vitals for the past 24 hrs:   Temp Pulse Resp BP SpO2   09/11/22 0902 97.9 °F (36.6 °C) 84 19 124/87 99 %   09/11/22 0500 98 °F (36.7 °C) 79 18 (!) 153/78 97 %   09/10/22 2120 98.2 °F (36.8 °C) 98 20 121/82 98 %       Intake/Output Summary (Last 24 hours) at 9/11/2022 1024  Last data filed at 9/11/2022 0300  Gross per 24 hour   Intake --   Output 300 ml   Net -300 ml        I had a face to face encounter and independently examined this patient on 9/11/2022, as outlined below:  PHYSICAL EXAM:  General: WD, WN. Alert, cooperative, no acute distress    EENT:  EOMI. Anicteric sclerae. MMM  Resp:  CTA bilaterally, no wheezing or rales. No accessory muscle use  CV:  Regular  rhythm,  No edema  GI:  Soft, Non distended, Non tender. +Bowel sounds  Neurologic:  Alert and oriented X 3, normal speech,   Psych:   Good insight. Not anxious nor agitated  Skin:  No rashes.   No jaundice    Reviewed most current lab test results and cultures  YES  Reviewed most current radiology test results   YES  Review and summation of old records today    NO  Reviewed patient's current orders and MAR    YES  PMH/SH reviewed - no change compared to H&P  ________________________________________________________________________  Care Plan discussed with:    Comments   Patient x    Family      RN x    Care Manager     Consultant                        Multidiciplinary team rounds were held today with , nursing, pharmacist and clinical coordinator. Patient's plan of care was discussed; medications were reviewed and discharge planning was addressed. ________________________________________________________________________  Total NON critical care TIME: 35 Minutes    Total CRITICAL CARE TIME Spent:   Minutes non procedure based      Comments   >50% of visit spent in counseling and coordination of care     ________________________________________________________________________  Rafal Sharma MD     Procedures: see electronic medical records for all procedures/Xrays and details which were not copied into this note but were reviewed prior to creation of Plan. LABS:  I reviewed today's most current labs and imaging studies.   Pertinent labs include:  Recent Labs     09/11/22  0232 09/10/22  0834   WBC 7.1 9.1   HGB 7.4* 7.7*   HCT 24.6* 25.1*   * 451*     Recent Labs     09/11/22  0232 09/10/22  0834   * 129*   K 4.9 6.4*    98   CO2 23 19*   * 228*   BUN 35* 34*   CREA 2.73* 2.87*   CA 9.9 9.6   MG 2.0  --    PHOS 3.8  --    ALB 2.9* 3.1*   TBILI 0.4 0.4   ALT 17 20       Signed: Rafal Sharma MD

## 2022-09-11 NOTE — PROGRESS NOTES
OCCUPATIONAL THERAPY EVALUATION/DISCHARGE  Patient: Kristina Snow (89 y.o. male)  Date: 9/11/2022  Primary Diagnosis: Acute kidney injury ([facility] [address] [N17.9]       Precautions:  Contact, Other (comment) (droplet plus)    ASSESSMENT  Based on the objective data described below, the patient presents at his (independent) functional baseline for ADLs. Pt presented in supine and participated well. Engaged appropriately with all questions and conversational re: currently events. Performed LE dressing task and performed bed mobilities independently. Demo'd functional mobilities to bathroom without RW. Good balance noted. Completed toileting and grooming tasks in standing. No acute OT needs identified at this time. Pt reports he is currently homeless and living in his car after being asked to leave his homeless shelter due to disagreement with staff. He completes bathing/grooming at local gas stations. Pt would benefit from SOCIAL WORK consult. Current Level of Function (ADLs/self-care): independent    Functional Outcome Measure: The patient scored 85/100 on the Barthel Index outcome measure which is indicative of independent for ADLs. Other factors to consider for discharge: pt needs stable place to live     PLAN :    Recommendation for discharge: (in order for the patient to meet his/her long term goals)  No skilled occupational therapy/ follow up rehabilitation needs identified at this time. This discharge recommendation:  Has not yet been discussed the attending provider and/or case management    IF patient discharges home will need the following DME: none       SUBJECTIVE:   Patient stated I don't need any therapy.     OBJECTIVE DATA SUMMARY:   HISTORY:   Past Medical History:   Diagnosis Date    Diabetes ([facility] [address]     Gastrointestinal disorder     Hypertension     DEJA (obstructive sleep apnea)     AHI: 8.9 per hour     Past Surgical History:   Procedure Laterality Date    HX PROSTATE SURGERY      [place] ABDOMEN SURGERY PROC UNLISTED      Hernia Repair       Prior Level of Function/Environment/Context: Pt was living at shelter but had a disagreement with staff and was asked to leave. For past ~1mo, pt has been living in his car. He gets food \"where he can\" and baths/grooms at local gas stations. Pt drives and is independent with dressing. Pt would benefit from [address] to assist with living arrangements. Expanded or extensive additional review of patient history:   Home Situation  Home Environment: Other (comment) (homeless)  Living Alone: Yes    EXAMINATION OF PERFORMANCE DEFICITS:  Cognitive/Behavioral Status:  Neurologic State: Alert  Orientation Level: Oriented X4  Cognition: Appropriate decision making  Perception: Appears intact  Perseveration: No perseveration noted  Safety/Judgement: Awareness of environment    Hearing: Auditory  Auditory Impairment: None    Vision/Perceptual:         Corrective Lenses: Glasses    Range of Motion:  AROM: Within functional limits  PROM: Within functional limits      Strength:  Strength: Within functional limits       Coordination:  Coordination: Within functional limits  Fine Motor Skills-Upper: Left Intact; Right Intact    Gross Motor Skills-Upper: Left Intact; Right Intact    Tone & Sensation:  Tone: Normal  Sensation: Intact     Balance:  Sitting: Intact  Standing: Intact    Functional Mobility and Transfers for ADLs:  Bed Mobility:  Rolling: Independent  Supine to Sit: Supervision; Independent  Sit to Supine: Supervision; Independent  Scooting: Supervision; Independent    Transfers:  Sit to Stand: Supervision  Stand to Sit: Supervision  Bed to Chair: Supervision  Bathroom Mobility: Independent;Supervision/set up    ADL Assessment:  Feeding: Independent    Oral Facial Hygiene/Grooming: Independent    Bathing: Supervision    Upper Body Dressing: Independent    Lower Body Dressing: Stand-by assistance    Toileting: Independent       ADL Intervention and task modifications:  Feeding  Feeding Assistance: Independent    Grooming  Position Performed: Standing  Washing Face: Independent  Washing Hands: Independent    Lower Body Dressing Assistance  Socks: Contact guard assistance    Toileting  Bladder Hygiene: Independent  Bowel Hygiene: Supervision    Cognitive Retraining  Safety/Judgement: Awareness of environment    Functional Measure:    Barthel Index:  Bathin  Bladder: 10  Bowels: 10  Groomin  Dressing: 10  Feeding: 10  Mobility: 15  Stairs: 0  Toilet Use: 10  Transfer (Bed to Chair and Back): 15  Total: 85/100      The Barthel ADL Index: Guidelines  1. The index should be used as a record of what a patient does, not as a record of what a patient could do. 2. The main aim is to establish degree of independence from any help, physical or verbal, however minor and for whatever reason. 3. The need for supervision renders the patient not independent. 4. A patient's performance should be established using the best available evidence. Asking the patient, friends/relatives and nurses are the usual sources, but direct observation and common sense are also important. However direct testing is not needed. 5. Usually the patient's performance over the preceding 24-48 hours is important, but occasionally longer periods will be relevant. 6. Middle categories imply that the patient supplies over 50 per cent of the effort. 7. Use of aids to be independent is allowed. Score Interpretation (from [facility])    Independent   60-79 Minimally independent   40-59 Partially dependent   20-39 Very dependent   <20 Totally dependent     -Sukhi Waterman., Barthel, D.W. (1965). Functional evaluation: the Barthel Index. [facility] ([address] (). The Barthel activities of daily living index: self-reporting versus actual performance in the old (> or = 75 years).  Journal of [address] [place] [address] 45(7), [facility], LENY, Zahra JUDY Levy (1999). Measuring the change in disability after inpatient rehabilitation; comparison of the responsiveness of the Barthel Index and Functional [place] Measure. Journal of Neurology, Neurosurgery, and Psychiatry, 66(4), 396-320. TOVA Gentile, JACK Godinez, & Ascencion Boland M.A. (2004) Assessment of post-stroke quality of life in cost-effectiveness studies: The usefulness of the Barthel Index and the EuroQoL-5D. Quality of Life Research, 15, 718-38         Occupational Therapy Evaluation Charge Determination   History Examination Decision-Making   LOW Complexity : Brief history review  LOW Complexity : 1-3 performance deficits relating to physical, cognitive , or psychosocial skils that result in activity limitations and / or participation restrictions  LOW Complexity : No comorbidities that affect functional and no verbal or physical assistance needed to complete eval tasks       Based on the above components, the patient evaluation is determined to be of the following complexity level: LOW   Pain Rating:  No c/o pain    Activity Tolerance: WNL    After treatment patient left in no apparent distress:    Supine in bed and Call bell within reach    COMMUNICATION/EDUCATION:   The patients plan of care was discussed with: Registered nurse.      Thank you for this referral.  Erick Arambula OT  Time Calculation: 20 mins

## 2022-09-11 NOTE — PROGRESS NOTES
Transition of Care Plan  RUR: 27%  DISPOSITION: The disposition plan is pending; patient homeless and has been staying in his car  Denied interest in [facility] [employer]   F/U with PCP/Specialist    Transport: self    Reason for Admission:   acute kidney injury                  RUR Score:   27%         PCP: First and Last name:  None     Name of Practice:   Are you a current patient: Yes/No:   Approximate date of last visit:    Can you do a virtual visit with your PCP:              Resources/supports as identified by patient/family:   sister                 Top Challenges facing patient (as identified by patient/family and CM):  Homeless & not well connected with medical providers                      Finances/Medication cost?      Insurance covers cost              Transportation? Drives and has a vehicle               Support system or lack thereof?  sister                     Living arrangements?   homeless             Self-care/ADLs/Cognition? Independent           Current Advanced Directive/Advance Care Plan:  Full Code      Healthcare Decision Maker:   Click here to complete HealthCare Decision Makers including selection of the Healthcare Decision Maker Relationship (ie \"Primary\")      Primary Decision Maker: Leida Kaplan - [facility] - [phone]    Payor Source Payor: [address] [place] [address] / Plan: [address] / Product Type: Managed Care Medicare /                             Plan for utilizing home health:    no recommendations                  Transition of Care Plan:                  CRM spoke with patient, introduced self, explained role, verified demographics, and offered assistance. Patient lives in his car as of recent. Patient is independent in his ADLs/IADLs and uses [facility] for his prescriptions. Patient stated he would not be willing to go back to [facility] [employer]. Patient has history of non-compliance with medical appointments.        Care Management Interventions  PCP Verified by CM: No (does not have one)  Palliative Care Criteria Met (RRAT>21 & CHF Dx)?: No  Mode of Transport at Discharge: Self  Transition of Care Consult (CM Consult): Discharge Planning  MyChart Signup: No  Discharge Durable Medical Equipment: No  Health Maintenance Reviewed: Yes  Physical Therapy Consult: Yes  Occupational Therapy Consult: Yes  Speech Therapy Consult: No  Support Systems: Other Family Member(s)  Confirm Follow Up Transport: Self   Resource Information Provided?: No  Discharge Location  Patient Expects to be Discharged to[de-identified] [facility]    5:26 PM  ABEBE Trejo

## 2022-09-12 LAB
ABO + RH BLD: NORMAL
ALBUMIN SERPL-MCNC: 2.8 G/DL (ref 3.5–5)
ALBUMIN/GLOB SERPL: 0.5 {RATIO} (ref 1.1–2.2)
ALP SERPL-CCNC: 114 U/L (ref 45–117)
ALT SERPL-CCNC: 14 U/L (ref 12–78)
ANION GAP SERPL CALC-SCNC: 9 MMOL/L (ref 5–15)
AST SERPL-CCNC: 14 U/L (ref 15–37)
BILIRUB SERPL-MCNC: 0.3 MG/DL (ref 0.2–1)
BLOOD GROUP ANTIBODIES SERPL: NORMAL
BUN SERPL-MCNC: 30 MG/DL (ref 6–20)
BUN/CREAT SERPL: 12 (ref 12–20)
CALCIUM SERPL-MCNC: 9.7 MG/DL (ref 8.5–10.1)
CHLORIDE SERPL-SCNC: 101 MMOL/L (ref 97–108)
CO2 SERPL-SCNC: 21 MMOL/L (ref 21–32)
CREAT SERPL-MCNC: 2.49 MG/DL (ref 0.7–1.3)
CREAT UR-MCNC: 47.6 MG/DL
ERYTHROCYTE [DISTWIDTH] IN BLOOD BY AUTOMATED COUNT: 17.2 % (ref 11.5–14.5)
GLOBULIN SER CALC-MCNC: 6.1 G/DL (ref 2–4)
GLUCOSE BLD STRIP.AUTO-MCNC: 163 MG/DL (ref 65–117)
GLUCOSE BLD STRIP.AUTO-MCNC: 164 MG/DL (ref 65–117)
GLUCOSE BLD STRIP.AUTO-MCNC: 201 MG/DL (ref 65–117)
GLUCOSE BLD STRIP.AUTO-MCNC: 201 MG/DL (ref 65–117)
GLUCOSE BLD STRIP.AUTO-MCNC: 210 MG/DL (ref 65–117)
GLUCOSE BLD STRIP.AUTO-MCNC: 240 MG/DL (ref 65–117)
GLUCOSE BLD STRIP.AUTO-MCNC: 248 MG/DL (ref 65–117)
GLUCOSE BLD STRIP.AUTO-MCNC: 271 MG/DL (ref 65–117)
GLUCOSE SERPL-MCNC: 269 MG/DL (ref 65–100)
HCT VFR BLD AUTO: 23.6 % (ref 36.6–50.3)
HGB BLD-MCNC: 7.2 G/DL (ref 12.1–17)
MCH RBC QN AUTO: 25.4 PG (ref 26–34)
MCHC RBC AUTO-ENTMCNC: 30.5 G/DL (ref 30–36.5)
MCV RBC AUTO: 83.1 FL (ref 80–99)
NRBC # BLD: 0 K/UL (ref 0–0.01)
NRBC BLD-RTO: 0 PER 100 WBC
PLATELET # BLD AUTO: 402 K/UL (ref 150–400)
PMV BLD AUTO: 8.2 FL (ref 8.9–12.9)
POTASSIUM SERPL-SCNC: 4.9 MMOL/L (ref 3.5–5.1)
PROT SERPL-MCNC: 8.9 G/DL (ref 6.4–8.2)
RBC # BLD AUTO: 2.84 M/UL (ref 4.1–5.7)
SERVICE CMNT-IMP: ABNORMAL
SODIUM SERPL-SCNC: 131 MMOL/L (ref 136–145)
SODIUM UR-SCNC: 52 MMOL/L
SPECIMEN EXP DATE BLD: NORMAL
UR CULT HOLD, URHOLD: NORMAL
WBC # BLD AUTO: 5.6 K/UL (ref 4.1–11.1)

## 2022-09-12 PROCEDURE — 82962 GLUCOSE BLOOD TEST: CPT

## 2022-09-12 PROCEDURE — 65270000046 HC RM TELEMETRY

## 2022-09-12 PROCEDURE — 85027 COMPLETE CBC AUTOMATED: CPT

## 2022-09-12 PROCEDURE — 87086 URINE CULTURE/COLONY COUNT: CPT

## 2022-09-12 PROCEDURE — 82272 OCCULT BLD FECES 1-3 TESTS: CPT

## 2022-09-12 PROCEDURE — 94760 N-INVAS EAR/PLS OXIMETRY 1: CPT

## 2022-09-12 PROCEDURE — 74011250637 HC RX REV CODE- 250/637: Performed by: INTERNAL MEDICINE

## 2022-09-12 PROCEDURE — 74011000250 HC RX REV CODE- 250: Performed by: INTERNAL MEDICINE

## 2022-09-12 PROCEDURE — 74011636637 HC RX REV CODE- 636/637: Performed by: INTERNAL MEDICINE

## 2022-09-12 PROCEDURE — 82570 ASSAY OF URINE CREATININE: CPT

## 2022-09-12 PROCEDURE — 74011250637 HC RX REV CODE- 250/637: Performed by: NURSE PRACTITIONER

## 2022-09-12 PROCEDURE — 97116 GAIT TRAINING THERAPY: CPT | Performed by: PHYSICAL THERAPIST

## 2022-09-12 PROCEDURE — 84300 ASSAY OF URINE SODIUM: CPT

## 2022-09-12 PROCEDURE — 74011250636 HC RX REV CODE- 250/636: Performed by: INTERNAL MEDICINE

## 2022-09-12 PROCEDURE — 97161 PT EVAL LOW COMPLEX 20 MIN: CPT | Performed by: PHYSICAL THERAPIST

## 2022-09-12 PROCEDURE — 76937 US GUIDE VASCULAR ACCESS: CPT

## 2022-09-12 PROCEDURE — 80053 COMPREHEN METABOLIC PANEL: CPT

## 2022-09-12 PROCEDURE — 2709999900 HC NON-CHARGEABLE SUPPLY

## 2022-09-12 PROCEDURE — 36415 COLL VENOUS BLD VENIPUNCTURE: CPT

## 2022-09-12 RX ORDER — TRAMADOL HYDROCHLORIDE 50 MG/1
50 TABLET ORAL 2 TIMES DAILY
Status: DISCONTINUED | OUTPATIENT
Start: 2022-09-12 | End: 2022-09-16 | Stop reason: HOSPADM

## 2022-09-12 RX ORDER — INSULIN GLARGINE 100 [IU]/ML
10 INJECTION, SOLUTION SUBCUTANEOUS
Status: DISCONTINUED | OUTPATIENT
Start: 2022-09-12 | End: 2022-09-16 | Stop reason: HOSPADM

## 2022-09-12 RX ORDER — OXYCODONE HYDROCHLORIDE 5 MG/1
5 TABLET ORAL
Status: DISCONTINUED | OUTPATIENT
Start: 2022-09-12 | End: 2022-09-16 | Stop reason: HOSPADM

## 2022-09-12 RX ADMIN — DICYCLOMINE HYDROCHLORIDE 20 MG: 20 TABLET ORAL at 12:44

## 2022-09-12 RX ADMIN — Medication 2 UNITS: at 17:51

## 2022-09-12 RX ADMIN — ATORVASTATIN CALCIUM 80 MG: 40 TABLET, FILM COATED ORAL at 21:53

## 2022-09-12 RX ADMIN — SODIUM CHLORIDE 100 ML/HR: 9 INJECTION, SOLUTION INTRAVENOUS at 00:20

## 2022-09-12 RX ADMIN — HEPARIN SODIUM 5000 UNITS: 5000 INJECTION INTRAVENOUS; SUBCUTANEOUS at 17:51

## 2022-09-12 RX ADMIN — INSULIN GLARGINE 10 UNITS: 100 INJECTION, SOLUTION SUBCUTANEOUS at 22:42

## 2022-09-12 RX ADMIN — PANTOPRAZOLE SODIUM 40 MG: 40 TABLET, DELAYED RELEASE ORAL at 10:35

## 2022-09-12 RX ADMIN — TRAMADOL HYDROCHLORIDE 50 MG: 50 TABLET, COATED ORAL at 18:02

## 2022-09-12 RX ADMIN — DICYCLOMINE HYDROCHLORIDE 20 MG: 20 TABLET ORAL at 19:21

## 2022-09-12 RX ADMIN — TAMSULOSIN HYDROCHLORIDE 0.4 MG: 0.4 CAPSULE ORAL at 10:35

## 2022-09-12 RX ADMIN — Medication 3 UNITS: at 10:37

## 2022-09-12 RX ADMIN — POLYETHYLENE GLYCOL 3350 17 G: 17 POWDER, FOR SOLUTION ORAL at 10:38

## 2022-09-12 RX ADMIN — SODIUM CHLORIDE 100 ML/HR: 9 INJECTION, SOLUTION INTRAVENOUS at 21:53

## 2022-09-12 RX ADMIN — SODIUM CHLORIDE, PRESERVATIVE FREE 10 ML: 5 INJECTION INTRAVENOUS at 17:52

## 2022-09-12 RX ADMIN — OXYBUTYNIN CHLORIDE 10 MG: 5 TABLET, EXTENDED RELEASE ORAL at 00:01

## 2022-09-12 RX ADMIN — SODIUM CHLORIDE, PRESERVATIVE FREE 10 ML: 5 INJECTION INTRAVENOUS at 21:53

## 2022-09-12 RX ADMIN — OXYCODONE 5 MG: 5 TABLET ORAL at 19:21

## 2022-09-12 RX ADMIN — AMLODIPINE BESYLATE 5 MG: 5 TABLET ORAL at 10:35

## 2022-09-12 RX ADMIN — Medication 5 UNITS: at 12:44

## 2022-09-12 RX ADMIN — MEROPENEM 1 G: 1 INJECTION, POWDER, FOR SOLUTION INTRAVENOUS at 19:21

## 2022-09-12 RX ADMIN — SODIUM CHLORIDE, PRESERVATIVE FREE 10 ML: 5 INJECTION INTRAVENOUS at 05:26

## 2022-09-12 RX ADMIN — Medication 2 UNITS: at 22:42

## 2022-09-12 RX ADMIN — HEPARIN SODIUM 5000 UNITS: 5000 INJECTION INTRAVENOUS; SUBCUTANEOUS at 21:53

## 2022-09-12 NOTE — PROGRESS NOTES
Transition of Care Plan:    RUR: 27%  Disposition: homeless - staying in his car  Follow up appointments: PCP - seen by [facility] - [facility] PCP - Lalitha Muñiz this month  DME needed: none at this time  Transportation at Discharge: has car but sister has it at this time  [address] or means to access home:      homeless  IM Medicare Letter:will need prior to discharge  Is patient a  and connected with the [facility] ? N/a  If yes, was [employer] transfer form completed and [facility] notified? Caregiver Contact: Sarah Freed Sister - [phone]  Discharge Caregiver contacted prior to discharge? Per patient  Care Conference needed?:        No    Patient living in his car PTA - [facility]-  to provide shelter/housing resources.  JUANJOSE Oneal

## 2022-09-12 NOTE — CONSULTS
Nephrology Consult Note     Tony Wells                Phone - [phone]   Patient: Lea Nash   YOB: 1950    Date- 9/12/2022  MRN: [id number]             REASON FOR CONSULTATION: safia  CONSULTING PHYSICIAN:     ADMIT DATE:9/10/2022 PATIENT PCP:None     IMPRESSION & PLAN:    Manuel Risk likely due to post renal obstruction  Urinary retention requiring straight cath as out pt  B/L HYDRONEPHROSIS  Hypertension  UTI  Hyponatremia  Hyperkalemia  Ckd 3-a bl cr 1.4-1.5  DM 2    PLAN-  Place madison cath  Start ivf  Hold novasc  Check urine lytes  Hold metformin  Daily bmp     Active Problems:    Acute kidney injury ([facility] [address] (9/10/2022)        [x] High complexity decision making was performed  [x] Patient is at high-risk of decompensation with multiple organ involvement    Subjective:   HPI: Lea Nash is a 66 y.o.  male. HE WAS TX FROM [facility] for safia and hyperkalemia- his labs at [facility] creatinine 2.87 with baseline , hyperkalemia K 6.4              IV calcium gluconate, IV insulin and glucose, kayexalate  His labs this am na 131, cr 2.7, k 4.9  His cr 1.5 in may 2022  He received medical treatment for his hyperkalemia  He has h/o urinary retention. He has been doing straight cath as out pt. He stopped doing straight cath recently  He lives in shelter. He has covid Infection recently. He says - he is homeless now  He is on norvasc  He is not on any diuretics or acei or arb  He denies taking NSAIDS  No documented hypotension  He has h/o prostate ca      Review of Systems:    No c/o sob,  No c/o chest pain,   No c/o nausea or vomiting, No c/o  fever. A 11 point review of system was performed today. Pertinent positives and negatives are mentioned in the HPI. The reminder of the ROS is negative and noncontributory.     Past Medical History:   Diagnosis Date    Diabetes ([facility] [address]     Gastrointestinal disorder     Hypertension     DEJA (obstructive sleep apnea) AHI: 8.9 per hour      Past Surgical History:   Procedure Laterality Date    HX PROSTATE SURGERY      [place] ABDOMEN SURGERY PROC UNLISTED      Hernia Repair      Prior to Admission medications    Medication Sig Start Date End Date Taking? Authorizing Provider   nitrofurantoin, macrocrystal-monohydrate, (Macrobid) 100 mg capsule Take 1 Capsule by mouth two (2) times a day for 7 days. 9/9/22 9/16/22  Aline Jonas NP   Toviaz 4 mg SR tablet Take 1 Tablet by mouth daily. 6/28/22   Provider, Historical   phenazopyridine (Pyridium) 200 mg tablet Take 1 Tablet by mouth three (3) times daily as needed for Pain. 8/19/22   Aline Jonas NP   gabapentin (NEURONTIN) 100 mg capsule Take 1 Capsule by mouth nightly. Max Daily Amount: 100 mg. 8/19/22   Aline Jonas NP   amLODIPine (NORVASC) 5 mg tablet Take 1 Tablet by mouth daily. 4/27/22   Garry Win MD   atorvastatin (LIPITOR) 80 mg tablet Take 1 Tablet by mouth nightly. 4/27/22   Garry Win MD   tamsulosin (FLOMAX) 0.4 mg capsule Take 1 Capsule by mouth daily. 4/27/22   Garry Win MD   metFORMIN (GLUCOPHAGE) 1,000 mg tablet Take 1 Tablet by mouth two (2) times daily (with meals). 4/27/22   Garry Win MD   pantoprazole (PROTONIX) 40 mg tablet Take 1 Tablet by mouth daily. 4/27/22   Garry Win MD   latanoprost (XALATAN) 0.005 % ophthalmic solution Administer 1 Drop to both eyes nightly. Patient not taking: No sig reported    Provider, Historical   albuterol (PROVENTIL HFA, VENTOLIN HFA, PROAIR HFA) 90 mcg/actuation inhaler Take 2 Puffs by inhalation every four (4) hours as needed for Wheezing.  1/8/22   Des Telless, NP     Allergies   Allergen Reactions    Aspirin Anxiety     Patient states not allergic to medication but reports he does not wish to take it     Celecoxib Vertigo    Ibuprofen Nausea Only      Social History     Tobacco Use    Smoking status: Never    Smokeless tobacco: Never   Substance Use Topics    Alcohol use: No      Family History Problem Relation Age of Onset    Hypertension Mother     Diabetes Mother     Hypertension Father     Diabetes Father         Objective:    Patient Vitals for the past 24 hrs:   Temp Pulse Resp BP SpO2   09/12/22 0943 98.3 °F (36.8 °C) (!) 101 18 117/77 94 %   09/12/22 0548 97.7 °F (36.5 °C) 89 18 111/78 98 %   09/11/22 1945 98 °F (36.7 °C) 79 18 (!) 153/78 97 %   09/11/22 1516 97.8 °F (36.6 °C) 83 18 129/87 98 %   09/11/22 1149 98.1 °F (36.7 °C) 84 20 101/79 100 %     No intake/output data recorded. There were no vitals filed for this visit. Physical Exam:  General:Alert, No distress,   Eyes:No scleral icterus, No conjunctival pallor  Neck:Supple,no mass palpable,no thyromegaly  Lungs:Clears to auscultation Bilaterally, normal respiratory effort  CVS:RRR, S1 S2 normal,  No rub,  Abdomen:Soft, Non tender, No hepatosplenomegaly  Extremities: trace LE edema  Skin:No rash or lesions, Warm and DRY   Psych: appropriate affect    :  no madison  Musculoskeletal : no redness, no joint tenderness  NEURO: Normal Speech, Non focal full       CODE STATUS:  full  Care Plan discussed with:  patient, nurse     Chart reviewed.    y Reviewed previous records   y Discussion with patient and/or family and questions answered     RENAL USG  COMPARISON: CT 4/27/2022     FINDINGS:  The patient is studied with coronal and axial real-time ultrasound. The right kidney measures 11.8 cm, and the left kidney measures 10.7 cm. Renal  contour and echogenicity are normal.  There is no mass or shadowing calculus. A  right kidney cyst measures 1.6 cm, unchanged. There is mild to moderate  bilateral hydronephrosis. The ureters are not dilated. The aorta and IVC are not seen due to bowel gas. The urinary bladder is unremarkable. IMPRESSION     Mild to moderate bilateral hydronephrosis. Normal appearance of the bilateral  renal parenchyma. Normal urinary bladder.     Lab Data Personally Reviewed: (see below)  Recent Labs 09/11/22  0232 09/10/22  0834   WBC 7.1 9.1   HGB 7.4* 7.7*   * 451*   ANEU 3.6 6.6   * 129*   K 4.9 6.4*   * 228*   BUN 35* 34*   CREA 2.73* 2.87*   ALT 17 20   TBILI 0.4 0.4   * 127*   CA 9.9 9.6   MG 2.0  --    PHOS 3.8  --      Lab Results   Component Value Date/Time    Color DARK YELLOW 09/10/2022 08:34 AM    Appearance TURBID (A) 09/10/2022 08:34 AM    Specific gravity 1.010 09/10/2022 08:34 AM    Specific gravity 1.025 06/03/2022 05:43 PM    pH (UA) 5.5 09/10/2022 08:34 AM    Protein 100 (A) 09/10/2022 08:34 AM    Glucose Negative 09/10/2022 08:34 AM    Ketone Negative 09/10/2022 08:34 AM    Bilirubin Negative 07/20/2022 05:19 PM    Urobilinogen 1.0 09/10/2022 08:34 AM    Nitrites Positive (A) 09/10/2022 08:34 AM    Leukocyte Esterase LARGE (A) 09/10/2022 08:34 AM    Epithelial cells FEW 09/10/2022 08:34 AM    Bacteria 1+ (A) 09/10/2022 08:34 AM    WBC  09/10/2022 08:34 AM    RBC 0-5 09/10/2022 08:34 AM       Lab Results   Component Value Date/Time    Iron 42 09/11/2022 02:32 AM    TIBC 286 09/11/2022 02:32 AM    Iron % saturation 15 (L) 09/11/2022 02:32 AM    Ferritin 258 09/11/2022 02:32 AM     Lab Results   Component Value Date/Time    Culture result: NO GROWTH 2 DAYS 09/10/2022 11:17 AM    Culture result: YEAST, (APPARENT CANDIDA ALBICANS) (A) 09/10/2022 08:34 AM    Culture result: MIXED UROGENITAL ALEKS ISOLATED 09/10/2022 08:34 AM     Prior to Admission Medications   Prescriptions Last Dose Informant Patient Reported? Taking? Toviaz 4 mg SR tablet   Yes No   Sig: Take 1 Tablet by mouth daily. albuterol (PROVENTIL HFA, VENTOLIN HFA, PROAIR HFA) 90 mcg/actuation inhaler  Self No No   Sig: Take 2 Puffs by inhalation every four (4) hours as needed for Wheezing. amLODIPine (NORVASC) 5 mg tablet   No No   Sig: Take 1 Tablet by mouth daily.    atorvastatin (LIPITOR) 80 mg tablet   No No   Sig: Take 1 Tablet by mouth nightly.   gabapentin (NEURONTIN) 100 mg capsule   No No Sig: Take 1 Capsule by mouth nightly. Max Daily Amount: 100 mg.   latanoprost (XALATAN) 0.005 % ophthalmic solution   Yes No   Sig: Administer 1 Drop to both eyes nightly. Patient not taking: No sig reported   metFORMIN (GLUCOPHAGE) 1,000 mg tablet   No No   Sig: Take 1 Tablet by mouth two (2) times daily (with meals). naproxen (Naprosyn) 500 mg tablet   No No   Sig: Take 1 Tablet by mouth two (2) times daily (with meals) for 10 days. nitrofurantoin, macrocrystal-monohydrate, (Macrobid) 100 mg capsule   No No   Sig: Take 1 Capsule by mouth two (2) times a day for 7 days. pantoprazole (PROTONIX) 40 mg tablet   No No   Sig: Take 1 Tablet by mouth daily. phenazopyridine (Pyridium) 200 mg tablet   No No   Sig: Take 1 Tablet by mouth three (3) times daily as needed for Pain.   tamsulosin (FLOMAX) 0.4 mg capsule   No No   Sig: Take 1 Capsule by mouth daily. Facility-Administered Medications: None     Imaging:    Medications list Personally Reviewed   [x]      Yes     []               No    Thank you for allowing us to participate in the care this patient. We will follow patient with you.   Signed By: Gladis Washington MD  [facility]  [facility] [place] [address], [facility], [address] [place]  Phone - [phone]         Fax - [phone] [facility] Office  [address] [place] [address]  Phone - [phone]        Fax - [phone]

## 2022-09-12 NOTE — PROGRESS NOTES
PHYSICAL THERAPY EVALUATION/DISCHARGE  Patient: Kristina Snow (82 y.o. male)  Date: 9/12/2022  Primary Diagnosis: Acute kidney injury ([facility] [address] [N17.9]       Precautions:   Contact      ASSESSMENT  Based on the objective data described below, the patient presents with near baseline level of function. Patient able to demo safe ambulation around the room with no overt LOB or need for assistance. States he has been homeless and living out of his car recently. Perseverative on care outside of the hospital and difficult to redirect at times. Patient's mobility is at baseline and PT will signoff. Other factors to consider for discharge: homeless     Further skilled acute physical therapy is not indicated at this time. PLAN :  Recommendation for discharge: (in order for the patient to meet his/her long term goals)  No skilled physical therapy/ follow up rehabilitation needs identified at this time. IF patient discharges home will need the following DME: none       SUBJECTIVE:   Patient stated I am just not going to do what she wanted so she put me out on the street.     OBJECTIVE DATA SUMMARY:   HISTORY:    Past Medical History:   Diagnosis Date    Diabetes ([facility] [address]     Gastrointestinal disorder     Hypertension     DEJA (obstructive sleep apnea)     AHI: 8.9 per hour     Past Surgical History:   Procedure Laterality Date    HX PROSTATE SURGERY      [place] ABDOMEN SURGERY PROC UNLISTED      Hernia Repair       Prior level of function: independent with ambulation.  Uses a SPC at times  Personal factors and/or comorbidities impacting plan of care:     Home Situation  Home Environment: Other (comment) (homeless)  Living Alone: Yes  Support Systems: Other Family Member(s)  Patient Expects to be Discharged to[de-identified] [facility]    EXAMINATION/PRESENTATION/DECISION MAKING:   Critical Behavior:  Neurologic State: Alert  Orientation Level: Oriented X4  Cognition: Appropriate decision making  Safety/Judgement: Awareness of environment  Hearing: Auditory  Auditory Impairment: None    Range Of Motion:  AROM: Within functional limits           PROM: Within functional limits           Strength:    Strength: Within functional limits                    Tone & Sensation:                                  Coordination:  Coordination: Within functional limits  Vision:      Functional Mobility:  Bed Mobility:              Transfers:  Sit to Stand: Modified independent  Stand to Sit: Modified independent                       Balance:   Sitting: Intact  Standing: Intact  Ambulation/Gait Training:  Distance (ft): 30 Feet (ft) (declined to amb further)  Assistive Device: Gait belt  Ambulation - Level of Assistance: Stand-by assistance     Gait Description (WDL): Exceptions to WDL  Gait Abnormalities: Decreased step clearance        Base of Support: Widened     Speed/Kyra: Pace decreased (<100 feet/min); Slow  Step Length: Left shortened;Right shortened       Pain Rating:  Reports pain in feet    Activity Tolerance:   Fair and requires rest breaks      After treatment patient left in no apparent distress:   Sitting EOB with call bell in reach    COMMUNICATION/EDUCATION:   The patients plan of care was discussed with: Physical therapist, Occupational therapist, and Registered nurse. Fall prevention education was provided and the patient/caregiver indicated understanding., Patient/family have participated as able in goal setting and plan of care. , and Patient/family agree to work toward stated goals and plan of care.     Thank you for this referral.  Kit Madrigal PT, DPT   Time Calculation: 17 mins

## 2022-09-12 NOTE — PROGRESS NOTES
Reported multiple attempts to start IV. IV team called for assistance to obtain iv access and ordered lab work.

## 2022-09-12 NOTE — PROGRESS NOTES
Received notification from bedside RN about patient with regards to: 8/10 abdominal pain, requesting medication for relief  VS: /87, HR 83, RR 18, O2 sat 98% on RA    Intervention given: Oxycodone 5/325 PO x 1 dose, Bentyl PO PRN, Mylanta PO PRN ordered    2344: Notified of unrelieved discomfort with Percocet, now described as bladder spasm discomfort that he felt the last time he had UTI.  Requesting medication for relief    - Oxybutynin 10 mg PO q daily with 1st dose now ordered

## 2022-09-12 NOTE — PROGRESS NOTES
Endurance Catheter insertion note     Inserted 20 G, 6 cm long  Endurance Extended Dwell Peripheral Catheter into right forearm using ultrasound guidance and sterile technique. Positive blood return. Patient tolerated procedure well. Denies questions or concerns at this time. The Endurance catheter is an extended dwell peripheral catheter and may remain in place 29 days. Blood samples can be obtained from this catheter. To obtain labs, a tourniquet may be used, flush with 10ml NS, waste 3ml, draw labs, flush with 20ml NS. Dressings needs to be changed with central line dressing kit using bio patch and stat lock every 7 days by nurse. Primary nurse, James Purcell RN notified.           Nolberto Mcarthur RN BSN, Vascular Access Team. PICC Nurse

## 2022-09-12 NOTE — CONSULTS
Urology Consult    Patient: Lorie Mehta MRN: [id number]  SSN: [id number]    YOB: 1950  Age: 66 y.o. Sex: male          Date of Consultation:  September 12, 2022  Requesting Physician: Parvin Moses MD  Reason for Consultation: hydronephrosis            Assessment/Plan:  Bilateral chronic hydronephrosis- imaging no obvious  obstruction   AYESHA on CKD 3  Suspected UTI, hx of recurrent ESBL utis   Prostate cancer hx with radiation cystitis and hematuria hx   Bph hx (urolift) with atonic bladder- previously managed on flomax and IC  Hx of LUTS  Remote hx of urethral stricture disease    -agree with madison placement, pt will need to continue madison through discharge unless wanting to resume IC. Continue flomax  -daily renal labs, nephrology following. If creat worsens can consider CT ivp   -follow cultures, target abx therapy once able   -will follow     Supervising MD, Dr. Gilda Bryant       History of Present Illness:  Patient is a 66 y.o. male admitted 9/10/2022 to the hospital for Acute kidney injury ([facility] [address] [N17.9]. He presented from \[facility]\"" with dysuria and decreased UOP. In \[facility]\"" ED he had ayesha with creat 2.87 with baseline around 1.5 and hyperkalemia 6.4. resolving with IVF and nephrology intervention. IV meropenem given due to ESBL history. Transferred to ED [facility] for further work up. Urology consulted for CANDICE showing mild/moderate hydronephrosis. Bladder appears normal. Office CANDICE from 4/8/22 showed persistent hydronephrosis. Reference office  note from April 2022. He is having a madison placed currently. He stopped IC at home a few months ago, he stated it was too painful. He has been voiding okay since then. He denies any recent gross hematuria or blood clots. He denies flank or suprapubic pain.     ===reference April 2022 [place] urology office note===    Maisha Speaker is a 66year old male who presents today for \"Dysuria\".  Patient followed by Dr. Sundar Junior with telemedicine office visit 3 days ago returns complaining of severe dysuria. Catheterized urine specimen appears grossly infected. Patient denies fevers or shaking chills. No significant gross hematuria. The patient was recently hospitalized with an ESBL E. coli UTI and seen by urology in consultation 2/23/2022. Treated with meropenem and discharged with 5 days of Macrobid. History of prostate Ca, BPH, (?) atonic bladder, UTI, prostatitis, hematuria. He has finished his month of Omnicef, 2/2022, but then he was admitted to the hospital later in Feb with UTI and gross hematuria that requires CBI. CT did show mild b/l hydro and blood clots in the bladder. Images visualised + reviewed. [place] records also reviewed. Hx of prostate Ca, XRT 2019. PSA < 0.1, 2021. Urolift, 10/2021. Hx of urethral stricture disease. Continues CIC QID + Flomax. U/S today still show bilateral hydro. Dr. Orellana Falling from 4/11/2022:  Agrees to CT-IVP and PSA on f/u. Agrees to discuss how to arrange cystoscopy at that time; importance of f/u stressed, and possibility of Ca reviewed. PAST MEDICAL HISTORY:    Allergies: No known allergies. DENIES: Latex, Shellfish, X-Ray Dye, Iodine. Medications:     Problems: Hematuria (ICD-599.70) (KSG43-O94. 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 DENIES: Heart Disease, Pacemaker/Defibrillator, Lung Disease, Bowel Problems, Stroke/Seizure, Kidney Problems, Bleeding Problems, HIV, or Hepatitis. Surgeries: Prostate Biopsy, Hernia Repair, and Other Abdominal Surgery. Family History: DENIES: Prostate cancer, Kidney cancer, Kidney disease, Kidney stones. Social History: Retired. .  Smoking status: former smoker. Does not drink alcohol. System Review: Admits to: Difficulty Walking. DENIES: Unexplained Weight Loss, Dry Eyes, Dry Mouth, Leg Swelling, Shortness of Breath, Constipation, Involuntary Urine Loss, Lower Extremity Weakness, Dry Skin, Psychiatric Problems, Impaired Sex Drive, Easy Bleeding, Rash. EXAMINATION: Appearance: well-developed NAD Conjunctiva/Lids: conjunctivae and lids normal External Ears/Nose: normal no lesions or deformities Neck: supple Respiratory Effort: breathing easily Lower Extremity: no edema Abdomen/Flank: soft non-tender without masses; no CVA tenderness Liver/Spleen: no organomegaly Hernia: no ventral hernia Gait/Station: normal Skin Inspection: warm and dry Mood/Affect: normal       URINALYSIS from I&O Cath specimen  Urine Dip: Bld: +++, LE: ++,     IMPRESSION:    1. BACTERIAL URINARY TRACT INFECTIN (UTI) (NSD20-P11.0) - Deteriorated: Patient with a recent history of an ESBL E. coli UTI requiring hospitalization with parenteral antibiotics discharged on Macrobid for 5 days now returns to the office with a several day history of severe dysuria and purulent urine. Urine appears infected. Will treat empirically with Macrobid pending Q PCR testing today. Q PCR and call the patient with results  Macrobid 100 mg p.o. twice daily (28)  Follow-up with Dr. Susan Peacock as scheduled. 2. ADENOCARCINOMA - PROSTATE (FPH87-B31) - Unchanged    3. HEMATURIA (VTC59-S87.9) - Resolved      Today's Services  E&M Service, Urinalysis Microscopic        Electronically signed by Kami Levi II, MD FACS on 04/14/2022 at 2:28 PM    ________________________________________________________________________        Patient: Hu Bauer  ID: Pascual Perry [id number]  Note: All result statuses are Final unless otherwise noted.     Tests: (1) U/S; Renal ([id number])    Order Note:       [facility]    Renal and bladder ultrasound    ORDERING PROVIDER: Adriana Royal MD  INTERPRETING PHYSICIAN: Venkatesh Morris MD  REASON FOR STUDY: BPH w/ obstruction    COMPARISON: None    FINDINGS:    Right kidney measures 11.3 x 5.5 x 5.2 cm. There is normal cortical thickness and echogenicity. There is mild hydronephrosis. No stones. There are few renal cysts including a 2 cm lower pole cyst. Normal color-flow is identified in the renal hilum on the Doppler portion of the exam.    Left kidney measures 10.1 x 6.8 x 5.0 cm. There is normal cortical thickness and echogenicity. No stones. There is mild left hydronephrosis. There are several renal cysts including a 1.8 cm upper pole cyst. Normal color-flow is identified in the renal hilum on the Doppler portion of the exam.    The bladder is not well distended limiting further evaluation. The patient voided during the examination. Survey views of the abdominal aorta and proximal IVC are performed. No evidence of abdominal aortic aneurysm. IMPRESSION:    1. Mild bilateral hydronephrosis. ELECTRONICALLY SIGNED BY: Venkatesh Morris MD      RENAL USOUND              \"Result Below. Mikayla Dye Mikayla Dye \"        RESULT: 1. Mild bilateral hydronephrosis. Note: An exclamation harper (!) indicates a result that was not dispersed into the flowsheet.   Document Creation Date: 04/08/2022 9:38 AM  _______________________________________________________________________    (1) Order result status: Final  Collection or observation date-time: 04/08/2022 09:30:50  Requested date-time: 04/07/2022 15:14:24  Receipt date-time:   Reported date-time: 04/08/2022 09:35:29  Referring Physician: Jj Whitman MD  Ordering Physician:   (nell)  Specimen Source:   Source: Luis M Gill Order Number: [id number]  Lab site: [facility]    _____________________________________________________________________    External Attachment:      Type:     Image      Comment:  Conception Mazzoni    Filed automatically (without signature) on 04/08/2022 at 9:38 AM    ________________________________________________________________________    Electronically signed by Caprice Barthel MD on 04/11/2022 at 10:30 AM  ________________________________________________________________________            Past Medical History: Allergies   Allergen Reactions    Aspirin Anxiety     Patient states not allergic to medication but reports he does not wish to take it     Celecoxib Vertigo    Ibuprofen Nausea Only      Prior to Admission medications    Medication Sig Start Date End Date Taking? Authorizing Provider   nitrofurantoin, macrocrystal-monohydrate, (Macrobid) 100 mg capsule Take 1 Capsule by mouth two (2) times a day for 7 days. 9/9/22 9/16/22  Lizeth Estrella NP   Toviaz 4 mg SR tablet Take 1 Tablet by mouth daily. 6/28/22   Provider, Historical   phenazopyridine (Pyridium) 200 mg tablet Take 1 Tablet by mouth three (3) times daily as needed for Pain. 8/19/22   Lizeth Estrella NP   gabapentin (NEURONTIN) 100 mg capsule Take 1 Capsule by mouth nightly. Max Daily Amount: 100 mg. 8/19/22   Lizeth Estrella NP   amLODIPine (NORVASC) 5 mg tablet Take 1 Tablet by mouth daily. 4/27/22   Yodit Fishman MD   atorvastatin (LIPITOR) 80 mg tablet Take 1 Tablet by mouth nightly. 4/27/22   Yodit Fishman MD   tamsulosin (FLOMAX) 0.4 mg capsule Take 1 Capsule by mouth daily. 4/27/22   Yodit Fishman MD   metFORMIN (GLUCOPHAGE) 1,000 mg tablet Take 1 Tablet by mouth two (2) times daily (with meals). 4/27/22   Yodit Fishman MD   pantoprazole (PROTONIX) 40 mg tablet Take 1 Tablet by mouth daily. 4/27/22   Yodit Fishman MD   latanoprost (XALATAN) 0.005 % ophthalmic solution Administer 1 Drop to both eyes nightly. Patient not taking: No sig reported    Provider, Historical   albuterol (PROVENTIL HFA, VENTOLIN HFA, PROAIR HFA) 90 mcg/actuation inhaler Take 2 Puffs by inhalation every four (4) hours as needed for Wheezing.  1/8/22   Yanna Mtz NP      PMHx:  has a past medical history of Diabetes ([facility] [address] Gastrointestinal disorder, Hypertension, and DEJA (obstructive sleep apnea). PSurgHx:  has a past surgical history that includes pr abdomen surgery proc unlisted and hx prostate surgery. PSocHx:  reports that he has never smoked. He has never used smokeless tobacco. He reports that he does not currently use drugs. He reports that he does not drink alcohol. ROS:  Admission ROS by Dagoberto Sylvester MD from 9/10/2022 were reviewed with the patient and changes (other than per HPI) include: none.     Physical Exam    General Appearance: NAD, awake  HENT: atraumatic, normal ears  Cardiovascular: not tachycardic, no LE edema  Respiratory: no distress, room air  Abdomen: soft, no suprapubic fullness or tenderness  : no CVA tenderness  Extremities: moves all  Musculoskeletal: normal alignment of neck and head  Neuro: Appropriate, no focal neurological deficits  Mood/Affect: appropriate, A&O x 3      Lab Results   Component Value Date/Time    WBC 5.6 09/12/2022 10:45 AM    HCT 23.6 (L) 09/12/2022 10:45 AM    PLATELET 548 (H) 25/24/4251 10:45 AM    Sodium 131 (L) 09/12/2022 10:45 AM    Potassium 4.9 09/12/2022 10:45 AM    Chloride 101 09/12/2022 10:45 AM    CO2 21 09/12/2022 10:45 AM    BUN 30 (H) 09/12/2022 10:45 AM    Creatinine 2.49 (H) 09/12/2022 10:45 AM    Glucose 269 (H) 09/12/2022 10:45 AM    Calcium 9.7 09/12/2022 10:45 AM    Magnesium 2.0 09/11/2022 02:32 AM       UA:   Lab Results   Component Value Date/Time    Color DARK YELLOW 09/10/2022 08:34 AM    Appearance TURBID (A) 09/10/2022 08:34 AM    Specific gravity 1.010 09/10/2022 08:34 AM    Specific gravity 1.025 06/03/2022 05:43 PM    pH (UA) 5.5 09/10/2022 08:34 AM    Protein 100 (A) 09/10/2022 08:34 AM    Glucose Negative 09/10/2022 08:34 AM    Ketone Negative 09/10/2022 08:34 AM    Bilirubin Negative 07/20/2022 05:19 PM    Urobilinogen 1.0 09/10/2022 08:34 AM    Nitrites Positive (A) 09/10/2022 08:34 AM    Leukocyte Esterase LARGE (A) 09/10/2022 08:34 AM Epithelial cells FEW 09/10/2022 08:34 AM    Bacteria 1+ (A) 09/10/2022 08:34 AM    WBC  09/10/2022 08:34 AM    RBC 0-5 09/10/2022 08:34 AM         Signed By: Luis Hardin NP  - September 12, 2022

## 2022-09-12 NOTE — PROGRESS NOTES
Hospitalist Progress Note    NAME: Kaity Carpenter   :  1950   MRN:  [id number]       Assessment / Plan:  Acute kidney injury POA creat 2.87  Hyperkalemia K 6.4 POA  Hyponatremia Na 129 POA  Stage 3 chronic kidney disease POA baseline creat 1.3 to 1.5  Possible urinary retention POA  Prostate cancer s/p XRT POA  Presented from hospitals ER with dysuria and decreased ability to urinate              Scheduled for unknown urology procedure as outpatient              Postponed due to COVID-19 + test ~ 12 days ago  Prior did I+O cath after XRT years ago, subsequently stopped  Now back with dysuria x 1-2 weeks              Worsening ability to urinate x 1 week  hospitals ED noted with AYESHA, hyperkalemia K             s/p   IV calcium gluconate, IV insulin and glucose, kayexalate, IVF  UA  WBC, 1+ bacteria, urine culture sent              IV meropenem with prior ESBL history  No clearly documented they did I+O cath or post-void residual  Currently not madison in place   : Hyperkalemia resolved, potassium 4.9, creatinine trending down  Renal ultrasound  showed mild to moderate b/l hydronephrosis  Will repeat ucx , restart merrem until get new urine cx     COVID-19 positive ~ 12 days ago POA  Second time infected  Denies Cough or SOB, currently on room air  CXR with no ASD or edema  Check inflammatory markers  Currently off isolation per infection control     Possible UTI POA  History of ESBL E coli infections  Urethral pain   Urine culture showed mixed urogenital rocío and yeast in the urine  Meropenem discontinued but pt continues to complain of ureteral pain and his urine is cloudy.   We will repeat urine culture and restart meropenem  Essential HTN POA  Hyperlipidemia POA  Continue ASA  Continue statin  Continue home BP regimen  PRN  hydralazine      Diabetes mellitus type 1 2 POA  Home regimen: metformin 1000 mg BID, denies taking insulin at home  Diabetic diet  Hold metformin with reduced GFR < 30  Sliding scale insulin  POC  A1c 8.6     Anemia normocytic suspect chronic disease POA  HgB 7.7  No obvious bleeding  Iron saturation 15%, serum iron and ferritin are within normal limit  Hemoglobin around 7.4      Estimated discharge date: September 15  Barriers: clinical improvement     Code status: Full  Prophylaxis: Hep SQ  Recommended Disposition:  TBD , pt is reportedly homeless     Subjective:     Chief Complaint / Reason for Physician Visit  AYESHA/hyperkalemia. Discussed with RN events overnight. C/o urethral pain   Review of Systems:  Symptom Y/N Comments  Symptom Y/N Comments   Fever/Chills n   Chest Pain n    Poor Appetite    Edema     Cough    Abdominal Pain n    Sputum    Joint Pain     SOB/ALEJO n   Pruritis/Rash     Nausea/vomit n   Tolerating PT/OT     Diarrhea    Tolerating Diet y    Constipation    Other       Could NOT obtain due to:      Objective:     VITALS:   Last 24hrs VS reviewed since prior progress note. Most recent are:  Patient Vitals for the past 24 hrs:   Temp Pulse Resp BP SpO2   09/12/22 0548 97.7 °F (36.5 °C) 89 18 111/78 98 %   09/11/22 1945 98 °F (36.7 °C) 79 18 (!) 153/78 97 %   09/11/22 1516 97.8 °F (36.6 °C) 83 18 129/87 98 %   09/11/22 1149 98.1 °F (36.7 °C) 84 20 101/79 100 %   09/11/22 0902 97.9 °F (36.6 °C) 84 19 124/87 99 %         Intake/Output Summary (Last 24 hours) at 9/12/2022 0840  Last data filed at 9/11/2022 2305  Gross per 24 hour   Intake --   Output 475 ml   Net -475 ml          I had a face to face encounter and independently examined this patient on 9/12/2022, as outlined below:  PHYSICAL EXAM:  General: WD, WN. Alert, cooperative, no acute distress    EENT:  EOMI. Anicteric sclerae. MMM  Resp:  CTA bilaterally, no wheezing or rales. No accessory muscle use  CV:  Regular  rhythm,  No edema  GI:  Soft, Non distended, Non tender. +Bowel sounds  Neurologic:  Alert and oriented X 3, normal speech,   Psych:   Good insight. Not anxious nor agitated  Skin:  No rashes.   No jaundice    Reviewed most current lab test results and cultures  YES  Reviewed most current radiology test results   YES  Review and summation of old records today    NO  Reviewed patient's current orders and MAR    YES  PMH/SH reviewed - no change compared to H&P  ________________________________________________________________________  Care Plan discussed with:    Comments   Patient x    Family      RN x    Care Manager     Consultant                        Multidiciplinary team rounds were held today with , nursing, pharmacist and clinical coordinator. Patient's plan of care was discussed; medications were reviewed and discharge planning was addressed. ________________________________________________________________________  Total NON critical care TIME: 35 Minutes    Total CRITICAL CARE TIME Spent:   Minutes non procedure based      Comments   >50% of visit spent in counseling and coordination of care     ________________________________________________________________________  Mariana Jones MD     Procedures: see electronic medical records for all procedures/Xrays and details which were not copied into this note but were reviewed prior to creation of Plan. LABS:  I reviewed today's most current labs and imaging studies.   Pertinent labs include:  Recent Labs     09/11/22  0232 09/10/22  0834   WBC 7.1 9.1   HGB 7.4* 7.7*   HCT 24.6* 25.1*   * 451*       Recent Labs     09/11/22  0232 09/10/22  0834   * 129*   K 4.9 6.4*    98   CO2 23 19*   * 228*   BUN 35* 34*   CREA 2.73* 2.87*   CA 9.9 9.6   MG 2.0  --    PHOS 3.8  --    ALB 2.9* 3.1*   TBILI 0.4 0.4   ALT 17 20         Signed: Mariana Jones MD

## 2022-09-12 NOTE — PROGRESS NOTES
Pt complained about IV site soreness. No s/s of infiltration. IVF stopped at 0300. Attempted IV stick x2 times. Called ED as patient only wanted ultrasound IV. Only tech available was not able to use ultrasound machine. ED tech attempted x3 times. Pt now refusing any additional attempts at IV's or lab draws. PT educated that he was admitted for an AYESHA and IVF are an important part of treating AYESHA. Pt still refusing any additional sticks. AM DR to explain again to patient importance of IVF and possibly get an endurance cath put in.

## 2022-09-13 LAB
ALBUMIN SERPL-MCNC: 2.3 G/DL (ref 3.5–5)
ALBUMIN/GLOB SERPL: 0.5 {RATIO} (ref 1.1–2.2)
ALP SERPL-CCNC: 91 U/L (ref 45–117)
ALT SERPL-CCNC: 13 U/L (ref 12–78)
ANION GAP SERPL CALC-SCNC: 2 MMOL/L (ref 5–15)
AST SERPL-CCNC: 13 U/L (ref 15–37)
BILIRUB SERPL-MCNC: 0.4 MG/DL (ref 0.2–1)
BUN SERPL-MCNC: 17 MG/DL (ref 6–20)
BUN/CREAT SERPL: 12 (ref 12–20)
CALCIUM SERPL-MCNC: 8.6 MG/DL (ref 8.5–10.1)
CHLORIDE SERPL-SCNC: 109 MMOL/L (ref 97–108)
CO2 SERPL-SCNC: 25 MMOL/L (ref 21–32)
CREAT SERPL-MCNC: 1.47 MG/DL (ref 0.7–1.3)
GLOBULIN SER CALC-MCNC: 4.9 G/DL (ref 2–4)
GLUCOSE BLD STRIP.AUTO-MCNC: 155 MG/DL (ref 65–117)
GLUCOSE BLD STRIP.AUTO-MCNC: 178 MG/DL (ref 65–117)
GLUCOSE BLD STRIP.AUTO-MCNC: 186 MG/DL (ref 65–117)
GLUCOSE BLD STRIP.AUTO-MCNC: 234 MG/DL (ref 65–117)
GLUCOSE SERPL-MCNC: 171 MG/DL (ref 65–100)
HEMOCCULT STL QL: NEGATIVE
POTASSIUM SERPL-SCNC: 4.4 MMOL/L (ref 3.5–5.1)
PROT SERPL-MCNC: 7.2 G/DL (ref 6.4–8.2)
SERVICE CMNT-IMP: ABNORMAL
SODIUM SERPL-SCNC: 136 MMOL/L (ref 136–145)

## 2022-09-13 PROCEDURE — 36415 COLL VENOUS BLD VENIPUNCTURE: CPT

## 2022-09-13 PROCEDURE — 82962 GLUCOSE BLOOD TEST: CPT

## 2022-09-13 PROCEDURE — 74011000250 HC RX REV CODE- 250: Performed by: INTERNAL MEDICINE

## 2022-09-13 PROCEDURE — 74011250636 HC RX REV CODE- 250/636: Performed by: INTERNAL MEDICINE

## 2022-09-13 PROCEDURE — 74011636637 HC RX REV CODE- 636/637: Performed by: INTERNAL MEDICINE

## 2022-09-13 PROCEDURE — 74011250637 HC RX REV CODE- 250/637: Performed by: INTERNAL MEDICINE

## 2022-09-13 PROCEDURE — 74011000258 HC RX REV CODE- 258: Performed by: INTERNAL MEDICINE

## 2022-09-13 PROCEDURE — 80053 COMPREHEN METABOLIC PANEL: CPT

## 2022-09-13 PROCEDURE — 65270000046 HC RM TELEMETRY

## 2022-09-13 PROCEDURE — 94760 N-INVAS EAR/PLS OXIMETRY 1: CPT

## 2022-09-13 RX ADMIN — Medication 2 UNITS: at 17:00

## 2022-09-13 RX ADMIN — MEROPENEM 1 G: 1 INJECTION, POWDER, FOR SOLUTION INTRAVENOUS at 17:01

## 2022-09-13 RX ADMIN — HEPARIN SODIUM 5000 UNITS: 5000 INJECTION INTRAVENOUS; SUBCUTANEOUS at 17:00

## 2022-09-13 RX ADMIN — HEPARIN SODIUM 5000 UNITS: 5000 INJECTION INTRAVENOUS; SUBCUTANEOUS at 22:05

## 2022-09-13 RX ADMIN — SODIUM CHLORIDE, PRESERVATIVE FREE 10 ML: 5 INJECTION INTRAVENOUS at 05:58

## 2022-09-13 RX ADMIN — MEROPENEM 1 G: 1 INJECTION, POWDER, FOR SOLUTION INTRAVENOUS at 01:53

## 2022-09-13 RX ADMIN — ATORVASTATIN CALCIUM 80 MG: 40 TABLET, FILM COATED ORAL at 22:05

## 2022-09-13 RX ADMIN — SODIUM CHLORIDE, PRESERVATIVE FREE 10 ML: 5 INJECTION INTRAVENOUS at 22:05

## 2022-09-13 RX ADMIN — INSULIN GLARGINE 10 UNITS: 100 INJECTION, SOLUTION SUBCUTANEOUS at 22:36

## 2022-09-13 RX ADMIN — SODIUM CHLORIDE, PRESERVATIVE FREE 10 ML: 5 INJECTION INTRAVENOUS at 17:18

## 2022-09-13 RX ADMIN — SODIUM CHLORIDE 100 ML/HR: 9 INJECTION, SOLUTION INTRAVENOUS at 14:07

## 2022-09-13 RX ADMIN — IRON SUCROSE 200 MG: 20 INJECTION, SOLUTION INTRAVENOUS at 17:16

## 2022-09-13 RX ADMIN — BISACODYL 5 MG: 5 TABLET, COATED ORAL at 06:22

## 2022-09-13 RX ADMIN — OXYCODONE 5 MG: 5 TABLET ORAL at 22:08

## 2022-09-13 RX ADMIN — HEPARIN SODIUM 5000 UNITS: 5000 INJECTION INTRAVENOUS; SUBCUTANEOUS at 05:57

## 2022-09-13 RX ADMIN — TRAMADOL HYDROCHLORIDE 50 MG: 50 TABLET, COATED ORAL at 17:00

## 2022-09-13 NOTE — PROGRESS NOTES
Physician Progress Note      PATIENT:               Juancarlos Toth  CSN #:                  [id number]  :                       1950  ADMIT DATE:       9/10/2022 7:48 PM  [address] [place] DATE:  RESPONDING  PROVIDER #:        Donnel Councilman MD          QUERY TEXT:    Pt admitted with AYESHA. Noted documentation of COVID-19 (H&P and Dr Andreas Martines PN ). Last COVID-19 test result in EMR was 2022 (+ for SARS-CoV-2). Please document in progress notes and discharge summary the clinical indicators to support this diagnosis on current admission or clarify current status of  COVID-19    The medical record reflects the following:    Risk Factors: 66year old male with h/o COVID    Clinical Indicators:  H&P: COVID-19 positive    12 days ago POA   Dr Andreas Martines PN:  Denies Cough or SOB, currently on room air. CXR with no ASD or edema. Currently off isolation per infection control    Treatment:  CXR    Thank You,  Scar Cabrera RN, CDI  Options provided:  -- No clinical evidence of COVID-19 currently  -- COVID-19 is PMH only  -- COVID-19 currently being treated/evaluated as evidenced by, Please document supportive evidence. -- Other - I will add my own diagnosis  -- Disagree - Not applicable / Not valid  -- Disagree - Clinically unable to determine / Unknown  -- Refer to Clinical Documentation Reviewer    PROVIDER RESPONSE TEXT:    This patient does not have clinical evidence of COVID-19 currently.     Query created by: Macy Griffin on 2022 11:23 AM      Electronically signed by:  Donnel Councilman MD 2022 6:30 PM

## 2022-09-13 NOTE — PROGRESS NOTES
Hospitalist Progress Note    NAME: Samuel Gatica   :  1950   MRN:  [id number]       Assessment / Plan:  Acute kidney injury POA creat 2.87  Hyperkalemia K 6.4 POA  Hyponatremia Na 129 POA  Stage 3 chronic kidney disease POA baseline creat 1.3 to 1.5  Possible urinary retention POA  Prostate cancer s/p XRT POA  Presented from [facility] ER with dysuria and decreased ability to urinate              Scheduled for unknown urology procedure as outpatient              Postponed due to COVID-19 + test ~ 12 days ago  Prior did I+O cath after XRT years ago, subsequently stopped  Now back with dysuria x 1-2 weeks              Worsening ability to urinate x 1 week  [facility] ED noted with AYESHA, hyperkalemia K             s/p   IV calcium gluconate, IV insulin and glucose, kayexalate, IVF  UA  WBC, 1+ bacteria, urine culture sent              IV meropenem with prior ESBL history  No clearly documented they did I+O cath or post-void residual  Currently not madison in place   : Hyperkalemia resolved, potassium 4.9, creatinine trending down   :Renal ultrasound  showed mild to moderate b/l hydronephrosis  Will repeat ucx , restart merrem until get new urine cx   : Patient had a Madison placed which revealed significant urinary retention. Patient reported some relief from his  urethral pain   Fu  urine culture  Per  urology, patient needs to be discharged on Madison    COVID-19 positive ~ 12 days ago POA  Second time infected  Denies Cough or SOB, currently on room air  CXR with no ASD or edema  Check inflammatory markers  Currently off isolation per infection control     Possible UTI POA  History of ESBL E coli infections  Urethral pain   Urine culture showed mixed urogenital rocío and yeast in the urine  Meropenem discontinued but pt continues to complain of ureteral pain and his urine is cloudy.   We will repeat urine culture and restart meropenem  Essential HTN POA  Hyperlipidemia POA  Continue ASA  Continue statin  Continue home BP regimen  PRN  hydralazine      Diabetes mellitus type 1 2 POA  Home regimen: metformin 1000 mg BID, denies taking insulin at home  Diabetic diet  Hold metformin with reduced GFR < 30  Sliding scale insulin  POC  A1c 8.6     Anemia normocytic suspect chronic disease POA  HgB 7.7  No obvious bleeding  Iron saturation 15%, serum iron and ferritin are within normal limit  Hemoglobin around 7.4>7.2  Stool occult blood negative      Estimated discharge date: September 14  Barriers: clinical improvement     Code status: Full  Prophylaxis: Hep SQ  Recommended Disposition:  TBD , pt is reportedly homeless     Subjective:     Chief Complaint / Reason for Physician Visit  AYESHA/hyperkalemia. Discussed with RN events overnight. Reported  that he is doing much better, no further restraint pain  Review of Systems:  Symptom Y/N Comments  Symptom Y/N Comments   Fever/Chills n   Chest Pain n    Poor Appetite    Edema     Cough    Abdominal Pain n    Sputum    Joint Pain     SOB/ALEJO n   Pruritis/Rash     Nausea/vomit n   Tolerating PT/OT     Diarrhea    Tolerating Diet y    Constipation    Other       Could NOT obtain due to:      Objective:     VITALS:   Last 24hrs VS reviewed since prior progress note. Most recent are:  Patient Vitals for the past 24 hrs:   Temp Pulse Resp BP SpO2   09/13/22 0743 98.5 °F (36.9 °C) 90 16 108/70 97 %   09/12/22 2346 98.8 °F (37.1 °C) 85 18 111/67 95 %   09/12/22 1153 98.4 °F (36.9 °C) 84 18 122/78 98 %   09/12/22 0943 98.3 °F (36.8 °C) (!) 101 18 117/77 94 %         Intake/Output Summary (Last 24 hours) at 9/13/2022 0936  Last data filed at 9/13/2022 0644  Gross per 24 hour   Intake 1800 ml   Output 4300 ml   Net -2500 ml          I had a face to face encounter and independently examined this patient on 9/13/2022, as outlined below:  PHYSICAL EXAM:  General: WD, WN. Alert, cooperative, no acute distress    EENT:  EOMI. Anicteric sclerae.  MMM  Resp:  CTA bilaterally, no wheezing or rales. No accessory muscle use  CV:  Regular  rhythm,  No edema  GI:  Soft, Non distended, Non tender. +Bowel sounds  Neurologic:  Alert and oriented X 3, normal speech,   Psych:   Good insight. Not anxious nor agitated  Skin:  No rashes. No jaundice    Reviewed most current lab test results and cultures  YES  Reviewed most current radiology test results   YES  Review and summation of old records today    NO  Reviewed patient's current orders and MAR    YES  PMH/SH reviewed - no change compared to H&P  ________________________________________________________________________  Care Plan discussed with:    Comments   Patient x    Family      RN x    Care Manager     Consultant                        Multidiciplinary team rounds were held today with , nursing, pharmacist and clinical coordinator. Patient's plan of care was discussed; medications were reviewed and discharge planning was addressed. ________________________________________________________________________  Total NON critical care TIME: 35 Minutes    Total CRITICAL CARE TIME Spent:   Minutes non procedure based      Comments   >50% of visit spent in counseling and coordination of care     ________________________________________________________________________  Taina Paul MD     Procedures: see electronic medical records for all procedures/Xrays and details which were not copied into this note but were reviewed prior to creation of Plan. LABS:  I reviewed today's most current labs and imaging studies.   Pertinent labs include:  Recent Labs     09/12/22  1045 09/11/22  0232   WBC 5.6 7.1   HGB 7.2* 7.4*   HCT 23.6* 24.6*   * 416*       Recent Labs     09/12/22  1045 09/11/22  0232   * 131*   K 4.9 4.9    102   CO2 21 23   * 190*   BUN 30* 35*   CREA 2.49* 2.73*   CA 9.7 9.9   MG  --  2.0   PHOS  --  3.8   ALB 2.8* 2.9*   TBILI 0.3 0.4   ALT 14 17         Signed: Taina Paul MD

## 2022-09-13 NOTE — PROGRESS NOTES
Progress Note    Patient: Kristina Snow MRN: [id number]  SSN: [id number]    YOB: 1950  Age: 66 y.o. Sex: male          ADMITTED:  9/10/2022 to Heike Zapata MD  for Acute kidney injury [facility]) [N17.9]           Kristina Snow was admitted for Acute kidney injury ([facility] [N17.9]. Urology following for mild bilateral hydronephrosis with AYESHA and chronic HOWARD, previously managed on IC however has stopped catheterizing for some time now. Madison placed yesterday to decompress bladder. No repeat renal labs this am. Currently pending. Creat baseline ~1.5 with ckd stage 3. yesterday was 2.5, awaiting current bmp. Excellent UOP yesterday with 4,300. Mild hematuria related to decompression hematuria, now resolved. 9/10 urine cx budding yeast. Repeat urine cx 22 pending. Currently treated with meropenem due to hx of ESBL utis. Vitals:  Temp (24hrs), Av.6 °F (37 °C), Min:98.4 °F (36.9 °C), Max:98.8 °F (37.1 °C)     Blood pressure 108/70, pulse 90, temperature 98.5 °F (36.9 °C), resp. rate 16, SpO2 97 %. I&O's:   1901 -  0700  In: 1800 [P.O.:1000; I.V.:800]  Out: 4775 [Urine:4775]   No intake/output data recorded.      Exam:   Physical Exam  General: NAD, pleasant  Respiratory: no distress, breathing easily, room air  Abdomen: soft, no distention; non-tender to palpation  : no CVA tenderness, madison, clear yellow UA  Neuro: Appropriate, no focal neurological deficits  Skin: warm, dry  Extremities: moves all, full ROM      Labs:   Recent Labs     22  1045 22  0232   WBC 5.6 7.1   HGB 7.2* 7.4*   HCT 23.6* 24.6*   * 416*     Recent Labs     22  1045 22  0232   * 131*   K 4.9 4.9    102   CO2 21 23   * 190*   BUN 30* 35*   CREA 2.49* 2.73*   CA 9.7 9.9        Cultures:        Imaging:       Assessment:     - Active Problems:    [facility]10/20[facility])    Bilateral chronic hydronephrosis- imaging no obvious  obstruction   AYESHA on CKD 3  Suspected UTI, hx of recurrent ESBL utis   Prostate cancer hx with radiation cystitis and hematuria hx   Bph hx (urolift) with atonic bladder and HOWARD- previously managed on flomax and IC  Hx of LUTS- previous IC  Remote hx of urethral stricture disease    Plan:     - continue madison, monitor I/Os, assess for post obstructive diuresis. Daily renal labs. Pt will d.c home with madison. Continue flomax. Will arrange OP follow up   -suspect madison will help resolve obstruction seen on imaging and AYESHA.  If creat does not continue to improve can consider CT IVP  -yeast on urine cx, repeat cx pending, target abx therapy per primary team    Supervising Dr. Nini GORDON By: Amelie Churchill, RAJAN - September 13, 2022

## 2022-09-13 NOTE — PROGRESS NOTES
Nephrology Progress Note  [facility]ly [facility] / [address] [place], [address]  Phone - [phone]  Fax - [phone]                 Patient: Daniela Esqueda                   YOB: 1950        Date- 9/13/2022                      Admit Date: 9/10/2022  CC: Follow up for ayesha          IMPRESSION & PLAN:    AYESHA - likely due to post renal obstruction  Urinary retention requiring straight cath as out pt  B/L HYDRONEPHROSIS  Hypertension  UTI  Anemia of ckd  Hyponatremia  Hyperkalemia  Ckd 3-a bl cr 1.4-1.5  DM 2    PLAN-  CONTINUE NACL  Follow bmp  Give epogen and venofer  Keep madison in place     Subjective: Interval History:   -  cr 2.49-- improving  K stable  Na low stable  Good urine out put-- 4300 ml    Objective:   Vitals:    09/12/22 0943 09/12/22 1153 09/12/22 2346 09/13/22 0743   BP: 117/77 122/78 111/67 108/70   Pulse: (!) 101 84 85 90   Resp: 18 18 18 16   Temp: 98.3 °F (36.8 °C) 98.4 °F (36.9 °C) 98.8 °F (37.1 °C) 98.5 °F (36.9 °C)   SpO2: 94% 98% 95% 97%      09/12 0701 - 09/13 0700  In: 1800 [P.O.:1000; I.V.:800]  Out: 4300 [Urine:4300]  There were no vitals filed for this visit. Physical exam:    GEN: NAD  NECK- Supple, no mass  RESP: No wheezing, Clear b/l  CVS: S1,S2  RRR  NEURO: Normal speech, Non focal  EXT: No Edema   PSYCH: Normal Mood  Madison +    Chart reviewed. Pertinent Notes reviewed.      Data Review :  Recent Labs     09/12/22  1045 09/11/22  0232   * 131*   K 4.9 4.9    102   CO2 21 23   BUN 30* 35*   CREA 2.49* 2.73*   * 190*   CA 9.7 9.9   MG  --  2.0   PHOS  --  3.8     Recent Labs     09/12/22  1045 09/11/22  0232   WBC 5.6 7.1   HGB 7.2* 7.4*   HCT 23.6* 24.6*   * 416*     Recent Labs     09/11/22  0232   TIBC 286   PSAT 15*   FERR 258      Medication list  reviewed  Current Facility-Administered Medications   Medication Dose Route Frequency insulin glargine (LANTUS) injection 10 Units  10 Units SubCUTAneous QHS    traMADoL (ULTRAM) tablet 50 mg  50 mg Oral BID    oxyCODONE IR (ROXICODONE) tablet 5 mg  5 mg Oral Q6H PRN    meropenem (MERREM) 1 g in 0.9% sodium chloride (MBP/ADV) 50 mL MBP  1 g IntraVENous Q8H    [Held by provider] amLODIPine (NORVASC) tablet 5 mg  5 mg Oral DAILY    atorvastatin (LIPITOR) tablet 80 mg  80 mg Oral QHS    pantoprazole (PROTONIX) tablet 40 mg  40 mg Oral DAILY    tamsulosin (FLOMAX) capsule 0.4 mg  0.4 mg Oral DAILY    alum-mag hydroxide-simeth (MYLANTA) oral suspension 30 mL  30 mL Oral Q4H PRN    dicyclomine (BENTYL) tablet 20 mg  20 mg Oral Q6H PRN    oxybutynin chloride XL (DITROPAN XL) tablet 10 mg  10 mg Oral DAILY    sodium chloride (NS) flush 5-40 mL  5-40 mL IntraVENous Q8H    sodium chloride (NS) flush 5-40 mL  5-40 mL IntraVENous PRN    acetaminophen (TYLENOL) tablet 650 mg  650 mg Oral Q6H PRN    Or    acetaminophen (TYLENOL) suppository 650 mg  650 mg Rectal Q6H PRN    polyethylene glycol (MIRALAX) packet 17 g  17 g Oral DAILY    bisacodyL (DULCOLAX) tablet 5 mg  5 mg Oral DAILY PRN    promethazine (PHENERGAN) tablet 12.5 mg  12.5 mg Oral Q6H PRN    Or    ondansetron (ZOFRAN) injection 4 mg  4 mg IntraVENous Q6H PRN    heparin (porcine) injection 5,000 Units  5,000 Units SubCUTAneous Q8H    glucose chewable tablet 16 g  4 Tablet Oral PRN    glucagon (GLUCAGEN) injection 1 mg  1 mg IntraMUSCular PRN    dextrose 10% infusion 0-250 mL  0-250 mL IntraVENous PRN    insulin lispro (HUMALOG) injection   SubCUTAneous AC&HS    0.9% sodium chloride infusion  100 mL/hr IntraVENous CONTINUOUS        Angel Choi MD  9/13/2022

## 2022-09-14 LAB
BACTERIA SPEC CULT: NORMAL
GLUCOSE BLD STRIP.AUTO-MCNC: 150 MG/DL (ref 65–117)
GLUCOSE BLD STRIP.AUTO-MCNC: 194 MG/DL (ref 65–117)
GLUCOSE BLD STRIP.AUTO-MCNC: 195 MG/DL (ref 65–117)
GLUCOSE BLD STRIP.AUTO-MCNC: 257 MG/DL (ref 65–117)
GLUCOSE BLD STRIP.AUTO-MCNC: 317 MG/DL (ref 65–117)
SERVICE CMNT-IMP: ABNORMAL
SERVICE CMNT-IMP: NORMAL

## 2022-09-14 PROCEDURE — 74011250636 HC RX REV CODE- 250/636: Performed by: INTERNAL MEDICINE

## 2022-09-14 PROCEDURE — 74011000250 HC RX REV CODE- 250: Performed by: INTERNAL MEDICINE

## 2022-09-14 PROCEDURE — 82962 GLUCOSE BLOOD TEST: CPT

## 2022-09-14 PROCEDURE — 74011636637 HC RX REV CODE- 636/637: Performed by: INTERNAL MEDICINE

## 2022-09-14 PROCEDURE — 94760 N-INVAS EAR/PLS OXIMETRY 1: CPT

## 2022-09-14 PROCEDURE — 74011250637 HC RX REV CODE- 250/637: Performed by: INTERNAL MEDICINE

## 2022-09-14 PROCEDURE — 74011250637 HC RX REV CODE- 250/637: Performed by: NURSE PRACTITIONER

## 2022-09-14 PROCEDURE — 65270000046 HC RM TELEMETRY

## 2022-09-14 PROCEDURE — 74011000258 HC RX REV CODE- 258: Performed by: INTERNAL MEDICINE

## 2022-09-14 RX ADMIN — Medication 2 UNITS: at 18:13

## 2022-09-14 RX ADMIN — HEPARIN SODIUM 5000 UNITS: 5000 INJECTION INTRAVENOUS; SUBCUTANEOUS at 17:52

## 2022-09-14 RX ADMIN — IRON SUCROSE 200 MG: 20 INJECTION, SOLUTION INTRAVENOUS at 08:11

## 2022-09-14 RX ADMIN — PANTOPRAZOLE SODIUM 40 MG: 40 TABLET, DELAYED RELEASE ORAL at 08:11

## 2022-09-14 RX ADMIN — SODIUM CHLORIDE, PRESERVATIVE FREE 10 ML: 5 INJECTION INTRAVENOUS at 05:28

## 2022-09-14 RX ADMIN — MEROPENEM 1 G: 1 INJECTION, POWDER, FOR SOLUTION INTRAVENOUS at 17:53

## 2022-09-14 RX ADMIN — MEROPENEM 1 G: 1 INJECTION, POWDER, FOR SOLUTION INTRAVENOUS at 02:13

## 2022-09-14 RX ADMIN — HEPARIN SODIUM 5000 UNITS: 5000 INJECTION INTRAVENOUS; SUBCUTANEOUS at 05:27

## 2022-09-14 RX ADMIN — SODIUM CHLORIDE, PRESERVATIVE FREE 10 ML: 5 INJECTION INTRAVENOUS at 22:00

## 2022-09-14 RX ADMIN — POLYETHYLENE GLYCOL 3350 17 G: 17 POWDER, FOR SOLUTION ORAL at 08:11

## 2022-09-14 RX ADMIN — TRAMADOL HYDROCHLORIDE 50 MG: 50 TABLET, COATED ORAL at 17:52

## 2022-09-14 RX ADMIN — OXYBUTYNIN CHLORIDE 10 MG: 5 TABLET, EXTENDED RELEASE ORAL at 08:11

## 2022-09-14 RX ADMIN — TRAMADOL HYDROCHLORIDE 50 MG: 50 TABLET, COATED ORAL at 08:10

## 2022-09-14 RX ADMIN — SODIUM CHLORIDE, PRESERVATIVE FREE 5 ML: 5 INJECTION INTRAVENOUS at 14:00

## 2022-09-14 RX ADMIN — TAMSULOSIN HYDROCHLORIDE 0.4 MG: 0.4 CAPSULE ORAL at 08:12

## 2022-09-14 RX ADMIN — Medication 2 UNITS: at 08:12

## 2022-09-14 NOTE — PROGRESS NOTES
Transition of Care Plan:    RUR: 25%  Disposition: homeless - has a car that is currently at his sisters home  Follow up appointments:PCP/Specialist  DME needed: none at this time  Transportation at Discharge: car at sisters home - may need ride assistance  Keys or means to access home:    homeless    IM Medicare Letter: will need prior to discharge  Is patient a [place] and connected with the [place]? N/a  If yes, was [employer] transfer form completed and [facility] notified? N/a  Caregiver ContactYo Cool - [phone]  Discharge Caregiver contacted prior to discharge? Per patient  Care Conference needed?:      no    CM met with patient - provided overnight shelter resources to patient and [facility]. Patient concerned about trupti Covid in congregate living situation. Patient to review resources and let CM know his discharge location once decided.  JUANJOSE Keller

## 2022-09-14 NOTE — PROGRESS NOTES
Nephrology Progress Note  [facility] / [address], [address]  [place], [address]  Phone - [phone]  Fax - [phone]                 Patient: Stella Eugene                   YOB: 1950        Date- 9/14/2022                      Admit Date: 9/10/2022  CC: Follow up for  safia     IMPRESSION & PLAN:   safia - likely due to post renal obstruction  Urinary retention requiring straight cath as out pt  B/L HYDRONEPHROSIS  Hypertension  UTI  Anemia of ckd  Hyponatremia  Hyperkalemia  Ckd 3-a bl cr 1.4-1.5  DM 2    PLAN-  Stop ivf  Follow bmp  Restart norvasc  Don't restart naproxen  S/p epogen and venofer  Keep madison in place  Okay to d//c renal stand point     Subjective: Interval History:   - cr 1.47 today        9/13/22   cr 2.49-- improving  K stable  Na low stable  Good urine out put-- 4300 ml    Objective:   Vitals:    09/13/22 1124 09/13/22 1527 09/14/22 0019 09/14/22 0911   BP: 125/84 132/86 118/76 119/69   Pulse: 75 70 74 75   Resp: 18 18 18 20   Temp: 97.5 °F (36.4 °C) 98 °F (36.7 °C) 97.8 °F (36.6 °C) 98.4 °F (36.9 °C)   SpO2: 98% 98% 97% 95%      09/13 0701 - 09/14 0700  In: -   Out: 3900 [Urine:3900]  There were no vitals filed for this visit. Physical exam:    GEN: NAD  NECK- Supple, no mass  RESP: No wheezing, clear  b/l  CVS: S1,S2  RRR  NEURO: Normal speech, non focal  EXT: No Edema   PSYCH: Normal Mood  Madison +    Chart reviewed. Pertinent Notes reviewed. Data Review :  Recent Labs     09/13/22  1611 09/12/22  1045    131*   K 4.4 4.9   * 101   CO2 25 21   BUN 17 30*   CREA 1.47* 2.49*   * 269*   CA 8.6 9.7       Recent Labs     09/12/22  1045   WBC 5.6   HGB 7.2*   HCT 23.6*   *       No results for input(s): FE, TIBC, PSAT, FERR in the last 72 hours.      Medication list  reviewed  Current Facility-Administered Medications   Medication Dose Route Frequency epoetin tiago (EPOGEN;PROCRIT) injection 20,000 Units  20,000 Units SubCUTAneous Q MON, WED & FRI    meropenem (MERREM) 1 g in 0.9% sodium chloride (MBP/ADV) 50 mL MBP  1 g IntraVENous Q12H    insulin glargine (LANTUS) injection 10 Units  10 Units SubCUTAneous QHS    traMADoL (ULTRAM) tablet 50 mg  50 mg Oral BID    oxyCODONE IR (ROXICODONE) tablet 5 mg  5 mg Oral Q6H PRN    [Held by provider] amLODIPine (NORVASC) tablet 5 mg  5 mg Oral DAILY    atorvastatin (LIPITOR) tablet 80 mg  80 mg Oral QHS    pantoprazole (PROTONIX) tablet 40 mg  40 mg Oral DAILY    tamsulosin (FLOMAX) capsule 0.4 mg  0.4 mg Oral DAILY    alum-mag hydroxide-simeth (MYLANTA) oral suspension 30 mL  30 mL Oral Q4H PRN    dicyclomine (BENTYL) tablet 20 mg  20 mg Oral Q6H PRN    oxybutynin chloride XL (DITROPAN XL) tablet 10 mg  10 mg Oral DAILY    sodium chloride (NS) flush 5-40 mL  5-40 mL IntraVENous Q8H    sodium chloride (NS) flush 5-40 mL  5-40 mL IntraVENous PRN    acetaminophen (TYLENOL) tablet 650 mg  650 mg Oral Q6H PRN    Or    acetaminophen (TYLENOL) suppository 650 mg  650 mg Rectal Q6H PRN    polyethylene glycol (MIRALAX) packet 17 g  17 g Oral DAILY    bisacodyL (DULCOLAX) tablet 5 mg  5 mg Oral DAILY PRN    promethazine (PHENERGAN) tablet 12.5 mg  12.5 mg Oral Q6H PRN    Or    ondansetron (ZOFRAN) injection 4 mg  4 mg IntraVENous Q6H PRN    heparin (porcine) injection 5,000 Units  5,000 Units SubCUTAneous Q8H    glucose chewable tablet 16 g  4 Tablet Oral PRN    glucagon (GLUCAGEN) injection 1 mg  1 mg IntraMUSCular PRN    dextrose 10% infusion 0-250 mL  0-250 mL IntraVENous PRN    insulin lispro (HUMALOG) injection   SubCUTAneous AC&HS    0.9% sodium chloride infusion  100 mL/hr IntraVENous CONTINUOUS          Gladis Washington MD  9/14/2022

## 2022-09-15 LAB
ANION GAP SERPL CALC-SCNC: 9 MMOL/L (ref 5–15)
BACTERIA SPEC CULT: NORMAL
BASOPHILS # BLD: 0.1 K/UL (ref 0–0.1)
BASOPHILS NFR BLD: 1 % (ref 0–1)
BUN SERPL-MCNC: 15 MG/DL (ref 6–20)
BUN/CREAT SERPL: 10 (ref 12–20)
CALCIUM SERPL-MCNC: 9.7 MG/DL (ref 8.5–10.1)
CHLORIDE SERPL-SCNC: 106 MMOL/L (ref 97–108)
CO2 SERPL-SCNC: 22 MMOL/L (ref 21–32)
CREAT SERPL-MCNC: 1.48 MG/DL (ref 0.7–1.3)
DIFFERENTIAL METHOD BLD: ABNORMAL
EOSINOPHIL # BLD: 0.3 K/UL (ref 0–0.4)
EOSINOPHIL NFR BLD: 4 % (ref 0–7)
ERYTHROCYTE [DISTWIDTH] IN BLOOD BY AUTOMATED COUNT: 18.3 % (ref 11.5–14.5)
GLUCOSE BLD STRIP.AUTO-MCNC: 145 MG/DL (ref 65–117)
GLUCOSE BLD STRIP.AUTO-MCNC: 151 MG/DL (ref 65–117)
GLUCOSE BLD STRIP.AUTO-MCNC: 210 MG/DL (ref 65–117)
GLUCOSE BLD STRIP.AUTO-MCNC: 222 MG/DL (ref 65–117)
GLUCOSE SERPL-MCNC: 137 MG/DL (ref 65–100)
HCT VFR BLD AUTO: 22.9 % (ref 36.6–50.3)
HGB BLD-MCNC: 7 G/DL (ref 12.1–17)
IMM GRANULOCYTES # BLD AUTO: 0.1 K/UL (ref 0–0.04)
IMM GRANULOCYTES NFR BLD AUTO: 1 % (ref 0–0.5)
LYMPHOCYTES # BLD: 2.2 K/UL (ref 0.8–3.5)
LYMPHOCYTES NFR BLD: 34 % (ref 12–49)
MCH RBC QN AUTO: 25.8 PG (ref 26–34)
MCHC RBC AUTO-ENTMCNC: 30.6 G/DL (ref 30–36.5)
MCV RBC AUTO: 84.5 FL (ref 80–99)
MONOCYTES # BLD: 0.7 K/UL (ref 0–1)
MONOCYTES NFR BLD: 10 % (ref 5–13)
NEUTS SEG # BLD: 3.2 K/UL (ref 1.8–8)
NEUTS SEG NFR BLD: 50 % (ref 32–75)
NRBC # BLD: 0.05 K/UL (ref 0–0.01)
NRBC BLD-RTO: 0.8 PER 100 WBC
PLATELET # BLD AUTO: 334 K/UL (ref 150–400)
PMV BLD AUTO: 8 FL (ref 8.9–12.9)
POTASSIUM SERPL-SCNC: 4.3 MMOL/L (ref 3.5–5.1)
RBC # BLD AUTO: 2.71 M/UL (ref 4.1–5.7)
RBC MORPH BLD: ABNORMAL
SERVICE CMNT-IMP: ABNORMAL
SERVICE CMNT-IMP: NORMAL
SODIUM SERPL-SCNC: 137 MMOL/L (ref 136–145)
WBC # BLD AUTO: 6.6 K/UL (ref 4.1–11.1)

## 2022-09-15 PROCEDURE — 74011000250 HC RX REV CODE- 250: Performed by: INTERNAL MEDICINE

## 2022-09-15 PROCEDURE — 74011250637 HC RX REV CODE- 250/637: Performed by: INTERNAL MEDICINE

## 2022-09-15 PROCEDURE — 74011000258 HC RX REV CODE- 258: Performed by: INTERNAL MEDICINE

## 2022-09-15 PROCEDURE — 80048 BASIC METABOLIC PNL TOTAL CA: CPT

## 2022-09-15 PROCEDURE — 65270000046 HC RM TELEMETRY

## 2022-09-15 PROCEDURE — 85025 COMPLETE CBC W/AUTO DIFF WBC: CPT

## 2022-09-15 PROCEDURE — 94760 N-INVAS EAR/PLS OXIMETRY 1: CPT

## 2022-09-15 PROCEDURE — 36415 COLL VENOUS BLD VENIPUNCTURE: CPT

## 2022-09-15 PROCEDURE — 82962 GLUCOSE BLOOD TEST: CPT

## 2022-09-15 PROCEDURE — 74011250637 HC RX REV CODE- 250/637: Performed by: NURSE PRACTITIONER

## 2022-09-15 PROCEDURE — 74011250636 HC RX REV CODE- 250/636: Performed by: INTERNAL MEDICINE

## 2022-09-15 PROCEDURE — 74011636637 HC RX REV CODE- 636/637: Performed by: INTERNAL MEDICINE

## 2022-09-15 RX ADMIN — PANTOPRAZOLE SODIUM 40 MG: 40 TABLET, DELAYED RELEASE ORAL at 10:17

## 2022-09-15 RX ADMIN — TRAMADOL HYDROCHLORIDE 50 MG: 50 TABLET, COATED ORAL at 10:17

## 2022-09-15 RX ADMIN — MEROPENEM 1 G: 1 INJECTION, POWDER, FOR SOLUTION INTRAVENOUS at 06:40

## 2022-09-15 RX ADMIN — HEPARIN SODIUM 5000 UNITS: 5000 INJECTION INTRAVENOUS; SUBCUTANEOUS at 00:03

## 2022-09-15 RX ADMIN — OXYCODONE 5 MG: 5 TABLET ORAL at 21:44

## 2022-09-15 RX ADMIN — INSULIN GLARGINE 10 UNITS: 100 INJECTION, SOLUTION SUBCUTANEOUS at 00:03

## 2022-09-15 RX ADMIN — OXYBUTYNIN CHLORIDE 10 MG: 5 TABLET, EXTENDED RELEASE ORAL at 10:17

## 2022-09-15 RX ADMIN — POLYETHYLENE GLYCOL 3350 17 G: 17 POWDER, FOR SOLUTION ORAL at 10:17

## 2022-09-15 RX ADMIN — ATORVASTATIN CALCIUM 80 MG: 40 TABLET, FILM COATED ORAL at 21:35

## 2022-09-15 RX ADMIN — Medication 2 UNITS: at 17:03

## 2022-09-15 RX ADMIN — Medication 2 UNITS: at 21:35

## 2022-09-15 RX ADMIN — HEPARIN SODIUM 5000 UNITS: 5000 INJECTION INTRAVENOUS; SUBCUTANEOUS at 08:00

## 2022-09-15 RX ADMIN — ERYTHROPOIETIN 20000 UNITS: 20000 INJECTION, SOLUTION INTRAVENOUS; SUBCUTANEOUS at 00:03

## 2022-09-15 RX ADMIN — SODIUM CHLORIDE, PRESERVATIVE FREE 10 ML: 5 INJECTION INTRAVENOUS at 06:44

## 2022-09-15 RX ADMIN — INSULIN GLARGINE 10 UNITS: 100 INJECTION, SOLUTION SUBCUTANEOUS at 21:35

## 2022-09-15 RX ADMIN — TAMSULOSIN HYDROCHLORIDE 0.4 MG: 0.4 CAPSULE ORAL at 10:17

## 2022-09-15 RX ADMIN — TRAMADOL HYDROCHLORIDE 50 MG: 50 TABLET, COATED ORAL at 17:03

## 2022-09-15 RX ADMIN — BISACODYL 5 MG: 5 TABLET, COATED ORAL at 21:35

## 2022-09-15 RX ADMIN — HEPARIN SODIUM 5000 UNITS: 5000 INJECTION INTRAVENOUS; SUBCUTANEOUS at 17:02

## 2022-09-15 RX ADMIN — Medication 5 UNITS: at 13:30

## 2022-09-15 RX ADMIN — ATORVASTATIN CALCIUM 80 MG: 40 TABLET, FILM COATED ORAL at 00:03

## 2022-09-15 RX ADMIN — OXYCODONE 5 MG: 5 TABLET ORAL at 00:10

## 2022-09-15 NOTE — PROGRESS NOTES
Nephrology Progress Note  Formerly [facility] / [address], [place], [address]  Phone - [phone]  Fax - [phone]                 Patient: Rafita Arellano                   YOB: 1950        Date- 9/15/2022                      Admit Date: 9/10/2022  CC: Follow up for  safia     IMPRESSION & PLAN:   safia - likely due to post renal obstruction  Urinary retention requiring straight cath as out pt  B/L HYDRONEPHROSIS  Hypertension  UTI  Anemia of ckd  Hyponatremia  Hyperkalemia  Ckd 3-a bl cr 1.4-1.5  DM 2    PLAN-  Creatinine remained stable. Follow bmp  Restart norvasc  Don't restart naproxen  S/p epogen and venofer  Keep madison in place  Okay to d//c renal stand point     Subjective: Interval History:   Seen and examined today. Creatinine remained stable      Objective:   Vitals:    09/14/22 0911 09/14/22 1130 09/15/22 0042 09/15/22 0842   BP: 119/69 133/69 113/75 112/72   Pulse: 75 72 76 79   Resp: 20 16 16 17   Temp: 98.4 °F (36.9 °C) 99.7 °F (37.6 °C) 98 °F (36.7 °C) 98.5 °F (36.9 °C)   SpO2: 95% 93% 95% 98%      09/14 0701 - 09/15 0700  In: -   Out: 2150 [Urine:2150]  There were no vitals filed for this visit. Physical exam:    GEN: NAD  NECK- Supple, no mass  RESP: No wheezing, clear  b/l  CVS: S1,S2  RRR  NEURO: Normal speech, non focal  EXT: No Edema   PSYCH: Normal Mood  Madison +    Chart reviewed. Pertinent Notes reviewed. Data Review :  Recent Labs     09/15/22  0605 09/13/22  1611    136   K 4.3 4.4    109*   CO2 22 25   BUN 15 17   CREA 1.48* 1.47*   * 171*   CA 9.7 8.6       Recent Labs     09/15/22  0605   WBC 6.6   HGB 7.0*   HCT 22.9*          No results for input(s): FE, TIBC, PSAT, FERR in the last 72 hours.      Medication list  reviewed  Current Facility-Administered Medications   Medication Dose Route Frequency    epoetin tiago (EPOGEN;PROCRIT) injection 20,000 Units  20,000 Units SubCUTAneous Q MON, WED & FRI    meropenem (MERREM) 1 g in 0.9% sodium chloride (MBP/ADV) 50 mL MBP  1 g IntraVENous Q12H    insulin glargine (LANTUS) injection 10 Units  10 Units SubCUTAneous QHS    traMADoL (ULTRAM) tablet 50 mg  50 mg Oral BID    oxyCODONE IR (ROXICODONE) tablet 5 mg  5 mg Oral Q6H PRN    [Held by provider] amLODIPine (NORVASC) tablet 5 mg  5 mg Oral DAILY    atorvastatin (LIPITOR) tablet 80 mg  80 mg Oral QHS    pantoprazole (PROTONIX) tablet 40 mg  40 mg Oral DAILY    tamsulosin (FLOMAX) capsule 0.4 mg  0.4 mg Oral DAILY    alum-mag hydroxide-simeth (MYLANTA) oral suspension 30 mL  30 mL Oral Q4H PRN    dicyclomine (BENTYL) tablet 20 mg  20 mg Oral Q6H PRN    oxybutynin chloride XL (DITROPAN XL) tablet 10 mg  10 mg Oral DAILY    sodium chloride (NS) flush 5-40 mL  5-40 mL IntraVENous Q8H    sodium chloride (NS) flush 5-40 mL  5-40 mL IntraVENous PRN    acetaminophen (TYLENOL) tablet 650 mg  650 mg Oral Q6H PRN    Or    acetaminophen (TYLENOL) suppository 650 mg  650 mg Rectal Q6H PRN    polyethylene glycol (MIRALAX) packet 17 g  17 g Oral DAILY    bisacodyL (DULCOLAX) tablet 5 mg  5 mg Oral DAILY PRN    promethazine (PHENERGAN) tablet 12.5 mg  12.5 mg Oral Q6H PRN    Or    ondansetron (ZOFRAN) injection 4 mg  4 mg IntraVENous Q6H PRN    heparin (porcine) injection 5,000 Units  5,000 Units SubCUTAneous Q8H    glucose chewable tablet 16 g  4 Tablet Oral PRN    glucagon (GLUCAGEN) injection 1 mg  1 mg IntraMUSCular PRN    dextrose 10% infusion 0-250 mL  0-250 mL IntraVENous PRN    insulin lispro (HUMALOG) injection   SubCUTAneous AC&HS          Simon Britt MD  9/15/2022

## 2022-09-15 NOTE — PROGRESS NOTES
Hospitalist Progress Note    NAME: Jeanna Gaines   :  1950   MRN:  [id number]       Assessment / Plan:  Acute kidney injury POA creat 2.87  Hyperkalemia K 6.4 POA  Hyponatremia Na 129 POA  Stage 3 chronic kidney disease POA baseline creat 1.3 to 1.5  Possible urinary retention POA  Prostate cancer s/p XRT POA  Presented from [facility] ER with dysuria and decreased ability to urinate              Scheduled for unknown urology procedure as outpatient              Postponed due to COVID-19 + test ~ 12 days ago  Prior did I+O cath after XRT years ago, subsequently stopped  Now back with dysuria x 1-2 weeks              Worsening ability to urinate x 1 week  [facility] ED noted with AYESHA, hyperkalemia K             s/p   IV calcium gluconate, IV insulin and glucose, kayexalate, IVF  UA  WBC, 1+ bacteria, urine culture sent      IV meropenem with prior ESBL history  N09/11: Hyperkalemia resolved, potassium 4.9, creatinine trending down   :Renal ultrasound  showed mild to moderate b/l hydronephrosis  Currently patient on meropenem, repeat urine culture still pending. Prior history of ESBL  : Patient had a Ball placed Per  urology, patient needs to be discharged on Ball    Nephrology following discontinued IV fluids  Resume on Norvasc.   Avoid nephrotoxic drugs and NSAIDs  Nephrology cleared for discharge    Acute urinary retention status post Ball  Appreciate urology consult  Continue with his Ball on discharge  If creatinine does not improve consider CT IVP  Repeat urine culture pending    COVID-19 positive ~ 12 days ago POA  Second time infected  Denies Cough or SOB, currently on room air  CXR with no ASD or edema  Check inflammatory markers  Currently off isolation per infection control     Possible UTI POA  History of ESBL E coli infections  Urethral pain   Urine culture showed mixed urogenital rocío and yeast in the urine  Meropenem discontinued but pt continues to complain of ureteral pain and his urine is cloudy. Repeat urine culture pending  On meropenem currently    Essential HTN POA  Hyperlipidemia POA  Continue ASA  Continue statin  Continue home BP regimen  PRN  hydralazine      Diabetes mellitus type 1 2 POA  Home regimen: metformin 1000 mg BID, denies taking insulin at home  Diabetic diet  Hold metformin with reduced GFR < 30  Sliding scale insulin  POC  A1c 8.6     Anemia normocytic suspect chronic disease POA  HgB 7.7  No obvious bleeding  Iron saturation 15%, serum iron and ferritin are within normal limit  Hemoglobin around 7.4>7.2  Stool occult blood negative      Estimated discharge date: September 16  Barriers: clinical improvement, pending urine culture    Code status: Full  Prophylaxis: Hep SQ  Recommended Disposition:  TBD , pt is reportedly homeless     Subjective:     Chief Complaint / Reason for Physician Visit  AYESHA/hyperkalemia. Discussed with RN events overnight. Reported  that he is doing much better, no further restraint pain  Review of Systems:  Symptom Y/N Comments  Symptom Y/N Comments   Fever/Chills n   Chest Pain n    Poor Appetite n   Edema n    Cough n   Abdominal Pain n    Sputum    Joint Pain     SOB/ALEJO n   Pruritis/Rash     Nausea/vomit n   Tolerating PT/OT     Diarrhea n   Tolerating Diet y    Constipation    Other       Could NOT obtain due to:      Objective:     VITALS:   Last 24hrs VS reviewed since prior progress note. Most recent are:  Patient Vitals for the past 24 hrs:   Temp Pulse Resp BP SpO2   09/14/22 1130 99.7 °F (37.6 °C) 72 16 133/69 93 %   09/14/22 0911 98.4 °F (36.9 °C) 75 20 119/69 95 %   09/14/22 0019 97.8 °F (36.6 °C) 74 18 118/76 97 %         Intake/Output Summary (Last 24 hours) at 9/14/2022 2134  Last data filed at 9/14/2022 8895  Gross per 24 hour   Intake --   Output 2250 ml   Net -2250 ml          I had a face to face encounter and independently examined this patient on 9/14/2022, as outlined below:  PHYSICAL EXAM:  General: WD, WN.  Alert, cooperative, no acute distress    EENT:  EOMI. Anicteric sclerae. MMM  Resp:  CTA bilaterally, no wheezing or rales. No accessory muscle use  CV:  Regular  rhythm,  No edema  GI:  Soft, Non distended, Non tender. +Bowel sounds  Neurologic:  Alert and oriented X 3, normal speech,   Psych:   Good insight. Not anxious nor agitated  Skin:  No rashes. No jaundice    Reviewed most current lab test results and cultures  YES  Reviewed most current radiology test results   YES  Review and summation of old records today    NO  Reviewed patient's current orders and MAR    YES  PMH/SH reviewed - no change compared to H&P  ________________________________________________________________________  Care Plan discussed with:    Comments   Patient x    Family      RN x    Care Manager y    Consultant                       y Multidiciplinary team rounds were held today with , nursing, pharmacist and clinical coordinator. Patient's plan of care was discussed; medications were reviewed and discharge planning was addressed. ________________________________________________________________________  Total NON critical care TIME: 30 Minutes    Total CRITICAL CARE TIME Spent:   Minutes non procedure based      Comments   >50% of visit spent in counseling and coordination of care y    ________________________________________________________________________  Placido Baldwin MD     Procedures: see electronic medical records for all procedures/Xrays and details which were not copied into this note but were reviewed prior to creation of Plan. LABS:  I reviewed today's most current labs and imaging studies.   Pertinent labs include:  Recent Labs     09/12/22  1045   WBC 5.6   HGB 7.2*   HCT 23.6*   *       Recent Labs     09/13/22  1611 09/12/22  1045    131*   K 4.4 4.9   * 101   CO2 25 21   * 269*   BUN 17 30*   CREA 1.47* 2.49*   CA 8.6 9.7   ALB 2.3* 2.8*   TBILI 0.4 0.3   ALT 13 14         Signed: Emilia Valentin MD

## 2022-09-15 NOTE — PROGRESS NOTES
Hospitalist Progress Note    NAME: Manisha Horan   :  1950   MRN:  [id number]       Assessment / Plan:  Acute kidney injury POA creat 2.87  Hyperkalemia K 6.4 POA  Hyponatremia Na 129 POA  Stage 3 chronic kidney disease POA baseline creat 1.3 to 1.5  Possible urinary retention POA  Prostate cancer s/p XRT POA  Presented from [facility] ER with dysuria and decreased ability to urinate              Scheduled for unknown urology procedure as outpatient              Postponed due to COVID-19 + test ~ 12 days ago  Prior did I+O cath after XRT years ago, subsequently stopped  Now back with dysuria x 1-2 weeks              Worsening ability to urinate x 1 week  [facility] ED noted with AYESHA, hyperkalemia K             s/p   IV calcium gluconate, IV insulin and glucose, kayexalate, IVF  UA  WBC, 1+ bacteria, urine culture sent      IV meropenem with prior ESBL history  N09/: Hyperkalemia resolved, potassium 4.9, creatinine trending down   :Renal ultrasound  showed mild to moderate b/l hydronephrosis  Currently patient on meropenem, repeat urine culture still pending. Prior history of ESBL  : Patient had a Ball placed Per  urology, patient needs to be discharged on Ball    Nephrology following discontinued IV fluids  Avoid nephrotoxic drugs and NSAIDs  Nephrology cleared for discharge    Acute urinary retention status post Ball  Appreciate urology consult  Continue with his Ball on discharge  If creatinine does not improve consider CT IVP  Repeat urine culture neg. dc meropenem    COVID-19 positive ~ 12 days ago POA  Second time infected  Denies Cough or SOB, currently on room air  CXR with no ASD or edema  Check inflammatory markers  Currently off isolation per infection control     Possible UTI POA  History of ESBL E coli infections  Urethral pain   Urine culture showed mixed urogenital rocío and yeast in the urine  Meropenem discontinued but pt continues to complain of ureteral pain and his urine is cloudy. Repeat urine culture negative. Meropenem was initially reordered which I will discontinue today  1  Essential HTN POA  Hyperlipidemia POA  Continue ASA  Continue statin  Continue home BP regimen  PRN  hydralazine  Control     Diabetes mellitus type 1 2 POA  Home regimen: metformin 1000 mg BID, denies taking insulin at home  Diabetic diet  Hold metformin with reduced GFR < 30  Sliding scale insulin  POC  A1c 8.6     Anemia normocytic suspect chronic disease POA  HgB 7.7  No obvious bleeding  Iron saturation 15%, serum iron and ferritin are within normal limit  Hemoglobin around 7.4>7.2>7  Stool occult blood negative  [facility]      Estimated discharge date: September 17  Patient medically stable for discharge code status: Full  Prophylaxis: Hep SQ  Recommended Disposition:  medical Sheltersl placement     Subjective:     Chief Complaint / Reason for Physician Visit  AYESHA/hyperkalemia. Discussed with RN events overnight. Reported  that he is doing much better, no further restraint pain  Review of Systems:  Symptom Y/N Comments  Symptom Y/N Comments   Fever/Chills n   Chest Pain n    Poor Appetite n   Edema n    Cough n   Abdominal Pain n    Sputum    Joint Pain     SOB/ALEJO n   Pruritis/Rash     Nausea/vomit n   Tolerating PT/OT     Diarrhea n   Tolerating Diet y    Constipation    Other       Could NOT obtain due to:      Objective:     VITALS:   Last 24hrs VS reviewed since prior progress note.  Most recent are:  Patient Vitals for the past 24 hrs:   Temp Pulse Resp BP SpO2   09/15/22 1600 98.8 °F (37.1 °C) 90 20 (!) 143/82 96 %   09/15/22 0842 98.5 °F (36.9 °C) 79 17 112/72 98 %   09/15/22 0042 98 °F (36.7 °C) 76 16 113/75 95 %         Intake/Output Summary (Last 24 hours) at 9/15/2022 1620  Last data filed at 9/15/2022 0800  Gross per 24 hour   Intake 240 ml   Output 2200 ml   Net -1960 ml          I had a face to face encounter and independently examined this patient on 9/15/2022, as outlined below:  PHYSICAL EXAM:  General: WD, WN. Alert, cooperative, no acute distress    EENT:  EOMI. Anicteric sclerae. MMM  Resp:  CTA bilaterally, no wheezing or rales. No accessory muscle use  CV:  Regular  rhythm,  No edema  GI:  Soft, Non distended, Non tender. +Bowel sounds  Neurologic:  Alert and oriented X 3, normal speech,   Psych:   Good insight. Not anxious nor agitated  Skin:  No rashes. No jaundice  Ball catheter draining clear urine    Reviewed most current lab test results and cultures  YES  Reviewed most current radiology test results   YES  Review and summation of old records today    NO  Reviewed patient's current orders and MAR    YES  PMH/SH reviewed - no change compared to H&P  ________________________________________________________________________  Care Plan discussed with:    Comments   Patient x    Family      RN x    Care Manager y    Consultant                       y Multidiciplinary team rounds were held today with , nursing, pharmacist and clinical coordinator. Patient's plan of care was discussed; medications were reviewed and discharge planning was addressed. ________________________________________________________________________  Total NON critical care TIME: 30 Minutes    Total CRITICAL CARE TIME Spent:   Minutes non procedure based      Comments   >50% of visit spent in counseling and coordination of care y    ________________________________________________________________________  Vassie Schilder, MD     Procedures: see electronic medical records for all procedures/Xrays and details which were not copied into this note but were reviewed prior to creation of Plan. LABS:  I reviewed today's most current labs and imaging studies.   Pertinent labs include:  Recent Labs     09/15/22  0605   WBC 6.6   HGB 7.0*   HCT 22.9*          Recent Labs     09/15/22  0605 09/13/22  1611    136   K 4.3 4.4    109*   CO2 22 25   * 171*   BUN 15 17   CREA 1.48* 1.47*   CA 9.7 8.6   ALB  --  2.3*   TBILI  --  0.4   ALT  --  13         Signed: Martell Sharma MD

## 2022-09-15 NOTE — PROGRESS NOTES
Transition of Care Plan:    RUR:24%  Disposition: [facility]- referral pending  Follow up appointments: PCP/Specialist  DME needed: none  Transportation at Discharge: will need CM assist  [place] or means to access home:      homeless  IM Medicare Letter: will need prior to discharge  Is patient a  and connected with the [place]? N/a  If yes, was [employer] transfer form completed and [facility] notified? Caregiver Contact:Cassy Henriquez - [facility] - [phone]  Discharge Caregiver contacted prior to discharge? Per patient  Care Conference needed?:     no    Met with patient - he has reviewed housing/shelter resources and is agreeing to pursue [facility]- if accepted will need 30 days worth of medications to be filled at [facility] to go with patient - CM confirmed that [facility] is in process of medically reviewing referral at this time. Will follow.   JUANJOSE Man

## 2022-09-16 VITALS
SYSTOLIC BLOOD PRESSURE: 134 MMHG | HEART RATE: 84 BPM | TEMPERATURE: 98.7 F | OXYGEN SATURATION: 98 % | RESPIRATION RATE: 20 BRPM | DIASTOLIC BLOOD PRESSURE: 74 MMHG

## 2022-09-16 LAB
COVID-19 RAPID TEST, COVR: NOT DETECTED
GLUCOSE BLD STRIP.AUTO-MCNC: 129 MG/DL (ref 65–117)
GLUCOSE BLD STRIP.AUTO-MCNC: 196 MG/DL (ref 65–117)
SERVICE CMNT-IMP: ABNORMAL
SERVICE CMNT-IMP: ABNORMAL
SOURCE, COVRS: NORMAL

## 2022-09-16 PROCEDURE — 94760 N-INVAS EAR/PLS OXIMETRY 1: CPT

## 2022-09-16 PROCEDURE — 74011250637 HC RX REV CODE- 250/637: Performed by: INTERNAL MEDICINE

## 2022-09-16 PROCEDURE — 82962 GLUCOSE BLOOD TEST: CPT

## 2022-09-16 PROCEDURE — 74011250636 HC RX REV CODE- 250/636: Performed by: INTERNAL MEDICINE

## 2022-09-16 PROCEDURE — 87635 SARS-COV-2 COVID-19 AMP PRB: CPT

## 2022-09-16 PROCEDURE — 74011250637 HC RX REV CODE- 250/637: Performed by: NURSE PRACTITIONER

## 2022-09-16 PROCEDURE — 74011000250 HC RX REV CODE- 250: Performed by: INTERNAL MEDICINE

## 2022-09-16 RX ORDER — TAMSULOSIN HYDROCHLORIDE 0.4 MG/1
0.4 CAPSULE ORAL DAILY
Qty: 30 CAPSULE | Refills: 0 | OUTPATIENT
Start: 2022-09-17 | End: 2022-10-31

## 2022-09-16 RX ORDER — TAMSULOSIN HYDROCHLORIDE 0.4 MG/1
0.4 CAPSULE ORAL DAILY
Qty: 30 CAPSULE | Refills: 3 | Status: SHIPPED | OUTPATIENT
Start: 2022-09-16 | End: 2022-10-31

## 2022-09-16 RX ORDER — ATORVASTATIN CALCIUM 80 MG/1
40 TABLET, FILM COATED ORAL
Qty: 90 TABLET | Refills: 1 | Status: SHIPPED | OUTPATIENT
Start: 2022-09-16

## 2022-09-16 RX ORDER — PANTOPRAZOLE SODIUM 40 MG/1
40 TABLET, DELAYED RELEASE ORAL DAILY
Qty: 30 TABLET | Refills: 0 | Status: SHIPPED | OUTPATIENT
Start: 2022-09-17

## 2022-09-16 RX ORDER — ALBUTEROL SULFATE 90 UG/1
2 AEROSOL, METERED RESPIRATORY (INHALATION)
Qty: 1 EACH | Refills: 0 | Status: SHIPPED | OUTPATIENT
Start: 2022-09-16

## 2022-09-16 RX ORDER — PANTOPRAZOLE SODIUM 40 MG/1
40 TABLET, DELAYED RELEASE ORAL DAILY
Qty: 30 TABLET | Refills: 3 | Status: SHIPPED | OUTPATIENT
Start: 2022-09-16 | End: 2022-10-31

## 2022-09-16 RX ORDER — GABAPENTIN 100 MG/1
100 CAPSULE ORAL
Qty: 30 CAPSULE | Refills: 0 | OUTPATIENT
Start: 2022-09-16 | End: 2022-10-31

## 2022-09-16 RX ORDER — LATANOPROST 50 UG/ML
1 SOLUTION/ DROPS OPHTHALMIC
Qty: 1 EACH | Refills: 0 | Status: SHIPPED | OUTPATIENT
Start: 2022-09-16

## 2022-09-16 RX ORDER — GLIPIZIDE 2.5 MG/1
2.5 TABLET, EXTENDED RELEASE ORAL DAILY
Qty: 30 TABLET | Refills: 0 | Status: SHIPPED | OUTPATIENT
Start: 2022-09-16

## 2022-09-16 RX ADMIN — SODIUM CHLORIDE, PRESERVATIVE FREE 10 ML: 5 INJECTION INTRAVENOUS at 06:10

## 2022-09-16 RX ADMIN — TRAMADOL HYDROCHLORIDE 50 MG: 50 TABLET, COATED ORAL at 09:45

## 2022-09-16 RX ADMIN — OXYBUTYNIN CHLORIDE 10 MG: 5 TABLET, EXTENDED RELEASE ORAL at 09:45

## 2022-09-16 RX ADMIN — PANTOPRAZOLE SODIUM 40 MG: 40 TABLET, DELAYED RELEASE ORAL at 09:45

## 2022-09-16 RX ADMIN — HEPARIN SODIUM 5000 UNITS: 5000 INJECTION INTRAVENOUS; SUBCUTANEOUS at 00:31

## 2022-09-16 RX ADMIN — SODIUM CHLORIDE, PRESERVATIVE FREE 10 ML: 5 INJECTION INTRAVENOUS at 14:35

## 2022-09-16 RX ADMIN — TAMSULOSIN HYDROCHLORIDE 0.4 MG: 0.4 CAPSULE ORAL at 09:45

## 2022-09-16 RX ADMIN — SODIUM CHLORIDE, PRESERVATIVE FREE 10 ML: 5 INJECTION INTRAVENOUS at 01:06

## 2022-09-16 RX ADMIN — HEPARIN SODIUM 5000 UNITS: 5000 INJECTION INTRAVENOUS; SUBCUTANEOUS at 09:45

## 2022-09-16 RX ADMIN — POLYETHYLENE GLYCOL 3350 17 G: 17 POWDER, FOR SOLUTION ORAL at 09:46

## 2022-09-16 NOTE — DISCHARGE INSTRUCTIONS
Discharge with madison. Follow-up as outpatient with urology. Repeat CBC BMP in 3 to 5 days.   Follow-up with nephrology

## 2022-09-16 NOTE — PROGRESS NOTES
Problem: Risk for Spread of Infection  Goal: Prevent transmission of infectious organism to others  Description: Prevent the transmission of infectious organisms to other patients, staff members, and visitors. Outcome: Progressing Towards Goal     Problem: Patient Education:  Go to Education Activity  Goal: Patient/Family Education  Outcome: Progressing Towards Goal     Problem: Falls - Risk of  Goal: *Absence of Falls  Description: Document Bal Best Fall Risk and appropriate interventions in the flowsheet. Outcome: Progressing Towards Goal  Note: Fall Risk Interventions:            Medication Interventions: Patient to call before getting OOB                   Problem: Patient Education: Go to Patient Education Activity  Goal: Patient/Family Education  Outcome: Progressing Towards Goal     Problem: Diabetes Self-Management  Goal: *Disease process and treatment process  Description: Define diabetes and identify own type of diabetes; list 3 options for treating diabetes. Outcome: Progressing Towards Goal  Goal: *Incorporating nutritional management into lifestyle  Description: Describe effect of type, amount and timing of food on blood glucose; list 3 methods for planning meals. Outcome: Progressing Towards Goal  Goal: *Incorporating physical activity into lifestyle  Description: State effect of exercise on blood glucose levels. Outcome: Progressing Towards Goal  Goal: *Developing strategies to promote health/change behavior  Description: Define the ABC's of diabetes; identify appropriate screenings, schedule and personal plan for screenings. Outcome: Progressing Towards Goal  Goal: *Using medications safely  Description: State effect of diabetes medications on diabetes; name diabetes medication taking, action and side effects.   Outcome: Progressing Towards Goal  Goal: *Monitoring blood glucose, interpreting and using results  Description: Identify recommended blood glucose targets  and personal targets. Outcome: Progressing Towards Goal  Goal: *Prevention, detection, treatment of acute complications  Description: List symptoms of hyper- and hypoglycemia; describe how to treat low blood sugar and actions for lowering  high blood glucose level. Outcome: Progressing Towards Goal  Goal: *Prevention, detection and treatment of chronic complications  Description: Define the natural course of diabetes and describe the relationship of blood glucose levels to long term complications of diabetes.   Outcome: Progressing Towards Goal  Goal: *Developing strategies to address psychosocial issues  Description: Describe feelings about living with diabetes; identify support needed and support network  Outcome: Progressing Towards Goal  Goal: *Insulin pump training  Outcome: Progressing Towards Goal  Goal: *Sick day guidelines  Outcome: Progressing Towards Goal  Goal: *Patient Specific Goal (EDIT GOAL, INSERT TEXT)  Outcome: Progressing Towards Goal     Problem: Patient Education: Go to Patient Education Activity  Goal: Patient/Family Education  Outcome: Progressing Towards Goal

## 2022-09-16 NOTE — PROGRESS NOTES
Bedside shift change report given to Elio Briseno (oncoming nurse) by Galileo Mckee and Amish Littlejohn RN (offgoing nurse). Report included the following information SBAR, Kardex, Intake/Output, and MAR.

## 2022-09-16 NOTE — DISCHARGE SUMMARY
Hospitalist Discharge Summary     Patient ID:  Pretty Stinson  [id number]  89 y.o.  1950  9/10/2022    PCP on record: Maura Rodriguez NP    Admit date: 9/10/2022  Discharge date and time: 9/16/2022    DISCHARGE DIAGNOSIS:    AYESHA    CONSULTATIONS:  IP CONSULT TO NEPHROLOGY  IP CONSULT TO UROLOGY    Excerpted HPI from H&P of Gabriela Renteria MD:  66 Y. O. male     Presented from [facility] ER with dysuria and decreased ability to urinate              Scheduled for unknown urology procedure as outpatient     Postponed due to COVID-19 + test ~ 12 days ago     Prior did I+O cath after XRT years ago, subsequently stopped     Now back with dysuria x 1-2 weeks              Worsening ability to urinate x 1 week     [facility] ED   No leukocytosis or fevers, no SIRS criteria  Noted with AYESHA creatinine 2.87 with baseline , hyperkalemia K 6.4              IV calcium gluconate, IV insulin and glucose, kayexalate, IVF  UA  WBC, 1+ bacteria, urine culture sent              IV meropenem with prior ESBL history  CXR with no ASD  No clearly documented they did I+O cath or post-void residual  Currently no madison in place   [facility] [place] [facility] [place] [place] for further management    ______________________________________________________________________  DISCHARGE SUMMARY/HOSPITAL COURSE:  for full details see H&P, daily progress notes, labs, consult notes. Patient admitted with a AYESHA with a creatinine of 2.87 and hypokalemia and hyponatremia. Patient has stage III CKD with baseline creatinine around 1.5. Patient has history of urinary retention, and was doing in and out catheter in the past and presented with some dysuria. Given patient's previous history of ESBL patient was treated with meropenem. UA showed 5200 WBC. Renal ultrasound showed mild to moderate bilateral hydronephrosis. Urology was consulted\" patient was placed on [place]. Nephrology was also following the patient. Creatinine improved.   Per urology patient is still discharge with Ball and outpatient follow-up. Urine culture came back as negative and the meropenem discontinued. Creatinine improved to 1.48 and remained stable which is his baseline. Nephrology cleared for discharge. Patient was tested positive for COVID 12 days ago no isolation needed. Patient remains asymptomatic    Patient continued on medications for high blood pressure and hyperlipidemia    For diabetes patient was on metformin which was discontinued and replaced with glipizide    Anemia likely secondary to chronic kidney disease hemoglobin remained stable  CBC as outpatient  Stool occult negative  Epogen per nephrology    Patient being discharged to shelter or prescription sent to the pharmacy. Needs follow-up with urology and nephrology and a repeat CBC BMP in 1 week. All discharge recommendation discussed in detail with the patient and all questions were answered stable for discharge.      _______________________________________________________________________  Patient seen and examined by me on discharge day. Pertinent Findings:  Gen:    Not in distress  Chest: Clear lungs  CVS:   Regular rhythm. No edema  Abd:  Soft, not distended, not tender  Neuro:  Alert,   _______________________________________________________________________  DISCHARGE MEDICATIONS:   Discharge Medication List as of 9/16/2022  3:18 PM        START taking these medications    Details   glipiZIDE SR (GLUCOTROL XL) 2.5 mg CR tablet Take 1 Tablet by mouth daily. , Normal, Disp-30 Tablet, R-0           CONTINUE these medications which have CHANGED    Details   albuterol (PROVENTIL HFA, VENTOLIN HFA, PROAIR HFA) 90 mcg/actuation inhaler Take 2 Puffs by inhalation every four (4) hours as needed for Wheezing., Normal, Disp-1 Each, R-0      !! tamsulosin (FLOMAX) 0.4 mg capsule Take 1 Capsule by mouth daily. , Normal, Disp-30 Capsule, R-0      !! pantoprazole (PROTONIX) 40 mg tablet Take 1 Tablet by mouth daily. , Normal, Disp-30 Tablet, R-0      gabapentin (NEURONTIN) 100 mg capsule Take 1 Capsule by mouth nightly. Max Daily Amount: 100 mg., Normal, Disp-30 Capsule, R-0      atorvastatin (LIPITOR) 80 mg tablet Take 0.5 Tablets by mouth nightly., Normal, Disp-90 Tablet, R-1      latanoprost (XALATAN) 0.005 % ophthalmic solution Administer 1 Drop to both eyes nightly., Normal, Disp-1 Each, R-0      !! pantoprazole (PROTONIX) 40 mg tablet Take 1 Tablet by mouth daily. , Normal, Disp-30 Tablet, R-3      !! tamsulosin (FLOMAX) 0.4 mg capsule Take 1 Capsule by mouth daily. , Normal, Disp-30 Capsule, R-3       !! - Potential duplicate medications found. Please discuss with provider. STOP taking these medications       nitrofurantoin, macrocrystal-monohydrate, (Macrobid) 100 mg capsule Comments:   Reason for Stopping:         naproxen (Naprosyn) 500 mg tablet Comments:   Reason for Stopping:         Toviaz 4 mg SR tablet Comments:   Reason for Stopping:         phenazopyridine (Pyridium) 200 mg tablet Comments:   Reason for Stopping:         amLODIPine (NORVASC) 5 mg tablet Comments:   Reason for Stopping:         metFORMIN (GLUCOPHAGE) 1,000 mg tablet Comments:   Reason for Stopping:                 Patient Follow Up Instructions:    Activity: Activity as tolerated  Diet: Renal Diet  Wound Care: None needed    Fo  Follow-up Information       Follow up With Specialties Details Why [address] [facility]  Follow up on 9/27/2022 follow up with Dr. Néstor Hung at [facility] office on this date at [address] [facility]  [address] [facility], NP Nurse Practitioner Follow up in 2 week(s)  Coleen Barrett  [id number]      Casey Cabral NP Nurse Practitioner   Coleen Barrett  [address] [address]      Denise Ledezma MD Nephrology Follow up in 2 week(s)  [address] [facility]  [phone]      [facility] Follow up  [address]          ________________________________________________________________    Risk of deterioration: Moderate    Condition at Discharge:  Stable  __________________________________________________________________    Disposition  Home with family, no needs    ____________ med shelter________________________________________________________    Code Status: Full Code  ___________________________________________________________________      Total time in minutes spent coordinating this discharge (includes going over instructions, follow-up, prescriptions, and preparing report for sign off to her PCP) :  >30 minutes    Signed:  Celeste Almendarez MD

## 2022-09-16 NOTE — PROGRESS NOTES
Nephrology Progress Note  [facility] / [address] [facility]  [place], [address]  Phone - [phone]  Fax - [phone]                 Patient: Flora Kinney                   YOB: 1950        Date- 9/16/2022                      Admit Date: 9/10/2022  CC: Follow up for  AYESHA   IMPRESSION & PLAN:   AYESHA- likely due to post renal obstruction  Urinary retention requiring straight cath as out pt  B/L HYDRONEPHROSIS  Hypertension  UTI  Anemia of ckd  Hyponatremia  Hyperkalemia  Ckd 3-a bl cr 1.4-1.5  DM 2    PLAN-  CONTINUE current care  Keep madison in place  Okay to d//c renal stand point     Subjective: Interval History:     Creatinine remained stable- cr 1.48      Objective:   Vitals:    09/15/22 2244 09/15/22 2331 09/16/22 0518 09/16/22 0730   BP: (!) 140/75 133/78 130/75 125/83   Pulse: 85 77 75 65   Resp:  18 17 18   Temp:  98.6 °F (37 °C) 98.2 °F (36.8 °C) 98.4 °F (36.9 °C)   SpO2:  96% 98% 98%      09/15 0701 - 09/16 0700  In: 740 [P.O.:740]  Out: 2000 [Urine:2000]  There were no vitals filed for this visit. Physical exam:    GEN:  NAD  NECK:  Supple, no thyromegaly  RESP: Clear  b/l, no  wheezing,   CVS: RRR,S1,S2   NEURO: non focal, normal speech  EXT: no Edema +nt     Madison +    Chart reviewed. Pertinent Notes reviewed. Data Review :  Recent Labs     09/15/22  0605 09/13/22  1611    136   K 4.3 4.4    109*   CO2 22 25   BUN 15 17   CREA 1.48* 1.47*   * 171*   CA 9.7 8.6       Recent Labs     09/15/22  0605   WBC 6.6   HGB 7.0*   HCT 22.9*          No results for input(s): FE, TIBC, PSAT, FERR in the last 72 hours.      Medication list  reviewed  Current Facility-Administered Medications   Medication Dose Route Frequency    epoetin tiago (EPOGEN;PROCRIT) injection 20,000 Units  20,000 Units SubCUTAneous Q MON, WED & FRI    insulin glargine (LANTUS) injection 10 Units  10 Units SubCUTAneous QHS    traMADoL (ULTRAM) tablet 50 mg  50 mg Oral BID    oxyCODONE IR (ROXICODONE) tablet 5 mg  5 mg Oral Q6H PRN    [Held by provider] amLODIPine (NORVASC) tablet 5 mg  5 mg Oral DAILY    atorvastatin (LIPITOR) tablet 80 mg  80 mg Oral QHS    pantoprazole (PROTONIX) tablet 40 mg  40 mg Oral DAILY    tamsulosin (FLOMAX) capsule 0.4 mg  0.4 mg Oral DAILY    alum-mag hydroxide-simeth (MYLANTA) oral suspension 30 mL  30 mL Oral Q4H PRN    dicyclomine (BENTYL) tablet 20 mg  20 mg Oral Q6H PRN    oxybutynin chloride XL (DITROPAN XL) tablet 10 mg  10 mg Oral DAILY    sodium chloride (NS) flush 5-40 mL  5-40 mL IntraVENous Q8H    sodium chloride (NS) flush 5-40 mL  5-40 mL IntraVENous PRN    acetaminophen (TYLENOL) tablet 650 mg  650 mg Oral Q6H PRN    Or    acetaminophen (TYLENOL) suppository 650 mg  650 mg Rectal Q6H PRN    polyethylene glycol (MIRALAX) packet 17 g  17 g Oral DAILY    bisacodyL (DULCOLAX) tablet 5 mg  5 mg Oral DAILY PRN    promethazine (PHENERGAN) tablet 12.5 mg  12.5 mg Oral Q6H PRN    Or    ondansetron (ZOFRAN) injection 4 mg  4 mg IntraVENous Q6H PRN    heparin (porcine) injection 5,000 Units  5,000 Units SubCUTAneous Q8H    glucose chewable tablet 16 g  4 Tablet Oral PRN    glucagon (GLUCAGEN) injection 1 mg  1 mg IntraMUSCular PRN    dextrose 10% infusion 0-250 mL  0-250 mL IntraVENous PRN    insulin lispro (HUMALOG) injection   SubCUTAneous AC&HS          Delma Torres MD  9/16/2022

## 2022-10-10 NOTE — PROGRESS NOTES
Physician Progress Note      PATIENT:               Kindra Bhandari  CSN #:                  [id number]  :                       1950  ADMIT DATE:       9/10/2022 7:48 PM  [address] [place] DATE:        2022 3:51 PM  RESPONDING  PROVIDER #:        Lucille Yousif MD          QUERY TEXT:    Patient admitted 9/10 -  with AYESHA. Documentation reflects UTI on H&P and Dr. Kayla Pierce  pn. Please document in the progress notes and discharge summary if UTI was ruled in or ruled: The medical record reflects the following:    Risk Factors:  66year old male with CKD3, urinary retention, and h/o Prostate cancer s/p XRT    Clinical Indicators:  --UA Turbid, Lg blood, + Nitrite, Lg Leuk,  WBC, 1+ bacteria  --UCS No growth, (<1,000 CFU/ML)  --9/10 Rohan pn: 0150: Notified of 10/10 burning sensation/discomfort on top of his urethra    Treatment: UA, UCS, Meropenem  IV    Thank You,  Ivy Desai RN, CDI  Options provided:  -- UTI POA present as evidenced by, Please document evidence. -- UTI POA treated and resolved as evidenced by, Please document evidence. -- UTI was ruled out  -- Other - I will add my own diagnosis  -- Disagree - Not applicable / Not valid  -- Disagree - Clinically unable to determine / Unknown  -- Refer to Clinical Documentation Reviewer    PROVIDER RESPONSE TEXT:    Provider is clinically unable to determine a response to this query.     Query created by: Shelby Simmons on 10/1/2022 9:05 AM      Electronically signed by:  Lucille Yousif MD 10/10/2022 10:38 AM

## 2022-10-28 ENCOUNTER — HOSPITAL ENCOUNTER (EMERGENCY)
Age: 72
Discharge: HOME | End: 2022-10-28
Attending: EMERGENCY MEDICINE
Payer: MEDICARE

## 2022-10-28 ENCOUNTER — APPOINTMENT (OUTPATIENT)
Dept: VASCULAR SURGERY | Age: 72
End: 2022-10-28
Attending: NURSE PRACTITIONER
Payer: MEDICARE

## 2022-10-28 ENCOUNTER — APPOINTMENT (OUTPATIENT)
Dept: GENERAL RADIOLOGY | Age: 72
End: 2022-10-28
Attending: NURSE PRACTITIONER
Payer: MEDICARE

## 2022-10-28 VITALS
SYSTOLIC BLOOD PRESSURE: 132 MMHG | RESPIRATION RATE: 19 BRPM | HEART RATE: 62 BPM | TEMPERATURE: 98.4 F | DIASTOLIC BLOOD PRESSURE: 74 MMHG | WEIGHT: 218 LBS | BODY MASS INDEX: 33.04 KG/M2 | OXYGEN SATURATION: 99 % | HEIGHT: 68 IN

## 2022-10-28 DIAGNOSIS — R60.9 DEPENDENT EDEMA: Primary | ICD-10-CM

## 2022-10-28 LAB
ALBUMIN SERPL-MCNC: 3 G/DL (ref 3.5–5)
ALBUMIN/GLOB SERPL: 0.6 {RATIO} (ref 1.1–2.2)
ALP SERPL-CCNC: 97 U/L (ref 45–117)
ALT SERPL-CCNC: 34 U/L (ref 12–78)
ANION GAP SERPL CALC-SCNC: 8 MMOL/L (ref 5–15)
AST SERPL-CCNC: 20 U/L (ref 15–37)
BASOPHILS # BLD: 0.1 K/UL (ref 0–0.1)
BASOPHILS NFR BLD: 1 % (ref 0–1)
BILIRUB SERPL-MCNC: 0.3 MG/DL (ref 0.2–1)
BNP SERPL-MCNC: 106 PG/ML (ref 0–125)
BUN SERPL-MCNC: 19 MG/DL (ref 6–20)
BUN/CREAT SERPL: 13 (ref 12–20)
CALCIUM SERPL-MCNC: 9.2 MG/DL (ref 8.5–10.1)
CHLORIDE SERPL-SCNC: 105 MMOL/L (ref 97–108)
CO2 SERPL-SCNC: 26 MMOL/L (ref 21–32)
CREAT SERPL-MCNC: 1.44 MG/DL (ref 0.7–1.3)
DIFFERENTIAL METHOD BLD: ABNORMAL
EOSINOPHIL # BLD: 0.5 K/UL (ref 0–0.4)
EOSINOPHIL NFR BLD: 9 % (ref 0–7)
ERYTHROCYTE [DISTWIDTH] IN BLOOD BY AUTOMATED COUNT: 19.3 % (ref 11.5–14.5)
GLOBULIN SER CALC-MCNC: 5.1 G/DL (ref 2–4)
GLUCOSE SERPL-MCNC: 133 MG/DL (ref 65–100)
HCT VFR BLD AUTO: 30 % (ref 36.6–50.3)
HGB BLD-MCNC: 9 G/DL (ref 12.1–17)
IMM GRANULOCYTES # BLD AUTO: 0 K/UL (ref 0–0.04)
IMM GRANULOCYTES NFR BLD AUTO: 0 % (ref 0–0.5)
LYMPHOCYTES # BLD: 1.7 K/UL (ref 0.8–3.5)
LYMPHOCYTES NFR BLD: 32 % (ref 12–49)
MCH RBC QN AUTO: 26.7 PG (ref 26–34)
MCHC RBC AUTO-ENTMCNC: 30 G/DL (ref 30–36.5)
MCV RBC AUTO: 89 FL (ref 80–99)
MONOCYTES # BLD: 0.5 K/UL (ref 0–1)
MONOCYTES NFR BLD: 10 % (ref 5–13)
NEUTS SEG # BLD: 2.7 K/UL (ref 1.8–8)
NEUTS SEG NFR BLD: 48 % (ref 32–75)
NRBC # BLD: 0 K/UL (ref 0–0.01)
NRBC BLD-RTO: 0 PER 100 WBC
PLATELET # BLD AUTO: 279 K/UL (ref 150–400)
PMV BLD AUTO: 8.5 FL (ref 8.9–12.9)
POTASSIUM SERPL-SCNC: 3.6 MMOL/L (ref 3.5–5.1)
PROT SERPL-MCNC: 8.1 G/DL (ref 6.4–8.2)
RBC # BLD AUTO: 3.37 M/UL (ref 4.1–5.7)
SODIUM SERPL-SCNC: 139 MMOL/L (ref 136–145)
WBC # BLD AUTO: 5.5 K/UL (ref 4.1–11.1)

## 2022-10-28 PROCEDURE — 85025 COMPLETE CBC W/AUTO DIFF WBC: CPT

## 2022-10-28 PROCEDURE — 93971 EXTREMITY STUDY: CPT | Performed by: SPECIALIST

## 2022-10-28 PROCEDURE — 83880 ASSAY OF NATRIURETIC PEPTIDE: CPT

## 2022-10-28 PROCEDURE — 36415 COLL VENOUS BLD VENIPUNCTURE: CPT

## 2022-10-28 PROCEDURE — 80053 COMPREHEN METABOLIC PANEL: CPT

## 2022-10-28 PROCEDURE — 71045 X-RAY EXAM CHEST 1 VIEW: CPT

## 2022-10-28 PROCEDURE — 99284 EMERGENCY DEPT VISIT MOD MDM: CPT

## 2022-10-28 PROCEDURE — 93971 EXTREMITY STUDY: CPT

## 2022-10-28 NOTE — ED NOTES
Discharge instructions were given to the patient by Cheryle Salk, RN. The patient left the Emergency Department ambulatory, alert and oriented and in no acute distress with 0 prescriptions. The patient was encouraged to call or return to the ED for worsening issues or problems and was encouraged to schedule a follow up appointment for continuing care. The patient verbalized understanding of discharge instructions and prescriptions, all questions were answered. The patient has no further concerns at this time.

## 2022-10-28 NOTE — ED NOTES
Pt presents to ED ambulatory complaining of bilateral foot swelling x 1 week. Pt denies injury but reports he has been on his feet more the past few days. Pt reports he is compliant with his medications, reports he takes something for \"fluid. \" Pt is a poor historian on specific names of meds he takes. Pt arrived with a madison in place w/ leg bag and has an appt on 10/31 at [facility] to have madison removed removed. Pt did not tell this RN why he had a madison placed. Pt + for pedal pulses in bilateral feet and cap refill WDLs bilaterally. Pt is alert and oriented x 4, RR even and unlabored, skin is warm and dry. Assessment completed and pt updated on plan of care. Call bell in reach. Emergency Department Nursing Plan of Care       The Nursing Plan of Care is developed from the Nursing assessment and Emergency Department Attending provider initial evaluation. The plan of care may be reviewed in the ED Provider note.     The Plan of Care was developed with the following considerations:   Patient / Family readiness to learn indicated by:verbalized understanding  Persons(s) to be included in education: patient  Barriers to Learning/Limitations:No    Signed     Sam Pradhan    10/28/2022   12:01 PM

## 2022-10-29 NOTE — ED PROVIDER NOTES
EMERGENCY DEPARTMENT HISTORY AND PHYSICAL EXAM          Date: 10/28/2022  Patient Name: Wilda Green    History of Presenting Illness     Chief Complaint   Patient presents with    Foot Swelling     Per pt reports bilateral foot swelling x 1 week, \"I'm taking fluid pills that aren't working. \"          History Provided By: Patient    Chief Complaint: leg swelling  Duration: one week   Timing:  Acute  Location: lower legs left and right  Quality: Tightness and swelling  Severity: Moderate  Modifying Factors: increased walking this past week  Associated Symptoms: denies any other associated signs or symptoms      HPI: Wilda Green is a 66 y.o. male with a PMH of  DN HTN DEJA  who presents with bilateral leg swelling for the past week. Patient states left leg is more swollen than right leg. Patient states he has been doing a lot of walking this past week. Patient denies shortness of breath cough or wheezing. He states his doctor put him on a fluid pill but it has not helped. PCP: Elijah Agarwal NP    Current Outpatient Medications   Medication Sig Dispense Refill    albuterol (PROVENTIL HFA, VENTOLIN HFA, PROAIR HFA) 90 mcg/actuation inhaler Take 2 Puffs by inhalation every four (4) hours as needed for Wheezing. 1 Each 0    tamsulosin (FLOMAX) 0.4 mg capsule Take 1 Capsule by mouth daily. 30 Capsule 0    pantoprazole (PROTONIX) 40 mg tablet Take 1 Tablet by mouth daily. 30 Tablet 0    gabapentin (NEURONTIN) 100 mg capsule Take 1 Capsule by mouth nightly. Max Daily Amount: 100 mg. 30 Capsule 0    atorvastatin (LIPITOR) 80 mg tablet Take 0.5 Tablets by mouth nightly. 90 Tablet 1    glipiZIDE SR (GLUCOTROL XL) 2.5 mg CR tablet Take 1 Tablet by mouth daily. 30 Tablet 0    latanoprost (XALATAN) 0.005 % ophthalmic solution Administer 1 Drop to both eyes nightly. 1 Each 0    pantoprazole (PROTONIX) 40 mg tablet Take 1 Tablet by mouth daily.  30 Tablet 3    tamsulosin (FLOMAX) 0.4 mg capsule Take 1 Capsule by mouth daily. 30 Capsule 3       Past History     Past Medical History:  Past Medical History:   Diagnosis Date    Diabetes ([address]     Gastrointestinal disorder     Hypertension     DEJA (obstructive sleep apnea)     AHI: 8.9 per hour       Past Surgical History:  Past Surgical History:   Procedure Laterality Date    HX PROSTATE SURGERY      ND ABDOMEN SURGERY PROC UNLISTED      Hernia Repair       Family History:  Family History   Problem Relation Age of Onset    Hypertension Mother     Diabetes Mother     Hypertension Father     Diabetes Father        Social History:  Social History     Tobacco Use    Smoking status: Never    Smokeless tobacco: Never   Vaping Use    Vaping Use: Never used   Substance Use Topics    Alcohol use: No    Drug use: Not Currently       Allergies: Allergies   Allergen Reactions    Aspirin Anxiety     Patient states not allergic to medication but reports he does not wish to take it     Celecoxib Vertigo    Ibuprofen Nausea Only         Review of Systems   Review of Systems   Constitutional:  Negative for chills, fatigue and fever. HENT:  Negative for congestion and sore throat. Eyes:  Negative for redness. Respiratory:  Negative for cough and chest tightness. Cardiovascular:  Positive for leg swelling. Negative for chest pain. Gastrointestinal:  Negative for abdominal pain. Genitourinary:  Negative for dysuria. Musculoskeletal:  Negative for arthralgias, back pain, myalgias, neck pain and neck stiffness. Skin:  Negative for rash. Neurological:  Negative for dizziness, syncope, weakness, light-headedness, numbness and headaches. Psychiatric/Behavioral:  Negative for agitation and behavioral problems. All other systems reviewed and are negative. Physical Exam     Vitals:    10/28/22 1141   BP: 132/74   Pulse: 62   Resp: 19   Temp: 98.4 °F (36.9 °C)   SpO2: 99%   Weight: 98.9 kg (218 lb)   Height: 5' 8\" (1.727 m)     Physical Exam  Vitals and nursing note reviewed. Constitutional:       Appearance: He is well-developed. HENT:      Head: Normocephalic and atraumatic. Right Ear: External ear normal.   Eyes:      General:         Right eye: No discharge. Left eye: No discharge. Conjunctiva/sclera: Conjunctivae normal.   Cardiovascular:      Rate and Rhythm: Normal rate and regular rhythm. Pulmonary:      Effort: Pulmonary effort is normal. No respiratory distress. Breath sounds: Normal breath sounds. No wheezing. Abdominal:      General: Bowel sounds are normal.      Palpations: Abdomen is soft. Tenderness: There is no abdominal tenderness. Musculoskeletal:         General: Swelling present. Normal range of motion. Cervical back: Normal range of motion and neck supple. Comments: Bilateral lower extremity swelling left greater than right +1 pitting edema DNV intact bilaterally no redness or warmth   Lymphadenopathy:      Cervical: No cervical adenopathy. Skin:     General: Skin is warm and dry. Neurological:      Mental Status: He is alert and oriented to person, place, and time. Cranial Nerves: No cranial nerve deficit. Psychiatric:         Behavior: Behavior normal.         Thought Content: Thought content normal.         Judgment: Judgment normal.             Medical Decision Making   I am the first provider for this patient. I reviewed the vital signs, available nursing notes, past medical history, past surgical history, family history and social history. Vital Signs-Reviewed the patient's vital signs. Records Reviewed: Nursing Notes    Provider Notes (Medical Decision Making):   DDX dependent edema CHF electrolyte abnormalilty CKD DVT            Procedures:  Procedures    Diagnostic Study Results     Labs -   No results found for this or any previous visit (from the past 12 hour(s)). Radiologic Studies -   DUPLEX LOWER EXT VENOUS LEFT   Final Result      XR CHEST PORT   Final Result      No acute process. CT Results  (Last 48 hours)      None          CXR Results  (Last 48 hours)                 10/28/22 1319  XR CHEST PORT Final result    Impression:      No acute process. Narrative:  EXAM:  XR CHEST PORT       INDICATION: Chest pain       COMPARISON: 9/10/2022       TECHNIQUE: Portable AP upright chest view at 1313 hours       FINDINGS: The cardiomediastinal contours are stable. The pulmonary vasculature   is within normal limits. The lungs and pleural spaces are clear. There is no pneumothorax. The bones and   upper abdomen are stable. Disposition:  home    DISCHARGE NOTE:           Care plan outlined and precautions discussed. Patient has no new complaints, changes, or physical findings. Results of tests were reviewed with the patient. All medications were reviewed with the patient; will d/c home . All of pt's questions and concerns were addressed. Patient was instructed and agrees to follow up with PCP, as well as to return to the ED upon further deterioration. Patient is ready to go home. Follow-up Information       Follow up With Specialties Details Why Contact Info    Balwinder Ro NP Nurse Practitioner In 1 week  [facility] [address]  [phone]              Discharge Medication List as of 10/28/2022  3:04 PM            Please note that this dictation was completed with Dragon, computer voice recognition software. Quite often unanticipated grammatical, syntax, homophones, and other interpretive errors are inadvertently transcribed by the computer software. Please disregard these errors. Additionally, please excuse any errors that have escaped final proofreading. Diagnosis     Clinical Impression:   1.  Dependent edema

## 2022-10-31 ENCOUNTER — HOSPITAL ENCOUNTER (EMERGENCY)
Age: 72
Discharge: HOME | End: 2022-10-31
Attending: EMERGENCY MEDICINE
Payer: MEDICARE

## 2022-10-31 ENCOUNTER — APPOINTMENT (OUTPATIENT)
Dept: VASCULAR SURGERY | Age: 72
End: 2022-10-31
Attending: PHYSICIAN ASSISTANT
Payer: MEDICARE

## 2022-10-31 VITALS
WEIGHT: 220 LBS | TEMPERATURE: 98.4 F | DIASTOLIC BLOOD PRESSURE: 86 MMHG | RESPIRATION RATE: 18 BRPM | SYSTOLIC BLOOD PRESSURE: 153 MMHG | BODY MASS INDEX: 33.34 KG/M2 | HEIGHT: 68 IN | HEART RATE: 68 BPM | OXYGEN SATURATION: 97 %

## 2022-10-31 DIAGNOSIS — R60.9 DEPENDENT EDEMA: Primary | ICD-10-CM

## 2022-10-31 PROCEDURE — 93971 EXTREMITY STUDY: CPT

## 2022-10-31 PROCEDURE — 99284 EMERGENCY DEPT VISIT MOD MDM: CPT

## 2022-10-31 PROCEDURE — 74011250637 HC RX REV CODE- 250/637: Performed by: PHYSICIAN ASSISTANT

## 2022-10-31 RX ORDER — ACETAMINOPHEN 500 MG
1000 TABLET ORAL
Status: COMPLETED | OUTPATIENT
Start: 2022-10-31 | End: 2022-10-31

## 2022-10-31 RX ADMIN — ACETAMINOPHEN 1000 MG: 500 TABLET, FILM COATED ORAL at 13:49

## 2022-10-31 NOTE — ED PROVIDER NOTES
EMERGENCY DEPARTMENT HISTORY AND PHYSICAL EXAM          Date: 10/31/2022  Patient Name: Edie Oneal    History of Presenting Illness     Chief Complaint   Patient presents with    Leg Swelling     Patient presents to ED with /o persistent leg swelling         History Provided By: Patient    HPI: Edie Oneal is a 66 y.o. male with a PMH of hypertension, diabetes, sleep apnea who presents with bilateral foot swelling and lower extremity edema. Patient states he was seen here on 10/28 for similar symptoms but states the only ultrasound his left leg. Patient states he is ready [facility] and gets 3 fluid pills daily. He denies any chest pain or shortness of breath currently. He does have a leg Ball bag states he went to see urology but his appointment was pushed back until the 16th of next month. Patient states that the swelling is now up to his right knee which he just noticed yesterday. Patient does admit that he is not very mobile at the facility except for needed. PCP: Nakita Sylvester NP    Current Facility-Administered Medications   Medication Dose Route Frequency Provider Last Rate Last Admin    acetaminophen (TYLENOL) tablet 1,000 mg  1,000 mg Oral NOW Mary Maynard PA-C         Current Outpatient Medications   Medication Sig Dispense Refill    pantoprazole (PROTONIX) 40 mg tablet Take 1 Tablet by mouth daily. 30 Tablet 0    atorvastatin (LIPITOR) 80 mg tablet Take 0.5 Tablets by mouth nightly. 90 Tablet 1    glipiZIDE SR (GLUCOTROL XL) 2.5 mg CR tablet Take 1 Tablet by mouth daily. 30 Tablet 0    latanoprost (XALATAN) 0.005 % ophthalmic solution Administer 1 Drop to both eyes nightly. 1 Each 0    albuterol (PROVENTIL HFA, VENTOLIN HFA, PROAIR HFA) 90 mcg/actuation inhaler Take 2 Puffs by inhalation every four (4) hours as needed for Wheezing.  1 Each 0       Past History     Past Medical History:  Past Medical History:   Diagnosis Date    Diabetes ([address]     Gastrointestinal disorder Hypertension     DEJA (obstructive sleep apnea)     AHI: 8.9 per hour       Past Surgical History:  Past Surgical History:   Procedure Laterality Date    HX PROSTATE SURGERY      RI ABDOMEN SURGERY PROC UNLISTED      Hernia Repair       Family History:  Family History   Problem Relation Age of Onset    Hypertension Mother     Diabetes Mother     Hypertension Father     Diabetes Father        Social History:  Social History     Tobacco Use    Smoking status: Never    Smokeless tobacco: Never   Vaping Use    Vaping Use: Never used   Substance Use Topics    Alcohol use: No    Drug use: Not Currently       Allergies: Allergies   Allergen Reactions    Aspirin Anxiety     Patient states not allergic to medication but reports he does not wish to take it     Celecoxib Vertigo    Ibuprofen Nausea Only         Review of Systems   Review of Systems   Constitutional:  Negative for chills and fever. Respiratory:  Negative for shortness of breath. Cardiovascular:  Positive for leg swelling. Negative for chest pain. Musculoskeletal:  Positive for myalgias. Skin: Negative. Neurological:  Negative for speech difficulty and weakness. Psychiatric/Behavioral:  Negative for self-injury and suicidal ideas. Physical Exam     Vitals:    10/31/22 1057   BP: (!) 153/86   Pulse: 68   Resp: 18   Temp: 98.4 °F (36.9 °C)   SpO2: 97%   Weight: 99.8 kg (220 lb)   Height: 5' 8\" (1.727 m)     Physical Exam  Vitals and nursing note reviewed. Constitutional:       General: He is not in acute distress. Appearance: He is well-developed. HENT:      Head: Normocephalic and atraumatic. Mouth/Throat:      Pharynx: No oropharyngeal exudate. Eyes:      Conjunctiva/sclera: Conjunctivae normal.   Cardiovascular:      Rate and Rhythm: Normal rate and regular rhythm. Heart sounds: Normal heart sounds. Pulmonary:      Effort: Pulmonary effort is normal. No respiratory distress. Breath sounds: Normal breath sounds.  No wheezing or rales. Genitourinary:     Comments: Leg Ball bag noted to the right lower extremity that does appear slightly snug so it was loosened some  Musculoskeletal:         General: Normal range of motion. Right lower leg: Edema present. Left lower leg: Edema present. Skin:     General: Skin is warm and dry. Neurological:      Mental Status: He is alert and oriented to person, place, and time. Psychiatric:         Mood and Affect: Mood normal.         Behavior: Behavior normal.         Thought Content: Thought content normal.             Medical Decision Making   I am the first provider for this patient. I reviewed the vital signs, available nursing notes, past medical history, past surgical history, family history and social history. Vital Signs-Reviewed the patient's vital signs. Records Reviewed: Nursing Notes and Old Medical Records    Provider Notes (Medical Decision Making):   Patient presents with bilateral lower extremity edema that patient states worsened yesterday of the right lower extremity stating it has progressed to the right knee. He denies any chest pain or shortness of breath and states he was seen here about 4 days ago for similar symptoms where they did labs, x-ray and an ultrasound of the left lower extremity. Patient does state that [facility] where he does get 3 fluid pills daily. He does report that he is not very mobile. On exam he has some trace edema noted to the lower extremities so we will ultrasound the right lower extremity but suspect this is just dependent edema as recently diagnosed at his last ED visit  ED Course as of 10/31/22 1307   Mon Oct 31, 2022   1234 Per tami Omalley neg []      ED Course User Index  [] Saritha Jimenez PA-C            Procedures:  Procedures    Diagnostic Study Results     Labs -   No results found for this or any previous visit (from the past 12 hour(s)).     Radiologic Studies -   DUPLEX LOWER EXT VENOUS RIGHT    (Results Pending)     CT Results  (Last 48 hours)      None          CXR Results  (Last 48 hours)      None                Disposition:  Discharged    DISCHARGE NOTE:   1:07 PM      Care plan outlined and precautions discussed. Patient has no new complaints, changes, or physical findings. Results of Vas duplex were reviewed with the patient. All medications were reviewed with the patient; will d/c home. All of pt's questions and concerns were addressed. Patient was instructed and agrees to follow up with PCP, as well as to return to the ED upon further deterioration. Patient is ready to go home. Follow-up Information       Follow up With Specialties Details Why [address] [facility], NP Nurse Practitioner In 2 days  [place] [address]  [phone]      [facility] EMERGENCY DEPT Emergency Medicine  If symptoms worsen [address] [facility]  [phone]            Current Discharge Medication List            Please note that this dictation was completed with Dragon, computer voice recognition software. Quite often unanticipated grammatical, syntax, homophones, and other interpretive errors are inadvertently transcribed by the computer software. Please disregard these errors. Additionally, please excuse any errors that have escaped final proofreading. Diagnosis     Clinical Impression:   1.  Dependent edema

## 2022-10-31 NOTE — ED NOTES
Discharge instructions were given to the patient by Mike Kapoor RN. The patient left the Emergency Department ambulatory, alert and oriented and in no acute distress with 0 prescriptions. The patient was encouraged to call or return to the ED for worsening issues or problems and was encouraged to schedule a follow up appointment for continuing care. The patient verbalized understanding of discharge instructions and prescriptions, all questions were answered. The patient has no further concerns at this time.

## 2022-10-31 NOTE — ED NOTES
Pt became unpleasant with this nurse and began questioning her intelligence while trying to give the pt discharge instructions. Pt getting aggravated and stating that the doctor did not explain to him what was happening. This nurse tried explaining to pt that his duplex was clear and that he needed to follow up with his PCP and continue taking his BP medications. Pt was also advised to prop feet up and wear compression stockings to manage swelling. Pt was instructed to continue taking tylenol at home for pain as needed, which the pt did not seem pleased with. Pt continued to be unpleasant and requested to see provider before leaving. Provider aware.

## 2022-10-31 NOTE — ED NOTES
Pt presents ambulatory to ED complaining of bilateral lower leg swelling for the past  2 weeks. Pt has hx of diabetes and HTN. Pt is alert and oriented x 4, RR even and unlabored, skin is warm and dry. Assesment completed and pt updated on plan of care. Emergency Department Nursing Plan of Care       The Nursing Plan of Care is developed from the Nursing assessment and Emergency Department Attending provider initial evaluation. The plan of care may be reviewed in the ED Provider note.     The Plan of Care was developed with the following considerations:   Patient / Family readiness to learn indicated by:verbalized understanding  Persons(s) to be included in education: patient  Barriers to Learning/Limitations:No    Signed     Latisha Winters RN    10/31/2022   11:27 AM

## 2022-11-01 PROCEDURE — 93971 EXTREMITY STUDY: CPT | Performed by: SPECIALIST

## 2022-11-17 ENCOUNTER — HOSPITAL ENCOUNTER (EMERGENCY)
Age: 72
Discharge: STILL ADMITTED OR ADMINISTRATIVE | End: 2022-11-17

## 2022-11-25 ENCOUNTER — OFFICE VISIT (OUTPATIENT)
Dept: INTERNAL MEDICINE CLINIC | Age: 72
End: 2022-11-25
Payer: MEDICARE

## 2022-11-25 VITALS
HEIGHT: 68 IN | OXYGEN SATURATION: 97 % | BODY MASS INDEX: 33.8 KG/M2 | SYSTOLIC BLOOD PRESSURE: 126 MMHG | RESPIRATION RATE: 18 BRPM | TEMPERATURE: 97.2 F | HEART RATE: 77 BPM | WEIGHT: 223 LBS | DIASTOLIC BLOOD PRESSURE: 66 MMHG

## 2022-11-25 DIAGNOSIS — E78.2 MIXED HYPERLIPIDEMIA: ICD-10-CM

## 2022-11-25 DIAGNOSIS — N18.31 TYPE 2 DIABETES MELLITUS WITH STAGE 3A CHRONIC KIDNEY DISEASE, WITHOUT LONG-TERM CURRENT USE OF INSULIN (HCC): Primary | ICD-10-CM

## 2022-11-25 DIAGNOSIS — E11.22 TYPE 2 DIABETES MELLITUS WITH STAGE 3A CHRONIC KIDNEY DISEASE, WITHOUT LONG-TERM CURRENT USE OF INSULIN (HCC): Primary | ICD-10-CM

## 2022-11-25 DIAGNOSIS — I10 ESSENTIAL HYPERTENSION: ICD-10-CM

## 2022-11-25 LAB
ALBUMIN UR QL STRIP: 80 MG/L
CHOLEST SERPL-MCNC: 105 MG/DL
CREATININE, URINE POC: 150 MG/DL
GLUCOSE POC: 136 MG/DL
HDLC SERPL-MCNC: 37 MG/DL
LDL CHOLESTEROL POC: 53 MG/DL
MICROALBUMIN/CREAT RATIO POC: <30 MG/G
NON-HDL GOAL (POC): 67
TCHOL/HDL RATIO (POC): 2.8
TRIGL SERPL-MCNC: 73 MG/DL

## 2022-11-25 PROCEDURE — G8432 DEP SCR NOT DOC, RNG: HCPCS | Performed by: NURSE PRACTITIONER

## 2022-11-25 PROCEDURE — 99214 OFFICE O/P EST MOD 30 MIN: CPT | Performed by: NURSE PRACTITIONER

## 2022-11-25 PROCEDURE — 80061 LIPID PANEL: CPT | Performed by: NURSE PRACTITIONER

## 2022-11-25 PROCEDURE — G8754 DIAS BP LESS 90: HCPCS | Performed by: NURSE PRACTITIONER

## 2022-11-25 PROCEDURE — G8417 CALC BMI ABV UP PARAM F/U: HCPCS | Performed by: NURSE PRACTITIONER

## 2022-11-25 PROCEDURE — G8536 NO DOC ELDER MAL SCRN: HCPCS | Performed by: NURSE PRACTITIONER

## 2022-11-25 PROCEDURE — G8427 DOCREV CUR MEDS BY ELIG CLIN: HCPCS | Performed by: NURSE PRACTITIONER

## 2022-11-25 PROCEDURE — 1101F PT FALLS ASSESS-DOCD LE1/YR: CPT | Performed by: NURSE PRACTITIONER

## 2022-11-25 PROCEDURE — 82962 GLUCOSE BLOOD TEST: CPT | Performed by: NURSE PRACTITIONER

## 2022-11-25 PROCEDURE — G8752 SYS BP LESS 140: HCPCS | Performed by: NURSE PRACTITIONER

## 2022-11-25 PROCEDURE — 2022F DILAT RTA XM EVC RTNOPTHY: CPT | Performed by: NURSE PRACTITIONER

## 2022-11-25 PROCEDURE — 82044 UR ALBUMIN SEMIQUANTITATIVE: CPT | Performed by: NURSE PRACTITIONER

## 2022-11-25 PROCEDURE — 3017F COLORECTAL CA SCREEN DOC REV: CPT | Performed by: NURSE PRACTITIONER

## 2022-11-25 RX ORDER — TAMSULOSIN HYDROCHLORIDE 0.4 MG/1
CAPSULE ORAL
COMMUNITY
Start: 2022-11-04

## 2022-11-25 RX ORDER — BENAZEPRIL HYDROCHLORIDE AND HYDROCHLOROTHIAZIDE 5; 6.25 MG/1; MG/1
1 TABLET ORAL DAILY
Qty: 90 TABLET | Refills: 1 | Status: SHIPPED | OUTPATIENT
Start: 2022-11-25

## 2022-11-25 RX ORDER — OXYBUTYNIN CHLORIDE 5 MG/1
TABLET, EXTENDED RELEASE ORAL
COMMUNITY
Start: 2022-11-04

## 2022-11-25 RX ORDER — LANCETS
EACH MISCELLANEOUS
Qty: 1 EACH | Refills: 11 | Status: SHIPPED | OUTPATIENT
Start: 2022-11-25

## 2022-11-25 RX ORDER — ISOPROPYL ALCOHOL 70 ML/100ML
SWAB TOPICAL
Qty: 200 PAD | Refills: 3 | Status: SHIPPED | OUTPATIENT
Start: 2022-11-25

## 2022-11-25 RX ORDER — FUROSEMIDE 20 MG/1
TABLET ORAL
COMMUNITY
Start: 2022-10-25

## 2022-11-25 RX ORDER — GABAPENTIN 100 MG/1
CAPSULE ORAL
COMMUNITY
Start: 2022-11-04

## 2022-11-25 RX ORDER — INSULIN PUMP SYRINGE, 3 ML
EACH MISCELLANEOUS
Qty: 1 KIT | Refills: 0 | Status: SHIPPED | OUTPATIENT
Start: 2022-11-25

## 2022-11-25 RX ORDER — FINASTERIDE 5 MG/1
TABLET, FILM COATED ORAL
COMMUNITY
Start: 2022-11-04 | End: 2022-11-25 | Stop reason: ALTCHOICE

## 2022-11-25 RX ORDER — IBUPROFEN 200 MG
CAPSULE ORAL
Qty: 200 STRIP | Refills: 3 | Status: SHIPPED | OUTPATIENT
Start: 2022-11-25

## 2022-11-25 RX ORDER — CEPHALEXIN 500 MG/1
CAPSULE ORAL
COMMUNITY
Start: 2022-11-18

## 2022-11-25 RX ORDER — GLIMEPIRIDE 4 MG/1
4 TABLET ORAL
Qty: 90 TABLET | Refills: 3 | Status: SHIPPED | OUTPATIENT
Start: 2022-11-25

## 2022-11-25 NOTE — PATIENT INSTRUCTIONS
Please be sure to check both your blood sugars and blood pressure DAILY. Write it down and bring that log with you to the next appt. You can try to get your license at any time, your blood pressure and A1C (8.6) is great.

## 2022-11-25 NOTE — PROGRESS NOTES
Pt Is here for   Chief Complaint   Patient presents with    Follow-up     HTN, DM, BPH/URO fu, labs-PSA/A1C    Referral Request     To podiatry, for nail clipping. . states that he needs them cut they are growing over the toe      1. Have you been to the ER, urgent care clinic since your last visit? Hospitalized since your last visit? No    2. Have you seen or consulted any other health care providers outside of the [address] [place] [address] since your last visit? Include any pap smears or colon screening.  No

## 2022-11-25 NOTE — PROGRESS NOTES
Mattie Moreno (: 1950) is a 66 y.o. male, established patient, here for evaluation of the following chief complaint(s):  Follow-up (HTN, DM, BPH/URO fu, labs-PSA/A1C) and Referral Request (To podiatry, for nail clipping. . states that he needs them cut they are growing over the toe )       ASSESSMENT/PLAN:  Below is the assessment and plan developed based on review of pertinent history, physical exam, labs, studies, and medications. 1. Type 2 diabetes mellitus with stage 3a chronic kidney disease, without long-term current use of insulin (HCC)  -     glimepiride (AMARYL) 4 mg tablet; Take 1 Tablet by mouth every morning. To REPLACE Glipizide, Normal, Disp-90 Tablet, R-3  -     empagliflozin (Jardiance) 25 mg tablet; Take 1 Tablet by mouth daily. Indications: type 2 diabetes mellitus, Normal, Disp-90 Tablet, R-3  -     benazepril-hydroCHLOROthiazide (LOTENSIN HCT) 5-6.25 mg per tablet; Take 1 Tablet by mouth daily. , Normal, Disp-90 Tablet, R-1  -     AMB POC URINE, MICROALBUMIN, SEMIQUANT (3 RESULTS)  -     AMB POC GLUCOSE BLOOD, BY GLUCOSE MONITORING DEVICE  2. Essential hypertension  -     benazepril-hydroCHLOROthiazide (LOTENSIN HCT) 5-6.25 mg per tablet; Take 1 Tablet by mouth daily. , Normal, Disp-90 Tablet, R-1  3. Mixed hyperlipidemia  -     AMB POC LIPID PROFILE    Return in about 4 weeks (around 2022) for OV-4 WK fu HTN, DM med changes, repeat A1C. Pt asked to complete follow by next visit: UA + bld and leuk, sent for culture. Levaquin ordered. Short course of opiate until procedure next week prescribed. Resent DANICA, as pt seemingly NOT taking. Already taking 0.8mg of flomax. Pyridium sent, but reports high OOP cost, advised he may get OTC also. SUBJECTIVE/OBJECTIVE:  HPI    Pt presents to f/u HTN, DM, & CHOL. Currently homeless, just left shelter today, has to find somewhere else to stay. Has NOT takne meds today. Blood sugar level range: n/a.  Taking WILLIAM, atorvastatin, lasix 20 mg, oxybutynin 5 mg, protonix 40 mg, flomax 0.4,DANICA 100 mg, Jardiance 10 mg, and glipizide. Denies side effects from medication. Feels concerned for leg swelling while in shelter. No report of unilateral weakness, headaches, blurring vision, CP, SOB,or  leg swelling. No report of polydipsia, polyphagia, or polyuria. BP Readings from Last 3 Encounters:   11/25/22 126/66   10/31/22 (!) 153/86   10/28/22 132/74       Last Point of Care HGB A1C  No results found for: [id number]   Lab Results   Component Value Date/Time    Hemoglobin A1c 8.6 (H) 09/11/2022 02:32 AM       Lab Results   Component Value Date/Time    Cholesterol (POC) 105 11/25/2022 11:27 AM    HDL Cholesterol (POC) 37 11/25/2022 11:27 AM    LDL Cholesterol (POC) 53 11/25/2022 11:27 AM    Triglycerides (POC) 73 11/25/2022 11:27 AM       Will have cystoscopy/UR, but awaiting scheduling. Had indwelling catheter recently removed, now straight caths everyday.      Review of Systems  Constitutional: negative for fevers, chills, anorexia and weight loss  Respiratory:  negative for cough, hemoptysis, dyspnea, and wheezing  CV:   negative for chest pain, palpitations, and lower extremity edema  GI:   negative for nausea, vomiting, diarrhea, abdominal pain, and melena  Endo:               negative for polyuria,polydipsia,polyphagia, and heat intolerance  Genitourinary: negative for retention, and hematuria  Integument:  negative for rash, ulcerations, and pruritus  Hematologic:  negative for easy bruising and bleeding  Musculoskel: negative for arthralgias, muscle weakness,and joint pain/swelling  Neurological:  negative for headaches, dizziness, vertigo,and memory/gait problems  Behavl/Psych: negative for feelings of anxiety, depression, suicide, and mood changes    Visit Vitals  /66 (BP 1 Location: Left upper arm, BP Patient Position: Sitting, BP Cuff Size: Large adult)   Pulse 77   Temp 97.2 °F (36.2 °C) (Temporal)   Resp 18   Ht 5' 8\" (1.727 m)   Wt 223 lb (101.2 kg)   SpO2 97%   BMI 33.91 kg/m²       Wt Readings from Last 3 Encounters:   11/25/22 223 lb (101.2 kg)   10/31/22 220 lb (99.8 kg)   10/28/22 218 lb (98.9 kg)         Physical Exam:   General appearance - alert, well appearing, and in mild distress. Restless. Mental status - A/O x 4, anxious mood and affect. +hyperverbal.   Chest -  CTA. Symmetric chest rise. No wheezing. No distress. Heart - Normal rate & rhythm. Normal S1 & S2. No MGR. Abdomen- Soft, round. Non-distended, NT. No pulsatile masses or hernias. No CVAT. Ext-  No pedal edema, clubbing, or cyanosis. Skin-Warm and dry. No hyperpigmentation, ulcerations, or suspicious lesions. Neuro - pressured speech, no focal findings or movement disorder. Normal strength, gait, and muscle tone. An electronic signature was used to authenticate this note.   -- [employer], NP

## 2022-12-04 ENCOUNTER — HOSPITAL ENCOUNTER (EMERGENCY)
Age: 72
Discharge: HOME | End: 2022-12-05
Attending: EMERGENCY MEDICINE
Payer: MEDICARE

## 2022-12-04 DIAGNOSIS — N39.0 URINARY TRACT INFECTION WITH HEMATURIA, SITE UNSPECIFIED: Primary | ICD-10-CM

## 2022-12-04 DIAGNOSIS — R31.9 URINARY TRACT INFECTION WITH HEMATURIA, SITE UNSPECIFIED: Primary | ICD-10-CM

## 2022-12-04 LAB
ANION GAP SERPL CALC-SCNC: 14 MMOL/L (ref 5–15)
BASOPHILS # BLD: 0 K/UL (ref 0–0.1)
BASOPHILS NFR BLD: 1 % (ref 0–1)
BUN SERPL-MCNC: 27 MG/DL (ref 6–20)
BUN/CREAT SERPL: 10 (ref 12–20)
CALCIUM SERPL-MCNC: 8.8 MG/DL (ref 8.5–10.1)
CHLORIDE SERPL-SCNC: 102 MMOL/L (ref 97–108)
CO2 SERPL-SCNC: 22 MMOL/L (ref 21–32)
CREAT SERPL-MCNC: 2.79 MG/DL (ref 0.7–1.3)
DIFFERENTIAL METHOD BLD: ABNORMAL
EOSINOPHIL # BLD: 0.2 K/UL (ref 0–0.4)
EOSINOPHIL NFR BLD: 3 % (ref 0–7)
ERYTHROCYTE [DISTWIDTH] IN BLOOD BY AUTOMATED COUNT: 15.6 % (ref 11.5–14.5)
GLUCOSE SERPL-MCNC: 198 MG/DL (ref 65–100)
HCT VFR BLD AUTO: 30.4 % (ref 36.6–50.3)
HGB BLD-MCNC: 9.3 G/DL (ref 12.1–17)
IMM GRANULOCYTES # BLD AUTO: 0 K/UL (ref 0–0.04)
IMM GRANULOCYTES NFR BLD AUTO: 0 % (ref 0–0.5)
LYMPHOCYTES # BLD: 1.3 K/UL (ref 0.8–3.5)
LYMPHOCYTES NFR BLD: 17 % (ref 12–49)
MCH RBC QN AUTO: 25.5 PG (ref 26–34)
MCHC RBC AUTO-ENTMCNC: 30.6 G/DL (ref 30–36.5)
MCV RBC AUTO: 83.3 FL (ref 80–99)
MONOCYTES # BLD: 0.7 K/UL (ref 0–1)
MONOCYTES NFR BLD: 9 % (ref 5–13)
NEUTS SEG # BLD: 5.5 K/UL (ref 1.8–8)
NEUTS SEG NFR BLD: 70 % (ref 32–75)
NRBC # BLD: 0 K/UL (ref 0–0.01)
NRBC BLD-RTO: 0 PER 100 WBC
PLATELET # BLD AUTO: 400 K/UL (ref 150–400)
PMV BLD AUTO: 8.3 FL (ref 8.9–12.9)
POTASSIUM SERPL-SCNC: 3.1 MMOL/L (ref 3.5–5.1)
RBC # BLD AUTO: 3.65 M/UL (ref 4.1–5.7)
SODIUM SERPL-SCNC: 138 MMOL/L (ref 136–145)
WBC # BLD AUTO: 7.8 K/UL (ref 4.1–11.1)

## 2022-12-04 PROCEDURE — 81001 URINALYSIS AUTO W/SCOPE: CPT

## 2022-12-04 PROCEDURE — 74011250637 HC RX REV CODE- 250/637: Performed by: EMERGENCY MEDICINE

## 2022-12-04 PROCEDURE — 99283 EMERGENCY DEPT VISIT LOW MDM: CPT

## 2022-12-04 PROCEDURE — 36415 COLL VENOUS BLD VENIPUNCTURE: CPT

## 2022-12-04 PROCEDURE — 85025 COMPLETE CBC W/AUTO DIFF WBC: CPT

## 2022-12-04 PROCEDURE — 80048 BASIC METABOLIC PNL TOTAL CA: CPT

## 2022-12-04 PROCEDURE — 87086 URINE CULTURE/COLONY COUNT: CPT

## 2022-12-04 RX ORDER — POTASSIUM CHLORIDE 750 MG/1
40 TABLET, FILM COATED, EXTENDED RELEASE ORAL
Status: COMPLETED | OUTPATIENT
Start: 2022-12-04 | End: 2022-12-04

## 2022-12-04 RX ADMIN — POTASSIUM CHLORIDE 40 MEQ: 750 TABLET, EXTENDED RELEASE ORAL at 23:41

## 2022-12-05 VITALS
WEIGHT: 220 LBS | HEIGHT: 68 IN | TEMPERATURE: 99 F | BODY MASS INDEX: 33.34 KG/M2 | DIASTOLIC BLOOD PRESSURE: 72 MMHG | OXYGEN SATURATION: 97 % | RESPIRATION RATE: 18 BRPM | SYSTOLIC BLOOD PRESSURE: 133 MMHG | HEART RATE: 62 BPM

## 2022-12-05 LAB
APPEARANCE UR: ABNORMAL
BACTERIA URNS QL MICRO: ABNORMAL /HPF
BILIRUB UR QL: NEGATIVE
COLOR UR: ABNORMAL
EPITH CASTS URNS QL MICRO: ABNORMAL /LPF
GLUCOSE UR STRIP.AUTO-MCNC: 500 MG/DL
HGB UR QL STRIP: ABNORMAL
KETONES UR QL STRIP.AUTO: NEGATIVE MG/DL
LEUKOCYTE ESTERASE UR QL STRIP.AUTO: ABNORMAL
NITRITE UR QL STRIP.AUTO: NEGATIVE
PH UR STRIP: 6 [PH] (ref 5–8)
PROT UR STRIP-MCNC: 100 MG/DL
RBC #/AREA URNS HPF: ABNORMAL /HPF (ref 0–5)
SP GR UR REFRACTOMETRY: 1.01
UA: UC IF INDICATED,UAUC: ABNORMAL
UROBILINOGEN UR QL STRIP.AUTO: 1 EU/DL (ref 0.2–1)
WBC URNS QL MICRO: ABNORMAL /HPF (ref 0–4)

## 2022-12-05 PROCEDURE — 74011250637 HC RX REV CODE- 250/637: Performed by: NURSE PRACTITIONER

## 2022-12-05 RX ORDER — SODIUM CHLORIDE 0.9 % (FLUSH) 0.9 %
5-40 SYRINGE (ML) INJECTION EVERY 8 HOURS
Status: DISCONTINUED | OUTPATIENT
Start: 2022-12-05 | End: 2022-12-05

## 2022-12-05 RX ORDER — SODIUM CHLORIDE 0.9 % (FLUSH) 0.9 %
5-40 SYRINGE (ML) INJECTION AS NEEDED
Status: DISCONTINUED | OUTPATIENT
Start: 2022-12-05 | End: 2022-12-05

## 2022-12-05 RX ORDER — CEFDINIR 300 MG/1
300 CAPSULE ORAL
Status: COMPLETED | OUTPATIENT
Start: 2022-12-05 | End: 2022-12-05

## 2022-12-05 RX ADMIN — CEFDINIR 300 MG: 300 CAPSULE ORAL at 00:54

## 2022-12-05 NOTE — ED NOTES
Bladder scan completed and showing approx 450 ml urine. Patient states that he is supposed to self cath 4x daily s/t hx of urinary retention, but he has not done so since yesterday. Denies running out of cath kits and does not offer reason for noncompliance. Patient provided I&O cath kit to perform self cath and educated on how to use properly. Alice Hall NP aware.

## 2022-12-05 NOTE — ED NOTES
Patient has drunk approx 700-800 mL of oral fluids during ER visit. Patient provided cup of water upon discharge as well. Discharge instructions were given to the patient by Simon Chandra RN. The patient left the Emergency Department ambulatory, alert and oriented and in no acute distress with 1 previously written prescriptions. The patient was encouraged to call or return to the ED for worsening issues or problems and was encouraged to schedule a follow up appointment for continuing care. The patient verbalized understanding of discharge instructions and prescriptions, all questions were answered. The patient has no further concerns at this time.

## 2022-12-05 NOTE — ED TRIAGE NOTES
Patient to ED for dysuria, urgency and frequency. Patient reports \"I'm just not feeling well\" patient has discharge paperwork and a prescription for Cefdinir from [facility] in [place]. Patient reports he was unable to fill prescription d/t traveling by bus. Patient also has hx of urinary incontinence.

## 2022-12-05 NOTE — ED NOTES
Patient encouraged to provide urine sample. Urinal provided. Emergency Department Nursing Plan of Care       The Nursing Plan of Care is developed from the Nursing assessment and Emergency Department Attending provider initial evaluation. The plan of care may be reviewed in the ED Provider note.     The Plan of Care was developed with the following considerations:   Patient / Family readiness to learn indicated by:verbalized understanding  Persons(s) to be included in education: patient  Barriers to Learning/Limitations:No    Signed     Manuel Douglas RN    12/4/2022   9:30 PM

## 2022-12-06 LAB
BACTERIA SPEC CULT: NORMAL
SERVICE CMNT-IMP: NORMAL

## 2022-12-06 NOTE — ED PROVIDER NOTES
EMERGENCY DEPARTMENT HISTORY AND PHYSICAL EXAM          Date: 12/4/2022  Patient Name: Edvin Vazquez    History of Presenting Illness     Chief Complaint   Patient presents with    Urinary Pain         History Provided By: Patient    Chief Complaint: urinay pain  Duration: onset past few days   Timing:  Acute  Location: suprapubic  Quality: Pressure  Severity: Moderate  Modifying Factors: none  Associated Symptoms:  frequency      HPI: Edvin Vazquez is a 67 y.o. male with a PMH as documented below who presents with urine frequency and urgency. Patient states he was seen yesterday and given a prescription for GAVIRIA MARY but was not able to fill it because he was out of town and had to travel on the bus. Patient states he did not self cath today. Patient has a history of BPH. Patient specifically denies fever fatigue chest pain shortness of breath abdominal pain nausea vomiitng diarrhea dysuria numbness headache . patient has no other complaints at this time    PCP: Ibrahima Ferrari NP    Current Outpatient Medications   Medication Sig Dispense Refill    gabapentin (NEURONTIN) 100 mg capsule       furosemide (LASIX) 20 mg tablet       oxybutynin chloride XL (DITROPAN XL) 5 mg CR tablet       tamsulosin (FLOMAX) 0.4 mg capsule       cephALEXin (KEFLEX) 500 mg capsule       glimepiride (AMARYL) 4 mg tablet Take 1 Tablet by mouth every morning. To REPLACE Glipizide 90 Tablet 3    empagliflozin (Jardiance) 25 mg tablet Take 1 Tablet by mouth daily. Indications: type 2 diabetes mellitus 90 Tablet 3    benazepril-hydroCHLOROthiazide (LOTENSIN HCT) 5-6.25 mg per tablet Take 1 Tablet by mouth daily. 90 Tablet 1    Blood-Glucose Meter monitoring kit Check blood sugar daily. May substitute for insurance preferred meter 1 Kit 0    glucose blood VI test strips (blood glucose test) strip Check blood sugar 2 times daily: E11.65, may substitute for insurance preferred strips.  200 Strip 3    lancets misc Check blood sugar 2 times daily. May substitute for insurance preferred lancets. 1 Each 11    alcohol swabs (Alcohol Pads) padm Use before checking blood sugar level to cleanse skin 200 Pad 3    albuterol (PROVENTIL HFA, VENTOLIN HFA, PROAIR HFA) 90 mcg/actuation inhaler Take 2 Puffs by inhalation every four (4) hours as needed for Wheezing. 1 Each 0    pantoprazole (PROTONIX) 40 mg tablet Take 1 Tablet by mouth daily. 30 Tablet 0    atorvastatin (LIPITOR) 80 mg tablet Take 0.5 Tablets by mouth nightly. 80 Tablet 1       Past History     Past Medical History:  Past Medical History:   Diagnosis Date    Diabetes ([facility] [address]     Gastrointestinal disorder     Hypertension     DEJA (obstructive sleep apnea)     AHI: 8.9 per hour       Past Surgical History:  Past Surgical History:   Procedure Laterality Date    HX PROSTATE SURGERY      FL ABDOMEN SURGERY PROC UNLISTED      Hernia Repair       Family History:  Family History   Problem Relation Age of Onset    Hypertension Mother     Diabetes Mother     Hypertension Father     Diabetes Father        Social History:  Social History     Tobacco Use    Smoking status: Never     Passive exposure: Never    Smokeless tobacco: Never   Vaping Use    Vaping Use: Never used   Substance Use Topics    Alcohol use: No    Drug use: Not Currently       Allergies: Allergies   Allergen Reactions    Aspirin Anxiety     Patient states not allergic to medication but reports he does not wish to take it     Celecoxib Vertigo    Ibuprofen Nausea Only         Review of Systems   Review of Systems   Constitutional:  Negative for chills, fatigue and fever. HENT:  Negative for congestion and sore throat. Eyes:  Negative for redness. Respiratory:  Negative for cough, chest tightness and wheezing. Cardiovascular:  Negative for chest pain. Gastrointestinal:  Negative for abdominal pain. Genitourinary:  Positive for dysuria, frequency and urgency.    Musculoskeletal:  Negative for arthralgias, back pain, myalgias, neck pain and neck stiffness. Skin:  Negative for rash. Neurological:  Negative for dizziness, syncope, weakness, light-headedness, numbness and headaches. Hematological:  Negative for adenopathy. Psychiatric/Behavioral:  Negative for agitation and behavioral problems. All other systems reviewed and are negative. Physical Exam     Vitals:    12/04/22 2106 12/05/22 0053 12/05/22 0054   BP: (!) 169/64 133/72    Pulse: 62     Resp: 18     Temp: 99 °F (37.2 °C)     SpO2: 96%  97%   Weight: 99.8 kg (220 lb)     Height: 5' 8\" (1.727 m)       Physical Exam  Vitals and nursing note reviewed. Constitutional:       Appearance: Normal appearance. He is well-developed. HENT:      Head: Normocephalic and atraumatic. Right Ear: External ear normal.      Left Ear: External ear normal.      Nose: Nose normal.      Mouth/Throat:      Mouth: Mucous membranes are moist.   Eyes:      General:         Right eye: No discharge. Left eye: No discharge. Conjunctiva/sclera: Conjunctivae normal.   Cardiovascular:      Rate and Rhythm: Normal rate and regular rhythm. Pulmonary:      Effort: Pulmonary effort is normal. No respiratory distress. Breath sounds: Normal breath sounds. No wheezing. Abdominal:      General: Bowel sounds are normal.      Palpations: Abdomen is soft. Tenderness: There is no abdominal tenderness. Musculoskeletal:         General: Normal range of motion. Cervical back: Normal range of motion and neck supple. Lymphadenopathy:      Cervical: No cervical adenopathy. Skin:     General: Skin is warm and dry. Neurological:      Mental Status: He is alert and oriented to person, place, and time. Cranial Nerves: No cranial nerve deficit. Psychiatric:         Behavior: Behavior normal.         Thought Content: Thought content normal.         Judgment: Judgment normal.             Medical Decision Making   I am the first provider for this patient.     I reviewed the vital signs, available nursing notes, past medical history, past surgical history, family history and social history. Vital Signs-Reviewed the patient's vital signs. Records Reviewed: Nursing Notes    Provider Notes (Medical Decision Making):   67year old male presents with urinary frequency dysuria urgency. Patient self caths did not self cath today. Patient was seen yesterday in [place] and given Omnicef prescription for UTI but did not fill prescription we will do bladder scan patient self cath check basic labs urinalysis. DDX dysuria UTI BPH      Procedures:  Procedures    Diagnostic Study Results     Labs -   No results found for this or any previous visit (from the past 12 hour(s)). Radiologic Studies -   No orders to display     CT Results  (Last 48 hours)      None          CXR Results  (Last 48 hours)      None                Disposition:  home  Patient has RX for omcnicef. Will give one dose priotr to discharge. Patient has urology appt Dec 19 creatnine elevated baseline. Chronic renal insufficiency  DISCHARGE NOTE:     I have discussed with patient their diagnosis, treatment, and follow up plan. The patient agrees to follow up as outlined in discharge paperwork and also to return to the ED with any worsening. Quentin Tobias NP         Follow-up Information       Follow up With Specialties Details Why [address] [facility]  In 1 week keep appt with Denise Castro urology as scheduled [address] [facility]            Discharge Medication List as of 12/5/2022 12:44 AM            Please note that this dictation was completed with Dragon, computer voice recognition software. Quite often unanticipated grammatical, syntax, homophones, and other interpretive errors are inadvertently transcribed by the computer software. Please disregard these errors. Additionally, please excuse any errors that have escaped final proofreading.     Diagnosis Clinical Impression:   1.  Urinary tract infection with hematuria, site unspecified

## 2022-12-07 ENCOUNTER — HOSPITAL ENCOUNTER (EMERGENCY)
Age: 72
Discharge: HOME | End: 2022-12-07
Attending: EMERGENCY MEDICINE
Payer: MEDICARE

## 2022-12-07 VITALS
HEART RATE: 66 BPM | HEIGHT: 68 IN | DIASTOLIC BLOOD PRESSURE: 100 MMHG | RESPIRATION RATE: 18 BRPM | OXYGEN SATURATION: 98 % | TEMPERATURE: 97.9 F | SYSTOLIC BLOOD PRESSURE: 136 MMHG | WEIGHT: 211.2 LBS | BODY MASS INDEX: 32.01 KG/M2

## 2022-12-07 DIAGNOSIS — R22.0 LIP SWELLING: Primary | ICD-10-CM

## 2022-12-07 DIAGNOSIS — T50.905A ADVERSE EFFECT OF DRUG, INITIAL ENCOUNTER: ICD-10-CM

## 2022-12-07 PROCEDURE — 74011250636 HC RX REV CODE- 250/636: Performed by: NURSE PRACTITIONER

## 2022-12-07 PROCEDURE — 74011000250 HC RX REV CODE- 250: Performed by: NURSE PRACTITIONER

## 2022-12-07 PROCEDURE — 99284 EMERGENCY DEPT VISIT MOD MDM: CPT

## 2022-12-07 PROCEDURE — 96375 TX/PRO/DX INJ NEW DRUG ADDON: CPT

## 2022-12-07 PROCEDURE — 96374 THER/PROPH/DIAG INJ IV PUSH: CPT

## 2022-12-07 RX ORDER — CETIRIZINE HYDROCHLORIDE 10 MG/1
10 TABLET ORAL DAILY
Qty: 30 TABLET | Refills: 0 | Status: SHIPPED | OUTPATIENT
Start: 2022-12-07 | End: 2023-01-06

## 2022-12-07 RX ORDER — FAMOTIDINE 40 MG/1
40 TABLET, FILM COATED ORAL DAILY
Qty: 7 TABLET | Refills: 0 | Status: SHIPPED | OUTPATIENT
Start: 2022-12-07

## 2022-12-07 RX ORDER — PREDNISONE 20 MG/1
60 TABLET ORAL DAILY
Qty: 21 TABLET | Refills: 0 | Status: SHIPPED | OUTPATIENT
Start: 2022-12-07 | End: 2022-12-14

## 2022-12-07 RX ORDER — DIPHENHYDRAMINE HYDROCHLORIDE 50 MG/ML
25 INJECTION, SOLUTION INTRAMUSCULAR; INTRAVENOUS
Status: COMPLETED | OUTPATIENT
Start: 2022-12-07 | End: 2022-12-07

## 2022-12-07 RX ORDER — DEXAMETHASONE SODIUM PHOSPHATE 10 MG/ML
10 INJECTION INTRAMUSCULAR; INTRAVENOUS
Status: COMPLETED | OUTPATIENT
Start: 2022-12-07 | End: 2022-12-07

## 2022-12-07 RX ADMIN — DIPHENHYDRAMINE HYDROCHLORIDE 25 MG: 50 INJECTION, SOLUTION INTRAMUSCULAR; INTRAVENOUS at 14:33

## 2022-12-07 RX ADMIN — FAMOTIDINE 20 MG: 10 INJECTION, SOLUTION INTRAVENOUS at 14:34

## 2022-12-07 RX ADMIN — DEXAMETHASONE SODIUM PHOSPHATE 10 MG: 10 INJECTION INTRAMUSCULAR; INTRAVENOUS at 14:33

## 2022-12-07 NOTE — PROGRESS NOTES
RENAL DOSE ADJUSTMENT MADE PER P/T PROTOCOL     PREVIOUS ORDER:  Famotidine (Pepcid) 20 mg IVP BID     Estimated Creatinine Clearance: 26.5 mL/min (A) (by C-G formula based on SCr of 2.79 mg/dL (H)).     Recent Labs     12/04/22 2204   BUN 27*           NEW RENALLY ADJUSTED ORDER: Famotidine (Pepcid) 20 mg IVP q 24 h      Saleem Smith MS FRANCISCO.PH  12/7/2022 2:13 PM

## 2022-12-07 NOTE — ED NOTES
Discharge instructions were given to the patient by Noe Leiva. The patient left the Emergency Department ambulatory, alert and oriented and in no acute distress with 3 prescriptions. The patient was encouraged to call or return to the ED for worsening issues or problems and was encouraged to schedule a follow up appointment for continuing care. The patient verbalized understanding of discharge instructions and prescriptions, all questions were answered. The patient has no further concerns at this time.

## 2022-12-07 NOTE — ED TRIAGE NOTES
Pt presents with lip swelling to the right side starting this AM. Pt reports he is out of all of his medications x 2 days

## 2022-12-07 NOTE — DISCHARGE INSTRUCTIONS
Hold Cefdinir and Lotensin( blood pressure medication)      Take prednisone, cetirizine and pepcid daily for 7 days starting tomorrow. It was a pleasure taking care of you at [facility] Emergency Department today. We know that when you come to [facility], you are entrusting us with your health, comfort, and safety. Our physicians and nurses honor that trust, and we truly appreciate the opportunity to care for you and your loved ones. We also value our feedback. If you receive a survey about your Emergency Department experience today, please fill it out. We care about our patients' feedback, and we listen to what you have to say. Thank you!

## 2022-12-07 NOTE — ED NOTES
Pt reports taking antibiotics unk name and stated his lips started swelling. Pt stated it was what was prescribed here. From what could be seen it was keflex. Allergies updated. Pt speaking in full sentences and handling secretions without issue    Pt is alert and oriented x 4, RR even and unlabored, skin is warm and dry. Assessment completed and pt updated on plan of care. Call bell in reach. Emergency Department Nursing Plan of Care       The Nursing Plan of Care is developed from the Nursing assessment and Emergency Department Attending provider initial evaluation. The plan of care may be reviewed in the ED Provider note.     The Plan of Care was developed with the following considerations:   Patient / Family readiness to learn indicated by:verbalized understanding  Persons(s) to be included in education: patient  Barriers to Learning/Limitations:No    Signed

## 2022-12-07 NOTE — ED PROVIDER NOTES
EMERGENCY DEPARTMENT HISTORY AND PHYSICAL EXAM      Date: 12/7/2022  Patient Name: George Cuadra    History of Presenting Illness     Chief Complaint   Patient presents with    Lip Swelling       History Provided By: Patient      Additional History (Context): George Cuadra is a 67 y.o. male with  HTN, DM, DEJA  who presents with lip swelling. Onset 2 hours ago. He reports eating cereal and drinking milk that was left out of refrigerator prior to sx onset. The milk and cereal is unchanged from his usual diet. He denies CP, SOB, tongue swelling. He has not taken anything for his sx. Denies previous hx of lip swelling in the past. He reports new medication ( cefdinir) for UTI he started 2 days ago. In addition he reports taking lotensin which has benazepril but he has not used in 2 days. PCP: Robert Pat NP    Current Outpatient Medications   Medication Sig Dispense Refill    predniSONE (DELTASONE) 20 mg tablet Take 3 Tablets by mouth daily for 7 days. With Breakfast 21 Tablet 0    cetirizine (ZYRTEC) 10 mg tablet Take 1 Tablet by mouth daily for 30 days. 30 Tablet 0    famotidine (Pepcid) 40 mg tablet Take 1 Tablet by mouth daily. 7 Tablet 0    cephALEXin (KEFLEX) 500 mg capsule       gabapentin (NEURONTIN) 100 mg capsule  (Patient not taking: Reported on 12/7/2022)      furosemide (LASIX) 20 mg tablet  (Patient not taking: Reported on 12/7/2022)      oxybutynin chloride XL (DITROPAN XL) 5 mg CR tablet  (Patient not taking: Reported on 12/7/2022)      tamsulosin (FLOMAX) 0.4 mg capsule  (Patient not taking: Reported on 12/7/2022)      glimepiride (AMARYL) 4 mg tablet Take 1 Tablet by mouth every morning. To REPLACE Glipizide (Patient not taking: Reported on 12/7/2022) 90 Tablet 3    empagliflozin (Jardiance) 25 mg tablet Take 1 Tablet by mouth daily.  Indications: type 2 diabetes mellitus (Patient not taking: Reported on 12/7/2022) 90 Tablet 3    benazepril-hydroCHLOROthiazide (LOTENSIN HCT) 5-6.25 mg per tablet Take 1 Tablet by mouth daily. (Patient not taking: Reported on 12/7/2022) 90 Tablet 1    Blood-Glucose Meter monitoring kit Check blood sugar daily. May substitute for insurance preferred meter (Patient not taking: Reported on 12/7/2022) 1 Kit 0    glucose blood VI test strips (blood glucose test) strip Check blood sugar 2 times daily: E11.65, may substitute for insurance preferred strips. (Patient not taking: Reported on 12/7/2022) 200 Strip 3    lancets misc Check blood sugar 2 times daily. May substitute for insurance preferred lancets. (Patient not taking: Reported on 12/7/2022) 1 Each 11    alcohol swabs (Alcohol Pads) padm Use before checking blood sugar level to cleanse skin (Patient not taking: Reported on 12/7/2022) 200 Pad 3    albuterol (PROVENTIL HFA, VENTOLIN HFA, PROAIR HFA) 90 mcg/actuation inhaler Take 2 Puffs by inhalation every four (4) hours as needed for Wheezing. (Patient not taking: Reported on 12/7/2022) 1 Each 0    pantoprazole (PROTONIX) 40 mg tablet Take 1 Tablet by mouth daily. (Patient not taking: Reported on 12/7/2022) 30 Tablet 0    atorvastatin (LIPITOR) 80 mg tablet Take 0.5 Tablets by mouth nightly.  (Patient not taking: Reported on 12/7/2022) 90 Tablet 1       Past History     Past Medical History:  Past Medical History:   Diagnosis Date    Diabetes ([facility] [address]     Gastrointestinal disorder     Hypertension     DEJA (obstructive sleep apnea)     AHI: 8.9 per hour       Past Surgical History:  Past Surgical History:   Procedure Laterality Date    HX PROSTATE SURGERY      LA ABDOMEN SURGERY PROC UNLISTED      Hernia Repair       Family History:  Family History   Problem Relation Age of Onset    Hypertension Mother     Diabetes Mother     Hypertension Father     Diabetes Father        Social History:  Social History     Tobacco Use    Smoking status: Never     Passive exposure: Never    Smokeless tobacco: Never   Vaping Use    Vaping Use: Never used   Substance Use Topics    Alcohol use: No    Drug use: Not Currently       Allergies: Allergies   Allergen Reactions    Aspirin Anxiety     Patient states not allergic to medication but reports he does not wish to take it     Celecoxib Vertigo    Ibuprofen Nausea Only    Keflex [Cephalexin] Swelling         Review of Systems   Review of Systems   Constitutional:  Negative for chills and fever. HENT:  Negative for congestion, drooling, ear pain, facial swelling, rhinorrhea and sore throat.         + lip swelling    Eyes:  Negative for pain and itching. Respiratory:  Negative for cough, shortness of breath, wheezing and stridor. Cardiovascular:  Negative for chest pain and leg swelling. Gastrointestinal:  Negative for abdominal pain, constipation, diarrhea, nausea and vomiting. Endocrine: Negative for polydipsia, polyphagia and polyuria. Genitourinary:  Negative for dysuria, frequency and urgency. Musculoskeletal:  Negative for back pain, gait problem and neck pain. Skin:  Negative for wound. Neurological:  Negative for dizziness, numbness and headaches. All other systems reviewed and are negative. Physical Exam     Vitals:    12/07/22 1302 12/07/22 1745   BP: (!) 133/98 (!) 136/100   Pulse: 78 66   Resp: 24 18   Temp: 97.9 °F (36.6 °C)    SpO2: 97% 98%   Weight: 95.8 kg (211 lb 3.2 oz)    Height: 5' 8\" (1.727 m)      Physical Exam  Vitals and nursing note reviewed. Constitutional:       General: He is not in acute distress. Appearance: He is well-developed. He is not ill-appearing. HENT:      Head: Normocephalic and atraumatic. Right Ear: Tympanic membrane and ear canal normal.      Left Ear: Tympanic membrane and ear canal normal.      Nose: Nose normal.      Mouth/Throat:      Mouth: Mucous membranes are moist.      Pharynx: Oropharynx is clear. No oropharyngeal exudate or posterior oropharyngeal erythema.       Comments: Upper and lower lip swelling   No tongue or tonsillar swelling   Eyes:      Extraocular Movements: Extraocular movements intact. Conjunctiva/sclera: Conjunctivae normal.      Pupils: Pupils are equal, round, and reactive to light. Cardiovascular:      Rate and Rhythm: Normal rate and regular rhythm. Pulses: Normal pulses. Heart sounds: Normal heart sounds. Pulmonary:      Effort: Pulmonary effort is normal.      Breath sounds: Normal breath sounds. No stridor. Abdominal:      General: Bowel sounds are normal. There is no distension. Palpations: Abdomen is soft. Tenderness: There is no abdominal tenderness. There is no right CVA tenderness, left CVA tenderness or guarding. Musculoskeletal:      Cervical back: Normal range of motion and neck supple. Lymphadenopathy:      Cervical: No cervical adenopathy. Skin:     General: Skin is warm and dry. Neurological:      Mental Status: He is alert and oriented to person, place, and time. GCS: GCS eye subscore is 4. GCS verbal subscore is 5. GCS motor subscore is 6. Cranial Nerves: No cranial nerve deficit. Psychiatric:         Thought Content: Thought content normal.         Diagnostic Study Results     Labs -   No results found for this or any previous visit (from the past 12 hour(s)). Radiologic Studies -   No orders to display     CT Results  (Last 48 hours)      None          CXR Results  (Last 48 hours)      None              Medical Decision Making   I am the first provider for this patient. I reviewed the vital signs, available nursing notes, past medical history, past surgical history, family history and social history. Vital Signs-Reviewed the patient's vital signs. Records Reviewed: Nursing Notes and [id number] [facility] M presenting with lip swelling exhibiting upper and lower lip swelling. Unsure the possible cause of sx except that pt started new abx 2 days ago but this is a delayed reaction. In addition her reports eating cereal prior to sx onset which may be a possibility.  Pt is on an ace inhibitor and likely causes angioedema. Plan to manage with pepcid, decadron and benadryl. Also advised pt to stop taking ace inhibitor until follow up with PCP     ED Course:   ED Course as of 12/08/22 1653   Wed Dec 07, 2022   1552 Progress Note:   Lip swelling still present and no signs of respiratory distress. Pt states he notice improvement but swelling appears increased. Will continue to monitor  [NA]   1714 Progress Note:   Pt reports sx are improving mild swelling still present. No tongue swelling at this time. He reports feeling drowsy from the benadryl but would like to stay until he awakens. We will continue to monitor. Dr Ada Kate and Judy Brady made aware. [NA]      ED Course User Index  [NA] Awilda Hayes NP         Disposition:  Discharge     DISCHARGE NOTE:   Pt has been reexamined. Patient has no new complaints, changes, or physical findings. Care plan outlined and precautions discussed. All of pt's questions and concerns were addressed. Patient was instructed and agrees to follow up with PCP, as well as to return to the ED upon further deterioration. Patient is ready to go home. Follow-up Information       Follow up With Specialties Details Why Contact Obi Zuniga NP Nurse Practitioner Schedule an appointment as soon as possible for a visit  ER follow up for lip swelling reaction to medication [facility]  [address]  [phone]              Discharge Medication List as of 12/7/2022  5:11 PM        START taking these medications    Details   predniSONE (DELTASONE) 20 mg tablet Take 3 Tablets by mouth daily for 7 days. With Breakfast, Normal, Disp-21 Tablet, R-0      cetirizine (ZYRTEC) 10 mg tablet Take 1 Tablet by mouth daily for 30 days. , Normal, Disp-30 Tablet, R-0      famotidine (Pepcid) 40 mg tablet Take 1 Tablet by mouth daily. , Normal, Disp-7 Tablet, R-0           CONTINUE these medications which have NOT CHANGED    Details cephALEXin (KEFLEX) 500 mg capsule Historical Med      gabapentin (NEURONTIN) 100 mg capsule Historical Med      furosemide (LASIX) 20 mg tablet Historical Med      oxybutynin chloride XL (DITROPAN XL) 5 mg CR tablet Historical Med      tamsulosin (FLOMAX) 0.4 mg capsule Historical Med      glimepiride (AMARYL) 4 mg tablet Take 1 Tablet by mouth every morning. To REPLACE Glipizide, Normal, Disp-90 Tablet, R-3      empagliflozin (Jardiance) 25 mg tablet Take 1 Tablet by mouth daily. Indications: type 2 diabetes mellitus, Normal, Disp-90 Tablet, R-3      benazepril-hydroCHLOROthiazide (LOTENSIN HCT) 5-6.25 mg per tablet Take 1 Tablet by mouth daily. , Normal, Disp-90 Tablet, R-1      Blood-Glucose Meter monitoring kit Check blood sugar daily. May substitute for insurance preferred meter, Normal, Disp-1 Kit, R-0      glucose blood VI test strips (blood glucose test) strip Check blood sugar 2 times daily: E11.65, may substitute for insurance preferred strips., Normal, Disp-200 Strip, R-3      lancets misc Check blood sugar 2 times daily. May substitute for insurance preferred lancets., Normal, Disp-1 Each, R-11      alcohol swabs (Alcohol Pads) padm Use before checking blood sugar level to cleanse skin, Normal, Disp-200 Pad, R-3      albuterol (PROVENTIL HFA, VENTOLIN HFA, PROAIR HFA) 90 mcg/actuation inhaler Take 2 Puffs by inhalation every four (4) hours as needed for Wheezing., Normal, Disp-1 Each, R-0      pantoprazole (PROTONIX) 40 mg tablet Take 1 Tablet by mouth daily. , Normal, Disp-30 Tablet, R-0      atorvastatin (LIPITOR) 80 mg tablet Take 0.5 Tablets by mouth nightly., Normal, Disp-90 Tablet, R-1               Diagnosis     Clinical Impression:   1. Lip swelling    2.  Adverse effect of drug, initial encounter      \

## 2022-12-07 NOTE — ED NOTES
Pt noted to be eating walking into triage. Pt informed that with his lip swelling he cannot continue to eat until cleared by the doctor. After multiple attempts, pt verbalized understanding. Unknown if pt will be adherent.

## 2022-12-13 ENCOUNTER — OFFICE VISIT (OUTPATIENT)
Dept: INTERNAL MEDICINE CLINIC | Age: 72
End: 2022-12-13
Payer: MEDICARE

## 2022-12-13 VITALS
BODY MASS INDEX: 30.46 KG/M2 | TEMPERATURE: 96.8 F | RESPIRATION RATE: 18 BRPM | SYSTOLIC BLOOD PRESSURE: 157 MMHG | HEIGHT: 68 IN | WEIGHT: 201 LBS | OXYGEN SATURATION: 97 % | DIASTOLIC BLOOD PRESSURE: 94 MMHG | HEART RATE: 73 BPM

## 2022-12-13 DIAGNOSIS — J45.20 MILD INTERMITTENT ASTHMA WITHOUT COMPLICATION: ICD-10-CM

## 2022-12-13 DIAGNOSIS — N30.01 ACUTE CYSTITIS WITH HEMATURIA: Primary | ICD-10-CM

## 2022-12-13 DIAGNOSIS — R39.12 BENIGN PROSTATIC HYPERPLASIA WITH WEAK URINARY STREAM: ICD-10-CM

## 2022-12-13 DIAGNOSIS — E78.2 MIXED HYPERLIPIDEMIA: ICD-10-CM

## 2022-12-13 DIAGNOSIS — N40.1 BENIGN PROSTATIC HYPERPLASIA WITH WEAK URINARY STREAM: ICD-10-CM

## 2022-12-13 DIAGNOSIS — K21.9 GASTROESOPHAGEAL REFLUX DISEASE, UNSPECIFIED WHETHER ESOPHAGITIS PRESENT: ICD-10-CM

## 2022-12-13 DIAGNOSIS — E11.42 DIABETIC POLYNEUROPATHY ASSOCIATED WITH TYPE 2 DIABETES MELLITUS (HCC): ICD-10-CM

## 2022-12-13 DIAGNOSIS — D50.8 IRON DEFICIENCY ANEMIA SECONDARY TO INADEQUATE DIETARY IRON INTAKE: ICD-10-CM

## 2022-12-13 LAB
BILIRUB UR QL STRIP: NEGATIVE
GLUCOSE UR-MCNC: NORMAL MG/DL
KETONES P FAST UR STRIP-MCNC: NEGATIVE MG/DL
PH UR STRIP: 6.5 [PH] (ref 4.6–8)
PROT UR QL STRIP: NORMAL
SP GR UR STRIP: 1.01 (ref 1–1.03)
UA UROBILINOGEN AMB POC: NORMAL (ref 0.2–1)
URINALYSIS CLARITY POC: CLEAR
URINALYSIS COLOR POC: YELLOW
URINE BLOOD POC: NORMAL
URINE LEUKOCYTES POC: NORMAL
URINE NITRITES POC: NEGATIVE

## 2022-12-13 PROCEDURE — 99214 OFFICE O/P EST MOD 30 MIN: CPT | Performed by: NURSE PRACTITIONER

## 2022-12-13 PROCEDURE — G8427 DOCREV CUR MEDS BY ELIG CLIN: HCPCS | Performed by: NURSE PRACTITIONER

## 2022-12-13 PROCEDURE — 3017F COLORECTAL CA SCREEN DOC REV: CPT | Performed by: NURSE PRACTITIONER

## 2022-12-13 PROCEDURE — G8417 CALC BMI ABV UP PARAM F/U: HCPCS | Performed by: NURSE PRACTITIONER

## 2022-12-13 PROCEDURE — 2022F DILAT RTA XM EVC RTNOPTHY: CPT | Performed by: NURSE PRACTITIONER

## 2022-12-13 PROCEDURE — G8753 SYS BP > OR = 140: HCPCS | Performed by: NURSE PRACTITIONER

## 2022-12-13 PROCEDURE — G8432 DEP SCR NOT DOC, RNG: HCPCS | Performed by: NURSE PRACTITIONER

## 2022-12-13 PROCEDURE — 81001 URINALYSIS AUTO W/SCOPE: CPT | Performed by: NURSE PRACTITIONER

## 2022-12-13 PROCEDURE — G8755 DIAS BP > OR = 90: HCPCS | Performed by: NURSE PRACTITIONER

## 2022-12-13 PROCEDURE — 1101F PT FALLS ASSESS-DOCD LE1/YR: CPT | Performed by: NURSE PRACTITIONER

## 2022-12-13 PROCEDURE — G8536 NO DOC ELDER MAL SCRN: HCPCS | Performed by: NURSE PRACTITIONER

## 2022-12-13 RX ORDER — ALBUTEROL SULFATE 90 UG/1
2 AEROSOL, METERED RESPIRATORY (INHALATION)
Qty: 1 EACH | Refills: 0 | Status: SHIPPED | OUTPATIENT
Start: 2022-12-13

## 2022-12-13 RX ORDER — CEFDINIR 300 MG/1
CAPSULE ORAL
COMMUNITY
Start: 2022-12-05 | End: 2022-12-13 | Stop reason: ALTCHOICE

## 2022-12-13 RX ORDER — ATORVASTATIN CALCIUM 80 MG/1
80 TABLET, FILM COATED ORAL
Qty: 90 TABLET | Refills: 1 | Status: SHIPPED | OUTPATIENT
Start: 2022-12-13

## 2022-12-13 RX ORDER — LANOLIN ALCOHOL/MO/W.PET/CERES
325 CREAM (GRAM) TOPICAL 2 TIMES DAILY WITH MEALS
Qty: 180 TABLET | Refills: 3 | Status: SHIPPED | OUTPATIENT
Start: 2022-12-24

## 2022-12-13 RX ORDER — LEVOFLOXACIN 500 MG/1
500 TABLET, FILM COATED ORAL DAILY
Qty: 10 TABLET | Refills: 0 | Status: SHIPPED | OUTPATIENT
Start: 2022-12-13

## 2022-12-13 RX ORDER — PANTOPRAZOLE SODIUM 40 MG/1
40 TABLET, DELAYED RELEASE ORAL DAILY
Qty: 30 TABLET | Refills: 0 | Status: SHIPPED | OUTPATIENT
Start: 2022-12-13

## 2022-12-13 RX ORDER — GABAPENTIN 100 MG/1
100 CAPSULE ORAL
Qty: 90 CAPSULE | Refills: 1 | Status: SHIPPED | OUTPATIENT
Start: 2022-12-13

## 2022-12-13 RX ORDER — PHENAZOPYRIDINE HYDROCHLORIDE 200 MG/1
200 TABLET, FILM COATED ORAL
Qty: 10 TABLET | Refills: 0 | Status: SHIPPED | OUTPATIENT
Start: 2022-12-13

## 2022-12-13 NOTE — PROGRESS NOTES
Adelina Kearns (: 1950) is a 67 y.o. male, established patient, here for evaluation of the following chief complaint(s):  Medication Evaluation       ASSESSMENT/PLAN:  Below is the assessment and plan developed based on review of pertinent history, physical exam, labs, studies, and medications. 1. Acute cystitis with hematuria  -     AMB POC URINALYSIS DIP STICK AUTO W/ MICRO  -     levoFLOXacin (Levaquin) 500 mg tablet; Take 1 Tablet by mouth daily. , Normal, Disp-10 Tablet, R-0  2. Benign prostatic hyperplasia with weak urinary stream  3. Mixed hyperlipidemia  -     atorvastatin (LIPITOR) 80 mg tablet; Take 1 Tablet by mouth nightly., Normal, Disp-90 Tablet, R-1  4. Gastroesophageal reflux disease, unspecified whether esophagitis present  -     pantoprazole (PROTONIX) 40 mg tablet; Take 1 Tablet by mouth daily. , Normal, Disp-30 Tablet, R-0  5. Iron deficiency anemia secondary to inadequate dietary iron intake  -     ferrous sulfate 325 mg (65 mg iron) tablet; Take 1 Tablet by mouth two (2) times daily (with meals). On an empty stomach with Vitamin C (like OJ), Normal, Disp-180 Tablet, R-3  6. Diabetic polyneuropathy associated with type 2 diabetes mellitus (HCC)  -     gabapentin (NEURONTIN) 100 mg capsule; Take 1 Capsule by mouth nightly. Max Daily Amount: 100 mg., Normal, Disp-90 Capsule, R-1  7. Mild intermittent asthma without complication  -     atorvastatin (LIPITOR) 80 mg tablet; Take 1 Tablet by mouth nightly., Normal, Disp-90 Tablet, R-1    Return for Keep appt as scheduled. Pt asked to complete follow by next visit: UA + bld and leuk, sent for culture. Levaquin ordered. SUBJECTIVE/OBJECTIVE:  HPI    Pt is here for ER/UC follow-up on  &  for urinary pain and frequency. Diagnosed with UTI. Was given omnicef, but returned 2 days later after receiving first dose in the ER. Never filled home Rx. Instructed to f/u with PCP/specialty. Reports feeling SAME AS when in ER.      BP up today with acute illness. Needs refill of several meds and has NOT started new meds sent last month by this prescriber yet, as \"they haven't called me\" to  meds.     Results for orders placed or performed in visit on 12/13/22   AMB POC URINALYSIS DIP STICK AUTO W/ MICRO   Result Value Ref Range    Color (UA POC) Yellow     Clarity (UA POC) Clear     Glucose (UA POC) 2+ Negative    Bilirubin (UA POC) Negative Negative    Ketones (UA POC) Negative Negative    Specific gravity (UA POC) 1.010 1.001 - 1.035    Blood (UA POC) 2+ Negative    pH (UA POC) 6.5 4.6 - 8.0    Protein (UA POC) 1+ Negative    Urobilinogen (UA POC) 0.2 mg/dL 0.2 - 1    Nitrites (UA POC) Negative Negative    Leukocyte esterase (UA POC) 3+ Negative           Review of Systems  Constitutional: negative for fevers, chills, anorexia and weight loss  Respiratory:  negative for cough, hemoptysis, dyspnea, and wheezing  CV:   negative for chest pain, palpitations, and lower extremity edema  GI:   negative for nausea, vomiting, diarrhea, abdominal pain, and melena  Endo:               negative for polyuria,polydipsia,polyphagia, and heat intolerance  Genitourinary: negative for retention, and hematuria  Integument:  negative for rash, ulcerations, and pruritus  Hematologic:  negative for easy bruising and bleeding  Musculoskel: negative for arthralgias, muscle weakness,and joint pain/swelling  Neurological:  negative for headaches, dizziness, vertigo,and memory/gait problems  Behavl/Psych: negative for feelings of anxiety, depression, suicide, and mood changes    Visit Vitals  BP (!) 157/94 (BP 1 Location: Left upper arm, BP Patient Position: Sitting, BP Cuff Size: Large adult)   Pulse 73   Temp 96.8 °F (36 °C) (Temporal)   Resp 18   Ht 5' 8\" (1.727 m)   Wt 201 lb (91.2 kg)   SpO2 97%   BMI 30.56 kg/m²       Wt Readings from Last 3 Encounters:   12/13/22 201 lb (91.2 kg)   12/07/22 211 lb 3.2 oz (95.8 kg)   12/04/22 220 lb (99.8 kg)         Physical Exam:   General appearance - alert, well appearing, and in mild distress. Restless. Mental status - A/O x 4, anxious mood and affect. Chest -  CTA. Symmetric chest rise. No wheezing. No distress. Heart - Normal rate & rhythm. Normal S1 & S2. No MGR. Abdomen- Soft, round. Non-distended, NT. No pulsatile masses or hernias. No CVAT. Ext-  No pedal edema, clubbing, or cyanosis. Skin-Warm and dry. No hyperpigmentation, ulcerations, or suspicious lesions. Neuro - pressured speech, no focal findings or movement disorder. Normal strength, gait, and muscle tone. Results for orders placed or performed in visit on 12/13/22   AMB POC URINALYSIS DIP STICK AUTO W/ MICRO   Result Value Ref Range    Color (UA POC) Yellow     Clarity (UA POC) Clear     Glucose (UA POC) 2+ Negative    Bilirubin (UA POC) Negative Negative    Ketones (UA POC) Negative Negative    Specific gravity (UA POC) 1.010 1.001 - 1.035    Blood (UA POC) 2+ Negative    pH (UA POC) 6.5 4.6 - 8.0    Protein (UA POC) 1+ Negative    Urobilinogen (UA POC) 0.2 mg/dL 0.2 - 1    Nitrites (UA POC) Negative Negative    Leukocyte esterase (UA POC) 3+ Negative             An electronic signature was used to authenticate this note.   -- Shannon Deal, RAJAN

## 2022-12-13 NOTE — PATIENT INSTRUCTIONS
[facility]  - ask them for more straight cath supply order, tamsulosin, finasteride, and oxybutynin refills also.    [address], [address] [place], [address] [facility]    [phone]

## 2022-12-13 NOTE — PROGRESS NOTES
Pt is here for   Chief Complaint   Patient presents with    Medication Evaluation     1. Have you been to the ER, urgent care clinic since your last visit? Hospitalized since your last visit? No    2. Have you seen or consulted any other health care providers outside of the [address] [place] [address] since your last visit? Include any pap smears or colon screening.  No    Denies pain at this time

## 2022-12-14 ENCOUNTER — HOSPITAL ENCOUNTER (EMERGENCY)
Age: 72
Discharge: HOME | End: 2022-12-14
Attending: STUDENT IN AN ORGANIZED HEALTH CARE EDUCATION/TRAINING PROGRAM
Payer: MEDICARE

## 2022-12-14 VITALS
SYSTOLIC BLOOD PRESSURE: 139 MMHG | OXYGEN SATURATION: 98 % | HEIGHT: 68 IN | RESPIRATION RATE: 22 BRPM | TEMPERATURE: 99.2 F | HEART RATE: 87 BPM | DIASTOLIC BLOOD PRESSURE: 72 MMHG | BODY MASS INDEX: 31.94 KG/M2 | WEIGHT: 210.76 LBS

## 2022-12-14 DIAGNOSIS — R33.9 URINARY RETENTION: Primary | ICD-10-CM

## 2022-12-14 PROCEDURE — 74011250637 HC RX REV CODE- 250/637: Performed by: PHYSICIAN ASSISTANT

## 2022-12-14 PROCEDURE — 87086 URINE CULTURE/COLONY COUNT: CPT

## 2022-12-14 PROCEDURE — 99283 EMERGENCY DEPT VISIT LOW MDM: CPT

## 2022-12-14 PROCEDURE — 51798 US URINE CAPACITY MEASURE: CPT

## 2022-12-14 PROCEDURE — 51702 INSERT TEMP BLADDER CATH: CPT

## 2022-12-14 RX ORDER — OXYCODONE AND ACETAMINOPHEN 5; 325 MG/1; MG/1
1 TABLET ORAL
Status: COMPLETED | OUTPATIENT
Start: 2022-12-14 | End: 2022-12-14

## 2022-12-14 RX ORDER — LIDOCAINE HYDROCHLORIDE 20 MG/ML
JELLY TOPICAL
Status: DISCONTINUED | OUTPATIENT
Start: 2022-12-14 | End: 2022-12-15 | Stop reason: HOSPADM

## 2022-12-14 RX ORDER — LIDOCAINE HYDROCHLORIDE 20 MG/ML
JELLY TOPICAL
Qty: 30 ML | Refills: 0 | Status: SHIPPED | OUTPATIENT
Start: 2022-12-14

## 2022-12-14 RX ORDER — ACETAMINOPHEN 500 MG
1000 TABLET ORAL
Qty: 30 TABLET | Refills: 0 | Status: SHIPPED | OUTPATIENT
Start: 2022-12-14

## 2022-12-14 RX ADMIN — OXYCODONE HYDROCHLORIDE AND ACETAMINOPHEN 1 TABLET: 5; 325 TABLET ORAL at 21:16

## 2022-12-15 LAB
BACTERIA SPEC CULT: NORMAL
SERVICE CMNT-IMP: NORMAL

## 2022-12-15 NOTE — ED NOTES
Pt calling out repeatedly for various complaints. Pt reassured that the MD is working on discharge and medications.

## 2022-12-15 NOTE — ED TRIAGE NOTES
Pt reports she has been out of self cath supplies and cannot afford to get more.  Pt reports he normally caths 4x a day and has been out x2-3 days

## 2022-12-15 NOTE — ED NOTES
Emergency Department Nursing Plan of Care       The Nursing Plan of Care is developed from the Nursing assessment and Emergency Department Attending provider initial evaluation. The plan of care may be reviewed in the ED Provider note.     The Plan of Care was developed with the following considerations:   Patient / Family readiness to learn indicated by:verbalized understanding  Persons(s) to be included in education: patient  Barriers to Learning/Limitations:No    Signed     Carri Wing RN    12/14/2022   9:43 PM

## 2022-12-15 NOTE — ED PROVIDER NOTES
EMERGENCY DEPARTMENT HISTORY AND PHYSICAL EXAM      Date: 2022  Patient Name: Naty Campbell    History of Presenting Illness     Chief Complaint   Patient presents with    Urinary Pain    Urinary Retention       History Provided By: Patient    HPI: Naty Campbell, 67 y.o. male with PMHx significant for diabetes, hypertension, BPH, presents to the ED with cc of difficulty urinating. The patient usually self caths but ran out of supplies at home. He saw his PCP yesterday and was started on Levaquin. He reports he has had to urinate frequently overnight and has been having a lot of pain while doing so. He does not feel that he is able to fully empty his bladder. He has associated suprapubic discomfort. He denies fever, vomiting. There are no other complaints, changes, or physical findings at this time. PCP: Zhanna Meade NP    No current facility-administered medications on file prior to encounter. Current Outpatient Medications on File Prior to Encounter   Medication Sig Dispense Refill    levoFLOXacin (Levaquin) 500 mg tablet Take 1 Tablet by mouth daily. 10 Tablet 0    [START ON 2022] ferrous sulfate 325 mg (65 mg iron) tablet Take 1 Tablet by mouth two (2) times daily (with meals). On an empty stomach with Vitamin C (like OJ) 180 Tablet 3    pantoprazole (PROTONIX) 40 mg tablet Take 1 Tablet by mouth daily. 30 Tablet 0    albuterol (PROVENTIL HFA, VENTOLIN HFA, PROAIR HFA) 90 mcg/actuation inhaler Take 2 Puffs by inhalation every four (4) hours as needed for Wheezing. 1 Each 0    atorvastatin (LIPITOR) 80 mg tablet Take 1 Tablet by mouth nightly. 90 Tablet 1    gabapentin (NEURONTIN) 100 mg capsule Take 1 Capsule by mouth nightly. Max Daily Amount: 100 mg. 90 Capsule 1    phenazopyridine (Pyridium) 200 mg tablet Take 1 Tablet by mouth three (3) times daily as needed for Pain. 10 Tablet 0    [] predniSONE (DELTASONE) 20 mg tablet Take 3 Tablets by mouth daily for 7 days.  With Breakfast 21 Tablet 0    cetirizine (ZYRTEC) 10 mg tablet Take 1 Tablet by mouth daily for 30 days. (Patient not taking: Reported on 12/13/2022) 30 Tablet 0    furosemide (LASIX) 20 mg tablet       oxybutynin chloride XL (DITROPAN XL) 5 mg CR tablet       tamsulosin (FLOMAX) 0.4 mg capsule       glimepiride (AMARYL) 4 mg tablet Take 1 Tablet by mouth every morning. To REPLACE Glipizide (Patient not taking: No sig reported) 90 Tablet 3    empagliflozin (Jardiance) 25 mg tablet Take 1 Tablet by mouth daily. Indications: type 2 diabetes mellitus (Patient not taking: No sig reported) 90 Tablet 3    benazepril-hydroCHLOROthiazide (LOTENSIN HCT) 5-6.25 mg per tablet Take 1 Tablet by mouth daily. (Patient not taking: No sig reported) 90 Tablet 1    Blood-Glucose Meter monitoring kit Check blood sugar daily. May substitute for insurance preferred meter (Patient not taking: Reported on 12/7/2022) 1 Kit 0    glucose blood VI test strips (blood glucose test) strip Check blood sugar 2 times daily: E11.65, may substitute for insurance preferred strips. (Patient not taking: Reported on 12/7/2022) 200 Strip 3    lancets misc Check blood sugar 2 times daily. May substitute for insurance preferred lancets.  (Patient not taking: Reported on 12/7/2022) 1 Each 11    alcohol swabs (Alcohol Pads) padm Use before checking blood sugar level to cleanse skin (Patient not taking: Reported on 12/7/2022) 200 Pad 3       Past History     Past Medical History:  Past Medical History:   Diagnosis Date    Diabetes ([facility] [address]     Gastrointestinal disorder     Hypertension     DEJA (obstructive sleep apnea)     AHI: 8.9 per hour       Past Surgical History:  Past Surgical History:   Procedure Laterality Date    HX PROSTATE SURGERY      [place] ABDOMEN SURGERY PROC UNLISTED      Hernia Repair       Family History:  Family History   Problem Relation Age of Onset    Hypertension Mother     Diabetes Mother     Hypertension Father     Diabetes Father        Social History:  Social History     Tobacco Use    Smoking status: Never     Passive exposure: Never    Smokeless tobacco: Never   Vaping Use    Vaping Use: Never used   Substance Use Topics    Alcohol use: No    Drug use: Not Currently       Allergies: Allergies   Allergen Reactions    Aspirin Anxiety     Patient states not allergic to medication but reports he does not wish to take it     Celecoxib Vertigo    Ibuprofen Nausea Only    Keflex [Cephalexin] Swelling         Review of Systems   Review of Systems   Constitutional:  Negative for chills and fever. HENT:  Negative for ear pain and sore throat. Eyes:  Negative for redness and visual disturbance. Respiratory:  Negative for cough and shortness of breath. Cardiovascular:  Negative for chest pain and palpitations. Gastrointestinal:  Negative for abdominal pain, nausea and vomiting. Genitourinary:  Positive for difficulty urinating, dysuria, frequency, penile pain and urgency. Negative for hematuria, penile swelling and testicular pain. Musculoskeletal:  Negative for back pain and gait problem. Skin:  Negative for rash and wound. Neurological:  Negative for dizziness and headaches. Psychiatric/Behavioral:  Negative for behavioral problems and confusion. All other systems reviewed and are negative. Physical Exam   Physical Exam  Vitals and nursing note reviewed. Constitutional:       Appearance: He is not toxic-appearing. HENT:      Head: Normocephalic and atraumatic. Mouth/Throat:      Mouth: Mucous membranes are moist.   Eyes:      Extraocular Movements: Extraocular movements intact. Pupils: Pupils are equal, round, and reactive to light. Cardiovascular:      Rate and Rhythm: Normal rate and regular rhythm. Pulmonary:      Effort: Pulmonary effort is normal. No respiratory distress. Abdominal:      Comments: Mild suprapubic tenderness to palpation. Musculoskeletal:         General: No deformity.  Normal range of motion. Cervical back: Normal range of motion and neck supple. Skin:     General: Skin is warm and dry. Neurological:      General: No focal deficit present. Mental Status: He is alert and oriented to person, place, and time. Psychiatric:         Behavior: Behavior normal.         Diagnostic Study Results     Labs -   No results found for this or any previous visit (from the past 12 hour(s)). Radiologic Studies -   No orders to display     CT Results  (Last 48 hours)      None          CXR Results  (Last 48 hours)      None              Medical Decision Making   I am the first provider for this patient. I reviewed the vital signs, available nursing notes, past medical history, past surgical history, family history and social history. Vital Signs-Reviewed the patient's vital signs. Patient Vitals for the past 12 hrs:   Temp Pulse Resp BP SpO2   12/14/22 1903 99.2 °F (37.3 °C) 87 22 139/72 98 %         Records Reviewed: Nursing Notes and Old Medical Records      Provider Notes (Medical Decision Making):   DDx: Urinary retention, urinary tract infection    This is a 67-year-old who presents with dysuria and difficulty urinating. Bedside bladder scan is showing 500 mL in the bladder. Given he is out of home self cath supplies, will plan to insert Ball catheter. ED Course:   Initial assessment performed. The patients presenting problems have been discussed, and they are in agreement with the care plan formulated and outlined with them. I have encouraged them to ask questions as they arise throughout their visit. Ball was placed and patient reports improvement in suprapubic discomfort. He does complain of pain to his penis. There was some blood that drained into the Ball but this cleared and then began draining clear urine again. Advised that he continue taking the Levaquin as prescribed by PCP and urine culture was sent today.   Discussed return precautions. Disposition:  9:26 PM  The patient has been re-evaluated and is ready for discharge. Reviewed available results with patient. Counseled patient on diagnosis and care plan. Patient has expressed understanding, and all questions have been answered. Patient agrees with plan and agrees to follow up as recommended, or to return to the ED if their symptoms worsen. Discharge instructions have been provided and explained to the patient, along with reasons to return to the ED. PLAN:  1. Discharge Medication List as of 12/14/2022  9:26 PM        START taking these medications    Details   acetaminophen (Tylenol Extra Strength) 500 mg tablet Take 2 Tablets by mouth every six (6) hours as needed for Pain., Normal, Disp-30 Tablet, R-0      lidocaine (XYLOCAINE) 2 % jelly Apply to the tip of the penis as needed for pain., Normal, Disp-30 mL, R-0           CONTINUE these medications which have NOT CHANGED    Details   levoFLOXacin (Levaquin) 500 mg tablet Take 1 Tablet by mouth daily. , Normal, Disp-10 Tablet, R-0      ferrous sulfate 325 mg (65 mg iron) tablet Take 1 Tablet by mouth two (2) times daily (with meals). On an empty stomach with Vitamin C (like OJ), Normal, Disp-180 Tablet, R-3      pantoprazole (PROTONIX) 40 mg tablet Take 1 Tablet by mouth daily. , Normal, Disp-30 Tablet, R-0      albuterol (PROVENTIL HFA, VENTOLIN HFA, PROAIR HFA) 90 mcg/actuation inhaler Take 2 Puffs by inhalation every four (4) hours as needed for Wheezing., Normal, Disp-1 Each, R-0      atorvastatin (LIPITOR) 80 mg tablet Take 1 Tablet by mouth nightly., Normal, Disp-90 Tablet, R-1      gabapentin (NEURONTIN) 100 mg capsule Take 1 Capsule by mouth nightly.  Max Daily Amount: 100 mg., Normal, Disp-90 Capsule, R-1      phenazopyridine (Pyridium) 200 mg tablet Take 1 Tablet by mouth three (3) times daily as needed for Pain., Normal, Disp-10 Tablet, R-0      predniSONE (DELTASONE) 20 mg tablet Take 3 Tablets by mouth daily for 7 days. With Breakfast, Normal, Disp-21 Tablet, R-0      cetirizine (ZYRTEC) 10 mg tablet Take 1 Tablet by mouth daily for 30 days. , Normal, Disp-30 Tablet, R-0      furosemide (LASIX) 20 mg tablet Historical Med      oxybutynin chloride XL (DITROPAN XL) 5 mg CR tablet Historical Med      tamsulosin (FLOMAX) 0.4 mg capsule Historical Med      glimepiride (AMARYL) 4 mg tablet Take 1 Tablet by mouth every morning. To REPLACE Glipizide, Normal, Disp-90 Tablet, R-3      empagliflozin (Jardiance) 25 mg tablet Take 1 Tablet by mouth daily. Indications: type 2 diabetes mellitus, Normal, Disp-90 Tablet, R-3      benazepril-hydroCHLOROthiazide (LOTENSIN HCT) 5-6.25 mg per tablet Take 1 Tablet by mouth daily. , Normal, Disp-90 Tablet, R-1      Blood-Glucose Meter monitoring kit Check blood sugar daily. May substitute for insurance preferred meter, Normal, Disp-1 Kit, R-0      glucose blood VI test strips (blood glucose test) strip Check blood sugar 2 times daily: E11.65, may substitute for insurance preferred strips., Normal, Disp-200 Strip, R-3      lancets misc Check blood sugar 2 times daily. May substitute for insurance preferred lancets., Normal, Disp-1 Each, R-11      alcohol swabs (Alcohol Pads) padm Use before checking blood sugar level to cleanse skin, Normal, Disp-200 Pad, R-3           2. Follow-up Information       Follow up With Specialties Details Why Contact Info    Garrett Soto MD Urology Schedule an appointment as soon as possible for a visit in 1 week for a recheck of madison and further management of retention [address]  [address] EMERGENCY DEPT Emergency Medicine Go to  If symptoms worsen Tony Yipchandni  [phone]          Return to ED if worse     Diagnosis     Clinical Impression:   1. Urinary retention            Dayanna Rangel.  ROLAND Robbins  12/15/22 1:07 AM

## 2022-12-15 NOTE — ED NOTES
Patient discharged home with madison attached to leg bag. Discharge instructions were given to the patient by Deana Bletrán RN     The patient left the Emergency Department ambulatory, alert and oriented and in no acute distress with 2 prescriptions. The patient was encouraged to call or return to the ED for worsening issues or problems and was encouraged to schedule a follow up appointment for continuing care. The patient verbalized understanding of discharge instructions and prescriptions, all questions were answered. The patient has no further concerns at this time.

## 2022-12-24 LAB
BACTERIA SPEC CULT: NORMAL
CC UR VC: NORMAL
SERVICE CMNT-IMP: NORMAL

## 2023-01-03 ENCOUNTER — OFFICE VISIT (OUTPATIENT)
Dept: INTERNAL MEDICINE CLINIC | Age: 73
End: 2023-01-03
Payer: MEDICARE

## 2023-01-03 VITALS
DIASTOLIC BLOOD PRESSURE: 70 MMHG | RESPIRATION RATE: 16 BRPM | WEIGHT: 227.8 LBS | BODY MASS INDEX: 34.53 KG/M2 | HEART RATE: 65 BPM | OXYGEN SATURATION: 97 % | SYSTOLIC BLOOD PRESSURE: 115 MMHG | TEMPERATURE: 97 F | HEIGHT: 68 IN

## 2023-01-03 DIAGNOSIS — I10 ESSENTIAL HYPERTENSION: ICD-10-CM

## 2023-01-03 DIAGNOSIS — N18.31 TYPE 2 DIABETES MELLITUS WITH STAGE 3A CHRONIC KIDNEY DISEASE, WITHOUT LONG-TERM CURRENT USE OF INSULIN (HCC): ICD-10-CM

## 2023-01-03 DIAGNOSIS — F41.8 ANXIETY ABOUT HEALTH: ICD-10-CM

## 2023-01-03 DIAGNOSIS — Z97.8 INDWELLING FOLEY CATHETER PRESENT: ICD-10-CM

## 2023-01-03 DIAGNOSIS — Z00.00 MEDICARE ANNUAL WELLNESS VISIT, SUBSEQUENT: ICD-10-CM

## 2023-01-03 DIAGNOSIS — Z11.59 NEED FOR HEPATITIS C SCREENING TEST: ICD-10-CM

## 2023-01-03 DIAGNOSIS — E11.42 DIABETIC POLYNEUROPATHY ASSOCIATED WITH TYPE 2 DIABETES MELLITUS (HCC): Primary | ICD-10-CM

## 2023-01-03 DIAGNOSIS — J30.89 ENVIRONMENTAL AND SEASONAL ALLERGIES: ICD-10-CM

## 2023-01-03 DIAGNOSIS — Z71.89 ADVANCE CARE PLANNING: ICD-10-CM

## 2023-01-03 DIAGNOSIS — C61 PROSTATE CANCER (HCC): ICD-10-CM

## 2023-01-03 DIAGNOSIS — E11.22 TYPE 2 DIABETES MELLITUS WITH STAGE 3A CHRONIC KIDNEY DISEASE, WITHOUT LONG-TERM CURRENT USE OF INSULIN (HCC): ICD-10-CM

## 2023-01-03 DIAGNOSIS — E78.2 MIXED HYPERLIPIDEMIA: ICD-10-CM

## 2023-01-03 PROCEDURE — G0439 PPPS, SUBSEQ VISIT: HCPCS | Performed by: NURSE PRACTITIONER

## 2023-01-03 PROCEDURE — 2022F DILAT RTA XM EVC RTNOPTHY: CPT | Performed by: NURSE PRACTITIONER

## 2023-01-03 PROCEDURE — 3046F HEMOGLOBIN A1C LEVEL >9.0%: CPT | Performed by: NURSE PRACTITIONER

## 2023-01-03 PROCEDURE — 1101F PT FALLS ASSESS-DOCD LE1/YR: CPT | Performed by: NURSE PRACTITIONER

## 2023-01-03 PROCEDURE — 99214 OFFICE O/P EST MOD 30 MIN: CPT | Performed by: NURSE PRACTITIONER

## 2023-01-03 PROCEDURE — G8427 DOCREV CUR MEDS BY ELIG CLIN: HCPCS | Performed by: NURSE PRACTITIONER

## 2023-01-03 PROCEDURE — 3017F COLORECTAL CA SCREEN DOC REV: CPT | Performed by: NURSE PRACTITIONER

## 2023-01-03 PROCEDURE — G8417 CALC BMI ABV UP PARAM F/U: HCPCS | Performed by: NURSE PRACTITIONER

## 2023-01-03 PROCEDURE — G8536 NO DOC ELDER MAL SCRN: HCPCS | Performed by: NURSE PRACTITIONER

## 2023-01-03 PROCEDURE — G8432 DEP SCR NOT DOC, RNG: HCPCS | Performed by: NURSE PRACTITIONER

## 2023-01-03 RX ORDER — GABAPENTIN 300 MG/1
300 CAPSULE ORAL
Qty: 90 CAPSULE | Refills: 1 | Status: SHIPPED | OUTPATIENT
Start: 2023-01-03

## 2023-01-03 RX ORDER — MINERAL OIL
180 ENEMA (ML) RECTAL
Qty: 30 TABLET | Refills: 2 | Status: SHIPPED | OUTPATIENT
Start: 2023-01-03

## 2023-01-03 RX ORDER — BENZONATATE 200 MG/1
200 CAPSULE ORAL
Qty: 21 CAPSULE | Refills: 0 | Status: SHIPPED | OUTPATIENT
Start: 2023-01-03 | End: 2023-01-10

## 2023-01-03 NOTE — PROGRESS NOTES
Chief Complaint   Patient presents with    Diabetes    Hypertension     Follow up     Pt states he needs his feet to be checked, toenails need to be clipped, and is having swelling in feet. 1. \"Have you been to the ER, urgent care clinic since your last visit? Hospitalized since your last visit? \" No    2. \"Have you seen or consulted any other health care providers outside of the [address] [place] [address] since your last visit? \" No     3. For patients aged 39-70: Has the patient had a colonoscopy / FIT/ Cologuard? Yes - no Care Gap present      If the patient is female:    4. For patients aged 41-77: Has the patient had a mammogram within the past 2 years? NA - based on age or sex      11. For patients aged 21-65: Has the patient had a pap smear?  NA - based on age or sex    Visit Vitals  /70 (BP 1 Location: Left upper arm, BP Patient Position: Sitting)   Pulse 65   Temp 97 °F (36.1 °C) (Temporal)   Resp 16   Ht 5' 8\" (1.727 m)   Wt 227 lb 12.8 oz (103.3 kg)   SpO2 97%   BMI 34.64 kg/m²

## 2023-01-03 NOTE — PATIENT INSTRUCTIONS
Call Dr. Po Foster [phone] about your Ball catheter in place since 12/14/22 for worsening urinary retention.

## 2023-01-03 NOTE — ACP (ADVANCE CARE PLANNING)
Advanced care planning- discussed Advance directive, Medical POA, and life sustaining options. Advised of free virtual or in-person visit for advance care planning paperwork completion with ACP specialist. Referreal sent. Advance Care Planning (ACP) Provider Conversation Snapshot    Date of ACP Conversation: 01/03/23  Persons included in Conversation:  Patient/family  Length of ACP Conversation in minutes:  5-10 minutes    Authorized Decision Maker (if patient is incapable of making informed decisions): This person is:   Healthcare Agent/Medical Power of  under Advance Directive        For Patients with Decision Making Capacity:   Intubation, CPR, use of IVF/Nutrition, Tube Feedings, and organ donation options reviewed briefly    Conversation Outcomes / Follow-Up Plan:   Recommended completion of Advance Directive form after review of ACP materials and conversation with prospective healthcare agent     Referral made for ACP follow-up assistance to:  ACP facilitator/specialist    ====Advance Care Planning Invitation====    Patient was invited to begin or continue Advance Care Planning on this date and reviewed ACP materials in the office OR discussed in detail during virtual visit. Recommended appointment with a [employer]  facilitator for ACP conversation regarding advance directives. [x] Yes  [] No  Referral sent to [employer] ACP team member or Coordinator for follow-up    [] Yes  [x] No  Patient scheduled an appointment.        Site of Referral: [place] [address]

## 2023-01-03 NOTE — PROGRESS NOTES
Stella Eugene (: 1950) is a 67 y.o. male, established patient, here for evaluation of the following chief complaint(s):  Diabetes and Hypertension (Follow up)       ASSESSMENT/PLAN:  Below is the assessment and plan developed based on review of pertinent history, physical exam, labs, studies, and medications. 1. Diabetic polyneuropathy associated with type 2 diabetes mellitus (HCC)  -     REFERRAL TO PODIATRY  -     gabapentin (NEURONTIN) 300 mg capsule; Take 1 Capsule by mouth nightly. Max Daily Amount: 300 mg., Normal, Disp-90 Capsule, R-1  -     HEMOGLOBIN A1C WITH EAG  2. Indwelling Ball catheter present  -     REFERRAL TO HOME HEALTH  -     CBC WITH AUTOMATED DIFF  3. Prostate cancer ([facility] [address]  -     [address] [place] [address]  4. Type 2 diabetes mellitus with stage 3a chronic kidney disease, without long-term current use of insulin ([facility] [address]  5. Mixed hyperlipidemia  -     LIPID PANEL  6. Essential hypertension  -     METABOLIC PANEL, COMPREHENSIVE  -     CBC WITH AUTOMATED DIFF  7. Anxiety about health  8. Need for hepatitis C screening test  -     HCV RNA BY PCR W/REFL GENOTYPE  9. Environmental and seasonal allergies  -     benzonatate (TESSALON) 200 mg capsule; Take 1 Capsule by mouth three (3) times daily as needed for Cough for up to 7 days. , Normal, Disp-21 Capsule, R-0  -     fexofenadine (ALLEGRA) 180 mg tablet; Take 1 Tablet by mouth daily as needed for Allergies or Rhinitis., Normal, Disp-30 Tablet, R-2  10. Advance care planning  -     REFERRAL TO ACP CLINICAL SPECIALIST  11. Medicare annual wellness visit, subsequent  -     REFERRAL TO ACP CLINICAL SPECIALIST    Return in about 14 weeks (around 2023) for OV-HTN, CHOL, DM, pod/URO. Pt asked to complete follow by next visit: INCREASED Phan to 300 mg. Advised use of antihistamine for dry cough and sniffles after standing out in the rain recently. Referred to podiatry for onychomycosis and diabetic foot care.  Referred to [facility] per request for SN to address catheter care, accuchecks, and home BP monitoring. SUBJECTIVE/OBJECTIVE:  HPI    Pt presents for subsequent MWV and to f/u HTN, DM, & CHOL. No home BP readings or blood sugar level. Last eye exam: > 1 yr ago. Last foot exam/podiatry appt: > 1 yr ago. Taking most meds as prescribed. Denies side effects from medication. Feels foot pain, lonely, and ready for procedure. No report of unilateral weakness, headaches, blurring vision, CP, SOB,or  leg swelling. No report of polydipsia, polyphagia, or polyuria. BP Readings from Last 3 Encounters:   01/03/23 115/70   12/14/22 139/72   12/13/22 (!) 157/94       Last Point of Care HGB A1C  No results found for: [id number]   Lab Results   Component Value Date/Time    Hemoglobin A1c 8.6 (H) 09/11/2022 02:32 AM       Lab Results   Component Value Date/Time    Cholesterol (POC) 105 11/25/2022 11:27 AM    HDL Cholesterol (POC) 37 11/25/2022 11:27 AM    LDL Cholesterol (POC) 53 11/25/2022 11:27 AM    Triglycerides (POC) 73 11/25/2022 11:27 AM       Will have cystoscopy with URO, appt for 21st, but needs to arrange transportation. May call his sister. No abdominal pain and urine appears normal. Has occasional blood noted if catheter pulls on it wrong. Also has sniffles after standing out in the rain a few days ago. Occasional cough. Fine since waiting in room however.      Review of Systems  Constitutional: negative for fevers, chills, anorexia and weight loss  Respiratory:  negative for cough, hemoptysis, dyspnea, and wheezing  CV:   negative for chest pain, palpitations, and lower extremity edema  GI:   negative for nausea, vomiting, diarrhea, abdominal pain, and melena  Endo:               negative for polyuria,polydipsia,polyphagia, and heat intolerance  Genitourinary: negative for retention, and hematuria  Integument:  negative for rash, ulcerations, and pruritus  Hematologic:  negative for easy bruising and bleeding  Musculoskel: negative for arthralgias, muscle weakness,and joint pain/swelling  Neurological:  negative for headaches, dizziness, vertigo,and memory/gait problems  Behavl/Psych: negative for feelings of anxiety, depression, suicide, and mood changes    Visit Vitals  /70 (BP 1 Location: Left upper arm, BP Patient Position: Sitting)   Pulse 65   Temp 97 °F (36.1 °C) (Temporal)   Resp 16   Ht 5' 8\" (1.727 m)   Wt 227 lb 12.8 oz (103.3 kg)   SpO2 97%   BMI 34.64 kg/m²       Wt Readings from Last 3 Encounters:   01/03/23 227 lb 12.8 oz (103.3 kg)   12/14/22 210 lb 12.2 oz (95.6 kg)   12/13/22 201 lb (91.2 kg)         Physical Exam:   General appearance - alert, well appearing, and in mild distress. Restless, less than last visit. Mental status - A/O x 4, mildly anxious mood and affect. Chest -  CTA. Symmetric chest rise. No wheezing. No distress. Heart - Normal rate & rhythm. Normal S1 & S2. No MGR. Abdomen- Soft, round. Non-distended, NT. No pulsatile masses or hernias. No CVAT. Ext-  No pedal edema, clubbing, or cyanosis. Skin-Warm and dry. No hyperpigmentation, ulcerations, or suspicious lesions. Neuro - pressured speech, no focal findings or movement disorder. Normal strength, gait, and muscle tone. Diabetic foot exam: Hyperkeratinized LONG toenails with yellowish discoloration and thick, xerotic skin between toes and along plantar surface and heels. No wounds or erythema noted. Callous to left 4th toe. Left Foot:   Visual Exam: normal    Pulse DP: 1+ (weak)   Filament test: 3/6         Right Foot:   Visual Exam: normal    Pulse DP: 1+ (weak)   Filament test: 3/6     Assessment of cognitive impairment: Alert and oriented x 4    Depression Screen:   3 most recent PHQ Screens 1/3/2023   Little interest or pleasure in doing things Not at all   Feeling down, depressed, irritable, or hopeless Not at all   Total Score PHQ 2 0       Fall Risk Assessment:    Fall Risk Assessment, last 12 mths 1/3/2023   Able to walk?  Yes   Fall in past 15 months? 0   Do you feel unsteady? 0   Are you worried about falling 0       Abuse Assessment: Patient not domesticly abuse (verbal, physical, and financial)    Current Alcohol use: no   reports no history of alcohol use. Functional Ability:   Does the patient exhibit a steady gait? Yes   How long did it take the patient to get up and walk from a sitting position? 4 seconds   Is the patient self reliant?  (ie can do own laundry, meals, household chores) yes, but prefers helper     Does the patient handle his/her own medications? yes     Does the patient handle his/her own money? Yes     Is the patients home safe (ie good lighting, handrails on stairs and bath, etc.)? Yes   Did you notice or did patient express any hearing difficulties? no   Did you notice of did patient express any vision difficulties?  no   Were distance and reading eye charts used? n/a     Advance Care Planning:   Patient was offered the opportunity to discuss advance care planning:  Yes   Does patient have an Advance Directive: No   If no, did you provide information and forms? yes     Health Maintenance   Topic Date Due    Hepatitis C Screening  Never done    Foot Exam Q1  Never done    Eye Exam Retinal or Dilated  Never done    Diabetic Alb to Cr ratio (uACR) test  Never done    COVID-19 Vaccine (4 - Booster for Pfizer series) 04/04/2022    Shingles Vaccine (1 of 2) 04/10/2023 (Originally 12/3/1999)    Flu Vaccine (1) 06/30/2023 (Originally 8/1/2022)    DTaP/Tdap/Td series (1 - Tdap) 01/03/2024 (Originally 12/3/1970)    Pneumococcal 65+ years (1 - PCV) 01/03/2024 (Originally 12/3/1956)    A1C test (Diabetic or Prediabetic)  09/11/2023    Colorectal Cancer Screening Combo  09/12/2023    Depression Screen  11/25/2023    Lipid Screen  11/25/2023    GFR test (Diabetes, CKD 3-4, OR last GFR 15-59)  12/04/2023    Medicare Yearly Exam  01/04/2024     An electronic signature was used to authenticate this note.   -- Ross Adair NP

## 2023-01-04 ENCOUNTER — PATIENT OUTREACH (OUTPATIENT)
Dept: CASE MANAGEMENT | Age: 73
End: 2023-01-04

## 2023-01-04 NOTE — ACP (ADVANCE CARE PLANNING)
Advance Care Planning   Ambulatory ACP Specialist Patient Outreach    Date:  1/4/2023    ACP Specialist:  Jeff Beckham    Outreach call to patient in follow-up to ACP Specialist referral from: Maura Rodriguez NP    [x] PCP  [] Provider   [] Ambulatory Care Management [] Other     For:                  [x] Advance Directive Assistance              [] Complete Portable DNR order              [] Complete POST/MOST              [] Code Status Discussion             [] Discuss Goals of Care             [] Early ACP Decision-Making              [] Other (Specify)    Date Referral Received:  1/3/2023    Today's Outreach:  [x] First   [] Second  [] Third       Third outreach made by: [] Phone  [] Email / mail    [] Explorrat     Intervention:  [x] Spoke with Patient   [] Left VM requesting return call      Outcome:  Patient voiced concerns as to reason for ACP referral, but was willing to schedule a conversation with ACP Specialist Abdiel Herbert for Friday, January 6, 2023 at 3:00 PM to gather more information regarding ACP. Patient refused ACP information to be sent to him for review prior to meeting with ACP Specialist.    Next Step:   [x] ACP scheduled conversation  [] Outreach again in one week               [] Email / Mail [address]  [] Email / Mail Advance Directive   [] Closing referral.  Routing closure to referring provider/staff and to ACP Specialist . [] Closure letter mailed to patient with invitation to contact ACP Specialist if / when ready.   Thank you for this referral.

## 2023-01-06 ENCOUNTER — DOCUMENTATION ONLY (OUTPATIENT)
Dept: CASE MANAGEMENT | Age: 73
End: 2023-01-06

## 2023-01-06 NOTE — ACP (ADVANCE CARE PLANNING)
Advance Care Planning   Ambulatory ACP Specialist Patient Outreach    Date:  1/6/2023    ACP Specialist:  Jess Cowart LCSW    Outreach call to patient in follow-up to ACP Specialist referral from:    [x] PCP  [] Provider   [] Ambulatory Care Management [] Other     For:                  [x] Advance Directive Assistance              [] Complete Portable DNR order              [] Complete POST/MOST              [] Code Status Discussion             [] Discuss Goals of Care             [] Early ACP Decision-Making              [] Other (Specify)    Date Referral Received: 1/3/2023    Today's Outreach:  [] First   [x] Second  [] Third       Third outreach made by: [] Phone  [] Email / mail    [] MyChart     Intervention:  [x] Spoke with Patient   [] Left VM requesting return call      Outcome: ACP Specialist spoke with patient on 1/5/2023 to offer a reminder call regarding his appointment today at 3:00pm.  Pt called this ACP Specialist at 1:30pm today questioning the 3:00pm appointment. After trying to explain to him what Advance Care Planning is and the purpose of our appointment, patient requested an in-person ACP appointment to discuss further. ACP Coordinator will set patient up with an in-person appointment with another ACP Specialist.     Next Step:   [] ACP scheduled conversation  [x] Outreach again in one week               [] Email / Mail ACP Info Sheets  [] Email / Mail Advance Directive   [] Closing referral.  Routing closure to referring provider/staff and to ACP Specialist . [] Closure letter mailed to patient with invitation to contact ACP Specialist if / when ready. Thank you for this referral.     Nanette Coello.  Edrie Holstein, LISW-CP  Advance Care   [phone]

## 2023-01-10 ENCOUNTER — DOCUMENTATION ONLY (OUTPATIENT)
Dept: CASE MANAGEMENT | Age: 73
End: 2023-01-10

## 2023-01-10 NOTE — ACP (ADVANCE CARE PLANNING)
Advance Care Planning   Ambulatory ACP Specialist Patient Outreach    Date:  1/10/2023    ACP Specialist:  Partha Mata LCSW    Outreach call to patient in follow-up to ACP Specialist referral from:    [x] PCP  [] Provider   [] Ambulatory Care Management [] Other     For:                  [x] Advance Directive Assistance              [] Complete Portable DNR order              [] Complete POST/MOST              [] Code Status Discussion             [] Discuss Goals of Care             [] Early ACP Decision-Making              [] Other (Specify)    Date Referral Received:1/3/2023    Today's Outreach:  [] First   [] Second  [x] Third       Third outreach made by: [] Phone  [] Email / mail    [] MyChart     Intervention:  [] Spoke with Patient   [x] Left VM requesting return call      Jeannette Leggett briefly to pt who could not talk with me at the time. He asked I call him back in an hour. I called without success in reaching him. Left message explaining reach for my call as well as offered to have in person appt as well. Specialist left contact info requesting a return call as well as will reach out to him in one day. Next Step:   [] ACP scheduled conversation  [x] Outreach again in one DAY               [] Email / Mail ACP Info Sheets  [] Email / Mail Advance Directive   [] Closing referral.  Routing closure to referring provider/staff and to ACP Specialist . [] Closure letter mailed to patient with invitation to contact ACP Specialist if / when ready.   Thank you for this referral.  Partha Mata LCSW

## 2023-01-11 ENCOUNTER — DOCUMENTATION ONLY (OUTPATIENT)
Dept: CASE MANAGEMENT | Age: 73
End: 2023-01-11

## 2023-01-11 NOTE — ACP (ADVANCE CARE PLANNING)
Advance Care Planning   Ambulatory ACP Specialist Patient Outreach    Date:  1/11/2023    ACP Specialist:  Kingsley Murrieta LCSW    Outreach call to patient in follow-up to ACP Specialist referral from:    [x] PCP  [] Provider   [] Ambulatory Care Management [] Other     For:                  [x] Advance Directive Assistance              [] Complete Portable DNR order              [] Complete POST/MOST              [] Code Status Discussion             [] Discuss Goals of Care             [] Early ACP Decision-Making              [] Other (Specify)    Date Referral Received:1/3/2023    Today's Outreach:  [] First   [] Second  [x] Third       Third outreach made by: [] Phone  [] Email / mail    [] Zeohart     Intervention:  [x] Spoke with Patient   [] Left VM requesting return call      Outcome: Spoke with pt late morning who asked I call him later today due to having woke him. Specialist attempted to reach him again at 2:45 pm with no answer. Left voice mail requesting a return call so ACP program can be explained in more detail and option of in person conversation to be presented to him. Next Step:   [] ACP scheduled conversation  [x] Outreach again in one week               [] Email / Mail ACP Info Sheets  [] Email / Mail Advance Directive   [] Closing referral.  Routing closure to referring provider/staff and to ACP Specialist . [] Closure letter mailed to patient with invitation to contact ACP Specialist if / when ready.   Thank you for this referral.  Kingsley Murrieta LCSW

## 2023-01-19 ENCOUNTER — DOCUMENTATION ONLY (OUTPATIENT)
Dept: CASE MANAGEMENT | Age: 73
End: 2023-01-19

## 2023-01-19 NOTE — ACP (ADVANCE CARE PLANNING)
Advance Care Planning   Ambulatory ACP Specialist Patient Outreach    Date:  1/19/2023    ACP Specialist:  Noel eBnitez LCSW    Outreach call to patient in follow-up to ACP Specialist referral from:    [x] PCP  [] Provider   [] Ambulatory Care Management [] Other     For:                  [x] Advance Directive Assistance              [] Complete Portable DNR order              [] Complete POST/MOST              [] Code Status Discussion             [] Discuss Goals of Care             [] Early ACP Decision-Making              [] Other (Specify)    Date Referral Received:01/03/2023    Today's Outreach:  [] First   [] Second  [x] Third       Third outreach made by: [x] Phone  [] Email / mail    [] Peek Kidshart     Intervention:  [x] Spoke with Patient   [] Left VM requesting return call      Outcome: Pt informed Specialist of his current stay at [facility]. He stated staff had visited him re: ACP. He did not want to follow up with [facility] re: ACP at this time. Will close the referral.    Next Step:   [] ACP scheduled conversation  [] Outreach again in one week               [] Email / Mail ACP Info Sheets  [] Email / Mail Advance Directive   [x] Closing referral.  Routing closure to referring provider/staff and to ACP Specialist . [] Closure letter mailed to patient with invitation to contact ACP Specialist if / when ready.   Thank you for this referral.  Noel Benitez LCSW

## 2023-02-16 DIAGNOSIS — K21.9 GASTROESOPHAGEAL REFLUX DISEASE, UNSPECIFIED WHETHER ESOPHAGITIS PRESENT: ICD-10-CM

## 2023-02-16 RX ORDER — PANTOPRAZOLE SODIUM 40 MG/1
40 TABLET, DELAYED RELEASE ORAL DAILY
Qty: 30 TABLET | Refills: 0 | Status: SHIPPED | OUTPATIENT
Start: 2023-02-16

## 2023-02-16 NOTE — TELEPHONE ENCOUNTER
Patient came up to the office because Flomax and is needing a refill on his medication.  It is no longer on his current medication list because it has been discontinued

## 2023-03-11 DIAGNOSIS — K21.9 GASTROESOPHAGEAL REFLUX DISEASE, UNSPECIFIED WHETHER ESOPHAGITIS PRESENT: ICD-10-CM

## 2023-03-13 RX ORDER — PANTOPRAZOLE SODIUM 40 MG/1
TABLET, DELAYED RELEASE ORAL
Qty: 30 TABLET | Refills: 0 | Status: SHIPPED | OUTPATIENT
Start: 2023-03-13

## 2023-03-14 LAB — HBA1C MFR BLD HPLC: 5.7 %

## 2023-05-09 ENCOUNTER — TELEPHONE (OUTPATIENT)
Facility: CLINIC | Age: 73
End: 2023-05-09

## 2023-05-11 ENCOUNTER — TELEPHONE (OUTPATIENT)
Facility: CLINIC | Age: 73
End: 2023-05-11

## 2023-05-11 DIAGNOSIS — R33.8 BENIGN PROSTATIC HYPERPLASIA WITH URINARY RETENTION: ICD-10-CM

## 2023-05-11 DIAGNOSIS — I10 ESSENTIAL (PRIMARY) HYPERTENSION: Primary | ICD-10-CM

## 2023-05-11 DIAGNOSIS — R60.0 BILATERAL LEG EDEMA: ICD-10-CM

## 2023-05-11 DIAGNOSIS — N40.1 BENIGN PROSTATIC HYPERPLASIA WITH URINARY RETENTION: ICD-10-CM

## 2023-05-11 RX ORDER — AMLODIPINE BESYLATE 5 MG/1
5 TABLET ORAL DAILY
Qty: 30 TABLET | Refills: 0 | Status: SHIPPED | OUTPATIENT
Start: 2023-05-11

## 2023-05-11 NOTE — TELEPHONE ENCOUNTER
Dorice Heimlich with [facility] called stating pt's bp today was 170/102 left arm and after resting 158/95 right arm. There is pitting edema both legs (Plus One). Pt is not taking any bp eds.     Amy's # [id number] [phone]

## 2023-05-11 NOTE — PROGRESS NOTES
HH called with elevated BP and edema. Pt used to take benazapril-HCTZ (5-6.25 mg). However had CKD vs MARNIE with Dec labs. No current anti-hypertensive meds taken. Next OV scheduled for June. Amlodipine ordered today with NV in 1-2 wk for BP check on meds with repeat labs ordered to assess renal function and edema cause.    BP Readings from Last 3 Encounters:   01/03/23 115/70   12/13/22 (!) 157/94   11/25/22 126/66     Wt Readings from Last 3 Encounters:   01/03/23 227 lb 12.8 oz (103.3 kg)   12/13/22 201 lb (91.2 kg)   11/25/22 223 lb (101.2 kg)     Lab Results   Component Value Date    CREATININE 2.79 (H) 12/04/2022    BUN 27 (H) 12/04/2022     12/04/2022    K 3.1 (L) 12/04/2022     12/04/2022    CO2 22 12/04/2022

## 2023-05-19 RX ORDER — DOCUSATE SODIUM 250 MG
250 CAPSULE ORAL DAILY
Qty: 90 CAPSULE | Refills: 0 | Status: SHIPPED | OUTPATIENT
Start: 2023-05-19

## 2023-05-19 RX ORDER — FERROUS SULFATE 325(65) MG
325 TABLET ORAL 2 TIMES DAILY WITH MEALS
Qty: 90 TABLET | Refills: 0 | Status: SHIPPED | OUTPATIENT
Start: 2023-05-19

## 2023-05-19 RX ORDER — GLIMEPIRIDE 4 MG/1
4 TABLET ORAL
Qty: 90 TABLET | Refills: 0 | Status: SHIPPED | OUTPATIENT
Start: 2023-05-19

## 2023-06-03 DIAGNOSIS — I10 ESSENTIAL (PRIMARY) HYPERTENSION: ICD-10-CM

## 2023-06-05 RX ORDER — AMLODIPINE BESYLATE 5 MG/1
TABLET ORAL
Qty: 30 TABLET | Refills: 0 | Status: SHIPPED | OUTPATIENT
Start: 2023-06-05

## 2023-06-06 RX ORDER — ATORVASTATIN CALCIUM 80 MG/1
TABLET, FILM COATED ORAL
Qty: 90 TABLET | Refills: 1 | Status: SHIPPED | OUTPATIENT
Start: 2023-06-06 | End: 2023-06-15 | Stop reason: SDUPTHER

## 2023-06-07 ENCOUNTER — TELEPHONE (OUTPATIENT)
Facility: CLINIC | Age: 73
End: 2023-06-07

## 2023-06-07 NOTE — TELEPHONE ENCOUNTER
Patient switching pharmacy, [facility] [address]:   atorvastatin (LIPITOR) 80 MG tablet    tamsulosin (FLOMAX) 0.4 MG capsule    pantoprazole (PROTONIX) 40 MG tablet

## 2023-06-09 DIAGNOSIS — I10 ESSENTIAL (PRIMARY) HYPERTENSION: Primary | ICD-10-CM

## 2023-06-09 DIAGNOSIS — N18.31 STAGE 3A CHRONIC KIDNEY DISEASE (HCC): ICD-10-CM

## 2023-06-09 DIAGNOSIS — E11.65 TYPE 2 DIABETES MELLITUS WITH HYPERGLYCEMIA (HCC): ICD-10-CM

## 2023-06-09 DIAGNOSIS — E11.42 TYPE 2 DIABETES MELLITUS WITH DIABETIC POLYNEUROPATHY, WITHOUT LONG-TERM CURRENT USE OF INSULIN (HCC): ICD-10-CM

## 2023-06-09 DIAGNOSIS — I10 ESSENTIAL HYPERTENSION: ICD-10-CM

## 2023-06-29 DIAGNOSIS — I10 ESSENTIAL (PRIMARY) HYPERTENSION: ICD-10-CM

## 2023-06-29 RX ORDER — AMLODIPINE BESYLATE 5 MG/1
TABLET ORAL
Qty: 30 TABLET | OUTPATIENT
Start: 2023-06-29

## 2023-07-13 ENCOUNTER — TELEPHONE (OUTPATIENT)
Facility: CLINIC | Age: 73
End: 2023-07-13

## 2023-07-13 NOTE — TELEPHONE ENCOUNTER
Patient was calling for refill on blood pressure medication. I put on hold. Call disconnect. I tried calling  back .  lvm

## 2023-07-20 ENCOUNTER — TELEPHONE (OUTPATIENT)
Facility: CLINIC | Age: 73
End: 2023-07-20

## 2023-07-20 NOTE — TELEPHONE ENCOUNTER
[facility] Maria nurse wants an order for   for community resources for this patient please.   Any's #  [phone]

## 2023-08-04 ENCOUNTER — TELEPHONE (OUTPATIENT)
Facility: CLINIC | Age: 73
End: 2023-08-04

## 2023-08-04 NOTE — TELEPHONE ENCOUNTER
SHARITA Hyde with [facility] health [facility] as of 8/1/23 pt has [facility] and they are not in network  so they are discharging him.   Unique's # [phone]

## 2023-08-18 DIAGNOSIS — R60.0 BILATERAL LEG EDEMA: ICD-10-CM

## 2023-08-21 RX ORDER — FUROSEMIDE 20 MG/1
TABLET ORAL
Qty: 90 TABLET | Refills: 0 | OUTPATIENT
Start: 2023-08-21

## 2023-08-24 ENCOUNTER — TELEPHONE (OUTPATIENT)
Facility: CLINIC | Age: 73
End: 2023-08-24

## 2023-08-24 NOTE — TELEPHONE ENCOUNTER
Devante Wendover with [facility] home health needs an order for care of nephrostomy tube , wash with soap and water and cover with gauze.   [facility] ph# [phone]

## 2023-08-30 ENCOUNTER — TELEPHONE (OUTPATIENT)
Facility: CLINIC | Age: 73
End: 2023-08-30

## 2023-08-30 NOTE — TELEPHONE ENCOUNTER
Patient called asking for assistance/resources. States he is going to be evicted from his residence within 72 hours.

## 2023-08-31 RX ORDER — GLIMEPIRIDE 4 MG/1
TABLET ORAL
Qty: 90 TABLET | Refills: 3 | OUTPATIENT
Start: 2023-08-31

## 2023-09-08 DIAGNOSIS — R60.0 BILATERAL LEG EDEMA: ICD-10-CM

## 2023-09-08 RX ORDER — FUROSEMIDE 20 MG/1
TABLET ORAL
Qty: 90 TABLET | Refills: 0 | OUTPATIENT
Start: 2023-09-08

## 2023-09-08 RX ORDER — GLIMEPIRIDE 4 MG/1
TABLET ORAL
Qty: 90 TABLET | Refills: 3 | Status: SHIPPED | OUTPATIENT
Start: 2023-09-08

## 2024-01-04 ENCOUNTER — APPOINTMENT (OUTPATIENT)
Facility: HOSPITAL | Age: 74
DRG: 393 | End: 2024-01-04
Payer: MEDICAID

## 2024-01-04 ENCOUNTER — HOSPITAL ENCOUNTER (INPATIENT)
Facility: HOSPITAL | Age: 74
LOS: 6 days | Discharge: NURSING, REHAB OR LONG-TERM CARE | DRG: 393 | End: 2024-01-10
Attending: EMERGENCY MEDICINE | Admitting: INTERNAL MEDICINE
Payer: MEDICAID

## 2024-01-04 DIAGNOSIS — E87.6 HYPOKALEMIA: ICD-10-CM

## 2024-01-04 DIAGNOSIS — C61 PROSTATE CANCER (HCC): ICD-10-CM

## 2024-01-04 DIAGNOSIS — N39.0 COMPLICATED UTI (URINARY TRACT INFECTION): ICD-10-CM

## 2024-01-04 DIAGNOSIS — R79.89 ABNORMAL LFTS: ICD-10-CM

## 2024-01-04 DIAGNOSIS — K56.609 SBO (SMALL BOWEL OBSTRUCTION) (HCC): Primary | ICD-10-CM

## 2024-01-04 LAB
ALBUMIN SERPL-MCNC: 2.5 G/DL (ref 3.5–5)
ALBUMIN/GLOB SERPL: 0.4 (ref 1.1–2.2)
ALP SERPL-CCNC: 147 U/L (ref 45–117)
ALT SERPL-CCNC: 36 U/L (ref 12–78)
ANION GAP SERPL CALC-SCNC: 7 MMOL/L (ref 5–15)
APPEARANCE UR: ABNORMAL
APPEARANCE UR: ABNORMAL
AST SERPL-CCNC: 71 U/L (ref 15–37)
BACTERIA URNS QL MICRO: ABNORMAL /HPF
BACTERIA URNS QL MICRO: ABNORMAL /HPF
BASOPHILS # BLD: 0.1 K/UL (ref 0–0.1)
BASOPHILS NFR BLD: 0 % (ref 0–1)
BILIRUB SERPL-MCNC: 1.7 MG/DL (ref 0.2–1)
BILIRUB UR QL CFM: NEGATIVE
BILIRUB UR QL CFM: NEGATIVE
BUN SERPL-MCNC: 27 MG/DL (ref 6–20)
BUN/CREAT SERPL: 11 (ref 12–20)
CALCIUM SERPL-MCNC: 9.5 MG/DL (ref 8.5–10.1)
CHLORIDE SERPL-SCNC: 104 MMOL/L (ref 97–108)
CO2 SERPL-SCNC: 23 MMOL/L (ref 21–32)
COLOR UR: ABNORMAL
COLOR UR: ABNORMAL
COMMENT:: NORMAL
CREAT SERPL-MCNC: 2.43 MG/DL (ref 0.7–1.3)
DIFFERENTIAL METHOD BLD: ABNORMAL
EOSINOPHIL # BLD: 0 K/UL (ref 0–0.4)
EOSINOPHIL NFR BLD: 0 % (ref 0–7)
EPITH CASTS URNS QL MICRO: ABNORMAL /LPF
EPITH CASTS URNS QL MICRO: ABNORMAL /LPF
ERYTHROCYTE [DISTWIDTH] IN BLOOD BY AUTOMATED COUNT: 15.8 % (ref 11.5–14.5)
EST. AVERAGE GLUCOSE BLD GHB EST-MCNC: 134 MG/DL
FLUAV AG NPH QL IA: NEGATIVE
FLUAV RNA SPEC QL NAA+PROBE: NOT DETECTED
FLUBV AG NOSE QL IA: NEGATIVE
FLUBV RNA SPEC QL NAA+PROBE: NOT DETECTED
GLOBULIN SER CALC-MCNC: 6.5 G/DL (ref 2–4)
GLUCOSE BLD STRIP.AUTO-MCNC: 130 MG/DL (ref 65–117)
GLUCOSE SERPL-MCNC: 180 MG/DL (ref 65–100)
GLUCOSE UR STRIP.AUTO-MCNC: NEGATIVE MG/DL
GLUCOSE UR STRIP.AUTO-MCNC: NEGATIVE MG/DL
HBA1C MFR BLD: 6.3 % (ref 4–5.6)
HCT VFR BLD AUTO: 29 % (ref 36.6–50.3)
HGB BLD-MCNC: 9.4 G/DL (ref 12.1–17)
HGB UR QL STRIP: ABNORMAL
HGB UR QL STRIP: ABNORMAL
IMM GRANULOCYTES # BLD AUTO: 0.1 K/UL (ref 0–0.04)
IMM GRANULOCYTES NFR BLD AUTO: 0 % (ref 0–0.5)
KETONES UR QL STRIP.AUTO: ABNORMAL MG/DL
KETONES UR QL STRIP.AUTO: ABNORMAL MG/DL
LACTATE SERPL-SCNC: 1.4 MMOL/L (ref 0.4–2)
LEUKOCYTE ESTERASE UR QL STRIP.AUTO: ABNORMAL
LEUKOCYTE ESTERASE UR QL STRIP.AUTO: ABNORMAL
LYMPHOCYTES # BLD: 1 K/UL (ref 0.8–3.5)
LYMPHOCYTES NFR BLD: 7 % (ref 12–49)
MCH RBC QN AUTO: 26.9 PG (ref 26–34)
MCHC RBC AUTO-ENTMCNC: 32.4 G/DL (ref 30–36.5)
MCV RBC AUTO: 83.1 FL (ref 80–99)
MONOCYTES # BLD: 0.9 K/UL (ref 0–1)
MONOCYTES NFR BLD: 6 % (ref 5–13)
NEUTS SEG # BLD: 12.2 K/UL (ref 1.8–8)
NEUTS SEG NFR BLD: 87 % (ref 32–75)
NITRITE UR QL STRIP.AUTO: NEGATIVE
NITRITE UR QL STRIP.AUTO: POSITIVE
NRBC # BLD: 0 K/UL (ref 0–0.01)
NRBC BLD-RTO: 0 PER 100 WBC
PH UR STRIP: 5.5 (ref 5–8)
PH UR STRIP: 5.5 (ref 5–8)
PLATELET # BLD AUTO: 297 K/UL (ref 150–400)
PMV BLD AUTO: 9 FL (ref 8.9–12.9)
POTASSIUM SERPL-SCNC: 2.8 MMOL/L (ref 3.5–5.1)
PROT SERPL-MCNC: 9 G/DL (ref 6.4–8.2)
PROT UR STRIP-MCNC: >300 MG/DL
PROT UR STRIP-MCNC: >300 MG/DL
RBC # BLD AUTO: 3.49 M/UL (ref 4.1–5.7)
RBC #/AREA URNS HPF: ABNORMAL /HPF (ref 0–5)
RBC #/AREA URNS HPF: ABNORMAL /HPF (ref 0–5)
SARS-COV-2 RDRP RESP QL NAA+PROBE: NOT DETECTED
SARS-COV-2 RNA RESP QL NAA+PROBE: NOT DETECTED
SERVICE CMNT-IMP: ABNORMAL
SODIUM SERPL-SCNC: 134 MMOL/L (ref 136–145)
SOURCE: NORMAL
SP GR UR REFRACTOMETRY: 1.02 (ref 1–1.03)
SP GR UR REFRACTOMETRY: 1.02 (ref 1–1.03)
SPECIMEN HOLD: NORMAL
URINE CULTURE IF INDICATED: ABNORMAL
URINE CULTURE IF INDICATED: ABNORMAL
UROBILINOGEN UR QL STRIP.AUTO: >8 EU/DL (ref 0.2–1)
UROBILINOGEN UR QL STRIP.AUTO: >8 EU/DL (ref 0.2–1)
WBC # BLD AUTO: 14.2 K/UL (ref 4.1–11.1)
WBC URNS QL MICRO: >100 /HPF (ref 0–4)
WBC URNS QL MICRO: ABNORMAL /HPF (ref 0–4)

## 2024-01-04 PROCEDURE — 80053 COMPREHEN METABOLIC PANEL: CPT

## 2024-01-04 PROCEDURE — 96365 THER/PROPH/DIAG IV INF INIT: CPT

## 2024-01-04 PROCEDURE — 87186 SC STD MICRODIL/AGAR DIL: CPT

## 2024-01-04 PROCEDURE — 96368 THER/DIAG CONCURRENT INF: CPT

## 2024-01-04 PROCEDURE — 74018 RADEX ABDOMEN 1 VIEW: CPT

## 2024-01-04 PROCEDURE — 36415 COLL VENOUS BLD VENIPUNCTURE: CPT

## 2024-01-04 PROCEDURE — A4216 STERILE WATER/SALINE, 10 ML: HCPCS | Performed by: INTERNAL MEDICINE

## 2024-01-04 PROCEDURE — 87077 CULTURE AEROBIC IDENTIFY: CPT

## 2024-01-04 PROCEDURE — 6360000002 HC RX W HCPCS: Performed by: INTERNAL MEDICINE

## 2024-01-04 PROCEDURE — 87635 SARS-COV-2 COVID-19 AMP PRB: CPT

## 2024-01-04 PROCEDURE — 87636 SARSCOV2 & INF A&B AMP PRB: CPT

## 2024-01-04 PROCEDURE — 96361 HYDRATE IV INFUSION ADD-ON: CPT

## 2024-01-04 PROCEDURE — C9113 INJ PANTOPRAZOLE SODIUM, VIA: HCPCS | Performed by: INTERNAL MEDICINE

## 2024-01-04 PROCEDURE — 96366 THER/PROPH/DIAG IV INF ADDON: CPT

## 2024-01-04 PROCEDURE — 70450 CT HEAD/BRAIN W/O DYE: CPT

## 2024-01-04 PROCEDURE — 87804 INFLUENZA ASSAY W/OPTIC: CPT

## 2024-01-04 PROCEDURE — 82962 GLUCOSE BLOOD TEST: CPT

## 2024-01-04 PROCEDURE — 74176 CT ABD & PELVIS W/O CONTRAST: CPT

## 2024-01-04 PROCEDURE — 99285 EMERGENCY DEPT VISIT HI MDM: CPT

## 2024-01-04 PROCEDURE — 6370000000 HC RX 637 (ALT 250 FOR IP): Performed by: EMERGENCY MEDICINE

## 2024-01-04 PROCEDURE — 85025 COMPLETE CBC W/AUTO DIFF WBC: CPT

## 2024-01-04 PROCEDURE — 6370000000 HC RX 637 (ALT 250 FOR IP): Performed by: INTERNAL MEDICINE

## 2024-01-04 PROCEDURE — 2060000000 HC ICU INTERMEDIATE R&B

## 2024-01-04 PROCEDURE — 87086 URINE CULTURE/COLONY COUNT: CPT

## 2024-01-04 PROCEDURE — 83036 HEMOGLOBIN GLYCOSYLATED A1C: CPT

## 2024-01-04 PROCEDURE — 2580000003 HC RX 258: Performed by: EMERGENCY MEDICINE

## 2024-01-04 PROCEDURE — 2580000003 HC RX 258: Performed by: INTERNAL MEDICINE

## 2024-01-04 PROCEDURE — 71045 X-RAY EXAM CHEST 1 VIEW: CPT

## 2024-01-04 PROCEDURE — 83605 ASSAY OF LACTIC ACID: CPT

## 2024-01-04 PROCEDURE — 87040 BLOOD CULTURE FOR BACTERIA: CPT

## 2024-01-04 PROCEDURE — 6360000002 HC RX W HCPCS: Performed by: EMERGENCY MEDICINE

## 2024-01-04 PROCEDURE — APPSS30 APP SPLIT SHARED TIME 16-30 MINUTES

## 2024-01-04 PROCEDURE — 81001 URINALYSIS AUTO W/SCOPE: CPT

## 2024-01-04 RX ORDER — INSULIN LISPRO 100 [IU]/ML
0-4 INJECTION, SOLUTION INTRAVENOUS; SUBCUTANEOUS NIGHTLY
Status: DISCONTINUED | OUTPATIENT
Start: 2024-01-04 | End: 2024-01-10 | Stop reason: HOSPADM

## 2024-01-04 RX ORDER — LIDOCAINE HYDROCHLORIDE 20 MG/ML
JELLY TOPICAL PRN
Status: DISCONTINUED | OUTPATIENT
Start: 2024-01-04 | End: 2024-01-10 | Stop reason: HOSPADM

## 2024-01-04 RX ORDER — ENOXAPARIN SODIUM 100 MG/ML
30 INJECTION SUBCUTANEOUS
Status: DISCONTINUED | OUTPATIENT
Start: 2024-01-04 | End: 2024-01-04

## 2024-01-04 RX ORDER — ACETAMINOPHEN 650 MG/1
650 SUPPOSITORY RECTAL EVERY 6 HOURS PRN
Status: DISCONTINUED | OUTPATIENT
Start: 2024-01-04 | End: 2024-01-10 | Stop reason: HOSPADM

## 2024-01-04 RX ORDER — ONDANSETRON 2 MG/ML
4 INJECTION INTRAMUSCULAR; INTRAVENOUS EVERY 6 HOURS PRN
Status: DISCONTINUED | OUTPATIENT
Start: 2024-01-04 | End: 2024-01-10 | Stop reason: HOSPADM

## 2024-01-04 RX ORDER — SODIUM CHLORIDE 9 MG/ML
INJECTION, SOLUTION INTRAVENOUS CONTINUOUS
Status: DISCONTINUED | OUTPATIENT
Start: 2024-01-04 | End: 2024-01-05

## 2024-01-04 RX ORDER — SODIUM CHLORIDE 0.9 % (FLUSH) 0.9 %
5-40 SYRINGE (ML) INJECTION EVERY 12 HOURS SCHEDULED
Status: DISCONTINUED | OUTPATIENT
Start: 2024-01-04 | End: 2024-01-10 | Stop reason: HOSPADM

## 2024-01-04 RX ORDER — ENOXAPARIN SODIUM 100 MG/ML
1 INJECTION SUBCUTANEOUS 2 TIMES DAILY
Status: DISCONTINUED | OUTPATIENT
Start: 2024-01-04 | End: 2024-01-04 | Stop reason: DRUGHIGH

## 2024-01-04 RX ORDER — INSULIN LISPRO 100 [IU]/ML
0-4 INJECTION, SOLUTION INTRAVENOUS; SUBCUTANEOUS
Status: DISCONTINUED | OUTPATIENT
Start: 2024-01-04 | End: 2024-01-10 | Stop reason: HOSPADM

## 2024-01-04 RX ORDER — POTASSIUM CHLORIDE 7.45 MG/ML
10 INJECTION INTRAVENOUS
Status: COMPLETED | OUTPATIENT
Start: 2024-01-04 | End: 2024-01-04

## 2024-01-04 RX ORDER — ENOXAPARIN SODIUM 100 MG/ML
1 INJECTION SUBCUTANEOUS EVERY 24 HOURS
Status: DISCONTINUED | OUTPATIENT
Start: 2024-01-04 | End: 2024-01-07

## 2024-01-04 RX ORDER — ENOXAPARIN SODIUM 100 MG/ML
40 INJECTION SUBCUTANEOUS DAILY
Status: DISCONTINUED | OUTPATIENT
Start: 2024-01-04 | End: 2024-01-04 | Stop reason: SDUPTHER

## 2024-01-04 RX ORDER — SODIUM CHLORIDE 9 MG/ML
INJECTION, SOLUTION INTRAVENOUS PRN
Status: DISCONTINUED | OUTPATIENT
Start: 2024-01-04 | End: 2024-01-10 | Stop reason: HOSPADM

## 2024-01-04 RX ORDER — 0.9 % SODIUM CHLORIDE 0.9 %
1000 INTRAVENOUS SOLUTION INTRAVENOUS ONCE
Status: COMPLETED | OUTPATIENT
Start: 2024-01-04 | End: 2024-01-04

## 2024-01-04 RX ORDER — ALBUTEROL SULFATE 2.5 MG/3ML
2.5 SOLUTION RESPIRATORY (INHALATION) EVERY 4 HOURS PRN
Status: DISCONTINUED | OUTPATIENT
Start: 2024-01-04 | End: 2024-01-10 | Stop reason: HOSPADM

## 2024-01-04 RX ORDER — HYDRALAZINE HYDROCHLORIDE 20 MG/ML
10 INJECTION INTRAMUSCULAR; INTRAVENOUS EVERY 6 HOURS PRN
Status: DISCONTINUED | OUTPATIENT
Start: 2024-01-04 | End: 2024-01-10 | Stop reason: HOSPADM

## 2024-01-04 RX ORDER — ONDANSETRON 4 MG/1
4 TABLET, ORALLY DISINTEGRATING ORAL EVERY 8 HOURS PRN
Status: DISCONTINUED | OUTPATIENT
Start: 2024-01-04 | End: 2024-01-10 | Stop reason: HOSPADM

## 2024-01-04 RX ORDER — BISACODYL 10 MG
10 SUPPOSITORY, RECTAL RECTAL DAILY PRN
Status: DISCONTINUED | OUTPATIENT
Start: 2024-01-04 | End: 2024-01-10 | Stop reason: HOSPADM

## 2024-01-04 RX ORDER — POTASSIUM CHLORIDE 7.45 MG/ML
10 INJECTION INTRAVENOUS ONCE
Status: COMPLETED | OUTPATIENT
Start: 2024-01-04 | End: 2024-01-04

## 2024-01-04 RX ORDER — DEXTROSE MONOHYDRATE 100 MG/ML
INJECTION, SOLUTION INTRAVENOUS CONTINUOUS PRN
Status: DISCONTINUED | OUTPATIENT
Start: 2024-01-04 | End: 2024-01-10 | Stop reason: HOSPADM

## 2024-01-04 RX ORDER — ACETAMINOPHEN 325 MG/1
650 TABLET ORAL EVERY 6 HOURS PRN
Status: DISCONTINUED | OUTPATIENT
Start: 2024-01-04 | End: 2024-01-10 | Stop reason: HOSPADM

## 2024-01-04 RX ORDER — INSULIN LISPRO 100 [IU]/ML
0-4 INJECTION, SOLUTION INTRAVENOUS; SUBCUTANEOUS EVERY 4 HOURS
Status: DISCONTINUED | OUTPATIENT
Start: 2024-01-04 | End: 2024-01-08

## 2024-01-04 RX ORDER — SODIUM CHLORIDE 0.9 % (FLUSH) 0.9 %
5-40 SYRINGE (ML) INJECTION PRN
Status: DISCONTINUED | OUTPATIENT
Start: 2024-01-04 | End: 2024-01-10 | Stop reason: HOSPADM

## 2024-01-04 RX ORDER — MAGNESIUM SULFATE IN WATER 40 MG/ML
2000 INJECTION, SOLUTION INTRAVENOUS
Status: COMPLETED | OUTPATIENT
Start: 2024-01-04 | End: 2024-01-04

## 2024-01-04 RX ADMIN — ACETAMINOPHEN 650 MG: 650 SUPPOSITORY RECTAL at 19:35

## 2024-01-04 RX ADMIN — SODIUM CHLORIDE 1000 ML: 9 INJECTION, SOLUTION INTRAVENOUS at 14:06

## 2024-01-04 RX ADMIN — SODIUM CHLORIDE: 9 INJECTION, SOLUTION INTRAVENOUS at 18:26

## 2024-01-04 RX ADMIN — SODIUM CHLORIDE, PRESERVATIVE FREE 10 ML: 5 INJECTION INTRAVENOUS at 21:59

## 2024-01-04 RX ADMIN — LIDOCAINE HYDROCHLORIDE: 20 JELLY TOPICAL at 16:00

## 2024-01-04 RX ADMIN — MAGNESIUM SULFATE HEPTAHYDRATE 2000 MG: 40 INJECTION, SOLUTION INTRAVENOUS at 12:41

## 2024-01-04 RX ADMIN — SODIUM CHLORIDE 1000 ML: 9 INJECTION, SOLUTION INTRAVENOUS at 11:35

## 2024-01-04 RX ADMIN — ENOXAPARIN SODIUM 70 MG: 100 INJECTION SUBCUTANEOUS at 21:59

## 2024-01-04 RX ADMIN — MEROPENEM 1000 MG: 1 INJECTION, POWDER, FOR SOLUTION INTRAVENOUS at 15:30

## 2024-01-04 RX ADMIN — POTASSIUM CHLORIDE 10 MEQ: 7.46 INJECTION, SOLUTION INTRAVENOUS at 12:43

## 2024-01-04 RX ADMIN — VANCOMYCIN HYDROCHLORIDE 1750 MG: 10 INJECTION, POWDER, LYOPHILIZED, FOR SOLUTION INTRAVENOUS at 21:59

## 2024-01-04 RX ADMIN — PANTOPRAZOLE SODIUM 40 MG: 40 INJECTION, POWDER, FOR SOLUTION INTRAVENOUS at 18:26

## 2024-01-04 RX ADMIN — POTASSIUM CHLORIDE 10 MEQ: 7.46 INJECTION, SOLUTION INTRAVENOUS at 15:31

## 2024-01-04 ASSESSMENT — PAIN - FUNCTIONAL ASSESSMENT
PAIN_FUNCTIONAL_ASSESSMENT: NONE - DENIES PAIN
PAIN_FUNCTIONAL_ASSESSMENT: ACTIVITIES ARE NOT PREVENTED

## 2024-01-04 ASSESSMENT — PAIN SCALES - GENERAL: PAINLEVEL_OUTOF10: 5

## 2024-01-04 NOTE — H&P
History and Physical    Date of Service:  1/4/2024  Primary Care Provider: Felisha Hernadez APRN - NP  Source of information: patient, electronic medical record    Chief Complaint: Fever      History of Presenting Illness:   Mehrdad Sims is a 73 y.o. 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  Per staff at the [facility], patient was \"unresponsive\".   Patient was seen and examined in the emergency room, but very little history is obtained from him.  He is lethargic, opens his eyes to voice, but does not answer any questions or follow any commands.  Most of the information was obtained from referring provider and chart review.  He was last hospitalized at [facility] 11/22 - 11/30/24.  Patient has bilateral percutaneous nephrostomy tubes, which were last exchanged on 11/24.   He was diagnosed with Klebsiella pneumonia ESBL bacteremia, due to UTI.  Urine culture yielded Klebsiella pneumonia ESBL, E. coli ESBL, and Pseudomonas.  Patient was initially treated with Zosyn and Vancomycin.  Abx were switched to Meropenem per ID consultant, patient was discharged on IV meropenem with last dose of treatment on 12/2/2023.  He was also tested positive for COVID-19, and completed a 3-day course of remdesivir.  He was not hypoxic.  Patient was febrile on admission with a temperature of 100.3.  CT abdomen and pelvis showed small bowel obstruction.  Patient was evaluated by surgery, conservative management was recommended, NG tube was placed.  Cultures were obtained from bilateral nephrostomy tubes.  The hospitalist team has been consulted for admission.    The patient denies any headache, blurry vision, sore throat, trouble

## 2024-01-04 NOTE — ED NOTES
15F Ng tube placed in left nostril without difficulty. Pt tolerated procedure well. NG tube placement confirmed by auscultation. KUB ordered per protocol.

## 2024-01-04 NOTE — ED NOTES
Pt sister Rosa Hancock at bedside with poa paperwork. POA paperwork scanned into chart by registration and copy returned to sister.   Pt states he came in with a wallet. Informed pt he came in by ems and was placed on current stretcher without belongings with him. Searched bed sheets, pt belongings area and lock box in ER. No wallet found. Pt states he might have left it in his pants at facility.

## 2024-01-04 NOTE — CONSULTS
[facility] at [facility]  General Surgery ER Consultation        Admit Date: 1/4/2024  Reason for Consultation: SBO with transition point at the umbilical hernia     HPI:  Mehrdad Smis is a 73 y.o. male with past medical history significant for metastatic prostate cancer, s/p hernia repair, DM, HTN, renal failure s/p b/L PCN tubes sent to ED from SNF for fever and change in mental status.  Facility reported fever at facility and increased fatigue and nausea/vomiting.  PT denies any pain.  States he only vomited once.  Last BM about 2-3 days ago. He is a poor historian so most of HPI obtained from EMR/ Chart. Pt admitted for CT of abd/pelvis noted below.      CT abd/pelvis   1. Small bowel obstruction with a transition point most likely in a  supraumbilical hernia that contains a small loop of dilated bowel. Scarring is  seen in the abdominal wall adjacent to this.  2. Bladder wall thickening is relatively unchanged and may be related to chronic  cystitis. Recommend clinical correlation.  3. Bilateral percutaneous nephrostomy tubes in appropriate position without  hydronephrosis.  4. Interval development of innumerable sclerotic osseous metastases likely  related to the patient's known prostate cancer.      Per [facility] notes: Recently admitted to [facility] for multidrug-resistant ESBL UTI. Has hx of of severe hydronephrosis for which had post bilateral nephrostomy tube placement in July of this year and replaced 11/2023.  Urine cultures yielded E. coli ESBL, Klebsiella pneumoniae ESBL and Pseudomonas.  Blood cultures were positive for Klebsiella pneumonia ESBL.  Initially treated with Zosyn and vancomycin, however this was transitioned to meropenem upon obtaining culture data.  Infectious disease was consulted with assistance on antimicrobial choice and duration.       Patient Active Problem List    Diagnosis Date Noted    SBO (small bowel obstruction) (HCC) 01/04/2024    Prostate cancer ([facility])  Reflex to Culture    Collection Time: 01/04/24 11:40 AM    Specimen: Miscellaneous sample    Urine specimen   Result Value Ref Range    Color, UA YELLOW/STRAW      Appearance TURBID (A) CLEAR      Specific Gravity, UA 1.025 1.003 - 1.030      pH, Urine 5.5 5.0 - 8.0      Protein, UA >300 (A) NEG mg/dL    Glucose, UA Negative NEG mg/dL    Ketones, Urine TRACE (A) NEG mg/dL    Blood, Urine LARGE (A) NEG      Urobilinogen, Urine >8.0 (H) 0.2 - 1.0 EU/dL    Nitrite, Urine Negative NEG      Leukocyte Esterase, Urine MODERATE (A) NEG      WBC, UA  0 - 4 /hpf    RBC, UA 0-5 0 - 5 /hpf    Epithelial Cells UA FEW FEW /lpf    BACTERIA, URINE 3+ (A) NEG /hpf    Urine Culture if Indicated URINE CULTURE ORDERED (A) CNI     Bilirubin, Confirmatory    Collection Time: 01/04/24 11:40 AM   Result Value Ref Range    Bilirubin Confirmation, UA Negative NEG       _____________________  Physical Exam:     General:  Alert, cooperative, no distress, appears stated age.   Eyes:   Sclera clear.   Throat: Lips, mucosa, and tongue normal.   Neck: Supple, symmetrical, trachea midline.   Lungs:   Clear to auscultation bilaterally; unlabored   Heart:  Regular rate and rhythm.   Abdomen:   soft, tender reducible supraumbilical hernia, +BS, no guarding or rebound     Extremities: Extremities normal, atraumatic, no cyanosis or edema.   Skin: Skin color, texture, turgor normal. No rashes or lesions.           Assessment:   Principal Problem:    SBO (small bowel obstruction) (HCC)  Resolved Problems:    * No resolved hospital problems. *          Plan:     Hernia was reduced.  No acute surgical indication   Pt admitted to medical service for further evaluation and treatment  NGT to ELSI  AXR pending   AXR in the am   Analgesics and antiemetics PRN   IV antibiotics ordered   Further plans per Dr. Mcneil      Total time spent with patient:  30 minutes.    Signed By: JOSE Cueva NP     January 4, 2024    Pt seen and examined   Patient not

## 2024-01-04 NOTE — ED PROVIDER NOTES
[facility] 4W TELEMETRY  EMERGENCY DEPARTMENT ENCOUNTER      Pt Name: Mehrdad Sims  MRN: [id number]  Birthdate 1950  Date of evaluation: 1/4/2024  Provider: Marek Ansari MD    CHIEF COMPLAINT       Chief Complaint   Patient presents with    Fever         HISTORY OF PRESENT ILLNESS   (Location/Symptom, Timing/Onset, Context/Setting, Quality, Duration, Modifying Factors, Severity)  Note limiting factors.   73M w/ hx metastatic prostate cancer, DM, HTN, renal failure s/p b/L PCN tubes sent to ED from SNF for fever and change in MS. Pt had temp at facility 100.3 and was more sleepy than usual w/ vomiting. Pt very poor historian. He reports productive cough for past few days but denies difficulty breathing. Also denies pain anywhere.    Per [facility] notes: Recently admitted to [facility] for multidrug-resistant ESBL UTI. Has hx of of severe hydronephrosis for which had post bilateral nephrostomy tube placement in July of this year and replaced 11/2023.  Urine cultures yielded E. coli ESBL, Klebsiella pneumoniae ESBL and Pseudomonas.  Blood cultures were positive for Klebsiella pneumonia ESBL.  Initially treated with Zosyn and vancomycin, however this was transitioned to meropenem upon obtaining culture data.  Infectious disease was consulted with assistance on antimicrobial choice and duration.    The history is provided by medical records.         Review of External Medical Records:     Nursing Notes were reviewed.    REVIEW OF SYSTEMS    (2-9 systems for level 4, 10 or more for level 5)     Review of Systems   Unable to perform ROS: Mental status change       Except as noted above the remainder of the review of systems was reviewed and negative.       PAST MEDICAL HISTORY     Past Medical History:   Diagnosis Date    Diabetes (HCC)     Gastrointestinal disorder     Hypertension     PATTI (obstructive sleep apnea)     AHI: 8.9 per hour         SURGICAL HISTORY       Past Surgical History:   Procedure Laterality Date    MN  Final    Glucose 01/05/2024 136 (H)  65 - 100 mg/dL Final    BUN 01/05/2024 25 (H)  6 - 20 MG/DL Final    Creatinine 01/05/2024 1.88 (H)  0.70 - 1.30 MG/DL Final    Bun/Cre Ratio 01/05/2024 13  12 - 20   Final    EstRubin Filt Rate 01/05/2024 37 (L)  >60 ml/min/1.73m2 Final    Comment:    Pediatric calculator link: https://www.kidney.org/professionals/kdoqi/gfr_calculatorped     These results are not intended for use in patients <18 years of age.     eGFR results are calculated without a race factor using  the 2021 CKD-EPI equation. Careful clinical correlation is recommended, particularly when comparing to results calculated using previous equations.  The CKD-EPI equation is less accurate in patients with extremes of muscle mass, extra-renal metabolism of creatinine, excessive creatine ingestion, or following therapy that affects renal tubular secretion.      Calcium 01/05/2024 8.8  8.5 - 10.1 MG/DL Final    POC Glucose 01/05/2024 127 (H)  65 - 117 mg/dL Final    Comment: (NOTE)  The FDA has indicated that no capillary point of care blood glucose  monitoring systems are approved for use in \"critically ill\" patients,  however they have not defined this population. The College of  American Pathologists has recommended that these devices should not  be used in cases such as severe hypotension, dehydration, shock, and  hyperglycemic-hyperosmolar state, amongst others.  Venous or arterial  collection is the recommended specimen for testing these patients.      Performed by: 01/05/2024 ARIE Adamson  PCT   Final    POC Glucose 01/05/2024 129 (H)  65 - 117 mg/dL Final    Comment: (NOTE)  The FDA has indicated that no capillary point of care blood glucose  monitoring systems are approved for use in \"critically ill\" patients,  however they have not defined this population. The College of  American Pathologists has recommended that these devices should not  be used in cases such as severe hypotension, dehydration, shock,

## 2024-01-05 ENCOUNTER — APPOINTMENT (OUTPATIENT)
Facility: HOSPITAL | Age: 74
DRG: 393 | End: 2024-01-05
Payer: MEDICAID

## 2024-01-05 LAB
ANION GAP SERPL CALC-SCNC: 8 MMOL/L (ref 5–15)
BUN SERPL-MCNC: 25 MG/DL (ref 6–20)
BUN/CREAT SERPL: 13 (ref 12–20)
CALCIUM SERPL-MCNC: 8.8 MG/DL (ref 8.5–10.1)
CHLORIDE SERPL-SCNC: 111 MMOL/L (ref 97–108)
CO2 SERPL-SCNC: 21 MMOL/L (ref 21–32)
CREAT SERPL-MCNC: 1.88 MG/DL (ref 0.7–1.3)
GLUCOSE BLD STRIP.AUTO-MCNC: 117 MG/DL (ref 65–117)
GLUCOSE BLD STRIP.AUTO-MCNC: 127 MG/DL (ref 65–117)
GLUCOSE BLD STRIP.AUTO-MCNC: 129 MG/DL (ref 65–117)
GLUCOSE BLD STRIP.AUTO-MCNC: 132 MG/DL (ref 65–117)
GLUCOSE BLD STRIP.AUTO-MCNC: 98 MG/DL (ref 65–117)
GLUCOSE SERPL-MCNC: 136 MG/DL (ref 65–100)
POTASSIUM SERPL-SCNC: 2.8 MMOL/L (ref 3.5–5.1)
SERVICE CMNT-IMP: ABNORMAL
SERVICE CMNT-IMP: NORMAL
SERVICE CMNT-IMP: NORMAL
SODIUM SERPL-SCNC: 140 MMOL/L (ref 136–145)

## 2024-01-05 PROCEDURE — A4216 STERILE WATER/SALINE, 10 ML: HCPCS | Performed by: INTERNAL MEDICINE

## 2024-01-05 PROCEDURE — 80048 BASIC METABOLIC PNL TOTAL CA: CPT

## 2024-01-05 PROCEDURE — 6360000002 HC RX W HCPCS: Performed by: INTERNAL MEDICINE

## 2024-01-05 PROCEDURE — 97530 THERAPEUTIC ACTIVITIES: CPT

## 2024-01-05 PROCEDURE — 2060000000 HC ICU INTERMEDIATE R&B

## 2024-01-05 PROCEDURE — 97161 PT EVAL LOW COMPLEX 20 MIN: CPT

## 2024-01-05 PROCEDURE — 82962 GLUCOSE BLOOD TEST: CPT

## 2024-01-05 PROCEDURE — 74018 RADEX ABDOMEN 1 VIEW: CPT

## 2024-01-05 PROCEDURE — 6360000002 HC RX W HCPCS: Performed by: NURSE PRACTITIONER

## 2024-01-05 PROCEDURE — 97165 OT EVAL LOW COMPLEX 30 MIN: CPT

## 2024-01-05 PROCEDURE — 99232 SBSQ HOSP IP/OBS MODERATE 35: CPT | Performed by: SURGERY

## 2024-01-05 PROCEDURE — C9113 INJ PANTOPRAZOLE SODIUM, VIA: HCPCS | Performed by: INTERNAL MEDICINE

## 2024-01-05 PROCEDURE — 2580000003 HC RX 258: Performed by: INTERNAL MEDICINE

## 2024-01-05 PROCEDURE — 36415 COLL VENOUS BLD VENIPUNCTURE: CPT

## 2024-01-05 RX ORDER — SODIUM CHLORIDE AND POTASSIUM CHLORIDE 300; 900 MG/100ML; MG/100ML
INJECTION, SOLUTION INTRAVENOUS CONTINUOUS
Status: DISCONTINUED | OUTPATIENT
Start: 2024-01-05 | End: 2024-01-08

## 2024-01-05 RX ORDER — POTASSIUM CHLORIDE 7.45 MG/ML
10 INJECTION INTRAVENOUS
Status: COMPLETED | OUTPATIENT
Start: 2024-01-05 | End: 2024-01-05

## 2024-01-05 RX ADMIN — PANTOPRAZOLE SODIUM 40 MG: 40 INJECTION, POWDER, FOR SOLUTION INTRAVENOUS at 09:43

## 2024-01-05 RX ADMIN — POTASSIUM CHLORIDE AND SODIUM CHLORIDE: 900; 300 INJECTION, SOLUTION INTRAVENOUS at 10:42

## 2024-01-05 RX ADMIN — ENOXAPARIN SODIUM 70 MG: 100 INJECTION SUBCUTANEOUS at 21:23

## 2024-01-05 RX ADMIN — VANCOMYCIN HYDROCHLORIDE 750 MG: 750 INJECTION, POWDER, LYOPHILIZED, FOR SOLUTION INTRAVENOUS at 22:58

## 2024-01-05 RX ADMIN — PIPERACILLIN SODIUM AND TAZOBACTAM SODIUM 3375 MG: 3; .375 INJECTION, POWDER, LYOPHILIZED, FOR SOLUTION INTRAVENOUS at 18:23

## 2024-01-05 RX ADMIN — PIPERACILLIN SODIUM AND TAZOBACTAM SODIUM 3375 MG: 3; .375 INJECTION, POWDER, LYOPHILIZED, FOR SOLUTION INTRAVENOUS at 09:29

## 2024-01-05 RX ADMIN — POTASSIUM CHLORIDE 10 MEQ: 7.46 INJECTION, SOLUTION INTRAVENOUS at 07:43

## 2024-01-05 RX ADMIN — POTASSIUM CHLORIDE 10 MEQ: 7.46 INJECTION, SOLUTION INTRAVENOUS at 09:39

## 2024-01-05 RX ADMIN — PIPERACILLIN AND TAZOBACTAM 4500 MG: 4; .5 INJECTION, POWDER, LYOPHILIZED, FOR SOLUTION INTRAVENOUS at 03:02

## 2024-01-05 RX ADMIN — SODIUM CHLORIDE, PRESERVATIVE FREE 10 ML: 5 INJECTION INTRAVENOUS at 09:44

## 2024-01-05 RX ADMIN — POTASSIUM CHLORIDE 10 MEQ: 7.46 INJECTION, SOLUTION INTRAVENOUS at 06:37

## 2024-01-05 RX ADMIN — SODIUM CHLORIDE, PRESERVATIVE FREE 10 ML: 5 INJECTION INTRAVENOUS at 21:25

## 2024-01-05 NOTE — PROGRESS NOTES
Renal Dosing/Monitoring  Medication: zosyn   Current regimen:  3.375 gm IV every 6 hr  Recent Labs     01/04/24  1044   CREATININE 2.43*   BUN 27*     Estimated CrCl:  25 ml/min  Plan:   Dose adjusted based on extended infusion beta lactam protocol to:  Zosyn 4.5 gm LD over 30 minutes, followed by  3.375 gm IV q8h over 4 hours; first dose to be given 6 hours after LD.     Will give first dose Zosyn, 12 hours after last dose of Merrem.

## 2024-01-05 NOTE — PLAN OF CARE
Problem: Occupational Therapy - Adult  Goal: By Discharge: Performs self-care activities at highest level of function for planned discharge setting.  See evaluation for individualized goals.  Description: FUNCTIONAL STATUS PRIOR TO ADMISSION:  Patient is a questionable/unreliable historian, however he reports using a wheelchair for functional mobility since being at [facility] (SNF v LTC?).     HOME SUPPORT: Patient admitted from [facility] (SNF v LTC?) and reports requiring assist for all ADLs (assist level unclear).     Occupational Therapy Goals:  Initiated 1/5/2024  1.  Patient will perform grooming routine in unsupported sitting with Contact Guard Assist within 7 day(s).  2.  Patient will perform anterior upper and lower body bathing, in unsupported sitting, with Contact Guard Assist within 7 day(s).  3.  Patient will perform upper and lower body dressing, using AE PRN, with Moderate Assist within 7 day(s).  4.  Patient will perform toilet/BSC transfers with Maximal Assist  within 7 day(s).  5.  Patient will perform all aspects of toileting with Maximal Assist within 7 day(s).  6.  Patient will participate in upper extremity therapeutic exercise/activities with Contact Guard Assist for 5 minutes within 7 day(s).      Outcome: Progressing   OCCUPATIONAL THERAPY EVALUATION    Patient: Mehrdad Sims (73 y.o. male)  Date: 1/5/2024  Primary Diagnosis: SBO (small bowel obstruction) ([facility]) [K56.609]         Precautions: Fall Risk    ASSESSMENT :  The patient's performance of ADL/IADL tasks is limited at this time by impaired sitting balance, activity tolerance, generalized weakness, lethargy, volition, and cognition (memory, attention, insight) [facility]. Despite being A&Ox4, pt was a limited historian with significant prompting required to obtain PLOF. He required mod to max A x2 to complete bed mobility in prep for ADLs and once seated EOB demo'd fair sitting balance. Pt

## 2024-01-05 NOTE — PROGRESS NOTES
4 Eyes Skin Assessment     NAME:  Mehrdad Sims  YOB: 1950  MEDICAL RECORD NUMBER:  [id number]    The patient is being assessed for  Admission    I agree that at least one RN has performed a thorough Head to Toe Skin Assessment on the patient. ALL assessment sites listed below have been assessed.      Areas assessed by both nurses:    Head, Face, Ears, Shoulders, Back, Chest, Arms, Elbows, Hands, Sacrum. Buttock, Coccyx, Ischium, Legs. Feet and Heels, Under Medical Devices , and Other          Does the Patient have a Wound? Yes wound(s) were present on assessment. LDA wound assessment was Initiated and completed by RN       Vinh Prevention initiated by RN: Yes  Wound Care Orders initiated by RN: Yes    Pressure Injury (Stage 3,4, Unstageable, DTI, NWPT, and Complex wounds) if present, place Wound referral order by RN under : Yes    New Ostomies, if present place, Ostomy referral order under : No     Nurse 1 eSignature: Electronically signed by Saray Bonilla RN on 1/5/24 at 3:52 PM EST    **SHARE this note so that the co-signing nurse can place an eSignature**    Nurse 2 eSignature: Electronically signed by Arina Kat RN on 1/5/24 at 7:10 PM EST

## 2024-01-05 NOTE — PROGRESS NOTES
Lovenox Monitoring  Indication: DVT Prophylaxis  Recent Labs     01/04/24  1044   HGB 9.4*        Current Weight: 65 kg  Est. CrCl = 25 ml/min  Current Dose: 40 mg subcutaneously every 24 hours.  Plan: Change to enoxaparin 30 mg Q24H for crcl 15-30 ml/min

## 2024-01-05 NOTE — PROGRESS NOTES
Surgery Progress Note    1/5/2024    Admit Date: 1/4/2024 11:08 AM    CC: Abd pain      Subjective:     Large BM overnight.  Nephrostomy bags were changed.  Patient resting this morning.  Pain much improved    Constitutional: No fever or chills  Neurologic: No headache  Eyes: No scleral icterus or irritated eyes  Nose: No nasal pain or drainage  Mouth: No oral lesions or sore throat  Cardiac: No palpations or chest pain  Pulmonary: No cough or shortness of breath  Gastrointestinal: No nausea, emesis, diarrhea, or constipation  Genitourinary: No dysuria  Musculoskeletal: No muscle or joint tenderness  Skin: No rashes or lesions  Psychiatric: No anxiety or depressed mood    Objective:     Vitals:    01/05/24 0600   BP: 139/70   Pulse: 85   Resp: 26   Temp:    SpO2: 95%       General: No acute distress, conversant  Eyes: PERRLA, no scleral icterus  HENT: Normocephalic without oral lesions  Neck: Trachea midline without LAD  Cardiac: Normal pulse rate and rhythm  Pulmonary: Symmetric chest rise with normal effort  GI: Soft, NT, ND, hernia soft, no splenomegaly  Skin: Warm without rash  Extremities: No edema or joint stiffness  Psych: Appropriate mood and affect    Labs, vital signs, and I/O reviewed.    Assessment:     73-year-old male with incisional hernia and bilateral nephrostomy tubes secondary to metastatic prostate cancer with concern for SBO which shows signs of clinically improving    Plan:     Had a BM this morning.  Continue NG tube today.  Likely remove in the morning tomorrow.  Care per primary team.    Akhil Monet MD, FACS, [facility]  Bariatric and General Surgeon  [facility]

## 2024-01-05 NOTE — PROGRESS NOTES
2200: Patient with large watery stool, redressed R side nephrostomy tube with sterile gauze and medipore. Patient bathed, new gown and linens on patient.    0330: Patient nephrostomy bags replaced due to being soiled from patient stool.

## 2024-01-05 NOTE — PROGRESS NOTES
1400 - pt arrived to unit with belongings including wallet and watch. Watch put on pt per request. Wallet placed in pt belongings closet. Pt states he does not want it locked up with security because his sister is planning to pick it up when she comes to visit. Diet updated and pt given small cup of water to sip on. Dual skin and admission databases completed. VS stable.     1500 - Sister Rosa called for update. Notified RN of [facility] and that brother is not to be contacted because pt is a \"very private person\". Discussed with pt and he agreed that he does not want his brother receiving information from hospital. Removed from emergency contacts. Per [facility] docs sister Rosa is primary decision maker and son Mehrdad Sims Jr. Is secondary decision maker. Chart updated.     1530 - pt set up with blake per request.

## 2024-01-05 NOTE — PLAN OF CARE
Problem: Occupational Therapy - Adult  Goal: By Discharge: Performs self-care activities at highest level of function for planned discharge setting.  See evaluation for individualized goals.  Description: FUNCTIONAL STATUS PRIOR TO ADMISSION:  Patient is a questionable/unreliable historian, however he reports using a wheelchair for functional mobility since being at [facility] (SNF v LTC?).     HOME SUPPORT: Patient admitted from [facility] (SNF v LTC?) and reports requiring assist for all ADLs (assist level unclear).     Occupational Therapy Goals:  Initiated 1/5/2024  1.  Patient will perform grooming routine in unsupported sitting with Contact Guard Assist within 7 day(s).  2.  Patient will perform anterior upper and lower body bathing, in unsupported sitting, with Contact Guard Assist within 7 day(s).  3.  Patient will perform upper and lower body dressing, using AE PRN, with Moderate Assist within 7 day(s).  4.  Patient will perform toilet/BSC transfers with Maximal Assist  within 7 day(s).  5.  Patient will perform all aspects of toileting with Maximal Assist within 7 day(s).  6.  Patient will participate in upper extremity therapeutic exercise/activities with Contact Guard Assist for 5 minutes within 7 day(s).      1/5/2024 1254 by Christa Mares OT  Outcome: Progressing     Problem: Physical Therapy - Adult  Goal: By Discharge: Performs mobility at highest level of function for planned discharge setting.  See evaluation for individualized goals.  Description: FUNCTIONAL STATUS PRIOR TO ADMISSION: Patient is a questionable historian.  He endorses using being in a WC since admission to [facility], ambulated prior to that time.      HOME SUPPORT PRIOR TO ADMISSION: Patient is from [facility], (? SNF vs LTC), requires assist for transfers, ADLs.   When asked if he is receiving physical therapy at [facility] pt stated \"if you call it that.\"    Physical Therapy Goals  Initiated 1/5/2024  1.  Patient will move from supine

## 2024-01-05 NOTE — PROGRESS NOTES
edema, brisk 2+ DP pulses  Neuro/Psych: pleasant mood and affect, CN 2-12 grossly intact, sensory grossly within normal limit, Strength 5/5 in all extremities, DTR 1+ x 4  Skin: warm     Data Review:    Review and/or order of clinical lab test  Review and/or order of tests in the radiology section of CPT  Review and/or order of tests in the medicine section of CPT      I have personally and independently reviewed all pertinent labs, diagnostic studies, imaging, and have provided independent interpretation of the same.     Labs:     Recent Labs     01/04/24  1044   WBC 14.2*   HGB 9.4*   HCT 29.0*        Recent Labs     01/04/24  1044 01/05/24  0458   * 140   K 2.8* 2.8*    111*   CO2 23 21   BUN 27* 25*     Recent Labs     01/04/24  1044   ALT 36   GLOB 6.5*     No results for input(s): \"INR\", \"APTT\" in the last 72 hours.    Invalid input(s): \"PTP\"   No results for input(s): \"TIBC\", \"FERR\" in the last 72 hours.    Invalid input(s): \"FE\", \"PSAT\"   No results found for: \"FOL\", \"RBCF\"   No results for input(s): \"PH\", \"PCO2\", \"PO2\" in the last 72 hours.  No results for input(s): \"CPK\" in the last 72 hours.    Invalid input(s): \"CPKMB\", \"CKNDX\", \"TROIQ\"  No results found for: \"CHOL\", \"CHOLX\", \"CHLST\", \"CHOLV\", \"HDL\", \"HDLC\", \"LDL\", \"LDLC\", \"TGLX\", \"TRIGL\"  Lab Results   Component Value Date/Time    GLUCPOC 136 11/25/2022 01:40 PM     [unfilled]    Notes reviewed from all clinical/nonclinical/nursing services involved in patient's clinical care. Care coordination discussions were held with appropriate clinical/nonclinical/ nursing providers based on care coordination needs.         Patients current active Medications were reviewed, considered, added and adjusted based on the clinical condition today.      Home Medications were reconciled to the best of my ability given all available resources at the time of admission. Route is PO if not otherwise noted.      Admission Status:[id number]:::1}      Medications  estimated LOS  ACTUAL LENGTH OF STAY:          1                 Torrey Simmons MD

## 2024-01-05 NOTE — PLAN OF CARE
Problem: Physical Therapy - Adult  Goal: By Discharge: Performs mobility at highest level of function for planned discharge setting.  See evaluation for individualized goals.  Description: FUNCTIONAL STATUS PRIOR TO ADMISSION: Patient is a questionable historian.  He endorses using being in a WC since admission to [facility], ambulated prior to that time.      HOME SUPPORT PRIOR TO ADMISSION: Patient is from [facility], (? SNF vs LTC), requires assist for transfers, ADLs.   When asked if he is receiving physical therapy at [facility] pt stated \"if you call it that.\"    Physical Therapy Goals  Initiated 1/5/2024  1.  Patient will move from supine to sit and sit to supine in bed with minimal assistance within 7 day(s).    2.  Patient will perform sit to stand with minimal assistance within 7 day(s).  3.  Patient will transfer from bed to chair and chair to bed with minimal assistance using the least restrictive device within 7 day(s).        Outcome: Progressing     PHYSICAL THERAPY EVALUATION    Patient: Mehrdad Sims (73 y.o. male)  Date: 1/5/2024  Primary Diagnosis: SBO (small bowel obstruction) ([facility]) [K56.609]       Precautions: Fall Risk       ASSESSMENT :   The patient is limited by decreased functional mobility, independence in ADLs, ROM, strength, activity tolerance, endurance, cognition, attention/concentration, and balance.      Based on the impairments listed above the patient presents today overall Max A x2 transitioning positions in the bed and lateral scooting while sitting EOB.   He voiced feeling tired and weak throughout this session therefore unable to participate in bed<->chair transfer.  Also note, pt endorses being in a wheelchair since admission to [facility], ambulated prior to that admission.      Patient will benefit from skilled intervention to address the above impairments.    Functional Outcome Measure:  The patient scored 8/24 on the [facility] outcome measure which is indicative of higher odds of

## 2024-01-06 LAB
ANION GAP SERPL CALC-SCNC: 8 MMOL/L (ref 5–15)
BACTERIA SPEC CULT: NORMAL
BACTERIA SPEC CULT: NORMAL
BUN SERPL-MCNC: 20 MG/DL (ref 6–20)
BUN/CREAT SERPL: 12 (ref 12–20)
CALCIUM SERPL-MCNC: 9.6 MG/DL (ref 8.5–10.1)
CHLORIDE SERPL-SCNC: 119 MMOL/L (ref 97–108)
CO2 SERPL-SCNC: 19 MMOL/L (ref 21–32)
CREAT SERPL-MCNC: 1.63 MG/DL (ref 0.7–1.3)
ERYTHROCYTE [DISTWIDTH] IN BLOOD BY AUTOMATED COUNT: 16.2 % (ref 11.5–14.5)
GLUCOSE BLD STRIP.AUTO-MCNC: 101 MG/DL (ref 65–117)
GLUCOSE BLD STRIP.AUTO-MCNC: 147 MG/DL (ref 65–117)
GLUCOSE BLD STRIP.AUTO-MCNC: 92 MG/DL (ref 65–117)
GLUCOSE BLD STRIP.AUTO-MCNC: 95 MG/DL (ref 65–117)
GLUCOSE BLD STRIP.AUTO-MCNC: 97 MG/DL (ref 65–117)
GLUCOSE SERPL-MCNC: 112 MG/DL (ref 65–100)
HCT VFR BLD AUTO: 26.4 % (ref 36.6–50.3)
HGB BLD-MCNC: 8.3 G/DL (ref 12.1–17)
MAGNESIUM SERPL-MCNC: 2.1 MG/DL (ref 1.6–2.4)
MCH RBC QN AUTO: 26.3 PG (ref 26–34)
MCHC RBC AUTO-ENTMCNC: 31.4 G/DL (ref 30–36.5)
MCV RBC AUTO: 83.8 FL (ref 80–99)
NRBC # BLD: 0 K/UL (ref 0–0.01)
NRBC BLD-RTO: 0 PER 100 WBC
PHOSPHATE SERPL-MCNC: 2.6 MG/DL (ref 2.6–4.7)
PLATELET # BLD AUTO: 272 K/UL (ref 150–400)
PMV BLD AUTO: 8.8 FL (ref 8.9–12.9)
POTASSIUM SERPL-SCNC: 3.4 MMOL/L (ref 3.5–5.1)
RBC # BLD AUTO: 3.15 M/UL (ref 4.1–5.7)
SERVICE CMNT-IMP: ABNORMAL
SERVICE CMNT-IMP: NORMAL
SODIUM SERPL-SCNC: 146 MMOL/L (ref 136–145)
VANCOMYCIN SERPL-MCNC: 20.9 UG/ML
WBC # BLD AUTO: 8.5 K/UL (ref 4.1–11.1)

## 2024-01-06 PROCEDURE — 84100 ASSAY OF PHOSPHORUS: CPT

## 2024-01-06 PROCEDURE — A4216 STERILE WATER/SALINE, 10 ML: HCPCS | Performed by: INTERNAL MEDICINE

## 2024-01-06 PROCEDURE — C9113 INJ PANTOPRAZOLE SODIUM, VIA: HCPCS | Performed by: INTERNAL MEDICINE

## 2024-01-06 PROCEDURE — 80202 ASSAY OF VANCOMYCIN: CPT

## 2024-01-06 PROCEDURE — 85027 COMPLETE CBC AUTOMATED: CPT

## 2024-01-06 PROCEDURE — 1100000000 HC RM PRIVATE

## 2024-01-06 PROCEDURE — 82962 GLUCOSE BLOOD TEST: CPT

## 2024-01-06 PROCEDURE — 80048 BASIC METABOLIC PNL TOTAL CA: CPT

## 2024-01-06 PROCEDURE — 6360000002 HC RX W HCPCS: Performed by: INTERNAL MEDICINE

## 2024-01-06 PROCEDURE — 83735 ASSAY OF MAGNESIUM: CPT

## 2024-01-06 PROCEDURE — 2580000003 HC RX 258: Performed by: INTERNAL MEDICINE

## 2024-01-06 PROCEDURE — 36415 COLL VENOUS BLD VENIPUNCTURE: CPT

## 2024-01-06 RX ADMIN — ENOXAPARIN SODIUM 70 MG: 100 INJECTION SUBCUTANEOUS at 21:03

## 2024-01-06 RX ADMIN — SODIUM CHLORIDE, PRESERVATIVE FREE 10 ML: 5 INJECTION INTRAVENOUS at 21:04

## 2024-01-06 RX ADMIN — SODIUM CHLORIDE, PRESERVATIVE FREE 10 ML: 5 INJECTION INTRAVENOUS at 08:40

## 2024-01-06 RX ADMIN — PIPERACILLIN SODIUM AND TAZOBACTAM SODIUM 3375 MG: 3; .375 INJECTION, POWDER, LYOPHILIZED, FOR SOLUTION INTRAVENOUS at 17:01

## 2024-01-06 RX ADMIN — PIPERACILLIN SODIUM AND TAZOBACTAM SODIUM 3375 MG: 3; .375 INJECTION, POWDER, LYOPHILIZED, FOR SOLUTION INTRAVENOUS at 08:38

## 2024-01-06 RX ADMIN — PANTOPRAZOLE SODIUM 40 MG: 40 INJECTION, POWDER, FOR SOLUTION INTRAVENOUS at 08:39

## 2024-01-06 RX ADMIN — POTASSIUM CHLORIDE AND SODIUM CHLORIDE: 900; 300 INJECTION, SOLUTION INTRAVENOUS at 08:53

## 2024-01-06 RX ADMIN — PIPERACILLIN SODIUM AND TAZOBACTAM SODIUM 3375 MG: 3; .375 INJECTION, POWDER, LYOPHILIZED, FOR SOLUTION INTRAVENOUS at 01:29

## 2024-01-06 NOTE — PROGRESS NOTES
General Surgery Progress Note    Small bowel obstruction  Date:2024       Room:Counts include 234 beds at the [facility]  Patient Name:Mehrdad Sims     YOB: 1950     Age:73 y.o.    Subjective     No acute surgical issues.  Pt is resting in bed.  No nausea or vomiting. Reportedly had BM overnight.  No nausea or vomiting    Medications   Scheduled Meds:    sodium chloride flush  5-40 mL IntraVENous 2 times per day    insulin lispro  0-4 Units SubCUTAneous TID WC    insulin lispro  0-4 Units SubCUTAneous Nightly    insulin lispro  0-4 Units SubCUTAneous Q4H    pantoprazole (PROTONIX) 40 mg in sodium chloride (PF) 0.9 % 10 mL injection  40 mg IntraVENous Daily    piperacillin-tazobactam  3,375 mg IntraVENous Q8H    enoxaparin  1 mg/kg SubCUTAneous Q24H     Continuous Infusions:    0.9% NaCl with KCl 40 mEq 75 mL/hr at 24 0853    sodium chloride      dextrose       PRN Meds: lidocaine, sodium chloride flush, sodium chloride, ondansetron **OR** ondansetron, acetaminophen **OR** acetaminophen, bisacodyl, glucose, dextrose bolus **OR** dextrose bolus, glucagon (rDNA), dextrose, albuterol, hydrALAZINE        Physical Examination      Vitals:  BP (!) 149/80   Pulse 69   Temp 98.7 °F (37.1 °C) (Oral)   Resp 20   Ht 1.727 m (5' 8\")   Wt 65.5 kg (144 lb 6.4 oz)   SpO2 96%   BMI 21.96 kg/m²   Temp (24hrs), Av.8 °F (37.1 °C), Min:98 °F (36.7 °C), Max:99.9 °F (37.7 °C)      Physical Exam:    Gen:  No apparent distress  Neuro:  Alert and answering questions with nods  CV:  RRR  Pulm:  Unlabored  Abd:  Soft/non-distended/Non-tender to palpation without guarding or rebound  Ext:  No cyanosis, clubbing or edema      I/O (24Hr):    Intake/Output Summary (Last 24 hours) at 2024 1108  Last data filed at 2024 0731  Gross per 24 hour   Intake 100 ml   Output 1300 ml   Net -1200 ml         Labs/Imaging/Diagnostics   Labs:  CBC:  Recent Labs     24  1044 24  0128   WBC 14.2* 8.5   RBC 3.49* 3.15*   HGB 9.4* 8.3*   HCT

## 2024-01-06 NOTE — PLAN OF CARE
Problem: Discharge Planning  Goal: Discharge to home or other facility with appropriate resources  1/6/2024 1002 by Mouna Pedersen, RN  Outcome: Progressing  Flowsheets (Taken 1/6/2024 0850)  Discharge to home or other facility with appropriate resources:   Identify barriers to discharge with patient and caregiver   Arrange for needed discharge resources and transportation as appropriate   Identify discharge learning needs (meds, wound care, etc)   Refer to discharge planning if patient needs post-hospital services based on physician order or complex needs related to functional status, cognitive ability or social support system  1/6/2024 0534 by Lalitha Fernández RN  Outcome: Progressing     Problem: Skin/Tissue Integrity  Goal: Absence of new skin breakdown  Description: 1.  Monitor for areas of redness and/or skin breakdown  2.  Assess vascular access sites hourly  3.  Every 4-6 hours minimum:  Change oxygen saturation probe site  4.  Every 4-6 hours:  If on nasal continuous positive airway pressure, respiratory therapy assess nares and determine need for appliance change or resting period.  1/6/2024 1002 by Mouna Pedersen RN  Outcome: Progressing  1/6/2024 0534 by Lalitha Fernández RN  Outcome: Progressing     Problem: ABCDS Injury Assessment  Goal: Absence of physical injury  1/6/2024 1002 by Mouna Pedersen, RN  Outcome: Progressing  1/6/2024 0534 by Lalitha Fernández RN  Outcome: Progressing     Problem: Safety - Adult  Goal: Free from fall injury  1/6/2024 1002 by Mouna Pedersen, RN  Outcome: Progressing  1/6/2024 0534 by Lalitha Fernández RN  Outcome: Progressing

## 2024-01-06 NOTE — PROGRESS NOTES
[facility]  Hospitalist Group                                                                                          Hospitalist Progress Note  Torrey Simmons MD  Answering service: [phone] OR [phone] from in house phone        Date of Service:  2024  NAME:  Mehrdad Sims  :  1950  MRN:  [id number]       Admission Summary:   Mehrdad Sims is a 73 y.o. 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  Per staff at the SNF, patient was \"unresponsive\".   Patient was seen and examined in the emergency room, but very little history is obtained from him.  He is lethargic, opens his eyes to voice, but does not answer any questions or follow any commands.  Most of the information was obtained from referring provider and chart review.  He was last hospitalized at [facility]  - 24.  Patient has bilateral percutaneous nephrostomy tubes, which were last exchanged on .   He was diagnosed with Klebsiella pneumonia ESBL bacteremia, due to UTI.  Urine culture yielded Klebsiella pneumonia ESBL, E. coli ESBL, and Pseudomonas.  Patient was initially treated with Zosyn and Vancomycin.  Abx were switched to Meropenem per ID consultant, patient was discharged on IV meropenem with last dose of treatment on 2023.  He was also tested positive for COVID-19, and completed a 3-day course of remdesivir.  He was not hypoxic.  Patient was febrile on admission with a temperature of 100.3.  CT abdomen and pelvis showed small bowel obstruction.  Patient was evaluated by surgery, conservative management was recommended, NG tube was placed.  Cultures were obtained from bilateral nephrostomy  tubes.  The hospitalist team has been consulted for admission.       Interval history / Subjective:   Patient is doing better, abdomen is soft, bowel sounds present.    Reports a bowel movement overnight.    Seen by surgery, NG tube clamped.    Urine sample shows multiple multidrug-resistant organisms, including Klebsiella pneumonia, and Pseudomonas species.     Assessment & Plan:        Small bowel obstruction:  -Management per surgery;  -IV fluids;  -N.p.o.;  -NG tube for decompression;  -Serial KUBs;   -1/5: clinically much improved, had a BM last night, ice chips as tolerated;   -1/6: As GI recommends clamping NG tube, and possibly pulling it out later today;     Fever:  -Recent Klebsiella ESBL UTI and bacteremia;  -Patient was pancultured on arrival, follow culture results;  -Urine in the nephrostomy tubes is concentrated, but there is no sediment or purulence noted;   -Check COVID-19, as patient has had a recent exposure;   -Start empiric IV antibiotics: Zosyn and vancomycin, pending culture results;   -Urine culture:  Klebsiella pneumonia, ESBL -sensitive to Zosyn;  Pseudomonas species -susceptibilities pending;  Possible 2 more gram-negative rods -identification and susceptibilities pending;    -Continue Zosyn, patient shows improvement;  -ID consulted;     Acute metabolic encephalopathy:  -Suspect due to infection;  -much improved;      Metastatic prostate cancer:  -Outpatient follow-up with urology;     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 and unable to take Eliquis, will switch to therapeutic Lovenox 1 Mg per KG BW;     Anemia of chronic disease:  -monitor H&H;     Hypokalemia:   -replete prn;        CODE STATUS: full  DVT PROPHYLAXIS: Lovenox  ANTICIPATED

## 2024-01-06 NOTE — PLAN OF CARE
Problem: Discharge Planning  Goal: Discharge to home or other facility with appropriate resources  1/6/2024 0534 by Lalitha Fernández RN  Outcome: Progressing  1/5/2024 1803 by Saray Bonilla RN  Outcome: Progressing     Problem: Skin/Tissue Integrity  Goal: Absence of new skin breakdown  Description: 1.  Monitor for areas of redness and/or skin breakdown  2.  Assess vascular access sites hourly  3.  Every 4-6 hours minimum:  Change oxygen saturation probe site  4.  Every 4-6 hours:  If on nasal continuous positive airway pressure, respiratory therapy assess nares and determine need for appliance change or resting period.  1/6/2024 0534 by Lalitha Fernández RN  Outcome: Progressing  1/5/2024 1803 by Saray Bonilla RN  Outcome: Progressing     Problem: ABCDS Injury Assessment  Goal: Absence of physical injury  1/6/2024 0534 by Lalitha Fernández RN  Outcome: Progressing  1/5/2024 1803 by Saray Bonilla RN  Outcome: Progressing     Problem: Safety - Adult  Goal: Free from fall injury  1/6/2024 0534 by Lalitha Fernández RN  Outcome: Progressing  1/5/2024 1803 by Saray Bonilla RN  Outcome: Progressing

## 2024-01-06 NOTE — PROGRESS NOTES
Day #3 of Vancomycin  Indication:  sepsis  -recent Klebsiella pneumonia ESBL, E. coli ESBL, and Pseudomonas - completed treatment with Meropenem   Current regimen:  vanc 750 mg q24h  Abx regimen:  Vanc + Zosyn  ID Following ?: NO  Concomitant nephrotoxic drugs (requires more frequent monitoring): None  Frequency of BMP?: daily through     Recent Labs     24  1044 24  0458 24  0128   WBC 14.2*  --  8.5   CREATININE 2.43* 1.88* 1.63*   BUN 27* 25* 20     Est CrCl: 35-40 ml/min; UO: 0.8 ml/kg/hr  Temp (24hrs), Av.9 °F (37.2 °C), Min:98 °F (36.7 °C), Max:99.9 °F (37.7 °C)    Cultures:    blood - NGTD, prelim   urine - pseudomonas + 4th GNR alicia; prleim    MRSA Swab ordered (if applicable)? YES    Goal target range AUC/LORNA 400-600    Recent level history:  Date/Time Dose & Interval Measured Level (mcg/mL) Associated AUC/LORNA Dose Adjustment     @ 0128 Vanc 750 mg q24h 20.9 (~2h post dose) 394 none                                           Plan: Continue current regimen. Noted only dose from maintenance regimen administered off schedule given limited IV access and timed lab drawn early which resulted in insufficient time for serum drug to equilibrate with tissues. Will need to repeat random level with AM labs tomorrow.

## 2024-01-07 LAB
ANION GAP SERPL CALC-SCNC: 6 MMOL/L (ref 5–15)
BACTERIA SPEC CULT: ABNORMAL
BUN SERPL-MCNC: 17 MG/DL (ref 6–20)
BUN/CREAT SERPL: 12 (ref 12–20)
CALCIUM SERPL-MCNC: 9.2 MG/DL (ref 8.5–10.1)
CC UR VC: ABNORMAL
CC UR VC: ABNORMAL
CHLORIDE SERPL-SCNC: 119 MMOL/L (ref 97–108)
CO2 SERPL-SCNC: 20 MMOL/L (ref 21–32)
CREAT SERPL-MCNC: 1.45 MG/DL (ref 0.7–1.3)
ERYTHROCYTE [DISTWIDTH] IN BLOOD BY AUTOMATED COUNT: 16.4 % (ref 11.5–14.5)
GLUCOSE BLD STRIP.AUTO-MCNC: 117 MG/DL (ref 65–117)
GLUCOSE BLD STRIP.AUTO-MCNC: 146 MG/DL (ref 65–117)
GLUCOSE BLD STRIP.AUTO-MCNC: 157 MG/DL (ref 65–117)
GLUCOSE BLD STRIP.AUTO-MCNC: 180 MG/DL (ref 65–117)
GLUCOSE BLD STRIP.AUTO-MCNC: 182 MG/DL (ref 65–117)
GLUCOSE SERPL-MCNC: 125 MG/DL (ref 65–100)
HCT VFR BLD AUTO: 24 % (ref 36.6–50.3)
HGB BLD-MCNC: 7.6 G/DL (ref 12.1–17)
MAGNESIUM SERPL-MCNC: 1.8 MG/DL (ref 1.6–2.4)
MCH RBC QN AUTO: 26.7 PG (ref 26–34)
MCHC RBC AUTO-ENTMCNC: 31.7 G/DL (ref 30–36.5)
MCV RBC AUTO: 84.2 FL (ref 80–99)
NRBC # BLD: 0 K/UL (ref 0–0.01)
NRBC BLD-RTO: 0 PER 100 WBC
PHOSPHATE SERPL-MCNC: 2.2 MG/DL (ref 2.6–4.7)
PLATELET # BLD AUTO: 269 K/UL (ref 150–400)
PMV BLD AUTO: 8.9 FL (ref 8.9–12.9)
POTASSIUM SERPL-SCNC: 3.2 MMOL/L (ref 3.5–5.1)
RBC # BLD AUTO: 2.85 M/UL (ref 4.1–5.7)
SERVICE CMNT-IMP: ABNORMAL
SERVICE CMNT-IMP: NORMAL
SODIUM SERPL-SCNC: 145 MMOL/L (ref 136–145)
WBC # BLD AUTO: 5.5 K/UL (ref 4.1–11.1)

## 2024-01-07 PROCEDURE — 85027 COMPLETE CBC AUTOMATED: CPT

## 2024-01-07 PROCEDURE — 82962 GLUCOSE BLOOD TEST: CPT

## 2024-01-07 PROCEDURE — 6360000002 HC RX W HCPCS: Performed by: INTERNAL MEDICINE

## 2024-01-07 PROCEDURE — 6370000000 HC RX 637 (ALT 250 FOR IP): Performed by: INTERNAL MEDICINE

## 2024-01-07 PROCEDURE — 84100 ASSAY OF PHOSPHORUS: CPT

## 2024-01-07 PROCEDURE — A4216 STERILE WATER/SALINE, 10 ML: HCPCS | Performed by: INTERNAL MEDICINE

## 2024-01-07 PROCEDURE — 2580000003 HC RX 258: Performed by: INTERNAL MEDICINE

## 2024-01-07 PROCEDURE — 1100000000 HC RM PRIVATE

## 2024-01-07 PROCEDURE — 80048 BASIC METABOLIC PNL TOTAL CA: CPT

## 2024-01-07 PROCEDURE — 6370000000 HC RX 637 (ALT 250 FOR IP): Performed by: NURSE PRACTITIONER

## 2024-01-07 PROCEDURE — 36415 COLL VENOUS BLD VENIPUNCTURE: CPT

## 2024-01-07 PROCEDURE — 83735 ASSAY OF MAGNESIUM: CPT

## 2024-01-07 PROCEDURE — C9113 INJ PANTOPRAZOLE SODIUM, VIA: HCPCS | Performed by: INTERNAL MEDICINE

## 2024-01-07 RX ORDER — TAMSULOSIN HYDROCHLORIDE 0.4 MG/1
0.4 CAPSULE ORAL NIGHTLY
Status: DISCONTINUED | OUTPATIENT
Start: 2024-01-07 | End: 2024-01-10 | Stop reason: HOSPADM

## 2024-01-07 RX ORDER — GABAPENTIN 300 MG/1
300 CAPSULE ORAL NIGHTLY
Status: DISCONTINUED | OUTPATIENT
Start: 2024-01-07 | End: 2024-01-10 | Stop reason: HOSPADM

## 2024-01-07 RX ORDER — ACETAMINOPHEN 500 MG
1000 TABLET ORAL EVERY 6 HOURS PRN
COMMUNITY

## 2024-01-07 RX ORDER — DOCUSATE SODIUM 100 MG/1
100 CAPSULE, LIQUID FILLED ORAL DAILY
COMMUNITY

## 2024-01-07 RX ORDER — AMLODIPINE BESYLATE 5 MG/1
2.5 TABLET ORAL DAILY
Status: DISCONTINUED | OUTPATIENT
Start: 2024-01-07 | End: 2024-01-10 | Stop reason: HOSPADM

## 2024-01-07 RX ORDER — ATORVASTATIN CALCIUM 40 MG/1
80 TABLET, FILM COATED ORAL NIGHTLY
Status: DISCONTINUED | OUTPATIENT
Start: 2024-01-07 | End: 2024-01-10 | Stop reason: HOSPADM

## 2024-01-07 RX ORDER — ABIRATERONE ACETATE 250 MG/1
1000 TABLET ORAL DAILY
COMMUNITY

## 2024-01-07 RX ORDER — LANOLIN ALCOHOL/MO/W.PET/CERES
3 CREAM (GRAM) TOPICAL NIGHTLY
Status: DISCONTINUED | OUTPATIENT
Start: 2024-01-07 | End: 2024-01-10 | Stop reason: HOSPADM

## 2024-01-07 RX ORDER — AMLODIPINE BESYLATE 2.5 MG/1
2.5 TABLET ORAL DAILY
COMMUNITY

## 2024-01-07 RX ORDER — DOCUSATE SODIUM 100 MG/1
100 CAPSULE, LIQUID FILLED ORAL DAILY
Status: DISCONTINUED | OUTPATIENT
Start: 2024-01-07 | End: 2024-01-10 | Stop reason: HOSPADM

## 2024-01-07 RX ORDER — ONDANSETRON HYDROCHLORIDE 8 MG/1
8 TABLET, FILM COATED ORAL EVERY 8 HOURS PRN
COMMUNITY

## 2024-01-07 RX ORDER — PREDNISONE 5 MG/1
5 TABLET ORAL DAILY
COMMUNITY

## 2024-01-07 RX ORDER — OXYBUTYNIN CHLORIDE 5 MG/1
5 TABLET, EXTENDED RELEASE ORAL NIGHTLY
Status: DISCONTINUED | OUTPATIENT
Start: 2024-01-07 | End: 2024-01-07

## 2024-01-07 RX ADMIN — SODIUM CHLORIDE, PRESERVATIVE FREE 10 ML: 5 INJECTION INTRAVENOUS at 08:24

## 2024-01-07 RX ADMIN — ACETAMINOPHEN 650 MG: 325 TABLET ORAL at 02:35

## 2024-01-07 RX ADMIN — ATORVASTATIN CALCIUM 80 MG: 40 TABLET, FILM COATED ORAL at 21:08

## 2024-01-07 RX ADMIN — APIXABAN 5 MG: 5 TABLET, FILM COATED ORAL at 21:08

## 2024-01-07 RX ADMIN — PANTOPRAZOLE SODIUM 40 MG: 40 INJECTION, POWDER, FOR SOLUTION INTRAVENOUS at 08:24

## 2024-01-07 RX ADMIN — POTASSIUM BICARBONATE 40 MEQ: 782 TABLET, EFFERVESCENT ORAL at 11:24

## 2024-01-07 RX ADMIN — PIPERACILLIN SODIUM AND TAZOBACTAM SODIUM 3375 MG: 3; .375 INJECTION, POWDER, LYOPHILIZED, FOR SOLUTION INTRAVENOUS at 08:23

## 2024-01-07 RX ADMIN — GABAPENTIN 300 MG: 300 CAPSULE ORAL at 21:08

## 2024-01-07 RX ADMIN — PIPERACILLIN SODIUM AND TAZOBACTAM SODIUM 3375 MG: 3; .375 INJECTION, POWDER, LYOPHILIZED, FOR SOLUTION INTRAVENOUS at 00:50

## 2024-01-07 RX ADMIN — APIXABAN 5 MG: 5 TABLET, FILM COATED ORAL at 11:24

## 2024-01-07 RX ADMIN — MENTHOL 2 G: 10 GEL TOPICAL at 03:47

## 2024-01-07 RX ADMIN — PIPERACILLIN SODIUM AND TAZOBACTAM SODIUM 3375 MG: 3; .375 INJECTION, POWDER, LYOPHILIZED, FOR SOLUTION INTRAVENOUS at 17:44

## 2024-01-07 RX ADMIN — TAMSULOSIN HYDROCHLORIDE 0.4 MG: 0.4 CAPSULE ORAL at 21:08

## 2024-01-07 RX ADMIN — SODIUM CHLORIDE, PRESERVATIVE FREE 10 ML: 5 INJECTION INTRAVENOUS at 21:21

## 2024-01-07 RX ADMIN — POTASSIUM CHLORIDE AND SODIUM CHLORIDE: 900; 300 INJECTION, SOLUTION INTRAVENOUS at 00:46

## 2024-01-07 RX ADMIN — AMLODIPINE BESYLATE 2.5 MG: 5 TABLET ORAL at 12:23

## 2024-01-07 RX ADMIN — Medication 3 MG: at 21:07

## 2024-01-07 ASSESSMENT — PAIN - FUNCTIONAL ASSESSMENT: PAIN_FUNCTIONAL_ASSESSMENT: PREVENTS OR INTERFERES SOME ACTIVE ACTIVITIES AND ADLS

## 2024-01-07 ASSESSMENT — PAIN DESCRIPTION - ORIENTATION: ORIENTATION: RIGHT;LEFT

## 2024-01-07 ASSESSMENT — PAIN DESCRIPTION - PAIN TYPE: TYPE: CHRONIC PAIN

## 2024-01-07 ASSESSMENT — PAIN SCALES - GENERAL
PAINLEVEL_OUTOF10: 10
PAINLEVEL_OUTOF10: 10

## 2024-01-07 ASSESSMENT — PAIN DESCRIPTION - FREQUENCY: FREQUENCY: CONTINUOUS

## 2024-01-07 ASSESSMENT — PAIN DESCRIPTION - LOCATION: LOCATION: ANKLE

## 2024-01-07 ASSESSMENT — PAIN DESCRIPTION - ONSET: ONSET: ON-GOING

## 2024-01-07 NOTE — PLAN OF CARE
Problem: Discharge Planning  Goal: Discharge to home or other facility with appropriate resources  Outcome: Progressing  Flowsheets (Taken 1/7/2024 2245)  Discharge to home or other facility with appropriate resources:   Identify barriers to discharge with patient and caregiver   Arrange for needed discharge resources and transportation as appropriate   Identify discharge learning needs (meds, wound care, etc)   Refer to discharge planning if patient needs post-hospital services based on physician order or complex needs related to functional status, cognitive ability or social support system     Problem: Skin/Tissue Integrity  Goal: Absence of new skin breakdown  Description: 1.  Monitor for areas of redness and/or skin breakdown  2.  Assess vascular access sites hourly  3.  Every 4-6 hours minimum:  Change oxygen saturation probe site  4.  Every 4-6 hours:  If on nasal continuous positive airway pressure, respiratory therapy assess nares and determine need for appliance change or resting period.  Outcome: Progressing     Problem: ABCDS Injury Assessment  Goal: Absence of physical injury  Outcome: Progressing     Problem: Safety - Adult  Goal: Free from fall injury  Outcome: Progressing     Problem: Chronic Conditions and Co-morbidities  Goal: Patient's chronic conditions and co-morbidity symptoms are monitored and maintained or improved  Outcome: Progressing     Problem: Pain  Goal: Verbalizes/displays adequate comfort level or baseline comfort level  Outcome: Progressing

## 2024-01-07 NOTE — PROGRESS NOTES
Patient lost IV access, unable to get IV access, Critical care transport coming to assess patient for IV access.

## 2024-01-07 NOTE — PROGRESS NOTES
Admission Medication Reconciliation:    Information obtained from:  [facility] and [facility] paperwork  RxQuery data available¹:  Yes    Comments/Recommendations: Updated PTA meds/reviewed patient's allergies.    1)  Patient was not interviewed, all information obtained from facility records.    2)  Medication changes (since last review):  Added  - cholecalciferol  - ondansetron  - apixaban  - prednisone  - abiraterone    Adjusted  - amlodipine  - docusate    Removed  - oxybutynin   - melatonin  - glimepiride   - furosemide  - fexofenadine   - empagliflozin  - benazapril/HCTZ  - ferrous sulfate    3) Message sent to MD     ¹RxQuery pharmacy benefit data reflects medications filled and processed through the patient's insurance, however   this data does NOT capture whether the medication was picked up or is currently being taken by the patient.    Allergies:  Celecoxib, Cephalexin, Ibuprofen, and Aspirin    Significant PMH/Disease States:   Past Medical History:   Diagnosis Date    Diabetes (HCC)     Gastrointestinal disorder     Hypertension     PATTI (obstructive sleep apnea)     AHI: 8.9 per hour     Chief Complaint for this Admission:    Chief Complaint   Patient presents with    Fever     Prior to Admission Medications:   Prior to Admission Medications   Prescriptions Last Dose Informant   abiraterone acetate (ZYTIGA) 250 MG tablet     Sig: Take 4 tablets by mouth daily   acetaminophen (TYLENOL) 500 MG tablet     Sig: Take 2 tablets by mouth every 6 hours as needed for Pain   albuterol sulfate HFA (PROVENTIL;VENTOLIN;PROAIR) 108 (90 Base) MCG/ACT inhaler     Sig: Inhale 2 puffs into the lungs every 4 hours as needed   amLODIPine (NORVASC) 2.5 MG tablet     Sig: Take 1 tablet by mouth daily   apixaban (ELIQUIS) 5 MG TABS tablet     Sig: Take 1 tablet by mouth 2 times daily   atorvastatin (LIPITOR) 80 MG tablet     Sig: Take 1 tablet by mouth nightly   docusate sodium (COLACE) 100 MG capsule     Sig: Take 1  capsule by mouth daily   gabapentin (NEURONTIN) 300 MG capsule     Sig: Take 1 capsule by mouth at bedtime.   ondansetron (ZOFRAN) 8 MG tablet     Sig: Take 1 tablet by mouth every 8 hours as needed for Nausea or Vomiting   pantoprazole (PROTONIX) 40 MG tablet     Sig: TAKE 1 TABLET BY MOUTH EVERY DAY   predniSONE (DELTASONE) 5 MG tablet     Sig: Take 1 tablet by mouth daily   tamsulosin (FLOMAX) 0.4 MG capsule     Sig: ceived the following from [facility]: Outside name: tamsulosin (FLOMAX) 0.4 mg capsule   vitamin D (CHOLECALCIFEROL) 25 MCG (1000 UT) TABS tablet     Sig: Take 2 tablets by mouth daily      Facility-Administered Medications: None     Please contact the main inpatient pharmacy with any questions or concerns at [phone] and we will direct you to the clinical pharmacist covering this patient's care while in-house.   Roxi Shahid RP

## 2024-01-07 NOTE — PROGRESS NOTES
General Surgery Progress Note    Small bowel obstruction  Date:2024       Room:[facility]  Patient Name:Mehrdad Sims     YOB: 1950     Age:73 y.o.    Subjective     No acute surgical issues.  Pt is doing well.  Tolerating clear liquids without nause or vomiting. Reportedly had several BM's yesterday and overnight per RN.     Medications   Scheduled Meds:    potassium bicarb-citric acid  40 mEq Oral Q4H    amLODIPine  10 mg Oral Daily    gabapentin  300 mg Oral Nightly    melatonin  3 mg Oral Nightly    oxyBUTYnin  5 mg Oral Nightly    tamsulosin  0.4 mg Oral Nightly    docusate sodium  250 mg Oral Daily    atorvastatin  80 mg Oral Nightly    apixaban  5 mg Oral BID    sodium chloride flush  5-40 mL IntraVENous 2 times per day    insulin lispro  0-4 Units SubCUTAneous TID WC    insulin lispro  0-4 Units SubCUTAneous Nightly    insulin lispro  0-4 Units SubCUTAneous Q4H    pantoprazole (PROTONIX) 40 mg in sodium chloride (PF) 0.9 % 10 mL injection  40 mg IntraVENous Daily    piperacillin-tazobactam  3,375 mg IntraVENous Q8H     Continuous Infusions:    0.9% NaCl with KCl 40 mEq 75 mL/hr at 24 0046    sodium chloride      dextrose       PRN Meds: diclofenac sodium, lidocaine, sodium chloride flush, sodium chloride, ondansetron **OR** ondansetron, acetaminophen **OR** acetaminophen, bisacodyl, glucose, dextrose bolus **OR** dextrose bolus, glucagon (rDNA), dextrose, albuterol, hydrALAZINE        Physical Examination      Vitals:  BP (!) 156/79   Pulse 62   Temp 98.6 °F (37 °C) (Oral)   Resp 24   Ht 1.727 m (5' 8\")   Wt 65.5 kg (144 lb 6.4 oz)   SpO2 97%   BMI 21.96 kg/m²   Temp (24hrs), Av.5 °F (36.9 °C), Min:97.5 °F (36.4 °C), Max:99.2 °F (37.3 °C)      Physical Exam:    Gen:  No apparent distress  Neuro:  Alert and answering questions with nods  CV:  RRR  Pulm:  Unlabored  Abd:  Soft/non-distended/Non-tender to palpation without guarding or rebound  Ext:  No cyanosis, clubbing or  Date: 1/4/2024  EXAM: CT ABDOMEN PELVIS WO CONTRAST INDICATION: fever and abd pain w/ b/l PCN COMPARISON: 4/27/2022 IV CONTRAST: None. ORAL CONTRAST: None TECHNIQUE: Thin axial images were obtained through the abdomen and pelvis. Coronal and sagittal reformats were generated. CT dose reduction was achieved through use of a standardized protocol tailored for this examination and automatic exposure control for dose modulation. The absence of intravenous contrast material reduces the sensitivity for evaluation of the vasculature and solid organs. FINDINGS: LOWER THORAX: Coronary atherosclerotic calcifications are present. There is mild atelectasis in the right base. LIVER: No mass. BILIARY TREE: Surgically absent. Common bile duct is not dilated. Pneumobilia is likely related to prior sphincterotomy. SPLEEN: within normal limits. PANCREAS: No focal abnormality. ADRENALS: Unremarkable. KIDNEYS/URETERS: There are bilateral nephroureteral stents in place. Small locules of gas in the renal collecting systems is likely related to instrumentation. Small cysts are seen in the bilateral kidneys. No nephrolithiasis. No hydronephrosis. STOMACH: Unremarkable. SMALL BOWEL: There are multiple dilated loops of small bowel with air-fluid levels. There is a right supraumbilical hernia containing a loop of small bowel that is dilated. This is adjacent to a midline incisional scar. Multiple loops of decompressed small bowel are seen in the pelvis. COLON: No dilatation or wall thickening. Multiple colonic diverticula are present. APPENDIX: Not well seen. PERITONEUM: No ascites or pneumoperitoneum. RETROPERITONEUM: No lymphadenopathy or aortic aneurysm. REPRODUCTIVE ORGANS: The prostate gland is mildly enlarged.. URINARY BLADDER: The bladder wall is thickened. There is a diverticulum anteriorly. A 1.9 cm calcification appears to be outside of the bladder posteriorly may represent a previously torsed epiploic appendage. BONES: There are

## 2024-01-07 NOTE — CARE COORDINATION
Care Management Initial Assessment       RUR:15%  Readmission? No  1st IM letter given? Yes   1st  letter given: No      01/07/24 1621   Service Assessment   Patient Orientation Unable to Assess  (Information obtained by pt's sister, Ms. Hancock.)   Cognition Other (see comment)   History Provided By Child/Family;Medical Record   Primary Caregiver Other (Comment)  (SNF- [facility])   Support Systems Family Members   PCP Verified by CM No   Prior Functional Level Assistance with the following:;Bathing;Dressing;Toileting;Mobility   Current Functional Level Assistance with the following:;Bathing;Dressing;Toileting;Mobility   Can patient return to prior living arrangement Yes   Ability to make needs known: Fair   Family able to assist with home care needs: Yes   Would you like for me to discuss the discharge plan with any other family members/significant others, and if so, who? No     CM spoke with pt's sister, Rosa Hancock ([phone]) to introduce her to the role of Cm and transition of care. This pt resides at [facility] in [place]. He has lived there since September 2023. The sister wants him to return there when he is discharged. CM sent a referral to [facility] via [employer]. Elsa Chambers,AMALIAW,ACM-SW

## 2024-01-07 NOTE — PROGRESS NOTES
[facility] Adult  Hospitalist Group                                                                                          Hospitalist Progress Note  Torrey Simmons MD  Answering service: [phone] OR [phone] from in house phone        Date of Service:  2024  NAME:  Mehrdad Sims  :  1950  MRN:  [id number]       Admission Summary:   Mehrdad Sims is a 73 y.o. 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  Per staff at the SNF, patient was \"unresponsive\".   Patient was seen and examined in the emergency room, but very little history is obtained from him.  He is lethargic, opens his eyes to voice, but does not answer any questions or follow any commands.  Most of the information was obtained from referring provider and chart review.  He was last hospitalized at [facility]  - 24.  Patient has bilateral percutaneous nephrostomy tubes, which were last exchanged on .   He was diagnosed with Klebsiella pneumonia ESBL bacteremia, due to UTI.  Urine culture yielded Klebsiella pneumonia ESBL, E. coli ESBL, and Pseudomonas.  Patient was initially treated with Zosyn and Vancomycin.  Abx were switched to Meropenem per ID consultant, patient was discharged on IV meropenem with last dose of treatment on 2023.  He was also tested positive for COVID-19, and completed a 3-day course of remdesivir.  He was not hypoxic.  Patient was febrile on admission with a temperature of 100.3.  CT abdomen and pelvis showed small bowel obstruction.  Patient was evaluated by surgery, conservative management was recommended, NG tube was placed.  Cultures were obtained from bilateral nephrostomy

## 2024-01-07 NOTE — PROGRESS NOTES
Pt took SCDs off and does not want to put them back on. Education completed. Md aware.     0930: attempted to call report.     0958: attempted to call report

## 2024-01-07 NOTE — PROGRESS NOTES
TRANSFER - OUT REPORT:    Verbal report given to 5S on Mehrdad Sims.    Report consisted of patient's Situation, Background, Assessment and   Recommendations(SBAR).     Information from the following report(s) Nurse Handoff Report, Adult Overview, Intake/Output, MAR, and Cardiac Rhythm NSR  was reviewed with the receiving nurse.      Opportunity for questions and clarification was provided.      Patient transported with belongings.

## 2024-01-08 LAB
ERYTHROCYTE [DISTWIDTH] IN BLOOD BY AUTOMATED COUNT: 16.2 % (ref 11.5–14.5)
GLUCOSE BLD STRIP.AUTO-MCNC: 123 MG/DL (ref 65–117)
GLUCOSE BLD STRIP.AUTO-MCNC: 148 MG/DL (ref 65–117)
GLUCOSE BLD STRIP.AUTO-MCNC: 173 MG/DL (ref 65–117)
GLUCOSE BLD STRIP.AUTO-MCNC: 225 MG/DL (ref 65–117)
HCT VFR BLD AUTO: 25.9 % (ref 36.6–50.3)
HGB BLD-MCNC: 7.8 G/DL (ref 12.1–17)
MAGNESIUM SERPL-MCNC: 1.6 MG/DL (ref 1.6–2.4)
MCH RBC QN AUTO: 26.4 PG (ref 26–34)
MCHC RBC AUTO-ENTMCNC: 30.1 G/DL (ref 30–36.5)
MCV RBC AUTO: 87.8 FL (ref 80–99)
NRBC # BLD: 0 K/UL (ref 0–0.01)
NRBC BLD-RTO: 0 PER 100 WBC
PHOSPHATE SERPL-MCNC: 2.3 MG/DL (ref 2.6–4.7)
PLATELET # BLD AUTO: 253 K/UL (ref 150–400)
PMV BLD AUTO: 9.1 FL (ref 8.9–12.9)
RBC # BLD AUTO: 2.95 M/UL (ref 4.1–5.7)
SERVICE CMNT-IMP: ABNORMAL
WBC # BLD AUTO: 5.2 K/UL (ref 4.1–11.1)

## 2024-01-08 PROCEDURE — 2580000003 HC RX 258: Performed by: INTERNAL MEDICINE

## 2024-01-08 PROCEDURE — C9113 INJ PANTOPRAZOLE SODIUM, VIA: HCPCS | Performed by: INTERNAL MEDICINE

## 2024-01-08 PROCEDURE — 82962 GLUCOSE BLOOD TEST: CPT

## 2024-01-08 PROCEDURE — 97116 GAIT TRAINING THERAPY: CPT

## 2024-01-08 PROCEDURE — 99232 SBSQ HOSP IP/OBS MODERATE 35: CPT | Performed by: SURGERY

## 2024-01-08 PROCEDURE — 85027 COMPLETE CBC AUTOMATED: CPT

## 2024-01-08 PROCEDURE — 83735 ASSAY OF MAGNESIUM: CPT

## 2024-01-08 PROCEDURE — 36415 COLL VENOUS BLD VENIPUNCTURE: CPT

## 2024-01-08 PROCEDURE — 6370000000 HC RX 637 (ALT 250 FOR IP): Performed by: INTERNAL MEDICINE

## 2024-01-08 PROCEDURE — 84100 ASSAY OF PHOSPHORUS: CPT

## 2024-01-08 PROCEDURE — 6360000002 HC RX W HCPCS: Performed by: INTERNAL MEDICINE

## 2024-01-08 PROCEDURE — 1100000000 HC RM PRIVATE

## 2024-01-08 PROCEDURE — A4216 STERILE WATER/SALINE, 10 ML: HCPCS | Performed by: INTERNAL MEDICINE

## 2024-01-08 PROCEDURE — 6370000000 HC RX 637 (ALT 250 FOR IP): Performed by: HOSPITALIST

## 2024-01-08 RX ORDER — ABIRATERONE ACETATE 250 MG/1
1000 TABLET ORAL DAILY
Status: DISCONTINUED | OUTPATIENT
Start: 2024-01-08 | End: 2024-01-10 | Stop reason: HOSPADM

## 2024-01-08 RX ORDER — PREDNISONE 10 MG/1
5 TABLET ORAL DAILY
Status: DISCONTINUED | OUTPATIENT
Start: 2024-01-08 | End: 2024-01-10 | Stop reason: HOSPADM

## 2024-01-08 RX ADMIN — PREDNISONE 5 MG: 10 TABLET ORAL at 18:58

## 2024-01-08 RX ADMIN — PIPERACILLIN SODIUM AND TAZOBACTAM SODIUM 3375 MG: 3; .375 INJECTION, POWDER, LYOPHILIZED, FOR SOLUTION INTRAVENOUS at 18:53

## 2024-01-08 RX ADMIN — PIPERACILLIN SODIUM AND TAZOBACTAM SODIUM 3375 MG: 3; .375 INJECTION, POWDER, LYOPHILIZED, FOR SOLUTION INTRAVENOUS at 00:47

## 2024-01-08 RX ADMIN — APIXABAN 5 MG: 5 TABLET, FILM COATED ORAL at 21:41

## 2024-01-08 RX ADMIN — ATORVASTATIN CALCIUM 80 MG: 40 TABLET, FILM COATED ORAL at 21:41

## 2024-01-08 RX ADMIN — AMLODIPINE BESYLATE 2.5 MG: 5 TABLET ORAL at 10:22

## 2024-01-08 RX ADMIN — PIPERACILLIN SODIUM AND TAZOBACTAM SODIUM 3375 MG: 3; .375 INJECTION, POWDER, LYOPHILIZED, FOR SOLUTION INTRAVENOUS at 10:22

## 2024-01-08 RX ADMIN — APIXABAN 5 MG: 5 TABLET, FILM COATED ORAL at 10:22

## 2024-01-08 RX ADMIN — TAMSULOSIN HYDROCHLORIDE 0.4 MG: 0.4 CAPSULE ORAL at 21:41

## 2024-01-08 RX ADMIN — POTASSIUM CHLORIDE AND SODIUM CHLORIDE: 900; 300 INJECTION, SOLUTION INTRAVENOUS at 07:51

## 2024-01-08 RX ADMIN — SODIUM CHLORIDE, PRESERVATIVE FREE 10 ML: 5 INJECTION INTRAVENOUS at 10:24

## 2024-01-08 RX ADMIN — Medication 3 MG: at 21:41

## 2024-01-08 RX ADMIN — GABAPENTIN 300 MG: 300 CAPSULE ORAL at 21:41

## 2024-01-08 RX ADMIN — DOCUSATE SODIUM 100 MG: 100 CAPSULE, LIQUID FILLED ORAL at 10:22

## 2024-01-08 RX ADMIN — PANTOPRAZOLE SODIUM 40 MG: 40 INJECTION, POWDER, FOR SOLUTION INTRAVENOUS at 10:22

## 2024-01-08 NOTE — PROGRESS NOTES
hospitalist team has been consulted for admission.       Interval history / Subjective:   SBO resolved, NGT removed 1/7, diet advanced per general surgery, tolerate        Assessment & Plan:        Small bowel obstruction:  -Management per surgery;  -IV fluids;  -N.p.o.;  -NG tube for decompression;  -Serial KUBs;   -1/5: clinically much improved, had a BM last night, ice chips as tolerated;   -1/6: As GI recommends clamping NG tube, NG tube removed late 1/6  - 1/7 diet advanced   -1/8 resolved, surgery sign off      Fever:  -Recent Klebsiella ESBL UTI and bacteremia;  -Patient was pancultured on arrival, follow culture results;  -Urine in the nephrostomy tubes is concentrated, but there is no sediment or purulence noted;   -Check COVID-19, as patient has had a recent exposure;   -Start empiric IV antibiotics: Zosyn and vancomycin, pending culture results;   -Urine culture:  Klebsiella pneumonia, ESBL -sensitive to Zosyn;  Pseudomonas species -sensitive to Zosyn;  E. Coli, ESBL -sensitive to Zosyn;  -blood culture neg   -will give  5-7 days tx.        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 and unable to take Eliquis, will switch to therapeutic Lovenox 1 Mg per KG BW;  -resume Eliquis now that patient is able to take PO;     Anemia of chronic disease:  -monitor H&H;     Hypokalemia:   -replete prn;        CODE STATUS: full  DVT PROPHYLAXIS: Lovenox  ANTICIPATED DISCHARGE: 2-3 days  ANTICIPATED DISPOSITION: Skilled Nursing Facility (SNF)  EMERGENCY CONTACT/SURROGATE DECISION MAKER: Rosa Hancock  PRN    ondansetron (ZOFRAN-ODT) disintegrating tablet 4 mg  4 mg Oral Q8H PRN    Or    ondansetron (ZOFRAN) injection 4 mg  4 mg IntraVENous Q6H PRN    acetaminophen (TYLENOL) tablet 650 mg  650 mg Oral Q6H PRN    Or    acetaminophen (TYLENOL) suppository 650 mg  650 mg Rectal Q6H PRN    bisacodyl (DULCOLAX) suppository 10 mg  10 mg Rectal Daily PRN    glucose chewable tablet 16 g  4 tablet Oral PRN    dextrose bolus 10% 125 mL  125 mL IntraVENous PRN    Or    dextrose bolus 10% 250 mL  250 mL IntraVENous PRN    glucagon injection 1 mg  1 mg SubCUTAneous PRN    dextrose 10 % infusion   IntraVENous Continuous PRN    insulin lispro (HUMALOG) injection vial 0-4 Units  0-4 Units SubCUTAneous TID WC    insulin lispro (HUMALOG) injection vial 0-4 Units  0-4 Units SubCUTAneous Nightly    albuterol (PROVENTIL) (2.5 MG/3ML) 0.083% nebulizer solution 2.5 mg  2.5 mg Nebulization Q4H PRN    pantoprazole (PROTONIX) 40 mg in sodium chloride (PF) 0.9 % 10 mL injection  40 mg IntraVENous Daily    hydrALAZINE (APRESOLINE) injection 10 mg  10 mg IntraVENous Q6H PRN    piperacillin-tazobactam (ZOSYN) 3,375 mg in sodium chloride 0.9 % 50 mL IVPB (mini-bag)  3,375 mg IntraVENous Q8H     ______________________________________________________________________  EXPECTED LENGTH OF STAY: Unable to retrieve estimated LOS  ACTUAL LENGTH OF STAY:          4                 Mary Anne Murphy MD

## 2024-01-08 NOTE — WOUND CARE
WOCN Note:     New consult for sacrum.   Seen in 568 with PCT    73 y.o. y/o male admitted on 1/4/2024   Admitted for SBO and UTI  History of bilateral PCN's, DM, metastatic prostate cancer, Covid, P   Diet: reg           Assessment:   Appropriately conversational and sitting up in chair.   PCT in room to help him stand using walker for sacral assessment.   He is wearing a pull up and boxers.   Reports no pain.      Bilateral heels intact and without erythema.  Moves feet freely.     Buttocks and sacrum intact without erythema.  Gluteal cleft with slight MASD.  Zinc cream in use.     PI Prevention:  Turn/reposition approximately every 2 hours  Offload heels with heels hanging off end of pillow at all times while in bed.  Z-guard cream to buttocks and sacrum daily and as needed with incontinence care    No concerns to relay to provider.    No wound care needs - will sign off.     Denae Palma, AMALIAN, RN, CWOCN  Certified Wound, Ostomy, Continence Nurse  office [phone]  Available via [employer]

## 2024-01-08 NOTE — PLAN OF CARE
Problem: Physical Therapy - Adult  Goal: By Discharge: Performs mobility at highest level of function for planned discharge setting.  See evaluation for individualized goals.  Description: FUNCTIONAL STATUS PRIOR TO ADMISSION: Patient is a questionable historian.  He endorses using being in a WC since admission to [facility], ambulated prior to that time.      HOME SUPPORT PRIOR TO ADMISSION: Patient is from [facility], (? SNF vs LTC), requires assist for transfers, ADLs.   When asked if he is receiving physical therapy at [facility] pt stated \"if you call it that.\"    Physical Therapy Goals  Initiated 1/5/2024  1.  Patient will move from supine to sit and sit to supine in bed with minimal assistance within 7 day(s).    2.  Patient will perform sit to stand with minimal assistance within 7 day(s).  3.  Patient will transfer from bed to chair and chair to bed with minimal assistance using the least restrictive device within 7 day(s).        Outcome: Progressing   PHYSICAL THERAPY TREATMENT    Patient: Mehrdad Sims (73 y.o. male)  Date: 1/8/2024  Diagnosis: Hypokalemia [E87.6]  SBO (small bowel obstruction) ([facility]) [K56.609]  Abnormal LFTs [R79.89]  Complicated UTI (urinary tract infection) [N39.0] SBO (small bowel obstruction) ([facility])      Precautions: Fall Risk                    ASSESSMENT:  Patient continues to benefit from skilled PT services and is progressing towards goals. He was  up in the chair on arrival and nursing reported that he had walked himself to the door of the room over the weekend.  Although he moves very slowly, he was able to get himself to standing with only min assist with use of the walker.  He tends to stand with a flexed posture at hips and knees, but he was able to ambulate in the room with RW with min assist.  Noted mild MA that resolved with seated rest break and he denies any lightheadedness or dizziness.  Encouraged AROM of his legs when sitting in the chair, as he reports LE fatigue as

## 2024-01-08 NOTE — PROGRESS NOTES
Surgery Progress Note    1/8/2024    Admit Date: 1/4/2024 11:08 AM    CC: Abd pain      Subjective:     Tolerating liquids over the weekend.  Many BM's since admission. No abd pain    Constitutional: No fever or chills  Neurologic: No headache  Eyes: No scleral icterus or irritated eyes  Nose: No nasal pain or drainage  Mouth: No oral lesions or sore throat  Cardiac: No palpations or chest pain  Pulmonary: No cough or shortness of breath  Gastrointestinal: No nausea, emesis, diarrhea, or constipation  Genitourinary: No dysuria  Musculoskeletal: No muscle or joint tenderness  Skin: No rashes or lesions  Psychiatric: No anxiety or depressed mood    Objective:     Vitals:    01/07/24 2100   BP: 137/79   Pulse: 63   Resp: 19   Temp: 97.9 °F (36.6 °C)   SpO2: 100%       General: No acute distress, conversant  Eyes: PERRLA, no scleral icterus  HENT: Normocephalic without oral lesions  Neck: Trachea midline without LAD  Cardiac: Normal pulse rate and rhythm  Pulmonary: Symmetric chest rise with normal effort  GI: Soft, NT, ND, hernia soft, no splenomegaly  Skin: Warm without rash  Extremities: No edema or joint stiffness  Psych: Appropriate mood and affect    Labs, vital signs, and I/O reviewed.    Assessment:     73-year-old male with incisional hernia and bilateral nephrostomy tubes secondary to metastatic prostate cancer with concern for SBO now resolved    Plan:     PRN pain meds  Reg diet  Surgery to sign off      Akhil Monet MD, FACS, [facility]  Bariatric and General Surgeon  [facility]

## 2024-01-09 LAB
ALBUMIN SERPL-MCNC: 1.8 G/DL (ref 3.5–5)
ALBUMIN/GLOB SERPL: 0.3 (ref 1.1–2.2)
ALP SERPL-CCNC: 105 U/L (ref 45–117)
ALT SERPL-CCNC: 31 U/L (ref 12–78)
ANION GAP SERPL CALC-SCNC: 7 MMOL/L (ref 5–15)
AST SERPL-CCNC: 22 U/L (ref 15–37)
BACTERIA SPEC CULT: NORMAL
BACTERIA SPEC CULT: NORMAL
BILIRUB SERPL-MCNC: 0.4 MG/DL (ref 0.2–1)
BUN SERPL-MCNC: 8 MG/DL (ref 6–20)
BUN/CREAT SERPL: 6 (ref 12–20)
CALCIUM SERPL-MCNC: 9.3 MG/DL (ref 8.5–10.1)
CHLORIDE SERPL-SCNC: 115 MMOL/L (ref 97–108)
CO2 SERPL-SCNC: 20 MMOL/L (ref 21–32)
CREAT SERPL-MCNC: 1.36 MG/DL (ref 0.7–1.3)
ERYTHROCYTE [DISTWIDTH] IN BLOOD BY AUTOMATED COUNT: 16.5 % (ref 11.5–14.5)
GLOBULIN SER CALC-MCNC: 5.7 G/DL (ref 2–4)
GLUCOSE BLD STRIP.AUTO-MCNC: 116 MG/DL (ref 65–117)
GLUCOSE BLD STRIP.AUTO-MCNC: 226 MG/DL (ref 65–117)
GLUCOSE BLD STRIP.AUTO-MCNC: 264 MG/DL (ref 65–117)
GLUCOSE BLD STRIP.AUTO-MCNC: 272 MG/DL (ref 65–117)
GLUCOSE SERPL-MCNC: 132 MG/DL (ref 65–100)
HCT VFR BLD AUTO: 25.7 % (ref 36.6–50.3)
HGB BLD-MCNC: 8.1 G/DL (ref 12.1–17)
MAGNESIUM SERPL-MCNC: 1.8 MG/DL (ref 1.6–2.4)
MCH RBC QN AUTO: 26.6 PG (ref 26–34)
MCHC RBC AUTO-ENTMCNC: 31.5 G/DL (ref 30–36.5)
MCV RBC AUTO: 84.5 FL (ref 80–99)
NRBC # BLD: 0 K/UL (ref 0–0.01)
NRBC BLD-RTO: 0 PER 100 WBC
PLATELET # BLD AUTO: 268 K/UL (ref 150–400)
PMV BLD AUTO: 9.4 FL (ref 8.9–12.9)
POTASSIUM SERPL-SCNC: 3.5 MMOL/L (ref 3.5–5.1)
PROT SERPL-MCNC: 7.5 G/DL (ref 6.4–8.2)
RBC # BLD AUTO: 3.04 M/UL (ref 4.1–5.7)
SERVICE CMNT-IMP: ABNORMAL
SERVICE CMNT-IMP: NORMAL
SODIUM SERPL-SCNC: 142 MMOL/L (ref 136–145)
WBC # BLD AUTO: 5.5 K/UL (ref 4.1–11.1)

## 2024-01-09 PROCEDURE — 85027 COMPLETE CBC AUTOMATED: CPT

## 2024-01-09 PROCEDURE — 6360000002 HC RX W HCPCS: Performed by: INTERNAL MEDICINE

## 2024-01-09 PROCEDURE — 82962 GLUCOSE BLOOD TEST: CPT

## 2024-01-09 PROCEDURE — 80053 COMPREHEN METABOLIC PANEL: CPT

## 2024-01-09 PROCEDURE — 97530 THERAPEUTIC ACTIVITIES: CPT

## 2024-01-09 PROCEDURE — 2580000003 HC RX 258: Performed by: INTERNAL MEDICINE

## 2024-01-09 PROCEDURE — A4216 STERILE WATER/SALINE, 10 ML: HCPCS | Performed by: INTERNAL MEDICINE

## 2024-01-09 PROCEDURE — 6360000002 HC RX W HCPCS: Performed by: HOSPITALIST

## 2024-01-09 PROCEDURE — C9113 INJ PANTOPRAZOLE SODIUM, VIA: HCPCS | Performed by: INTERNAL MEDICINE

## 2024-01-09 PROCEDURE — 83735 ASSAY OF MAGNESIUM: CPT

## 2024-01-09 PROCEDURE — 36415 COLL VENOUS BLD VENIPUNCTURE: CPT

## 2024-01-09 PROCEDURE — 1100000000 HC RM PRIVATE

## 2024-01-09 PROCEDURE — 6370000000 HC RX 637 (ALT 250 FOR IP): Performed by: HOSPITALIST

## 2024-01-09 PROCEDURE — 2580000003 HC RX 258: Performed by: HOSPITALIST

## 2024-01-09 PROCEDURE — 6370000000 HC RX 637 (ALT 250 FOR IP): Performed by: INTERNAL MEDICINE

## 2024-01-09 RX ORDER — LEVOFLOXACIN 250 MG/1
250 TABLET, FILM COATED ORAL DAILY
Status: DISCONTINUED | OUTPATIENT
Start: 2024-01-10 | End: 2024-01-10 | Stop reason: HOSPADM

## 2024-01-09 RX ORDER — PANTOPRAZOLE SODIUM 40 MG/1
40 TABLET, DELAYED RELEASE ORAL
Status: DISCONTINUED | OUTPATIENT
Start: 2024-01-10 | End: 2024-01-10 | Stop reason: HOSPADM

## 2024-01-09 RX ADMIN — APIXABAN 5 MG: 5 TABLET, FILM COATED ORAL at 21:26

## 2024-01-09 RX ADMIN — PIPERACILLIN AND TAZOBACTAM 3375 MG: 3; .375 INJECTION, POWDER, LYOPHILIZED, FOR SOLUTION INTRAVENOUS at 18:23

## 2024-01-09 RX ADMIN — PIPERACILLIN SODIUM AND TAZOBACTAM SODIUM 3375 MG: 3; .375 INJECTION, POWDER, LYOPHILIZED, FOR SOLUTION INTRAVENOUS at 10:24

## 2024-01-09 RX ADMIN — PREDNISONE 5 MG: 10 TABLET ORAL at 10:25

## 2024-01-09 RX ADMIN — GABAPENTIN 300 MG: 300 CAPSULE ORAL at 21:26

## 2024-01-09 RX ADMIN — AMLODIPINE BESYLATE 2.5 MG: 5 TABLET ORAL at 10:25

## 2024-01-09 RX ADMIN — Medication 3 MG: at 21:26

## 2024-01-09 RX ADMIN — TAMSULOSIN HYDROCHLORIDE 0.4 MG: 0.4 CAPSULE ORAL at 21:27

## 2024-01-09 RX ADMIN — SODIUM CHLORIDE, PRESERVATIVE FREE 10 ML: 5 INJECTION INTRAVENOUS at 10:25

## 2024-01-09 RX ADMIN — INSULIN LISPRO 2 UNITS: 100 INJECTION, SOLUTION INTRAVENOUS; SUBCUTANEOUS at 18:22

## 2024-01-09 RX ADMIN — DOCUSATE SODIUM 100 MG: 100 CAPSULE, LIQUID FILLED ORAL at 10:25

## 2024-01-09 RX ADMIN — PIPERACILLIN SODIUM AND TAZOBACTAM SODIUM 3375 MG: 3; .375 INJECTION, POWDER, LYOPHILIZED, FOR SOLUTION INTRAVENOUS at 02:24

## 2024-01-09 RX ADMIN — PANTOPRAZOLE SODIUM 40 MG: 40 INJECTION, POWDER, FOR SOLUTION INTRAVENOUS at 10:24

## 2024-01-09 RX ADMIN — ATORVASTATIN CALCIUM 80 MG: 40 TABLET, FILM COATED ORAL at 21:26

## 2024-01-09 RX ADMIN — APIXABAN 5 MG: 5 TABLET, FILM COATED ORAL at 10:32

## 2024-01-09 RX ADMIN — SODIUM CHLORIDE, PRESERVATIVE FREE 10 ML: 5 INJECTION INTRAVENOUS at 21:28

## 2024-01-09 NOTE — CARE COORDINATION
FARIDEH- D/c back to [facility]  Transportation-Stretcher @noon      SATINDER met with Dr. Murphy who said that this pt will likely be ready for d/c tomorrow. SATINDER spoke with Ada ([phone]) in admissions at [facility], and she stated that they can accept him back there tomorrow. SATINDER met with pt to inform his as well and he is in agreement with d/c back to [facility] tomorrow. He also signed the 2nd IM letter. SATINDER called [facility] ([phone]) to set up stretcher transportation for 12 noon tomorrow. The trip number is [id number].Should  want to check on the ETA of this ride tomorrow, this number ([phone]) should be called.Elsa Chambers,AMALIAW,ACM-SW

## 2024-01-09 NOTE — PROGRESS NOTES
Comprehensive Nutrition Assessment    Type and Reason for Visit:  Initial, Positive Nutrition Screen    Nutrition Recommendations/Plan:   Add oral nutritional supplementation and continue in SNF.  Continue to monitor intake and wt for trends.      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   CT ob abdomen  and pelvis showed small bowel obstruction. NGT placed for suction and removed 1/6. Unable to obtain information from pt today, acute metabolic encephalopathy, however much improved. Pt noted to have 78# or 35% wt loss from UBW over the past 6 months reflecting severe wt change. SBO improved and taking some po. Will gladly add oral supplements to enhance nutrition. Continue with supplementation in SNF.       Nutrition Related Findings:      Wound Type: Moisture Associate Skin Damage       Current Nutrition Intake & Therapies:      ADULT DIET; Regular    Anthropometric Measures:  Height: 172.7 cm (5' 8\")  Ideal Body Weight (IBW): 154 lbs (70 kg)    Admission Body Weight: 65.5 kg (144 lb 6.4 oz)  Current Body Weight: 65.5 kg (144 lb 6.4 oz),   IBW.    Current BMI (kg/m2): 22  Usual Body Weight: 100.7 kg (222 lb) (6/15/2023)  % Weight Change (Calculated): -35     BMI Categories: Normal Weight (BMI 22.0 to 24.9)

## 2024-01-09 NOTE — PROGRESS NOTES
Clinical Pharmacy Note: IV to PO Automatic Conversion  Please note: Mehrdad Sims’s medication(s) (pantoprazole) has/have been changed from IV to PO based on the following critiera:    Patient is tolerating oral medications  Patient is tolerating a diet more advanced than clear liquids  Patient is not requiring vasopressors    This IV to PO conversion is based on the [employer] approved automatic conversion policy for eligible patients.  Please call with questions.

## 2024-01-09 NOTE — PROGRESS NOTES
[facility] Adult  Hospitalist Group                                                                                          Hospitalist Progress Note  Mary Anne Murphy MD  Answering service: [phone] OR [phone] from in house phone        Date of Service:  2024  NAME:  Mehrdad Sims  :  1950  MRN:  [id number]       Admission Summary:   Mehrdda Sims is a 73 y.o. 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  Per staff at the SNF, patient was \"unresponsive\".   Patient was seen and examined in the emergency room, but very little history is obtained from him.  He is lethargic, opens his eyes to voice, but does not answer any questions or follow any commands.  Most of the information was obtained from referring provider and chart review.  He was last hospitalized at [facility]  - 24.  Patient has bilateral percutaneous nephrostomy tubes, which were last exchanged on .   He was diagnosed with Klebsiella pneumonia ESBL bacteremia, due to UTI.  Urine culture yielded Klebsiella pneumonia ESBL, E. coli ESBL, and Pseudomonas.  Patient was initially treated with Zosyn and Vancomycin.  Abx were switched to Meropenem per ID consultant, patient was discharged on IV meropenem with last dose of treatment on 2023.  He was also tested positive for COVID-19, and completed a 3-day course of remdesivir.  He was not hypoxic.  Patient was febrile on admission with a temperature of 100.3.  CT abdomen and pelvis showed small bowel obstruction.  Patient was evaluated by surgery, conservative management was recommended, NG tube was placed.  Cultures were obtained from bilateral nephrostomy tubes.  The  results found for: \"FOL\", \"RBCF\"   No results for input(s): \"PH\", \"PCO2\", \"PO2\" in the last 72 hours.  No results for input(s): \"CPK\" in the last 72 hours.    Invalid input(s): \"CPKMB\", \"CKNDX\", \"TROIQ\"  No results found for: \"CHOL\", \"CHOLX\", \"CHLST\", \"CHOLV\", \"HDL\", \"HDLC\", \"LDL\", \"LDLC\", \"TGLX\", \"TRIGL\"  Lab Results   Component Value Date/Time    GLUCPOC 136 11/25/2022 01:40 PM     [unfilled]    Notes reviewed from all clinical/nonclinical/nursing services involved in patient's clinical care. Care coordination discussions were held with appropriate clinical/nonclinical/ nursing providers based on care coordination needs.         Patients current active Medications were reviewed, considered, added and adjusted based on the clinical condition today.      Home Medications were reconciled to the best of my ability given all available resources at the time of admission. Route is PO if not otherwise noted.      Admission Status:[id number]:::1}      Medications Reviewed:     Current Facility-Administered Medications   Medication Dose Route Frequency    [START ON 1/10/2024] pantoprazole (PROTONIX) tablet 40 mg  40 mg Oral QAM AC    abiraterone acetate (ZYTIGA) 250 MG tablet TABS 1,000 mg  1,000 mg Oral Daily    predniSONE (DELTASONE) tablet 5 mg  5 mg Oral Daily    diclofenac sodium (VOLTAREN) 1 % gel 2 g  2 g Topical 4x Daily PRN    amLODIPine (NORVASC) tablet 2.5 mg  2.5 mg Oral Daily    gabapentin (NEURONTIN) capsule 300 mg  300 mg Oral Nightly    melatonin tablet 3 mg  3 mg Oral Nightly    tamsulosin (FLOMAX) capsule 0.4 mg  0.4 mg Oral Nightly    docusate sodium (COLACE) capsule 100 mg  100 mg Oral Daily    atorvastatin (LIPITOR) tablet 80 mg  80 mg Oral Nightly    apixaban (ELIQUIS) tablet 5 mg  5 mg Oral BID    lidocaine (XYLOCAINE) 2 % uro-jet   Topical PRN    sodium chloride flush 0.9 % injection 5-40 mL  5-40 mL IntraVENous 2 times per day    sodium chloride flush 0.9 % injection 5-40 mL  5-40 mL IntraVENous PRN

## 2024-01-10 VITALS
HEIGHT: 68 IN | SYSTOLIC BLOOD PRESSURE: 141 MMHG | OXYGEN SATURATION: 95 % | DIASTOLIC BLOOD PRESSURE: 82 MMHG | TEMPERATURE: 97.7 F | WEIGHT: 144.4 LBS | HEART RATE: 68 BPM | BODY MASS INDEX: 21.89 KG/M2 | RESPIRATION RATE: 14 BRPM

## 2024-01-10 LAB
GLUCOSE BLD STRIP.AUTO-MCNC: 150 MG/DL (ref 65–117)
GLUCOSE BLD STRIP.AUTO-MCNC: 212 MG/DL (ref 65–117)
SERVICE CMNT-IMP: ABNORMAL
SERVICE CMNT-IMP: ABNORMAL

## 2024-01-10 PROCEDURE — 6370000000 HC RX 637 (ALT 250 FOR IP): Performed by: HOSPITALIST

## 2024-01-10 PROCEDURE — 6370000000 HC RX 637 (ALT 250 FOR IP): Performed by: INTERNAL MEDICINE

## 2024-01-10 PROCEDURE — 82962 GLUCOSE BLOOD TEST: CPT

## 2024-01-10 PROCEDURE — 2580000003 HC RX 258: Performed by: HOSPITALIST

## 2024-01-10 PROCEDURE — 6360000002 HC RX W HCPCS: Performed by: HOSPITALIST

## 2024-01-10 RX ORDER — LANOLIN ALCOHOL/MO/W.PET/CERES
3 CREAM (GRAM) TOPICAL NIGHTLY
Qty: 90 TABLET | Refills: 0 | Status: SHIPPED
Start: 2024-01-10

## 2024-01-10 RX ORDER — LEVOFLOXACIN 250 MG/1
250 TABLET, FILM COATED ORAL DAILY
Qty: 2 TABLET | Refills: 0 | Status: SHIPPED
Start: 2024-01-11 | End: 2024-01-13

## 2024-01-10 RX ORDER — GABAPENTIN 300 MG/1
300 CAPSULE ORAL NIGHTLY
Qty: 30 CAPSULE | Refills: 0 | Status: SHIPPED | OUTPATIENT
Start: 2024-01-10 | End: 2024-02-09

## 2024-01-10 RX ADMIN — PANTOPRAZOLE SODIUM 40 MG: 40 TABLET, DELAYED RELEASE ORAL at 06:23

## 2024-01-10 RX ADMIN — APIXABAN 5 MG: 5 TABLET, FILM COATED ORAL at 09:10

## 2024-01-10 RX ADMIN — PREDNISONE 5 MG: 10 TABLET ORAL at 09:10

## 2024-01-10 RX ADMIN — LEVOFLOXACIN 250 MG: 250 TABLET, FILM COATED ORAL at 09:09

## 2024-01-10 RX ADMIN — AMLODIPINE BESYLATE 2.5 MG: 5 TABLET ORAL at 09:10

## 2024-01-10 RX ADMIN — PIPERACILLIN AND TAZOBACTAM 3375 MG: 3; .375 INJECTION, POWDER, LYOPHILIZED, FOR SOLUTION INTRAVENOUS at 02:30

## 2024-01-10 ASSESSMENT — PAIN SCALES - GENERAL: PAINLEVEL_OUTOF10: 0

## 2024-01-10 NOTE — CARE COORDINATION
Transition of Care Plan to SNF/Rehab    Communication to Patient/Family:  Met with patient and family and they are agreeable to the transition plan. The Plan for Transition of Care is related to the following treatment goals: Skilled nursing facility    The Patient and/or patient representative was provided with a choice of provider and agrees  with the discharge plan.      Yes [x] No []    A Freedom of choice list was provided with basic dialogue that supports the patient's individualized plan of care/goals and shares the quality data associated with the providers.       Yes [x] No []    SNF/Rehab Transition:  Patient has been accepted to [facility] and meets criteria for admission.   Patient will transported by Hospital to Home and expected to leave at 1:00 PM.    Communication to SNF/Rehab:  Bedside RN, Beatrice, has been notified to update the transition plan to the facility and call report ([phone]).  Discharge information has been updated on the AVS. And communicated to facility via Getable/All Scripts, or CC link.     Discharge instructions is available in [employer].     Nursing Please include all hard scripts for controlled substances, med rec and dc summary, and AVS in packet.     Reviewed and confirmed with facility, [facility] can manage the patient care needs for the following:     Markus with (X) only those applicable:  Medication:  [x]Medications are available at the facility  []IV Antibiotics    []Controlled Substance - hard copies available sent.  []Weekly Labs    Equipment:  []CPAP/BiPAP  []Wound Vacuum  []Tristan or Urinary Device  []PICC/Central Line  []Nebulizer  []Ventilator    Treatment:  []Isolation (for MRSA, VRE, etc.)  []Surgical Drain Management  []Tracheostomy Care  []Dressing Changes  []Dialysis with transportation  []PEG Care  []Oxygen  []Daily Weights for Heart Failure    Dietary:  []Any diet limitations  []Tube Feedings   []Total Parenteral Management (TPN)    Financial

## 2024-01-10 NOTE — DISCHARGE INSTRUCTIONS
Discharge Instructions       PATIENT ID: Mehrdad Sims  MRN: [id number]   YOB: 1950    DATE OF ADMISSION: 1/4/2024 11:08 AM    DATE OF DISCHARGE: 1/10/2024   PRIMARY CARE PROVIDER: Felisha Hernadez APRN - DILCIA      ATTENDING PHYSICIAN: MARY ANNE MENCHACA MD   DISCHARGING PROVIDER: Mary Anne Menchaca MD    To contact this individual call [phone] and ask the  to page.  If unavailable ask to be transferred the Adult Hospitalist Department.     CONSULTATIONS: IP CONSULT TO GENERAL SURGERY  IP CONSULT TO CASE MANAGEMENT  IP WOUND CARE NURSE CONSULT TO EVAL     PROCEDURES/SURGERIES: * No surgery found *     ADMITTING DIAGNOSES & HOSPITAL COURSE:   Mehrdad Sims is a 73 y.o. 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  Per staff at the SNF, patient was \"unresponsive\".   Patient was seen and examined in the emergency room, but very little history is obtained from him.  He is lethargic, opens his eyes to voice, but does not answer any questions or follow any commands.  Most of the information was obtained from referring provider and chart review.  He was last hospitalized at [facility] 11/22 - 11/30/24.  Patient has bilateral percutaneous nephrostomy tubes, which were last exchanged on 11/24.   He was diagnosed with Klebsiella pneumonia ESBL bacteremia, due to UTI.  Urine culture yielded Klebsiella pneumonia ESBL, E. coli ESBL, and Pseudomonas.  Patient was initially treated with Zosyn and Vancomycin.  Abx were switched to Meropenem per ID consultant, patient was discharged on IV meropenem with last dose of treatment on 12/2/2023.  He was also tested positive for COVID-19, and completed a 3-day course of remdesivir.  He was  able to take PO;     Anemia of chronic disease:  -monitor H&H;     Hypokalemia:   -replete prn;          PENDING TEST RESULTS:   At the time of discharge the following test results are still pending: none      [facility]    [facility]      ADDITIONAL CARE RECOMMENDATIONS:   Follow up urologist   Follow up blood sugar   Cbc, cmp next week      DIET: diabetic diet  Oral Nutritional Supplements: Boost Glucose ControlOnce daily     ACTIVITY: activity as tolerated              DISCHARGE MEDICATIONS:      Medication List          START taking these medications       levoFLOXacin 250 MG tablet  Commonly known as: LEVAQUIN  Take 1 tablet by mouth daily for 2 doses  Start taking on: January 11, 2024                CHANGE how you take these medications       acetaminophen 500 MG tablet  Commonly known as: TYLENOL  What changed: Another medication with the same name was removed. Continue taking this medication, and follow the directions you see here.      amLODIPine 2.5 MG tablet  Commonly known as: NORVASC  What changed: Another medication with the same name was removed. Continue taking this medication, and follow the directions you see here.      docusate sodium 100 MG capsule  Commonly known as: COLACE  What changed: Another medication with the same name was removed. Continue taking this medication, and follow the directions you see here.      melatonin 3 MG Tabs tablet  Take 1 tablet by mouth at bedtime  What changed: when to take this                CONTINUE taking these medications       abiraterone acetate 250 MG tablet  Commonly known as: ZYTIGA      albuterol sulfate  (90 Base) MCG/ACT inhaler  Commonly known as: PROVENTIL;VENTOLIN;PROAIR      apixaban 5 MG Tabs tablet  Commonly known as: ELIQUIS      atorvastatin 80 MG tablet  Commonly known as: LIPITOR  Take 1 tablet by mouth nightly      gabapentin 300 MG capsule  Commonly known as: NEURONTIN      ondansetron 8 MG tablet  Commonly known as:

## 2024-01-10 NOTE — DISCHARGE SUMMARY
Patient informed of negative post vasectomy semen results. No sperm identified. Patient reminded to submit a second sample in 2 months for confirmation. Explained we cannot consider him sterile until 2 samples result negative. Patient happy with results, and in agreement to plan of care.   take PO;     Anemia of chronic disease:  -monitor H&H;     Hypokalemia:   -replete prn;        PENDING TEST RESULTS:   At the time of discharge the following test results are still pending: none     [facility]:    [facility]     ADDITIONAL CARE RECOMMENDATIONS:   Follow up urologist   Follow up blood sugar   Cbc, cmp next week     DIET: diabetic diet  Oral Nutritional Supplements: Boost Glucose ControlOnce daily    ACTIVITY: activity as tolerated          DISCHARGE MEDICATIONS:     Medication List        START taking these medications      levoFLOXacin 250 MG tablet  Commonly known as: LEVAQUIN  Take 1 tablet by mouth daily for 2 doses  Start taking on: January 11, 2024            CHANGE how you take these medications      acetaminophen 500 MG tablet  Commonly known as: TYLENOL  What changed: Another medication with the same name was removed. Continue taking this medication, and follow the directions you see here.     amLODIPine 2.5 MG tablet  Commonly known as: NORVASC  What changed: Another medication with the same name was removed. Continue taking this medication, and follow the directions you see here.     docusate sodium 100 MG capsule  Commonly known as: COLACE  What changed: Another medication with the same name was removed. Continue taking this medication, and follow the directions you see here.     melatonin 3 MG Tabs tablet  Take 1 tablet by mouth at bedtime  What changed: when to take this            CONTINUE taking these medications      abiraterone acetate 250 MG tablet  Commonly known as: ZYTIGA     albuterol sulfate  (90 Base) MCG/ACT inhaler  Commonly known as: PROVENTIL;VENTOLIN;PROAIR     apixaban 5 MG Tabs tablet  Commonly known as: ELIQUIS     atorvastatin 80 MG tablet  Commonly known as: LIPITOR  Take 1 tablet by mouth nightly     gabapentin 300 MG capsule  Commonly known as: NEURONTIN     ondansetron 8 MG tablet  Commonly known as: ZOFRAN     pantoprazole 40 MG  Clear to auscultation bilaterally   CVS: S1 S2 heard, Capillary refill less than 2 seconds  Abd: soft/ Non tender, non distended, BS physiological, nephrostomy tube urine clean   Ext: no clubbing, no cyanosis, no edema, brisk 2+ DP pulses  Neuro/Psych: no focal weakness   Skin: warm     CHRONIC MEDICAL DIAGNOSES:      Greater than 31 minutes were spent with the patient on counseling and coordination of care    Signed:   Mary Anne Murphy MD  1/10/2024  10:03 AM

## 2024-02-12 ENCOUNTER — OFFICE VISIT (OUTPATIENT)
Facility: CLINIC | Age: 74
End: 2024-02-12
Payer: MEDICAID

## 2024-02-12 DIAGNOSIS — M70.22 OLECRANON BURSITIS OF LEFT ELBOW: Primary | ICD-10-CM

## 2024-02-12 DIAGNOSIS — E11.42 TYPE 2 DIABETES MELLITUS WITH DIABETIC POLYNEUROPATHY, WITHOUT LONG-TERM CURRENT USE OF INSULIN (HCC): ICD-10-CM

## 2024-02-12 DIAGNOSIS — N18.31 CHRONIC KIDNEY DISEASE, STAGE 3A (HCC): ICD-10-CM

## 2024-02-12 PROCEDURE — 1123F ACP DISCUSS/DSCN MKR DOCD: CPT | Performed by: INTERNAL MEDICINE

## 2024-02-12 PROCEDURE — 3044F HG A1C LEVEL LT 7.0%: CPT | Performed by: INTERNAL MEDICINE

## 2024-02-12 PROCEDURE — 99309 SBSQ NF CARE MODERATE MDM 30: CPT | Performed by: INTERNAL MEDICINE

## 2024-02-12 PROCEDURE — G8484 FLU IMMUNIZE NO ADMIN: HCPCS | Performed by: INTERNAL MEDICINE

## 2024-02-12 NOTE — PROGRESS NOTES
PLACE OF SERVICE:  [facility] [address] [place] [address]     SKILLED VISIT    2/12/2024    Chief Complaint: Swelling to the left elbow      HPI : Mehrdad Sims is a 73 y.o. male past medical history of prostrate CA with mets to the spine diabetes type 2 diabetic neuropathy nephropathy CKD stage III massive pulmonary embolism bilateral percutaneous nephrostomy tubes due to urethral strictures.  He is a long-term resident of this facility.  Patient asked to be seen complaining of swelling to the left elbow last 2 days he denies any fall he does not remember history of gout.  He denies any pain with flexion extension he denies any shortness of breath fever or chills.    PMH:   Prostrate CA with mets to the spine  Diabetes type 2  Bilateral percutaneous nephrostomy tubes for urethral stricture  Obstructive sleep apnea  Hypertension  Diabetic neuropathy  CKD stage III    Medications: Reviewed in EMR and assessment and plan    Social History / Family History:       reports that he has never smoked. He has never been exposed to tobacco smoke. He has never used smokeless tobacco. He reports that he does not currently use drugs. He reports that he does not drink alcohol.    Family History   Problem Relation Age of Onset    Hypertension Father     Diabetes Father     Diabetes Mother     Hypertension Mother         ROS: Cardiovascular negative pulmonary negative GI negative      Vitals: /85 pulse 60 respiration 18 temperature 97.4        Exam:   Constitutional does not appear to be in distress looks stated age  Eyes sclerae are clear PERRLA  Ears nose throat MMM OP clear trachea midline  Cardiovascular rate rhythm regular S1 and S2  Respiratory.  Lungs clear to auscultation bilaterally no wheezes no rhonchi no accessory muscle use  Abdomen soft nontender no guarding bowel sounds are normal   has bilateral percutaneous nephrostomy tubes  Skeletal left elbow has olecranial bursa swelling

## 2024-02-26 ENCOUNTER — OFFICE VISIT (OUTPATIENT)
Facility: CLINIC | Age: 74
End: 2024-02-26
Payer: MEDICAID

## 2024-02-26 DIAGNOSIS — M70.22 OLECRANON BURSITIS OF LEFT ELBOW: Primary | ICD-10-CM

## 2024-02-26 PROCEDURE — 99309 SBSQ NF CARE MODERATE MDM 30: CPT | Performed by: INTERNAL MEDICINE

## 2024-02-26 PROCEDURE — 1123F ACP DISCUSS/DSCN MKR DOCD: CPT | Performed by: INTERNAL MEDICINE

## 2024-02-26 PROCEDURE — G8484 FLU IMMUNIZE NO ADMIN: HCPCS | Performed by: INTERNAL MEDICINE

## 2024-02-27 NOTE — PROGRESS NOTES
PLACE OF SERVICE:  [facility] [address] [place] [address]     SKILLED VISIT        2/26/2024    Chief Complaint: Swelling to the left elbow      HPI : Mehrdad Sims is a 73 y.o. male past medical history of prostrate CA with mets to the spine diabetes type 2 diabetic neuropathy nephropathy CKD stage III massive pulmonary embolism bilateral percutaneous nephrostomy tubes due to urethral strictures.  He is a long-term resident of this facility.  Patient is complaining of swelling to the left elbow previously was treated with 10-day course of antibiotics.  The swelling persists is nonpainful he can move his left elbow without any restriction and I was asked to reassess him.    PMH:   Prostrate CA with mets to the spine  Diabetes type 2  Bilateral percutaneous nephrostomy tubes for urethral stricture  Obstructive sleep apnea  Hypertension  Diabetic neuropathy  CKD stage III    Medications: Reviewed in EMR and assessment and plan    Social History / Family History:       reports that he has never smoked. He has never been exposed to tobacco smoke. He has never used smokeless tobacco. He reports that he does not currently use drugs. He reports that he does not drink alcohol.    Family History   Problem Relation Age of Onset    Hypertension Father     Diabetes Father     Diabetes Mother     Hypertension Mother         ROS: Cardiovascular negative pulmonary negative GI negative      Vitals: /85 pulse 60 respiration 18 temperature 97.4        Exam:   Constitutional does not appear to be in distress looks stated age  Eyes sclerae are clear PERRLA  Ears nose throat MMM OP clear trachea midline  Cardiovascular rate rhythm regular S1 and S2  Respiratory.  Lungs clear to auscultation bilaterally no wheezes no rhonchi no accessory muscle use  Abdomen soft nontender no guarding bowel sounds are normal   has bilateral percutaneous nephrostomy tubes  Skeletal left elbow has olecranial bursa

## 2024-03-31 ENCOUNTER — HOSPITAL ENCOUNTER (INPATIENT)
Facility: HOSPITAL | Age: 74
LOS: 11 days | Discharge: NURSING, REHAB OR LONG-TERM CARE | DRG: 872 | End: 2024-04-11
Attending: EMERGENCY MEDICINE | Admitting: INTERNAL MEDICINE
Payer: MEDICAID

## 2024-03-31 ENCOUNTER — APPOINTMENT (OUTPATIENT)
Facility: HOSPITAL | Age: 74
DRG: 872 | End: 2024-03-31
Payer: MEDICAID

## 2024-03-31 DIAGNOSIS — R31.0 PERSISTENT GROSS HEMATURIA: Primary | ICD-10-CM

## 2024-03-31 DIAGNOSIS — D64.9 ANEMIA, UNSPECIFIED TYPE: ICD-10-CM

## 2024-03-31 DIAGNOSIS — C61 PROSTATE CANCER (HCC): ICD-10-CM

## 2024-03-31 LAB
ALBUMIN SERPL-MCNC: 3 G/DL (ref 3.5–5)
ALBUMIN/GLOB SERPL: 0.6 (ref 1.1–2.2)
ALP SERPL-CCNC: 158 U/L (ref 45–117)
ALT SERPL-CCNC: 21 U/L (ref 12–78)
ANION GAP SERPL CALC-SCNC: 8 MMOL/L (ref 5–15)
ANION GAP SERPL CALC-SCNC: 9 MMOL/L (ref 5–15)
APPEARANCE UR: ABNORMAL
APPEARANCE UR: ABNORMAL
AST SERPL-CCNC: 37 U/L (ref 15–37)
BACTERIA URNS QL MICRO: ABNORMAL /HPF
BACTERIA URNS QL MICRO: ABNORMAL /HPF
BASOPHILS # BLD: 0 K/UL (ref 0–0.1)
BASOPHILS NFR BLD: 0 % (ref 0–1)
BILIRUB SERPL-MCNC: 0.7 MG/DL (ref 0.2–1)
BILIRUB UR QL CFM: NEGATIVE
BILIRUB UR QL: NEGATIVE
BUN SERPL-MCNC: 26 MG/DL (ref 6–20)
BUN SERPL-MCNC: 28 MG/DL (ref 6–20)
BUN/CREAT SERPL: 18 (ref 12–20)
BUN/CREAT SERPL: 19 (ref 12–20)
CALCIUM SERPL-MCNC: 10.2 MG/DL (ref 8.5–10.1)
CALCIUM SERPL-MCNC: 9.7 MG/DL (ref 8.5–10.1)
CHLORIDE SERPL-SCNC: 105 MMOL/L (ref 97–108)
CHLORIDE SERPL-SCNC: 108 MMOL/L (ref 97–108)
CO2 SERPL-SCNC: 21 MMOL/L (ref 21–32)
CO2 SERPL-SCNC: 24 MMOL/L (ref 21–32)
COLOR UR: ABNORMAL
COLOR UR: ABNORMAL
COMMENT:: NORMAL
CREAT SERPL-MCNC: 1.44 MG/DL (ref 0.7–1.3)
CREAT SERPL-MCNC: 1.47 MG/DL (ref 0.7–1.3)
DIFFERENTIAL METHOD BLD: ABNORMAL
EOSINOPHIL # BLD: 0.1 K/UL (ref 0–0.4)
EOSINOPHIL NFR BLD: 1 % (ref 0–7)
EPITH CASTS URNS QL MICRO: ABNORMAL /LPF
EPITH CASTS URNS QL MICRO: ABNORMAL /LPF
ERYTHROCYTE [DISTWIDTH] IN BLOOD BY AUTOMATED COUNT: 16.9 % (ref 11.5–14.5)
EST. AVERAGE GLUCOSE BLD GHB EST-MCNC: 128 MG/DL
GLOBULIN SER CALC-MCNC: 5.2 G/DL (ref 2–4)
GLUCOSE BLD STRIP.AUTO-MCNC: 151 MG/DL (ref 65–117)
GLUCOSE BLD STRIP.AUTO-MCNC: 209 MG/DL (ref 65–117)
GLUCOSE SERPL-MCNC: 149 MG/DL (ref 65–100)
GLUCOSE SERPL-MCNC: 169 MG/DL (ref 65–100)
GLUCOSE UR STRIP.AUTO-MCNC: NEGATIVE MG/DL
GLUCOSE UR STRIP.AUTO-MCNC: NEGATIVE MG/DL
HBA1C MFR BLD: 6.1 % (ref 4–5.6)
HCT VFR BLD AUTO: 31.8 % (ref 36.6–50.3)
HGB BLD-MCNC: 9.7 G/DL (ref 12.1–17)
HGB UR QL STRIP: ABNORMAL
HGB UR QL STRIP: ABNORMAL
IMM GRANULOCYTES # BLD AUTO: 0 K/UL (ref 0–0.04)
IMM GRANULOCYTES NFR BLD AUTO: 0 % (ref 0–0.5)
KETONES UR QL STRIP.AUTO: NEGATIVE MG/DL
KETONES UR QL STRIP.AUTO: NEGATIVE MG/DL
LEUKOCYTE ESTERASE UR QL STRIP.AUTO: ABNORMAL
LEUKOCYTE ESTERASE UR QL STRIP.AUTO: ABNORMAL
LYMPHOCYTES # BLD: 1.6 K/UL (ref 0.8–3.5)
LYMPHOCYTES NFR BLD: 16 % (ref 12–49)
MCH RBC QN AUTO: 26.5 PG (ref 26–34)
MCHC RBC AUTO-ENTMCNC: 30.5 G/DL (ref 30–36.5)
MCV RBC AUTO: 86.9 FL (ref 80–99)
MONOCYTES # BLD: 0.4 K/UL (ref 0–1)
MONOCYTES NFR BLD: 4 % (ref 5–13)
NEUTS SEG # BLD: 8 K/UL (ref 1.8–8)
NEUTS SEG NFR BLD: 79 % (ref 32–75)
NITRITE UR QL STRIP.AUTO: NEGATIVE
NITRITE UR QL STRIP.AUTO: NEGATIVE
NRBC # BLD: 0 K/UL (ref 0–0.01)
NRBC BLD-RTO: 0 PER 100 WBC
PH UR STRIP: 5.5 (ref 5–8)
PH UR STRIP: 6.5 (ref 5–8)
PLATELET # BLD AUTO: 295 K/UL (ref 150–400)
PMV BLD AUTO: 9.2 FL (ref 8.9–12.9)
POTASSIUM SERPL-SCNC: 4.1 MMOL/L (ref 3.5–5.1)
POTASSIUM SERPL-SCNC: 5.7 MMOL/L (ref 3.5–5.1)
PROT SERPL-MCNC: 8.2 G/DL (ref 6.4–8.2)
PROT UR STRIP-MCNC: 300 MG/DL
PROT UR STRIP-MCNC: >300 MG/DL
RBC # BLD AUTO: 3.66 M/UL (ref 4.1–5.7)
RBC #/AREA URNS HPF: >100 /HPF (ref 0–5)
RBC #/AREA URNS HPF: >100 /HPF (ref 0–5)
RBC MORPH BLD: ABNORMAL
SERVICE CMNT-IMP: ABNORMAL
SERVICE CMNT-IMP: ABNORMAL
SODIUM SERPL-SCNC: 137 MMOL/L (ref 136–145)
SODIUM SERPL-SCNC: 138 MMOL/L (ref 136–145)
SP GR UR REFRACTOMETRY: 1.01
SP GR UR REFRACTOMETRY: 1.02 (ref 1–1.03)
SPECIMEN HOLD: NORMAL
URINE CULTURE IF INDICATED: ABNORMAL
URINE CULTURE IF INDICATED: ABNORMAL
UROBILINOGEN UR QL STRIP.AUTO: 1 EU/DL (ref 0.2–1)
UROBILINOGEN UR QL STRIP.AUTO: 2 EU/DL (ref 0.2–1)
WBC # BLD AUTO: 10.1 K/UL (ref 4.1–11.1)
WBC URNS QL MICRO: >100 /HPF (ref 0–4)
WBC URNS QL MICRO: ABNORMAL /HPF (ref 0–4)
YEAST URNS QL MICRO: PRESENT

## 2024-03-31 PROCEDURE — 81001 URINALYSIS AUTO W/SCOPE: CPT

## 2024-03-31 PROCEDURE — 6370000000 HC RX 637 (ALT 250 FOR IP): Performed by: INTERNAL MEDICINE

## 2024-03-31 PROCEDURE — 87040 BLOOD CULTURE FOR BACTERIA: CPT

## 2024-03-31 PROCEDURE — 85025 COMPLETE CBC W/AUTO DIFF WBC: CPT

## 2024-03-31 PROCEDURE — 36415 COLL VENOUS BLD VENIPUNCTURE: CPT

## 2024-03-31 PROCEDURE — 87086 URINE CULTURE/COLONY COUNT: CPT

## 2024-03-31 PROCEDURE — 80053 COMPREHEN METABOLIC PANEL: CPT

## 2024-03-31 PROCEDURE — 96374 THER/PROPH/DIAG INJ IV PUSH: CPT

## 2024-03-31 PROCEDURE — 86923 COMPATIBILITY TEST ELECTRIC: CPT

## 2024-03-31 PROCEDURE — 87077 CULTURE AEROBIC IDENTIFY: CPT

## 2024-03-31 PROCEDURE — 2580000003 HC RX 258: Performed by: INTERNAL MEDICINE

## 2024-03-31 PROCEDURE — 74177 CT ABD & PELVIS W/CONTRAST: CPT

## 2024-03-31 PROCEDURE — 86901 BLOOD TYPING SEROLOGIC RH(D): CPT

## 2024-03-31 PROCEDURE — 99285 EMERGENCY DEPT VISIT HI MDM: CPT

## 2024-03-31 PROCEDURE — 6360000004 HC RX CONTRAST MEDICATION: Performed by: EMERGENCY MEDICINE

## 2024-03-31 PROCEDURE — 1100000003 HC PRIVATE W/ TELEMETRY

## 2024-03-31 PROCEDURE — 82962 GLUCOSE BLOOD TEST: CPT

## 2024-03-31 PROCEDURE — 86900 BLOOD TYPING SEROLOGIC ABO: CPT

## 2024-03-31 PROCEDURE — 87186 SC STD MICRODIL/AGAR DIL: CPT

## 2024-03-31 PROCEDURE — 86850 RBC ANTIBODY SCREEN: CPT

## 2024-03-31 PROCEDURE — 83036 HEMOGLOBIN GLYCOSYLATED A1C: CPT

## 2024-03-31 PROCEDURE — 6360000002 HC RX W HCPCS: Performed by: INTERNAL MEDICINE

## 2024-03-31 PROCEDURE — 6360000002 HC RX W HCPCS: Performed by: EMERGENCY MEDICINE

## 2024-03-31 RX ORDER — TAMSULOSIN HYDROCHLORIDE 0.4 MG/1
0.4 CAPSULE ORAL DAILY
Status: DISCONTINUED | OUTPATIENT
Start: 2024-03-31 | End: 2024-04-11 | Stop reason: HOSPADM

## 2024-03-31 RX ORDER — FENTANYL CITRATE 50 UG/ML
50 INJECTION, SOLUTION INTRAMUSCULAR; INTRAVENOUS PRN
Status: DISCONTINUED | OUTPATIENT
Start: 2024-03-31 | End: 2024-04-01 | Stop reason: HOSPADM

## 2024-03-31 RX ORDER — SODIUM CHLORIDE 0.9 % (FLUSH) 0.9 %
5-40 SYRINGE (ML) INJECTION PRN
Status: DISCONTINUED | OUTPATIENT
Start: 2024-03-31 | End: 2024-04-11 | Stop reason: HOSPADM

## 2024-03-31 RX ORDER — SODIUM CHLORIDE 0.9 % (FLUSH) 0.9 %
5-40 SYRINGE (ML) INJECTION EVERY 12 HOURS SCHEDULED
Status: DISCONTINUED | OUTPATIENT
Start: 2024-03-31 | End: 2024-04-11 | Stop reason: HOSPADM

## 2024-03-31 RX ORDER — INSULIN LISPRO 100 [IU]/ML
0-4 INJECTION, SOLUTION INTRAVENOUS; SUBCUTANEOUS NIGHTLY
Status: DISCONTINUED | OUTPATIENT
Start: 2024-03-31 | End: 2024-04-11 | Stop reason: HOSPADM

## 2024-03-31 RX ORDER — MORPHINE SULFATE 2 MG/ML
2 INJECTION, SOLUTION INTRAMUSCULAR; INTRAVENOUS EVERY 4 HOURS PRN
Status: DISCONTINUED | OUTPATIENT
Start: 2024-03-31 | End: 2024-04-05

## 2024-03-31 RX ORDER — ACETAMINOPHEN 650 MG/1
650 SUPPOSITORY RECTAL EVERY 6 HOURS PRN
Status: DISCONTINUED | OUTPATIENT
Start: 2024-03-31 | End: 2024-04-11 | Stop reason: HOSPADM

## 2024-03-31 RX ORDER — ONDANSETRON 4 MG/1
4 TABLET, ORALLY DISINTEGRATING ORAL EVERY 8 HOURS PRN
Status: DISCONTINUED | OUTPATIENT
Start: 2024-03-31 | End: 2024-04-11 | Stop reason: HOSPADM

## 2024-03-31 RX ORDER — POLYETHYLENE GLYCOL 3350 17 G/17G
17 POWDER, FOR SOLUTION ORAL DAILY PRN
Status: DISCONTINUED | OUTPATIENT
Start: 2024-03-31 | End: 2024-04-11 | Stop reason: HOSPADM

## 2024-03-31 RX ORDER — DOCUSATE SODIUM 100 MG/1
100 CAPSULE, LIQUID FILLED ORAL DAILY PRN
Status: DISCONTINUED | OUTPATIENT
Start: 2024-03-31 | End: 2024-04-11 | Stop reason: HOSPADM

## 2024-03-31 RX ORDER — POTASSIUM CHLORIDE 20 MEQ/1
40 TABLET, EXTENDED RELEASE ORAL PRN
Status: DISCONTINUED | OUTPATIENT
Start: 2024-03-31 | End: 2024-04-03

## 2024-03-31 RX ORDER — SODIUM CHLORIDE 9 MG/ML
INJECTION, SOLUTION INTRAVENOUS PRN
Status: DISCONTINUED | OUTPATIENT
Start: 2024-03-31 | End: 2024-04-11 | Stop reason: HOSPADM

## 2024-03-31 RX ORDER — PANTOPRAZOLE SODIUM 40 MG/1
40 TABLET, DELAYED RELEASE ORAL DAILY
Status: DISCONTINUED | OUTPATIENT
Start: 2024-03-31 | End: 2024-04-03

## 2024-03-31 RX ORDER — PREDNISONE 5 MG/1
5 TABLET ORAL DAILY
Status: DISCONTINUED | OUTPATIENT
Start: 2024-03-31 | End: 2024-04-11 | Stop reason: HOSPADM

## 2024-03-31 RX ORDER — LANOLIN ALCOHOL/MO/W.PET/CERES
3 CREAM (GRAM) TOPICAL NIGHTLY PRN
Status: DISCONTINUED | OUTPATIENT
Start: 2024-03-31 | End: 2024-04-11 | Stop reason: HOSPADM

## 2024-03-31 RX ORDER — ABIRATERONE ACETATE 250 MG/1
1000 TABLET ORAL DAILY
Status: DISCONTINUED | OUTPATIENT
Start: 2024-03-31 | End: 2024-04-01 | Stop reason: SDUPTHER

## 2024-03-31 RX ORDER — INSULIN LISPRO 100 [IU]/ML
0-8 INJECTION, SOLUTION INTRAVENOUS; SUBCUTANEOUS
Status: DISCONTINUED | OUTPATIENT
Start: 2024-03-31 | End: 2024-04-11 | Stop reason: HOSPADM

## 2024-03-31 RX ORDER — VITAMIN B COMPLEX
2000 TABLET ORAL DAILY
Status: DISCONTINUED | OUTPATIENT
Start: 2024-03-31 | End: 2024-04-11 | Stop reason: HOSPADM

## 2024-03-31 RX ORDER — MAGNESIUM SULFATE IN WATER 40 MG/ML
2000 INJECTION, SOLUTION INTRAVENOUS PRN
Status: DISCONTINUED | OUTPATIENT
Start: 2024-03-31 | End: 2024-04-03

## 2024-03-31 RX ORDER — ATORVASTATIN CALCIUM 40 MG/1
80 TABLET, FILM COATED ORAL NIGHTLY
Status: DISCONTINUED | OUTPATIENT
Start: 2024-03-31 | End: 2024-04-11 | Stop reason: HOSPADM

## 2024-03-31 RX ORDER — ALBUTEROL SULFATE 2.5 MG/3ML
2.5 SOLUTION RESPIRATORY (INHALATION) EVERY 4 HOURS PRN
Status: DISCONTINUED | OUTPATIENT
Start: 2024-03-31 | End: 2024-04-11 | Stop reason: HOSPADM

## 2024-03-31 RX ORDER — ONDANSETRON 2 MG/ML
4 INJECTION INTRAMUSCULAR; INTRAVENOUS EVERY 6 HOURS PRN
Status: DISCONTINUED | OUTPATIENT
Start: 2024-03-31 | End: 2024-04-11 | Stop reason: HOSPADM

## 2024-03-31 RX ORDER — AMLODIPINE BESYLATE 2.5 MG/1
2.5 TABLET ORAL DAILY
Status: DISCONTINUED | OUTPATIENT
Start: 2024-03-31 | End: 2024-04-11 | Stop reason: HOSPADM

## 2024-03-31 RX ORDER — POTASSIUM CHLORIDE 7.45 MG/ML
10 INJECTION INTRAVENOUS PRN
Status: DISCONTINUED | OUTPATIENT
Start: 2024-03-31 | End: 2024-04-03

## 2024-03-31 RX ORDER — ACETAMINOPHEN 325 MG/1
650 TABLET ORAL EVERY 6 HOURS PRN
Status: DISCONTINUED | OUTPATIENT
Start: 2024-03-31 | End: 2024-04-11 | Stop reason: HOSPADM

## 2024-03-31 RX ORDER — DEXTROSE MONOHYDRATE 100 MG/ML
INJECTION, SOLUTION INTRAVENOUS CONTINUOUS PRN
Status: DISCONTINUED | OUTPATIENT
Start: 2024-03-31 | End: 2024-04-11 | Stop reason: HOSPADM

## 2024-03-31 RX ADMIN — TAMSULOSIN HYDROCHLORIDE 0.4 MG: 0.4 CAPSULE ORAL at 16:24

## 2024-03-31 RX ADMIN — ATORVASTATIN CALCIUM 80 MG: 40 TABLET, FILM COATED ORAL at 22:10

## 2024-03-31 RX ADMIN — Medication 2000 UNITS: at 16:24

## 2024-03-31 RX ADMIN — SODIUM CHLORIDE: 9 INJECTION, SOLUTION INTRAVENOUS at 17:05

## 2024-03-31 RX ADMIN — PREDNISONE 5 MG: 5 TABLET ORAL at 16:24

## 2024-03-31 RX ADMIN — ACETAMINOPHEN 650 MG: 325 TABLET ORAL at 16:23

## 2024-03-31 RX ADMIN — MORPHINE SULFATE 2 MG: 2 INJECTION, SOLUTION INTRAMUSCULAR; INTRAVENOUS at 22:10

## 2024-03-31 RX ADMIN — MEROPENEM 1000 MG: 1 INJECTION, POWDER, FOR SOLUTION INTRAVENOUS at 17:05

## 2024-03-31 RX ADMIN — SODIUM CHLORIDE, PRESERVATIVE FREE 10 ML: 5 INJECTION INTRAVENOUS at 22:18

## 2024-03-31 RX ADMIN — IOPAMIDOL 100 ML: 755 INJECTION, SOLUTION INTRAVENOUS at 11:00

## 2024-03-31 RX ADMIN — FENTANYL CITRATE 50 MCG: 50 INJECTION INTRAMUSCULAR; INTRAVENOUS at 10:18

## 2024-03-31 RX ADMIN — AMLODIPINE BESYLATE 2.5 MG: 2.5 TABLET ORAL at 16:24

## 2024-03-31 ASSESSMENT — PAIN DESCRIPTION - LOCATION
LOCATION: BACK
LOCATION: FLANK
LOCATION: FLANK

## 2024-03-31 ASSESSMENT — PAIN - FUNCTIONAL ASSESSMENT: PAIN_FUNCTIONAL_ASSESSMENT: 0-10

## 2024-03-31 ASSESSMENT — PAIN SCALES - GENERAL
PAINLEVEL_OUTOF10: 9
PAINLEVEL_OUTOF10: 2
PAINLEVEL_OUTOF10: 7
PAINLEVEL_OUTOF10: 0
PAINLEVEL_OUTOF10: 0
PAINLEVEL_OUTOF10: 2
PAINLEVEL_OUTOF10: 2

## 2024-03-31 ASSESSMENT — LIFESTYLE VARIABLES
HOW OFTEN DO YOU HAVE A DRINK CONTAINING ALCOHOL: NEVER
HOW MANY STANDARD DRINKS CONTAINING ALCOHOL DO YOU HAVE ON A TYPICAL DAY: PATIENT DOES NOT DRINK

## 2024-03-31 ASSESSMENT — PAIN DESCRIPTION - ORIENTATION
ORIENTATION: LEFT;RIGHT
ORIENTATION: LOWER
ORIENTATION: LEFT;RIGHT

## 2024-03-31 ASSESSMENT — PAIN DESCRIPTION - DESCRIPTORS
DESCRIPTORS: ACHING

## 2024-03-31 ASSESSMENT — PAIN DESCRIPTION - PAIN TYPE: TYPE: CHRONIC PAIN

## 2024-03-31 NOTE — ED PROVIDER NOTES
administration in time range)   Vitamin D (CHOLECALCIFEROL) tablet 2,000 Units (has no administration in time range)   sodium chloride flush 0.9 % injection 5-40 mL (has no administration in time range)   sodium chloride flush 0.9 % injection 5-40 mL (has no administration in time range)   0.9 % sodium chloride infusion (has no administration in time range)   potassium chloride (KLOR-CON M) extended release tablet 40 mEq (has no administration in time range)     Or   potassium bicarb-citric acid (EFFER-K) effervescent tablet 40 mEq (has no administration in time range)     Or   potassium chloride 10 mEq/100 mL IVPB (Peripheral Line) (has no administration in time range)   magnesium sulfate 2000 mg in 50 mL IVPB premix (has no administration in time range)   ondansetron (ZOFRAN-ODT) disintegrating tablet 4 mg (has no administration in time range)     Or   ondansetron (ZOFRAN) injection 4 mg (has no administration in time range)   polyethylene glycol (GLYCOLAX) packet 17 g (has no administration in time range)   acetaminophen (TYLENOL) tablet 650 mg (has no administration in time range)     Or   acetaminophen (TYLENOL) suppository 650 mg (has no administration in time range)   meropenem (MERREM) 1,000 mg in sodium chloride 0.9 % 100 mL IVPB (mini-bag) (has no administration in time range)   iopamidol (ISOVUE-370) 76 % injection 100 mL (100 mLs IntraVENous Given 3/31/24 1100)       Medical Decision Making  73-year-old male presents with gross nephrostomy output in the setting of Eliquis use.  Vital signs are notable for no evidence of hemodynamic instability.  Will check basic labs with attention to CBC.  Check CT of the abdomen pelvis to evaluate for possible dislodgment or other mechanical complication.  Given the output is primarily blood, currently slim utility in urinalysis.  Observe in emergency department.    Amount and/or Complexity of Data Reviewed  Independent Historian: EMS  External Data Reviewed: notes.

## 2024-03-31 NOTE — H&P
Hospitalist Admission Note    NAME:   Mehrdad Sims   : 1950   MRN: [id number]     Date/Time: 3/31/2024 3:19 PM    Patient PCP: Felisha Hernadez APRN - NP    ______________________________________________________________________  Given the patient's current clinical presentation, I have a high level of concern for decompensation if discharged from the emergency department.  Complex decision making was performed, which includes reviewing the patient's available past medical records, laboratory results, and x-ray films.       My assessment of this patient's clinical condition and my plan of care is as follows.    Assessment / Plan:    Bleeding from nephrostomy sites/hematuria/history of prostate cancer-of note patient presents with bleeding from nephrostomy state, patient is on Eliquis for anticoagulation, has a history of prostate cancer, currently hemoglobin hematocrit remained stable  Hold anticoagulation  Maintain active type and screen  Trend hemoglobin and hematocrit every 8 hours  N.p.o. after midnight in the event that patient needs intervention however discontinue if urology is not planning on procedure  Obtain urology consult for evaluation    Urinary tract infection-of note patient's urinalysis consistent with urinary tract infection, currently does not meet sepsis criteria at this time, of note based on prior urine cultures patient has a history of MDR UTIs  Follow-up blood cultures  Follow-up urine cultures  Meropenem for antibiotic coverage based off of prior sensitivities  Continue to monitor patient's clinical status    History of chronic kidney disease stage III-currently serum creatinine remains at baseline  Continue to trend serum creatinine    Hyperglycemia type 2 diabetes-insulin sliding scale    Pseudohyperkalemia-blood sample appears to be hemolyzed  Obtain repeat BMP for further evaluation    Prophylaxis-SCDs  FEN-cardiac/diabetic diet, NPO after midnight, replete potassium and

## 2024-03-31 NOTE — ED NOTES
Pt BIB EMS [facility]. Pt has bilateral nephrostomy tubes, unsure of when they were placed. Pt normally goes to [facility] for his care, but [facility] was on diversion today, therefore, EMS brought him here. Pt is A&Ox3 (pt unsure of year/month). Pt does have blood mixed in urine bag to L nephrostomy. Pt does c/o very minimal flank pain.

## 2024-04-01 LAB
ALBUMIN SERPL-MCNC: 2.6 G/DL (ref 3.5–5)
ALBUMIN/GLOB SERPL: 0.5 (ref 1.1–2.2)
ALP SERPL-CCNC: 133 U/L (ref 45–117)
ALT SERPL-CCNC: 18 U/L (ref 12–78)
ANION GAP SERPL CALC-SCNC: 9 MMOL/L (ref 5–15)
AST SERPL-CCNC: 10 U/L (ref 15–37)
BASOPHILS # BLD: 0.1 K/UL (ref 0–0.1)
BASOPHILS NFR BLD: 0 % (ref 0–1)
BILIRUB SERPL-MCNC: 0.8 MG/DL (ref 0.2–1)
BUN SERPL-MCNC: 26 MG/DL (ref 6–20)
BUN/CREAT SERPL: 18 (ref 12–20)
CALCIUM SERPL-MCNC: 9.4 MG/DL (ref 8.5–10.1)
CHLORIDE SERPL-SCNC: 107 MMOL/L (ref 97–108)
CO2 SERPL-SCNC: 20 MMOL/L (ref 21–32)
COMMENT:: NORMAL
CREAT SERPL-MCNC: 1.42 MG/DL (ref 0.7–1.3)
DIFFERENTIAL METHOD BLD: ABNORMAL
EOSINOPHIL # BLD: 0 K/UL (ref 0–0.4)
EOSINOPHIL NFR BLD: 0 % (ref 0–7)
ERYTHROCYTE [DISTWIDTH] IN BLOOD BY AUTOMATED COUNT: 16.9 % (ref 11.5–14.5)
GLOBULIN SER CALC-MCNC: 5.6 G/DL (ref 2–4)
GLUCOSE BLD STRIP.AUTO-MCNC: 155 MG/DL (ref 65–117)
GLUCOSE BLD STRIP.AUTO-MCNC: 177 MG/DL (ref 65–117)
GLUCOSE BLD STRIP.AUTO-MCNC: 178 MG/DL (ref 65–117)
GLUCOSE BLD STRIP.AUTO-MCNC: 281 MG/DL (ref 65–117)
GLUCOSE SERPL-MCNC: 182 MG/DL (ref 65–100)
HCT VFR BLD AUTO: 23.8 % (ref 36.6–50.3)
HCT VFR BLD AUTO: 26.4 % (ref 36.6–50.3)
HCT VFR BLD AUTO: 28.4 % (ref 36.6–50.3)
HGB BLD-MCNC: 7.5 G/DL (ref 12.1–17)
HGB BLD-MCNC: 8.2 G/DL (ref 12.1–17)
HGB BLD-MCNC: 8.6 G/DL (ref 12.1–17)
IMM GRANULOCYTES # BLD AUTO: 0.1 K/UL (ref 0–0.04)
IMM GRANULOCYTES NFR BLD AUTO: 0 % (ref 0–0.5)
LYMPHOCYTES # BLD: 2.1 K/UL (ref 0.8–3.5)
LYMPHOCYTES NFR BLD: 13 % (ref 12–49)
MCH RBC QN AUTO: 25.9 PG (ref 26–34)
MCHC RBC AUTO-ENTMCNC: 30.3 G/DL (ref 30–36.5)
MCV RBC AUTO: 85.5 FL (ref 80–99)
MONOCYTES # BLD: 0.9 K/UL (ref 0–1)
MONOCYTES NFR BLD: 6 % (ref 5–13)
NEUTS SEG # BLD: 12.6 K/UL (ref 1.8–8)
NEUTS SEG NFR BLD: 81 % (ref 32–75)
NRBC # BLD: 0 K/UL (ref 0–0.01)
NRBC BLD-RTO: 0 PER 100 WBC
PLATELET # BLD AUTO: 262 K/UL (ref 150–400)
PMV BLD AUTO: 8.7 FL (ref 8.9–12.9)
POTASSIUM SERPL-SCNC: 4.1 MMOL/L (ref 3.5–5.1)
PROT SERPL-MCNC: 8.2 G/DL (ref 6.4–8.2)
RBC # BLD AUTO: 3.32 M/UL (ref 4.1–5.7)
SERVICE CMNT-IMP: ABNORMAL
SODIUM SERPL-SCNC: 136 MMOL/L (ref 136–145)
SPECIMEN HOLD: NORMAL
WBC # BLD AUTO: 15.8 K/UL (ref 4.1–11.1)

## 2024-04-01 PROCEDURE — 82962 GLUCOSE BLOOD TEST: CPT

## 2024-04-01 PROCEDURE — 97535 SELF CARE MNGMENT TRAINING: CPT

## 2024-04-01 PROCEDURE — 1100000000 HC RM PRIVATE

## 2024-04-01 PROCEDURE — 97162 PT EVAL MOD COMPLEX 30 MIN: CPT

## 2024-04-01 PROCEDURE — 6370000000 HC RX 637 (ALT 250 FOR IP): Performed by: INTERNAL MEDICINE

## 2024-04-01 PROCEDURE — 97166 OT EVAL MOD COMPLEX 45 MIN: CPT

## 2024-04-01 PROCEDURE — 2580000003 HC RX 258: Performed by: INTERNAL MEDICINE

## 2024-04-01 PROCEDURE — 85025 COMPLETE CBC W/AUTO DIFF WBC: CPT

## 2024-04-01 PROCEDURE — 80053 COMPREHEN METABOLIC PANEL: CPT

## 2024-04-01 PROCEDURE — 97116 GAIT TRAINING THERAPY: CPT

## 2024-04-01 PROCEDURE — 85014 HEMATOCRIT: CPT

## 2024-04-01 PROCEDURE — 6360000002 HC RX W HCPCS: Performed by: INTERNAL MEDICINE

## 2024-04-01 PROCEDURE — 36415 COLL VENOUS BLD VENIPUNCTURE: CPT

## 2024-04-01 PROCEDURE — 85018 HEMOGLOBIN: CPT

## 2024-04-01 RX ORDER — ABIRATERONE ACETATE 250 MG/1
1000 TABLET ORAL DAILY
Status: DISCONTINUED | OUTPATIENT
Start: 2024-04-01 | End: 2024-04-11 | Stop reason: HOSPADM

## 2024-04-01 RX ORDER — INSULIN LISPRO 100 [IU]/ML
INJECTION, SOLUTION INTRAVENOUS; SUBCUTANEOUS
COMMUNITY

## 2024-04-01 RX ORDER — GABAPENTIN 100 MG/1
100 CAPSULE ORAL 3 TIMES DAILY
COMMUNITY

## 2024-04-01 RX ORDER — POTASSIUM CHLORIDE 20 MEQ/1
40 TABLET, EXTENDED RELEASE ORAL DAILY
COMMUNITY

## 2024-04-01 RX ORDER — GUAIFENESIN 200 MG/10ML
100 LIQUID ORAL EVERY 6 HOURS PRN
COMMUNITY

## 2024-04-01 RX ADMIN — Medication 2000 UNITS: at 10:37

## 2024-04-01 RX ADMIN — AMLODIPINE BESYLATE 2.5 MG: 2.5 TABLET ORAL at 10:37

## 2024-04-01 RX ADMIN — INSULIN LISPRO 8 UNITS: 100 INJECTION, SOLUTION INTRAVENOUS; SUBCUTANEOUS at 12:42

## 2024-04-01 RX ADMIN — SODIUM CHLORIDE, PRESERVATIVE FREE 10 ML: 5 INJECTION INTRAVENOUS at 19:56

## 2024-04-01 RX ADMIN — TAMSULOSIN HYDROCHLORIDE 0.4 MG: 0.4 CAPSULE ORAL at 10:37

## 2024-04-01 RX ADMIN — ABIRATERONE ACETATE 1000 MG: 250 TABLET ORAL at 13:06

## 2024-04-01 RX ADMIN — SODIUM CHLORIDE, PRESERVATIVE FREE 10 ML: 5 INJECTION INTRAVENOUS at 10:43

## 2024-04-01 RX ADMIN — PANTOPRAZOLE SODIUM 40 MG: 40 TABLET, DELAYED RELEASE ORAL at 10:37

## 2024-04-01 RX ADMIN — MEROPENEM 1000 MG: 1 INJECTION, POWDER, FOR SOLUTION INTRAVENOUS at 14:55

## 2024-04-01 RX ADMIN — PREDNISONE 5 MG: 5 TABLET ORAL at 10:37

## 2024-04-01 RX ADMIN — MEROPENEM 1000 MG: 1 INJECTION, POWDER, FOR SOLUTION INTRAVENOUS at 04:12

## 2024-04-01 RX ADMIN — ATORVASTATIN CALCIUM 80 MG: 40 TABLET, FILM COATED ORAL at 20:13

## 2024-04-01 NOTE — CONSULTS
New Urology Consult Note    Patient: Mehrdad Sims MRN: [id number]  SSN: [id number]    YOB: 1950  Age: 73 y.o.  Sex: male            Assessment:     Mehrdad Sims is a 73 y.o. male presented to ED with . Patient has hx of metastatic prostate cancer s/p radiation follows with [facility], bladder outlet obstruction, L ureteral stricture managed by bilateral PCNs and radiation cystitis.     On assessment nad, denies suprapubic pain, bladder pressure or flank pain. Reports he does not usually void from his penis since having bilateral PCNs placed.  Bilateral PCNs in place, patent, draining brownish red urine. No bladder distention noted on exam.  Bedside RN reports bloody OP with clots from penis overnight, this is not demonstrated on exam.    VSS, afebrile  Wbc- 15.8, Hgb- 8.6, creatinine 1.42 ( baseline)  UA c/w UTI, Ucx pending  CT abd/pel: significant for left ureteral extension of metastatic disease, intravesical involvement moderate bladder thrombus,  PCNs in good placement, no hydro        Recommendations:      in setting of metastatic prostate cancer, radiation cystitis  UTI  HD stable, Hgb stable, creatinine at baseline, patient is maximally diverted with bilateral PCNs, given likely extensive tumor into base of bladder would hold off on irrigation as there a risk of causing more hematuria.  - Continue to monitor, no indications for operative intervention, CBI at present. If patient develops worsening hematuria, clot retention, blood loss anemia, will consider FC placement and bladder irrigation at this time.  - Hold AC, trend CBC transfuse per primary team   - Culture driven ABX per primary team  - Nursing to flush PCNs PRN to ensure patency  - Will need close OP FU with [facility] urology    Discussed with Dr. Cantu    Following      Thank you for this consult. Please contact [facility] with any further questions/concerns.      History of Present Illness:     Reason for Consult:

## 2024-04-02 ENCOUNTER — APPOINTMENT (OUTPATIENT)
Facility: HOSPITAL | Age: 74
DRG: 872 | End: 2024-04-02
Payer: MEDICAID

## 2024-04-02 LAB
ANION GAP SERPL CALC-SCNC: 7 MMOL/L (ref 5–15)
BASOPHILS # BLD: 0 K/UL (ref 0–0.1)
BASOPHILS NFR BLD: 0 % (ref 0–1)
BUN SERPL-MCNC: 34 MG/DL (ref 6–20)
BUN/CREAT SERPL: 22 (ref 12–20)
CALCIUM SERPL-MCNC: 9.2 MG/DL (ref 8.5–10.1)
CHLORIDE SERPL-SCNC: 108 MMOL/L (ref 97–108)
CO2 SERPL-SCNC: 22 MMOL/L (ref 21–32)
CREAT SERPL-MCNC: 1.57 MG/DL (ref 0.7–1.3)
DIFFERENTIAL METHOD BLD: ABNORMAL
EOSINOPHIL # BLD: 0.2 K/UL (ref 0–0.4)
EOSINOPHIL NFR BLD: 2 % (ref 0–7)
ERYTHROCYTE [DISTWIDTH] IN BLOOD BY AUTOMATED COUNT: 16.7 % (ref 11.5–14.5)
GLUCOSE BLD STRIP.AUTO-MCNC: 134 MG/DL (ref 65–117)
GLUCOSE BLD STRIP.AUTO-MCNC: 152 MG/DL (ref 65–117)
GLUCOSE BLD STRIP.AUTO-MCNC: 179 MG/DL (ref 65–117)
GLUCOSE SERPL-MCNC: 144 MG/DL (ref 65–100)
HCT VFR BLD AUTO: 23.7 % (ref 36.6–50.3)
HCT VFR BLD AUTO: 25.6 % (ref 36.6–50.3)
HGB BLD-MCNC: 7.2 G/DL (ref 12.1–17)
HGB BLD-MCNC: 8 G/DL (ref 12.1–17)
HISTORY CHECK: NORMAL
IMM GRANULOCYTES # BLD AUTO: 0 K/UL (ref 0–0.04)
IMM GRANULOCYTES NFR BLD AUTO: 0 % (ref 0–0.5)
LYMPHOCYTES # BLD: 1.8 K/UL (ref 0.8–3.5)
LYMPHOCYTES NFR BLD: 24 % (ref 12–49)
MCH RBC QN AUTO: 25.6 PG (ref 26–34)
MCHC RBC AUTO-ENTMCNC: 30.4 G/DL (ref 30–36.5)
MCV RBC AUTO: 84.3 FL (ref 80–99)
MONOCYTES # BLD: 0.6 K/UL (ref 0–1)
MONOCYTES NFR BLD: 9 % (ref 5–13)
NEUTS SEG # BLD: 4.9 K/UL (ref 1.8–8)
NEUTS SEG NFR BLD: 65 % (ref 32–75)
NRBC # BLD: 0 K/UL (ref 0–0.01)
NRBC BLD-RTO: 0 PER 100 WBC
PLATELET # BLD AUTO: 238 K/UL (ref 150–400)
PMV BLD AUTO: 9.1 FL (ref 8.9–12.9)
POTASSIUM SERPL-SCNC: 3.9 MMOL/L (ref 3.5–5.1)
RBC # BLD AUTO: 2.81 M/UL (ref 4.1–5.7)
SERVICE CMNT-IMP: ABNORMAL
SODIUM SERPL-SCNC: 137 MMOL/L (ref 136–145)
WBC # BLD AUTO: 7.5 K/UL (ref 4.1–11.1)

## 2024-04-02 PROCEDURE — 30233N1 TRANSFUSION OF NONAUTOLOGOUS RED BLOOD CELLS INTO PERIPHERAL VEIN, PERCUTANEOUS APPROACH: ICD-10-PCS | Performed by: STUDENT IN AN ORGANIZED HEALTH CARE EDUCATION/TRAINING PROGRAM

## 2024-04-02 PROCEDURE — 6360000004 HC RX CONTRAST MEDICATION: Performed by: STUDENT IN AN ORGANIZED HEALTH CARE EDUCATION/TRAINING PROGRAM

## 2024-04-02 PROCEDURE — 85018 HEMOGLOBIN: CPT

## 2024-04-02 PROCEDURE — 36430 TRANSFUSION BLD/BLD COMPNT: CPT

## 2024-04-02 PROCEDURE — 6370000000 HC RX 637 (ALT 250 FOR IP): Performed by: INTERNAL MEDICINE

## 2024-04-02 PROCEDURE — 85014 HEMATOCRIT: CPT

## 2024-04-02 PROCEDURE — P9016 RBC LEUKOCYTES REDUCED: HCPCS

## 2024-04-02 PROCEDURE — 6360000002 HC RX W HCPCS: Performed by: STUDENT IN AN ORGANIZED HEALTH CARE EDUCATION/TRAINING PROGRAM

## 2024-04-02 PROCEDURE — 1100000000 HC RM PRIVATE

## 2024-04-02 PROCEDURE — 97110 THERAPEUTIC EXERCISES: CPT

## 2024-04-02 PROCEDURE — 0T25X0Z CHANGE DRAINAGE DEVICE IN KIDNEY, EXTERNAL APPROACH: ICD-10-PCS | Performed by: STUDENT IN AN ORGANIZED HEALTH CARE EDUCATION/TRAINING PROGRAM

## 2024-04-02 PROCEDURE — 50435 EXCHANGE NEPHROSTOMY CATH: CPT

## 2024-04-02 PROCEDURE — 85025 COMPLETE CBC W/AUTO DIFF WBC: CPT

## 2024-04-02 PROCEDURE — 36415 COLL VENOUS BLD VENIPUNCTURE: CPT

## 2024-04-02 PROCEDURE — 80048 BASIC METABOLIC PNL TOTAL CA: CPT

## 2024-04-02 PROCEDURE — 82962 GLUCOSE BLOOD TEST: CPT

## 2024-04-02 PROCEDURE — 2580000003 HC RX 258: Performed by: INTERNAL MEDICINE

## 2024-04-02 PROCEDURE — 6360000002 HC RX W HCPCS: Performed by: INTERNAL MEDICINE

## 2024-04-02 PROCEDURE — 2580000003 HC RX 258: Performed by: STUDENT IN AN ORGANIZED HEALTH CARE EDUCATION/TRAINING PROGRAM

## 2024-04-02 RX ORDER — SODIUM CHLORIDE 9 MG/ML
INJECTION, SOLUTION INTRAVENOUS PRN
Status: DISCONTINUED | OUTPATIENT
Start: 2024-04-02 | End: 2024-04-05

## 2024-04-02 RX ORDER — FENTANYL CITRATE 50 UG/ML
100 INJECTION, SOLUTION INTRAMUSCULAR; INTRAVENOUS
Status: DISCONTINUED | OUTPATIENT
Start: 2024-04-02 | End: 2024-04-02

## 2024-04-02 RX ORDER — HEPARIN SODIUM 200 [USP'U]/100ML
200 INJECTION, SOLUTION INTRAVENOUS ONCE
Status: COMPLETED | OUTPATIENT
Start: 2024-04-02 | End: 2024-04-02

## 2024-04-02 RX ORDER — LIDOCAINE HYDROCHLORIDE 20 MG/ML
20 INJECTION, SOLUTION INFILTRATION; PERINEURAL ONCE
Status: DISCONTINUED | OUTPATIENT
Start: 2024-04-02 | End: 2024-04-11 | Stop reason: HOSPADM

## 2024-04-02 RX ORDER — MIDAZOLAM HYDROCHLORIDE 2 MG/2ML
5 INJECTION, SOLUTION INTRAMUSCULAR; INTRAVENOUS
Status: DISCONTINUED | OUTPATIENT
Start: 2024-04-02 | End: 2024-04-02

## 2024-04-02 RX ADMIN — IOPAMIDOL 15 ML: 755 INJECTION, SOLUTION INTRAVENOUS at 13:02

## 2024-04-02 RX ADMIN — PANTOPRAZOLE SODIUM 40 MG: 40 TABLET, DELAYED RELEASE ORAL at 09:18

## 2024-04-02 RX ADMIN — PREDNISONE 5 MG: 5 TABLET ORAL at 09:18

## 2024-04-02 RX ADMIN — HEPARIN SODIUM IN SODIUM CHLORIDE 1000 UNITS: 200 INJECTION INTRAVENOUS at 13:02

## 2024-04-02 RX ADMIN — MIDAZOLAM HYDROCHLORIDE 2 MG: 1 INJECTION, SOLUTION INTRAMUSCULAR; INTRAVENOUS at 12:50

## 2024-04-02 RX ADMIN — SODIUM CHLORIDE: 9 INJECTION, SOLUTION INTRAVENOUS at 21:17

## 2024-04-02 RX ADMIN — MEROPENEM 1000 MG: 1 INJECTION, POWDER, FOR SOLUTION INTRAVENOUS at 21:18

## 2024-04-02 RX ADMIN — ABIRATERONE ACETATE 1000 MG: 250 TABLET ORAL at 09:31

## 2024-04-02 RX ADMIN — TAMSULOSIN HYDROCHLORIDE 0.4 MG: 0.4 CAPSULE ORAL at 09:18

## 2024-04-02 RX ADMIN — MEROPENEM 1000 MG: 1 INJECTION, POWDER, FOR SOLUTION INTRAVENOUS at 05:52

## 2024-04-02 RX ADMIN — Medication 2000 UNITS: at 09:18

## 2024-04-02 RX ADMIN — FENTANYL CITRATE 25 MCG: 50 INJECTION, SOLUTION INTRAMUSCULAR; INTRAVENOUS at 12:55

## 2024-04-02 RX ADMIN — MELATONIN 3 MG: at 21:11

## 2024-04-02 RX ADMIN — ATORVASTATIN CALCIUM 80 MG: 40 TABLET, FILM COATED ORAL at 21:10

## 2024-04-02 RX ADMIN — FENTANYL CITRATE 25 MCG: 50 INJECTION, SOLUTION INTRAMUSCULAR; INTRAVENOUS at 13:00

## 2024-04-02 RX ADMIN — SODIUM CHLORIDE, PRESERVATIVE FREE 10 ML: 5 INJECTION INTRAVENOUS at 09:35

## 2024-04-02 RX ADMIN — FENTANYL CITRATE 25 MCG: 50 INJECTION, SOLUTION INTRAMUSCULAR; INTRAVENOUS at 12:50

## 2024-04-02 NOTE — CARE COORDINATION
04/02/24 1530   Service Assessment   Patient Orientation Alert and Oriented  (Pt requested sister be contacted for accurate info for assessment)   Cognition Alert   History Provided By Child/Family  (Pt sister Ms. Hancock)   Primary Caregiver   (Pt resides in a LTC facility-[facility])   Support Systems Family Members   Patient's Healthcare Decision Maker is: Named in Scanned ACP Document  (Ms. Hancock-sister)   PCP Verified by CM Yes  (Pt is visited by MD's at the facility.)   Prior Functional Level Assistance with the following:;Bathing;Dressing;Toileting;Mobility   Current Functional Level Assistance with the following:;Bathing;Dressing;Toileting;Mobility   Can patient return to prior living arrangement Yes   Family able to assist with home care needs: No   Would you like for me to discuss the discharge plan with any other family members/significant others, and if so, who? Yes  (Ms. Monet)   Financial Resources Medicaid   Social/Functional History   Lives With Other (comment)  (LTC facility)   Type of Home Facility  ([facility])   Home Equipment Wheelchair-manual;Cane   Active  No   Discharge Planning   Type of Residence Long-Term Care  (back to [facility])   Patient expects to be discharged to: Long-term care   Services At/After Discharge   Mode of Transport at Discharge BLS   Confirm Follow Up Transport Family       Assessment completed by pt sister Ms. Hancock.  Per sister, pt has been at [facility] since Sept in which he was there for rehab and converted to LTC Oct 27th.  He has been to [facility] in the past for rehab and had HH in the past.  He requires assistance with ADL's and IADLs.  He has access to a cane and wheelchair.  Pt scoots around in the wheelchair or family wheel him around the facility.  Per sister he has been in and out of the hospital since being at the facility.  Pt visits with some MD's at [facility], neuro and hematology the sister believes. Pt has ACP on file.  Plan is for the pt to discharge

## 2024-04-02 NOTE — CONSENT
Informed Consent for Blood Component Transfusion Note    I have discussed with the patient the rationale for blood component transfusion; its benefits in treating or preventing fatigue, organ damage, or death; and its risk which includes mild transfusion reactions, rare risk of blood borne infection, or more serious but rare reactions. I have discussed the alternatives to transfusion, including the risk and consequences of not receiving transfusion. The patient had an opportunity to ask questions and had agreed to proceed with transfusion of blood components.    Electronically signed by Jesús Ramírez MD on 4/2/24 at 2:43 PM EDT

## 2024-04-03 ENCOUNTER — APPOINTMENT (OUTPATIENT)
Facility: HOSPITAL | Age: 74
DRG: 872 | End: 2024-04-03
Payer: MEDICAID

## 2024-04-03 LAB
ABO + RH BLD: NORMAL
ANION GAP SERPL CALC-SCNC: 7 MMOL/L (ref 5–15)
BACTERIA SPEC CULT: ABNORMAL
BLD PROD TYP BPU: NORMAL
BLD PROD TYP BPU: NORMAL
BLOOD BANK BLOOD PRODUCT EXPIRATION DATE: NORMAL
BLOOD BANK DISPENSE STATUS: NORMAL
BLOOD BANK DISPENSE STATUS: NORMAL
BLOOD BANK ISBT PRODUCT BLOOD TYPE: 6200
BLOOD BANK PRODUCT CODE: NORMAL
BLOOD BANK UNIT TYPE AND RH: NORMAL
BLOOD GROUP ANTIBODIES SERPL: NORMAL
BPU ID: NORMAL
BPU ID: NORMAL
BUN SERPL-MCNC: 35 MG/DL (ref 6–20)
BUN/CREAT SERPL: 24 (ref 12–20)
CALCIUM SERPL-MCNC: 9.2 MG/DL (ref 8.5–10.1)
CC UR VC: ABNORMAL
CC UR VC: ABNORMAL
CHLORIDE SERPL-SCNC: 109 MMOL/L (ref 97–108)
CO2 SERPL-SCNC: 21 MMOL/L (ref 21–32)
CREAT SERPL-MCNC: 1.47 MG/DL (ref 0.7–1.3)
CROSSMATCH RESULT: NORMAL
CROSSMATCH RESULT: NORMAL
GLUCOSE BLD STRIP.AUTO-MCNC: 138 MG/DL (ref 65–117)
GLUCOSE BLD STRIP.AUTO-MCNC: 144 MG/DL (ref 65–117)
GLUCOSE BLD STRIP.AUTO-MCNC: 154 MG/DL (ref 65–117)
GLUCOSE BLD STRIP.AUTO-MCNC: 202 MG/DL (ref 65–117)
GLUCOSE SERPL-MCNC: 160 MG/DL (ref 65–100)
HCT VFR BLD AUTO: 24.4 % (ref 36.6–50.3)
HCT VFR BLD AUTO: 25 % (ref 36.6–50.3)
HGB BLD-MCNC: 7.5 G/DL (ref 12.1–17)
HGB BLD-MCNC: 7.8 G/DL (ref 12.1–17)
HISTORY CHECK: NORMAL
POTASSIUM SERPL-SCNC: 4.2 MMOL/L (ref 3.5–5.1)
SERVICE CMNT-IMP: ABNORMAL
SODIUM SERPL-SCNC: 137 MMOL/L (ref 136–145)
SPECIMEN EXP DATE BLD: NORMAL
UNIT DIVISION: 0
UNIT DIVISION: 0
UNIT ISSUE DATE/TIME: NORMAL

## 2024-04-03 PROCEDURE — 2580000003 HC RX 258: Performed by: INTERNAL MEDICINE

## 2024-04-03 PROCEDURE — 78278 ACUTE GI BLOOD LOSS IMAGING: CPT

## 2024-04-03 PROCEDURE — 82962 GLUCOSE BLOOD TEST: CPT

## 2024-04-03 PROCEDURE — 85014 HEMATOCRIT: CPT

## 2024-04-03 PROCEDURE — 36415 COLL VENOUS BLD VENIPUNCTURE: CPT

## 2024-04-03 PROCEDURE — 85018 HEMOGLOBIN: CPT

## 2024-04-03 PROCEDURE — 6360000002 HC RX W HCPCS: Performed by: STUDENT IN AN ORGANIZED HEALTH CARE EDUCATION/TRAINING PROGRAM

## 2024-04-03 PROCEDURE — 2580000003 HC RX 258: Performed by: STUDENT IN AN ORGANIZED HEALTH CARE EDUCATION/TRAINING PROGRAM

## 2024-04-03 PROCEDURE — 6370000000 HC RX 637 (ALT 250 FOR IP): Performed by: NURSE PRACTITIONER

## 2024-04-03 PROCEDURE — 1100000000 HC RM PRIVATE

## 2024-04-03 PROCEDURE — 3430000000 HC RX DIAGNOSTIC RADIOPHARMACEUTICAL: Performed by: NURSE PRACTITIONER

## 2024-04-03 PROCEDURE — 80048 BASIC METABOLIC PNL TOTAL CA: CPT

## 2024-04-03 PROCEDURE — 6370000000 HC RX 637 (ALT 250 FOR IP): Performed by: INTERNAL MEDICINE

## 2024-04-03 PROCEDURE — A9560 TC99M LABELED RBC: HCPCS | Performed by: NURSE PRACTITIONER

## 2024-04-03 RX ORDER — SODIUM CHLORIDE 9 MG/ML
INJECTION, SOLUTION INTRAVENOUS PRN
Status: DISCONTINUED | OUTPATIENT
Start: 2024-04-03 | End: 2024-04-11 | Stop reason: HOSPADM

## 2024-04-03 RX ORDER — PANTOPRAZOLE SODIUM 40 MG/1
40 TABLET, DELAYED RELEASE ORAL
Status: DISCONTINUED | OUTPATIENT
Start: 2024-04-03 | End: 2024-04-11 | Stop reason: HOSPADM

## 2024-04-03 RX ORDER — HEPARIN 100 UNIT/ML
100 SYRINGE INTRAVENOUS PRN
Status: DISCONTINUED | OUTPATIENT
Start: 2024-04-03 | End: 2024-04-03

## 2024-04-03 RX ADMIN — ATORVASTATIN CALCIUM 80 MG: 40 TABLET, FILM COATED ORAL at 21:11

## 2024-04-03 RX ADMIN — SODIUM CHLORIDE: 9 INJECTION, SOLUTION INTRAVENOUS at 09:15

## 2024-04-03 RX ADMIN — SODIUM CHLORIDE, PRESERVATIVE FREE 10 ML: 5 INJECTION INTRAVENOUS at 09:18

## 2024-04-03 RX ADMIN — TECHNETIUM TC 99M-LABELED RED BLOOD CELLS 21 MILLICURIE: KIT at 12:45

## 2024-04-03 RX ADMIN — ABIRATERONE ACETATE 1000 MG: 250 TABLET ORAL at 08:59

## 2024-04-03 RX ADMIN — TAMSULOSIN HYDROCHLORIDE 0.4 MG: 0.4 CAPSULE ORAL at 08:59

## 2024-04-03 RX ADMIN — Medication 2000 UNITS: at 08:59

## 2024-04-03 RX ADMIN — MEROPENEM 1000 MG: 1 INJECTION, POWDER, FOR SOLUTION INTRAVENOUS at 09:15

## 2024-04-03 RX ADMIN — PANTOPRAZOLE SODIUM 40 MG: 40 TABLET, DELAYED RELEASE ORAL at 16:07

## 2024-04-03 RX ADMIN — PREDNISONE 5 MG: 5 TABLET ORAL at 08:59

## 2024-04-03 RX ADMIN — MEROPENEM 1000 MG: 1 INJECTION, POWDER, FOR SOLUTION INTRAVENOUS at 21:15

## 2024-04-03 RX ADMIN — PANTOPRAZOLE SODIUM 40 MG: 40 TABLET, DELAYED RELEASE ORAL at 08:59

## 2024-04-03 ASSESSMENT — PAIN SCALES - GENERAL: PAINLEVEL_OUTOF10: 0

## 2024-04-04 LAB
ANION GAP SERPL CALC-SCNC: 7 MMOL/L (ref 5–15)
BASOPHILS # BLD: 0 K/UL (ref 0–0.1)
BASOPHILS NFR BLD: 1 % (ref 0–1)
BUN SERPL-MCNC: 28 MG/DL (ref 6–20)
BUN/CREAT SERPL: 21 (ref 12–20)
CALCIUM SERPL-MCNC: 9.6 MG/DL (ref 8.5–10.1)
CHLORIDE SERPL-SCNC: 108 MMOL/L (ref 97–108)
CO2 SERPL-SCNC: 23 MMOL/L (ref 21–32)
CREAT SERPL-MCNC: 1.31 MG/DL (ref 0.7–1.3)
DIFFERENTIAL METHOD BLD: ABNORMAL
EOSINOPHIL # BLD: 0.2 K/UL (ref 0–0.4)
EOSINOPHIL NFR BLD: 3 % (ref 0–7)
ERYTHROCYTE [DISTWIDTH] IN BLOOD BY AUTOMATED COUNT: 16.3 % (ref 11.5–14.5)
GLUCOSE BLD STRIP.AUTO-MCNC: 122 MG/DL (ref 65–117)
GLUCOSE BLD STRIP.AUTO-MCNC: 132 MG/DL (ref 65–117)
GLUCOSE BLD STRIP.AUTO-MCNC: 171 MG/DL (ref 65–117)
GLUCOSE BLD STRIP.AUTO-MCNC: 232 MG/DL (ref 65–117)
GLUCOSE SERPL-MCNC: 119 MG/DL (ref 65–100)
HCT VFR BLD AUTO: 25.1 % (ref 36.6–50.3)
HCT VFR BLD AUTO: 26.1 % (ref 36.6–50.3)
HCT VFR BLD AUTO: 26.3 % (ref 36.6–50.3)
HGB BLD-MCNC: 7.9 G/DL (ref 12.1–17)
HGB BLD-MCNC: 8 G/DL (ref 12.1–17)
HGB BLD-MCNC: 8.1 G/DL (ref 12.1–17)
IMM GRANULOCYTES # BLD AUTO: 0 K/UL (ref 0–0.04)
IMM GRANULOCYTES NFR BLD AUTO: 0 % (ref 0–0.5)
LYMPHOCYTES # BLD: 2.5 K/UL (ref 0.8–3.5)
LYMPHOCYTES NFR BLD: 41 % (ref 12–49)
MCH RBC QN AUTO: 26.1 PG (ref 26–34)
MCHC RBC AUTO-ENTMCNC: 30.7 G/DL (ref 30–36.5)
MCV RBC AUTO: 85.3 FL (ref 80–99)
MONOCYTES # BLD: 0.4 K/UL (ref 0–1)
MONOCYTES NFR BLD: 7 % (ref 5–13)
NEUTS SEG # BLD: 2.9 K/UL (ref 1.8–8)
NEUTS SEG NFR BLD: 48 % (ref 32–75)
NRBC # BLD: 0 K/UL (ref 0–0.01)
NRBC BLD-RTO: 0 PER 100 WBC
PLATELET # BLD AUTO: 259 K/UL (ref 150–400)
PMV BLD AUTO: 8.6 FL (ref 8.9–12.9)
POTASSIUM SERPL-SCNC: 4.2 MMOL/L (ref 3.5–5.1)
RBC # BLD AUTO: 3.06 M/UL (ref 4.1–5.7)
SERVICE CMNT-IMP: ABNORMAL
SODIUM SERPL-SCNC: 138 MMOL/L (ref 136–145)
WBC # BLD AUTO: 6 K/UL (ref 4.1–11.1)

## 2024-04-04 PROCEDURE — 82962 GLUCOSE BLOOD TEST: CPT

## 2024-04-04 PROCEDURE — 6370000000 HC RX 637 (ALT 250 FOR IP): Performed by: INTERNAL MEDICINE

## 2024-04-04 PROCEDURE — 36415 COLL VENOUS BLD VENIPUNCTURE: CPT

## 2024-04-04 PROCEDURE — 97530 THERAPEUTIC ACTIVITIES: CPT

## 2024-04-04 PROCEDURE — 1100000000 HC RM PRIVATE

## 2024-04-04 PROCEDURE — 6360000002 HC RX W HCPCS: Performed by: STUDENT IN AN ORGANIZED HEALTH CARE EDUCATION/TRAINING PROGRAM

## 2024-04-04 PROCEDURE — 97116 GAIT TRAINING THERAPY: CPT

## 2024-04-04 PROCEDURE — 6370000000 HC RX 637 (ALT 250 FOR IP): Performed by: NURSE PRACTITIONER

## 2024-04-04 PROCEDURE — 97535 SELF CARE MNGMENT TRAINING: CPT

## 2024-04-04 PROCEDURE — 85025 COMPLETE CBC W/AUTO DIFF WBC: CPT

## 2024-04-04 PROCEDURE — 2580000003 HC RX 258: Performed by: INTERNAL MEDICINE

## 2024-04-04 PROCEDURE — 2580000003 HC RX 258: Performed by: STUDENT IN AN ORGANIZED HEALTH CARE EDUCATION/TRAINING PROGRAM

## 2024-04-04 PROCEDURE — 85018 HEMOGLOBIN: CPT

## 2024-04-04 PROCEDURE — 85014 HEMATOCRIT: CPT

## 2024-04-04 PROCEDURE — 80048 BASIC METABOLIC PNL TOTAL CA: CPT

## 2024-04-04 RX ORDER — HYDRALAZINE HYDROCHLORIDE 20 MG/ML
5 INJECTION INTRAMUSCULAR; INTRAVENOUS EVERY 6 HOURS PRN
Status: DISCONTINUED | OUTPATIENT
Start: 2024-04-04 | End: 2024-04-11 | Stop reason: HOSPADM

## 2024-04-04 RX ADMIN — INSULIN LISPRO 2 UNITS: 100 INJECTION, SOLUTION INTRAVENOUS; SUBCUTANEOUS at 17:00

## 2024-04-04 RX ADMIN — MEROPENEM 1000 MG: 1 INJECTION, POWDER, FOR SOLUTION INTRAVENOUS at 21:45

## 2024-04-04 RX ADMIN — TAMSULOSIN HYDROCHLORIDE 0.4 MG: 0.4 CAPSULE ORAL at 09:55

## 2024-04-04 RX ADMIN — PANTOPRAZOLE SODIUM 40 MG: 40 TABLET, DELAYED RELEASE ORAL at 06:30

## 2024-04-04 RX ADMIN — Medication 2000 UNITS: at 09:53

## 2024-04-04 RX ADMIN — ATORVASTATIN CALCIUM 80 MG: 40 TABLET, FILM COATED ORAL at 21:43

## 2024-04-04 RX ADMIN — PREDNISONE 5 MG: 5 TABLET ORAL at 10:11

## 2024-04-04 RX ADMIN — PANTOPRAZOLE SODIUM 40 MG: 40 TABLET, DELAYED RELEASE ORAL at 16:42

## 2024-04-04 RX ADMIN — SODIUM CHLORIDE, PRESERVATIVE FREE 10 ML: 5 INJECTION INTRAVENOUS at 21:43

## 2024-04-04 RX ADMIN — SODIUM CHLORIDE, PRESERVATIVE FREE 10 ML: 5 INJECTION INTRAVENOUS at 09:53

## 2024-04-04 RX ADMIN — ABIRATERONE ACETATE 1000 MG: 250 TABLET ORAL at 10:08

## 2024-04-04 RX ADMIN — SODIUM CHLORIDE: 9 INJECTION, SOLUTION INTRAVENOUS at 10:05

## 2024-04-04 RX ADMIN — MEROPENEM 1000 MG: 1 INJECTION, POWDER, FOR SOLUTION INTRAVENOUS at 10:07

## 2024-04-04 ASSESSMENT — PAIN SCALES - GENERAL
PAINLEVEL_OUTOF10: 0
PAINLEVEL_OUTOF10: 0

## 2024-04-04 NOTE — CARE COORDINATION
Transition of Care Plan:    RUR: 20%  Prior Level of Functioning: Needs assistance with ADLs   Disposition: Return to LTC-[facility]   Follow up appointments: Follow  up with PCP and/or Specialist  DME needed: will use at facility   Transportation at discharge: BLS transport   IM/IMM Medicare/ letter given: 2nd IM Medicare Letter   Is patient a  and connected with [facility]? N/A   If yes, was [facility] transfer form completed and [facility] notified? N/A  Caregiver Contact: Kika Hanccok ([facility]) [phone]  Discharge Caregiver contacted prior to discharge? Family to be contacted   Care Conference needed? Not at this time   Barriers to discharge: Medical Stability      CM: Lesly Sargent is currently working with pt in the Onc Unit.  CM staff case with clinical team, and pt will remain as inpatient greater than 48hrs.    Pt is known to be LTC resident at [facility], and will transition back to facility at the time of d/c, via BLS.  SATINDER spoke with admin coordinator at facility: [phone], to inform her of the following.    CM sent clinicals and referral to facility, via [facility].  CM will continue to follow.    CRISTOFER Berumen CM  [phone]

## 2024-04-05 LAB
ANION GAP SERPL CALC-SCNC: 8 MMOL/L (ref 5–15)
BASOPHILS # BLD: 0 K/UL (ref 0–0.1)
BASOPHILS NFR BLD: 1 % (ref 0–1)
BUN SERPL-MCNC: 23 MG/DL (ref 6–20)
BUN/CREAT SERPL: 16 (ref 12–20)
CALCIUM SERPL-MCNC: 9.3 MG/DL (ref 8.5–10.1)
CHLORIDE SERPL-SCNC: 106 MMOL/L (ref 97–108)
CO2 SERPL-SCNC: 24 MMOL/L (ref 21–32)
CREAT SERPL-MCNC: 1.42 MG/DL (ref 0.7–1.3)
DIFFERENTIAL METHOD BLD: ABNORMAL
EOSINOPHIL # BLD: 0.2 K/UL (ref 0–0.4)
EOSINOPHIL NFR BLD: 3 % (ref 0–7)
ERYTHROCYTE [DISTWIDTH] IN BLOOD BY AUTOMATED COUNT: 16 % (ref 11.5–14.5)
FERRITIN SERPL-MCNC: 180 NG/ML (ref 26–388)
GLUCOSE BLD STRIP.AUTO-MCNC: 151 MG/DL (ref 65–117)
GLUCOSE BLD STRIP.AUTO-MCNC: 155 MG/DL (ref 65–117)
GLUCOSE BLD STRIP.AUTO-MCNC: 178 MG/DL (ref 65–117)
GLUCOSE BLD STRIP.AUTO-MCNC: 207 MG/DL (ref 65–117)
GLUCOSE SERPL-MCNC: 119 MG/DL (ref 65–100)
HCT VFR BLD AUTO: 25.6 % (ref 36.6–50.3)
HGB BLD-MCNC: 7.9 G/DL (ref 12.1–17)
IMM GRANULOCYTES # BLD AUTO: 0 K/UL (ref 0–0.04)
IMM GRANULOCYTES NFR BLD AUTO: 0 % (ref 0–0.5)
IRON SATN MFR SERPL: 18 % (ref 20–50)
IRON SERPL-MCNC: 41 UG/DL (ref 35–150)
LYMPHOCYTES # BLD: 1.9 K/UL (ref 0.8–3.5)
LYMPHOCYTES NFR BLD: 34 % (ref 12–49)
MCH RBC QN AUTO: 26.1 PG (ref 26–34)
MCHC RBC AUTO-ENTMCNC: 30.9 G/DL (ref 30–36.5)
MCV RBC AUTO: 84.5 FL (ref 80–99)
MONOCYTES # BLD: 0.5 K/UL (ref 0–1)
MONOCYTES NFR BLD: 8 % (ref 5–13)
NEUTS SEG # BLD: 3.1 K/UL (ref 1.8–8)
NEUTS SEG NFR BLD: 54 % (ref 32–75)
NRBC # BLD: 0 K/UL (ref 0–0.01)
NRBC BLD-RTO: 0 PER 100 WBC
PLATELET # BLD AUTO: 250 K/UL (ref 150–400)
PMV BLD AUTO: 8.5 FL (ref 8.9–12.9)
POTASSIUM SERPL-SCNC: 3.7 MMOL/L (ref 3.5–5.1)
RBC # BLD AUTO: 3.03 M/UL (ref 4.1–5.7)
SERVICE CMNT-IMP: ABNORMAL
SODIUM SERPL-SCNC: 138 MMOL/L (ref 136–145)
TIBC SERPL-MCNC: 229 UG/DL (ref 250–450)
WBC # BLD AUTO: 5.6 K/UL (ref 4.1–11.1)

## 2024-04-05 PROCEDURE — 1100000000 HC RM PRIVATE

## 2024-04-05 PROCEDURE — 2580000003 HC RX 258: Performed by: INTERNAL MEDICINE

## 2024-04-05 PROCEDURE — 83550 IRON BINDING TEST: CPT

## 2024-04-05 PROCEDURE — 83540 ASSAY OF IRON: CPT

## 2024-04-05 PROCEDURE — 82962 GLUCOSE BLOOD TEST: CPT

## 2024-04-05 PROCEDURE — 36415 COLL VENOUS BLD VENIPUNCTURE: CPT

## 2024-04-05 PROCEDURE — 82728 ASSAY OF FERRITIN: CPT

## 2024-04-05 PROCEDURE — 2580000003 HC RX 258: Performed by: STUDENT IN AN ORGANIZED HEALTH CARE EDUCATION/TRAINING PROGRAM

## 2024-04-05 PROCEDURE — 97116 GAIT TRAINING THERAPY: CPT

## 2024-04-05 PROCEDURE — 85025 COMPLETE CBC W/AUTO DIFF WBC: CPT

## 2024-04-05 PROCEDURE — 6360000002 HC RX W HCPCS: Performed by: STUDENT IN AN ORGANIZED HEALTH CARE EDUCATION/TRAINING PROGRAM

## 2024-04-05 PROCEDURE — 6370000000 HC RX 637 (ALT 250 FOR IP): Performed by: NURSE PRACTITIONER

## 2024-04-05 PROCEDURE — 6370000000 HC RX 637 (ALT 250 FOR IP): Performed by: INTERNAL MEDICINE

## 2024-04-05 PROCEDURE — 80048 BASIC METABOLIC PNL TOTAL CA: CPT

## 2024-04-05 RX ORDER — SENNA AND DOCUSATE SODIUM 50; 8.6 MG/1; MG/1
2 TABLET, FILM COATED ORAL DAILY
Status: DISCONTINUED | OUTPATIENT
Start: 2024-04-05 | End: 2024-04-06

## 2024-04-05 RX ORDER — OXYCODONE HYDROCHLORIDE AND ACETAMINOPHEN 5; 325 MG/1; MG/1
1 TABLET ORAL EVERY 4 HOURS PRN
Status: DISCONTINUED | OUTPATIENT
Start: 2024-04-05 | End: 2024-04-11 | Stop reason: HOSPADM

## 2024-04-05 RX ADMIN — MEROPENEM 1000 MG: 1 INJECTION, POWDER, FOR SOLUTION INTRAVENOUS at 21:41

## 2024-04-05 RX ADMIN — SODIUM CHLORIDE, PRESERVATIVE FREE 10 ML: 5 INJECTION INTRAVENOUS at 08:47

## 2024-04-05 RX ADMIN — MEROPENEM 1000 MG: 1 INJECTION, POWDER, FOR SOLUTION INTRAVENOUS at 10:17

## 2024-04-05 RX ADMIN — TAMSULOSIN HYDROCHLORIDE 0.4 MG: 0.4 CAPSULE ORAL at 08:41

## 2024-04-05 RX ADMIN — ABIRATERONE ACETATE 1000 MG: 250 TABLET ORAL at 08:42

## 2024-04-05 RX ADMIN — ATORVASTATIN CALCIUM 80 MG: 40 TABLET, FILM COATED ORAL at 21:43

## 2024-04-05 RX ADMIN — AMLODIPINE BESYLATE 2.5 MG: 2.5 TABLET ORAL at 08:41

## 2024-04-05 RX ADMIN — Medication 2 TABLET: at 10:16

## 2024-04-05 RX ADMIN — PREDNISONE 5 MG: 5 TABLET ORAL at 08:41

## 2024-04-05 RX ADMIN — PANTOPRAZOLE SODIUM 40 MG: 40 TABLET, DELAYED RELEASE ORAL at 08:41

## 2024-04-05 RX ADMIN — Medication 2000 UNITS: at 08:40

## 2024-04-05 RX ADMIN — SODIUM CHLORIDE, PRESERVATIVE FREE 10 ML: 5 INJECTION INTRAVENOUS at 21:43

## 2024-04-05 RX ADMIN — PANTOPRAZOLE SODIUM 40 MG: 40 TABLET, DELAYED RELEASE ORAL at 16:38

## 2024-04-06 LAB
ANION GAP SERPL CALC-SCNC: 7 MMOL/L (ref 5–15)
BACTERIA SPEC CULT: NORMAL
BACTERIA SPEC CULT: NORMAL
BASOPHILS # BLD: 0 K/UL (ref 0–0.1)
BASOPHILS NFR BLD: 1 % (ref 0–1)
BUN SERPL-MCNC: 29 MG/DL (ref 6–20)
BUN/CREAT SERPL: 20 (ref 12–20)
CALCIUM SERPL-MCNC: 9.5 MG/DL (ref 8.5–10.1)
CHLORIDE SERPL-SCNC: 107 MMOL/L (ref 97–108)
CO2 SERPL-SCNC: 23 MMOL/L (ref 21–32)
COMMENT:: NORMAL
CREAT SERPL-MCNC: 1.48 MG/DL (ref 0.7–1.3)
DIFFERENTIAL METHOD BLD: ABNORMAL
EOSINOPHIL # BLD: 0.1 K/UL (ref 0–0.4)
EOSINOPHIL NFR BLD: 2 % (ref 0–7)
ERYTHROCYTE [DISTWIDTH] IN BLOOD BY AUTOMATED COUNT: 16.1 % (ref 11.5–14.5)
GLUCOSE BLD STRIP.AUTO-MCNC: 157 MG/DL (ref 65–117)
GLUCOSE BLD STRIP.AUTO-MCNC: 158 MG/DL (ref 65–117)
GLUCOSE BLD STRIP.AUTO-MCNC: 178 MG/DL (ref 65–117)
GLUCOSE BLD STRIP.AUTO-MCNC: 192 MG/DL (ref 65–117)
GLUCOSE SERPL-MCNC: 130 MG/DL (ref 65–100)
HCT VFR BLD AUTO: 26.3 % (ref 36.6–50.3)
HCT VFR BLD AUTO: 26.6 % (ref 36.6–50.3)
HEMOCCULT STL QL: POSITIVE
HGB BLD-MCNC: 8 G/DL (ref 12.1–17)
HGB BLD-MCNC: 8.3 G/DL (ref 12.1–17)
IMM GRANULOCYTES # BLD AUTO: 0 K/UL (ref 0–0.04)
IMM GRANULOCYTES NFR BLD AUTO: 0 % (ref 0–0.5)
LYMPHOCYTES # BLD: 2.1 K/UL (ref 0.8–3.5)
LYMPHOCYTES NFR BLD: 31 % (ref 12–49)
MCH RBC QN AUTO: 26.8 PG (ref 26–34)
MCHC RBC AUTO-ENTMCNC: 31.2 G/DL (ref 30–36.5)
MCV RBC AUTO: 85.8 FL (ref 80–99)
MONOCYTES # BLD: 0.6 K/UL (ref 0–1)
MONOCYTES NFR BLD: 9 % (ref 5–13)
NEUTS SEG # BLD: 3.9 K/UL (ref 1.8–8)
NEUTS SEG NFR BLD: 57 % (ref 32–75)
NRBC # BLD: 0 K/UL (ref 0–0.01)
NRBC BLD-RTO: 0 PER 100 WBC
PLATELET # BLD AUTO: 279 K/UL (ref 150–400)
PMV BLD AUTO: 8.7 FL (ref 8.9–12.9)
POTASSIUM SERPL-SCNC: 3.6 MMOL/L (ref 3.5–5.1)
RBC # BLD AUTO: 3.1 M/UL (ref 4.1–5.7)
SERVICE CMNT-IMP: ABNORMAL
SERVICE CMNT-IMP: NORMAL
SERVICE CMNT-IMP: NORMAL
SODIUM SERPL-SCNC: 137 MMOL/L (ref 136–145)
SPECIMEN HOLD: NORMAL
WBC # BLD AUTO: 6.8 K/UL (ref 4.1–11.1)

## 2024-04-06 PROCEDURE — 82272 OCCULT BLD FECES 1-3 TESTS: CPT

## 2024-04-06 PROCEDURE — 36415 COLL VENOUS BLD VENIPUNCTURE: CPT

## 2024-04-06 PROCEDURE — 6360000002 HC RX W HCPCS: Performed by: STUDENT IN AN ORGANIZED HEALTH CARE EDUCATION/TRAINING PROGRAM

## 2024-04-06 PROCEDURE — 85014 HEMATOCRIT: CPT

## 2024-04-06 PROCEDURE — 1100000000 HC RM PRIVATE

## 2024-04-06 PROCEDURE — 6370000000 HC RX 637 (ALT 250 FOR IP): Performed by: NURSE PRACTITIONER

## 2024-04-06 PROCEDURE — 2580000003 HC RX 258: Performed by: STUDENT IN AN ORGANIZED HEALTH CARE EDUCATION/TRAINING PROGRAM

## 2024-04-06 PROCEDURE — 6370000000 HC RX 637 (ALT 250 FOR IP): Performed by: INTERNAL MEDICINE

## 2024-04-06 PROCEDURE — 2580000003 HC RX 258: Performed by: INTERNAL MEDICINE

## 2024-04-06 PROCEDURE — 85025 COMPLETE CBC W/AUTO DIFF WBC: CPT

## 2024-04-06 PROCEDURE — 80048 BASIC METABOLIC PNL TOTAL CA: CPT

## 2024-04-06 PROCEDURE — 82962 GLUCOSE BLOOD TEST: CPT

## 2024-04-06 PROCEDURE — 85018 HEMOGLOBIN: CPT

## 2024-04-06 RX ORDER — LACTULOSE 10 G/15ML
20 SOLUTION ORAL 3 TIMES DAILY PRN
Status: DISCONTINUED | OUTPATIENT
Start: 2024-04-06 | End: 2024-04-11 | Stop reason: HOSPADM

## 2024-04-06 RX ORDER — LACTULOSE 10 G/15ML
20 SOLUTION ORAL ONCE
Status: DISCONTINUED | OUTPATIENT
Start: 2024-04-06 | End: 2024-04-08

## 2024-04-06 RX ORDER — BISACODYL 5 MG/1
5 TABLET, DELAYED RELEASE ORAL DAILY PRN
Status: DISCONTINUED | OUTPATIENT
Start: 2024-04-06 | End: 2024-04-11 | Stop reason: HOSPADM

## 2024-04-06 RX ORDER — SENNA AND DOCUSATE SODIUM 50; 8.6 MG/1; MG/1
2 TABLET, FILM COATED ORAL 2 TIMES DAILY
Status: DISCONTINUED | OUTPATIENT
Start: 2024-04-06 | End: 2024-04-11 | Stop reason: HOSPADM

## 2024-04-06 RX ORDER — FERROUS SULFATE 325(65) MG
325 TABLET ORAL
Status: DISCONTINUED | OUTPATIENT
Start: 2024-04-07 | End: 2024-04-11 | Stop reason: HOSPADM

## 2024-04-06 RX ADMIN — PANTOPRAZOLE SODIUM 40 MG: 40 TABLET, DELAYED RELEASE ORAL at 17:48

## 2024-04-06 RX ADMIN — SODIUM CHLORIDE, PRESERVATIVE FREE 10 ML: 5 INJECTION INTRAVENOUS at 20:58

## 2024-04-06 RX ADMIN — MEROPENEM 1000 MG: 1 INJECTION, POWDER, FOR SOLUTION INTRAVENOUS at 20:56

## 2024-04-06 RX ADMIN — SODIUM CHLORIDE: 9 INJECTION, SOLUTION INTRAVENOUS at 09:12

## 2024-04-06 RX ADMIN — AMLODIPINE BESYLATE 2.5 MG: 2.5 TABLET ORAL at 11:47

## 2024-04-06 RX ADMIN — ATORVASTATIN CALCIUM 80 MG: 40 TABLET, FILM COATED ORAL at 20:47

## 2024-04-06 RX ADMIN — MEROPENEM 1000 MG: 1 INJECTION, POWDER, FOR SOLUTION INTRAVENOUS at 09:13

## 2024-04-06 RX ADMIN — POLYETHYLENE GLYCOL 3350 17 G: 17 POWDER, FOR SOLUTION ORAL at 11:59

## 2024-04-06 RX ADMIN — PANTOPRAZOLE SODIUM 40 MG: 40 TABLET, DELAYED RELEASE ORAL at 06:16

## 2024-04-06 RX ADMIN — TAMSULOSIN HYDROCHLORIDE 0.4 MG: 0.4 CAPSULE ORAL at 11:47

## 2024-04-06 RX ADMIN — Medication 2000 UNITS: at 11:47

## 2024-04-06 RX ADMIN — ABIRATERONE ACETATE 1000 MG: 250 TABLET ORAL at 09:06

## 2024-04-06 RX ADMIN — SODIUM CHLORIDE: 9 INJECTION, SOLUTION INTRAVENOUS at 20:55

## 2024-04-06 RX ADMIN — PREDNISONE 5 MG: 5 TABLET ORAL at 11:47

## 2024-04-06 RX ADMIN — SODIUM CHLORIDE, PRESERVATIVE FREE 10 ML: 5 INJECTION INTRAVENOUS at 09:13

## 2024-04-06 ASSESSMENT — PAIN SCALES - GENERAL
PAINLEVEL_OUTOF10: 0

## 2024-04-07 PROBLEM — Z86.711 HISTORY OF PULMONARY EMBOLUS (PE): Status: ACTIVE | Noted: 2024-04-07

## 2024-04-07 LAB
ANION GAP SERPL CALC-SCNC: 6 MMOL/L (ref 5–15)
BASOPHILS # BLD: 0 K/UL (ref 0–0.1)
BASOPHILS NFR BLD: 1 % (ref 0–1)
BUN SERPL-MCNC: 27 MG/DL (ref 6–20)
BUN/CREAT SERPL: 21 (ref 12–20)
CALCIUM SERPL-MCNC: 9.5 MG/DL (ref 8.5–10.1)
CHLORIDE SERPL-SCNC: 108 MMOL/L (ref 97–108)
CO2 SERPL-SCNC: 26 MMOL/L (ref 21–32)
CREAT SERPL-MCNC: 1.3 MG/DL (ref 0.7–1.3)
DIFFERENTIAL METHOD BLD: ABNORMAL
EOSINOPHIL # BLD: 0.2 K/UL (ref 0–0.4)
EOSINOPHIL NFR BLD: 3 % (ref 0–7)
ERYTHROCYTE [DISTWIDTH] IN BLOOD BY AUTOMATED COUNT: 16.6 % (ref 11.5–14.5)
GLUCOSE BLD STRIP.AUTO-MCNC: 126 MG/DL (ref 65–117)
GLUCOSE BLD STRIP.AUTO-MCNC: 127 MG/DL (ref 65–117)
GLUCOSE BLD STRIP.AUTO-MCNC: 184 MG/DL (ref 65–117)
GLUCOSE BLD STRIP.AUTO-MCNC: 308 MG/DL (ref 65–117)
GLUCOSE SERPL-MCNC: 143 MG/DL (ref 65–100)
HCT VFR BLD AUTO: 25.3 % (ref 36.6–50.3)
HCT VFR BLD AUTO: 25.7 % (ref 36.6–50.3)
HGB BLD-MCNC: 7.9 G/DL (ref 12.1–17)
HGB BLD-MCNC: 8 G/DL (ref 12.1–17)
IMM GRANULOCYTES # BLD AUTO: 0.1 K/UL (ref 0–0.04)
IMM GRANULOCYTES NFR BLD AUTO: 1 % (ref 0–0.5)
LYMPHOCYTES # BLD: 2.2 K/UL (ref 0.8–3.5)
LYMPHOCYTES NFR BLD: 34 % (ref 12–49)
MCH RBC QN AUTO: 27.4 PG (ref 26–34)
MCHC RBC AUTO-ENTMCNC: 31.1 G/DL (ref 30–36.5)
MCV RBC AUTO: 88 FL (ref 80–99)
MONOCYTES # BLD: 0.4 K/UL (ref 0–1)
MONOCYTES NFR BLD: 7 % (ref 5–13)
NEUTS SEG # BLD: 3.6 K/UL (ref 1.8–8)
NEUTS SEG NFR BLD: 54 % (ref 32–75)
NRBC # BLD: 0 K/UL (ref 0–0.01)
NRBC BLD-RTO: 0 PER 100 WBC
PLATELET # BLD AUTO: 287 K/UL (ref 150–400)
PMV BLD AUTO: 8.7 FL (ref 8.9–12.9)
POTASSIUM SERPL-SCNC: 4.2 MMOL/L (ref 3.5–5.1)
RBC # BLD AUTO: 2.92 M/UL (ref 4.1–5.7)
SERVICE CMNT-IMP: ABNORMAL
SODIUM SERPL-SCNC: 140 MMOL/L (ref 136–145)
WBC # BLD AUTO: 6.6 K/UL (ref 4.1–11.1)

## 2024-04-07 PROCEDURE — 2580000003 HC RX 258: Performed by: INTERNAL MEDICINE

## 2024-04-07 PROCEDURE — 6360000002 HC RX W HCPCS: Performed by: STUDENT IN AN ORGANIZED HEALTH CARE EDUCATION/TRAINING PROGRAM

## 2024-04-07 PROCEDURE — 80048 BASIC METABOLIC PNL TOTAL CA: CPT

## 2024-04-07 PROCEDURE — 36415 COLL VENOUS BLD VENIPUNCTURE: CPT

## 2024-04-07 PROCEDURE — 2580000003 HC RX 258: Performed by: STUDENT IN AN ORGANIZED HEALTH CARE EDUCATION/TRAINING PROGRAM

## 2024-04-07 PROCEDURE — 99222 1ST HOSP IP/OBS MODERATE 55: CPT | Performed by: INTERNAL MEDICINE

## 2024-04-07 PROCEDURE — 6370000000 HC RX 637 (ALT 250 FOR IP): Performed by: NURSE PRACTITIONER

## 2024-04-07 PROCEDURE — 85014 HEMATOCRIT: CPT

## 2024-04-07 PROCEDURE — 1100000000 HC RM PRIVATE

## 2024-04-07 PROCEDURE — 85025 COMPLETE CBC W/AUTO DIFF WBC: CPT

## 2024-04-07 PROCEDURE — 82962 GLUCOSE BLOOD TEST: CPT

## 2024-04-07 PROCEDURE — 6370000000 HC RX 637 (ALT 250 FOR IP): Performed by: INTERNAL MEDICINE

## 2024-04-07 PROCEDURE — 85018 HEMOGLOBIN: CPT

## 2024-04-07 RX ORDER — HYDROCORTISONE 100 MG/60ML
100 SUSPENSION RECTAL 2 TIMES DAILY PRN
Status: DISCONTINUED | OUTPATIENT
Start: 2024-04-07 | End: 2024-04-11 | Stop reason: HOSPADM

## 2024-04-07 RX ORDER — SUCRALFATE 1 G/1
2 TABLET ORAL ONCE
Status: DISCONTINUED | OUTPATIENT
Start: 2024-04-07 | End: 2024-04-11 | Stop reason: HOSPADM

## 2024-04-07 RX ADMIN — SODIUM CHLORIDE, PRESERVATIVE FREE 10 ML: 5 INJECTION INTRAVENOUS at 09:24

## 2024-04-07 RX ADMIN — ATORVASTATIN CALCIUM 80 MG: 40 TABLET, FILM COATED ORAL at 20:52

## 2024-04-07 RX ADMIN — MEROPENEM 1000 MG: 1 INJECTION, POWDER, FOR SOLUTION INTRAVENOUS at 09:32

## 2024-04-07 RX ADMIN — SODIUM CHLORIDE, PRESERVATIVE FREE 20 ML: 5 INJECTION INTRAVENOUS at 20:53

## 2024-04-07 RX ADMIN — PANTOPRAZOLE SODIUM 40 MG: 40 TABLET, DELAYED RELEASE ORAL at 07:59

## 2024-04-07 RX ADMIN — TAMSULOSIN HYDROCHLORIDE 0.4 MG: 0.4 CAPSULE ORAL at 11:21

## 2024-04-07 RX ADMIN — Medication 2000 UNITS: at 11:21

## 2024-04-07 RX ADMIN — ABIRATERONE ACETATE 1000 MG: 250 TABLET ORAL at 09:18

## 2024-04-07 RX ADMIN — POLYETHYLENE GLYCOL 3350 17 G: 17 POWDER, FOR SOLUTION ORAL at 11:28

## 2024-04-07 RX ADMIN — SODIUM CHLORIDE: 9 INJECTION, SOLUTION INTRAVENOUS at 09:31

## 2024-04-07 RX ADMIN — INSULIN LISPRO 6 UNITS: 100 INJECTION, SOLUTION INTRAVENOUS; SUBCUTANEOUS at 12:50

## 2024-04-07 RX ADMIN — FERROUS SULFATE TAB 325 MG (65 MG ELEMENTAL FE) 325 MG: 325 (65 FE) TAB at 07:59

## 2024-04-07 RX ADMIN — PREDNISONE 5 MG: 5 TABLET ORAL at 11:21

## 2024-04-07 RX ADMIN — PANTOPRAZOLE SODIUM 40 MG: 40 TABLET, DELAYED RELEASE ORAL at 17:35

## 2024-04-07 RX ADMIN — AMLODIPINE BESYLATE 2.5 MG: 2.5 TABLET ORAL at 11:21

## 2024-04-07 ASSESSMENT — PAIN SCALES - GENERAL
PAINLEVEL_OUTOF10: 0
PAINLEVEL_OUTOF10: 0

## 2024-04-07 NOTE — CONSULTS
[facility] at [facility]  [address], [address] [place] [address]  W: [phone]  F: [phone]      Reason for Visit:   Mehrdad Sims is a 73 y.o. male who is seen for evaluation for restarting anticoagulation for VTE  Treatment History:   N/a    History of Present Illness:   Pt admitted on 3/31/24 with bleeding from nephrostomy site.  Pt denies active bleeding now. History of PE, unclear as to exactly when per pt.  He states \"months ago.\"      Past Medical History:   Diagnosis Date    Diabetes (HCC)     Gastrointestinal disorder     Hypertension     PATTI (obstructive sleep apnea)     AHI: 8.9 per hour      Past Surgical History:   Procedure Laterality Date    [place] UNLISTED PROCEDURE ABDOMEN PERITONEUM & OMENTUM      Hernia Repair    PROSTATE SURGERY        Social History     Tobacco Use    Smoking status: Never     Passive exposure: Never    Smokeless tobacco: Never   Substance Use Topics    Alcohol use: No      Family History   Problem Relation Age of Onset    Hypertension Father     Diabetes Father     Diabetes Mother     Hypertension Mother      Current Facility-Administered Medications   Medication Dose Route Frequency    sennosides-docusate sodium (SENOKOT-S) 8.6-50 MG tablet 2 tablet  2 tablet Oral BID    lactulose (CHRONULAC) 10 GM/15ML solution 20 g  20 g Oral TID PRN    bisacodyl (DULCOLAX) EC tablet 5 mg  5 mg Oral Daily PRN    lactulose (CHRONULAC) 10 GM/15ML solution 20 g  20 g Oral Once    ferrous sulfate (IRON 325) tablet 325 mg  325 mg Oral Daily with breakfast    oxyCODONE-acetaminophen (PERCOCET) 5-325 MG per tablet 1 tablet  1 tablet Oral Q4H PRN    hydrALAZINE (APRESOLINE) injection 5 mg  5 mg IntraVENous Q6H PRN    0.9 % sodium chloride infusion   IntraVENous PRN    pantoprazole (PROTONIX) tablet 40 mg  40 mg Oral BID AC    lidocaine 2 % injection 20 mL  20 mL IntraDERmal Once    abiraterone acetate (ZYTIGA) 250 MG tablet TABS 1,000 mg (Patient

## 2024-04-07 NOTE — CONSULTS
Gastroenterology Consult     Referring Physician: Dr. Carl    Consult Date: 4/7/2024     Subjective:     Chief Complaint: rectal bleeding    History of Present Illness: Mehrdad Sims is a 73 y.o. male who is seen in consultation for rectal bleeding.    Mehrdad Sims is a 74yo with complicated prostate CA s/p XRT & ongoing urinary obstruction requiring bilateral nephrostomy tubes c/b hemorrhage, hx/o PE on apixaban held since admission, CKD-3, T2DM well controlled with A1C 6, for whom we are consulted for rectal bleeding.    Feels well. States he sometimes gets scant streaks of blood with stool. Sometimes has to strain.     Brown stool with scant streaks of BRB and some dark color per RN who just changed him.     Colon 2021 by Dr. Payne of [facility] - internal hemorrhoids & radiation proctitis & L-sided diverticulosis    Past Medical History:   Diagnosis Date    Diabetes (HCC)     Gastrointestinal disorder     Hypertension     PATTI (obstructive sleep apnea)     AHI: 8.9 per hour     Past Surgical History:   Procedure Laterality Date    [place] UNLISTED PROCEDURE ABDOMEN PERITONEUM & OMENTUM      Hernia Repair    PROSTATE SURGERY        Family History   Problem Relation Age of Onset    Hypertension Father     Diabetes Father     Diabetes Mother     Hypertension Mother      Social History     Tobacco Use    Smoking status: Never     Passive exposure: Never    Smokeless tobacco: Never   Substance Use Topics    Alcohol use: No      Allergies   Allergen Reactions    Celecoxib Dizziness or Vertigo    Cephalexin Swelling     Tolerated Zosyn and meropenem at [facility]    Ibuprofen Nausea Only    Aspirin Nausea Only     Patient states not allergic to medication but reports he does not wish to take it   UPSET STOMACH       Current Facility-Administered Medications   Medication Dose Route Frequency    sennosides-docusate sodium (SENOKOT-S) 8.6-50 MG tablet 2 tablet  2 tablet Oral BID    lactulose (CHRONULAC) 10 GM/15ML

## 2024-04-08 LAB
ANION GAP SERPL CALC-SCNC: 6 MMOL/L (ref 5–15)
BASOPHILS # BLD: 0 K/UL (ref 0–0.1)
BASOPHILS NFR BLD: 1 % (ref 0–1)
BUN SERPL-MCNC: 28 MG/DL (ref 6–20)
BUN/CREAT SERPL: 25 (ref 12–20)
CALCIUM SERPL-MCNC: 8.2 MG/DL (ref 8.5–10.1)
CHLORIDE SERPL-SCNC: 113 MMOL/L (ref 97–108)
CO2 SERPL-SCNC: 20 MMOL/L (ref 21–32)
CREAT SERPL-MCNC: 1.11 MG/DL (ref 0.7–1.3)
DIFFERENTIAL METHOD BLD: ABNORMAL
EOSINOPHIL # BLD: 0.2 K/UL (ref 0–0.4)
EOSINOPHIL NFR BLD: 2 % (ref 0–7)
ERYTHROCYTE [DISTWIDTH] IN BLOOD BY AUTOMATED COUNT: 16.9 % (ref 11.5–14.5)
GLUCOSE BLD STRIP.AUTO-MCNC: 111 MG/DL (ref 65–117)
GLUCOSE BLD STRIP.AUTO-MCNC: 170 MG/DL (ref 65–117)
GLUCOSE BLD STRIP.AUTO-MCNC: 187 MG/DL (ref 65–117)
GLUCOSE BLD STRIP.AUTO-MCNC: 221 MG/DL (ref 65–117)
GLUCOSE SERPL-MCNC: 96 MG/DL (ref 65–100)
HCT VFR BLD AUTO: 24.8 % (ref 36.6–50.3)
HCT VFR BLD AUTO: 27.2 % (ref 36.6–50.3)
HGB BLD-MCNC: 7.5 G/DL (ref 12.1–17)
HGB BLD-MCNC: 8.5 G/DL (ref 12.1–17)
IMM GRANULOCYTES # BLD AUTO: 0.1 K/UL (ref 0–0.04)
IMM GRANULOCYTES NFR BLD AUTO: 1 % (ref 0–0.5)
LYMPHOCYTES # BLD: 2.3 K/UL (ref 0.8–3.5)
LYMPHOCYTES NFR BLD: 33 % (ref 12–49)
MAGNESIUM SERPL-MCNC: 1.4 MG/DL (ref 1.6–2.4)
MCH RBC QN AUTO: 27.1 PG (ref 26–34)
MCHC RBC AUTO-ENTMCNC: 30.2 G/DL (ref 30–36.5)
MCV RBC AUTO: 89.5 FL (ref 80–99)
MONOCYTES # BLD: 0.6 K/UL (ref 0–1)
MONOCYTES NFR BLD: 8 % (ref 5–13)
NEUTS SEG # BLD: 3.9 K/UL (ref 1.8–8)
NEUTS SEG NFR BLD: 55 % (ref 32–75)
NRBC # BLD: 0 K/UL (ref 0–0.01)
NRBC BLD-RTO: 0 PER 100 WBC
PLATELET # BLD AUTO: 303 K/UL (ref 150–400)
PMV BLD AUTO: 8.9 FL (ref 8.9–12.9)
POTASSIUM SERPL-SCNC: 3.5 MMOL/L (ref 3.5–5.1)
RBC # BLD AUTO: 2.77 M/UL (ref 4.1–5.7)
SERVICE CMNT-IMP: ABNORMAL
SERVICE CMNT-IMP: NORMAL
SODIUM SERPL-SCNC: 139 MMOL/L (ref 136–145)
WBC # BLD AUTO: 7 K/UL (ref 4.1–11.1)

## 2024-04-08 PROCEDURE — 36415 COLL VENOUS BLD VENIPUNCTURE: CPT

## 2024-04-08 PROCEDURE — 85018 HEMOGLOBIN: CPT

## 2024-04-08 PROCEDURE — 6370000000 HC RX 637 (ALT 250 FOR IP): Performed by: INTERNAL MEDICINE

## 2024-04-08 PROCEDURE — 6370000000 HC RX 637 (ALT 250 FOR IP): Performed by: NURSE PRACTITIONER

## 2024-04-08 PROCEDURE — 80048 BASIC METABOLIC PNL TOTAL CA: CPT

## 2024-04-08 PROCEDURE — 83735 ASSAY OF MAGNESIUM: CPT

## 2024-04-08 PROCEDURE — 97116 GAIT TRAINING THERAPY: CPT

## 2024-04-08 PROCEDURE — 85014 HEMATOCRIT: CPT

## 2024-04-08 PROCEDURE — 85025 COMPLETE CBC W/AUTO DIFF WBC: CPT

## 2024-04-08 PROCEDURE — 2580000003 HC RX 258: Performed by: INTERNAL MEDICINE

## 2024-04-08 PROCEDURE — 97110 THERAPEUTIC EXERCISES: CPT

## 2024-04-08 PROCEDURE — 82962 GLUCOSE BLOOD TEST: CPT

## 2024-04-08 PROCEDURE — 1100000000 HC RM PRIVATE

## 2024-04-08 RX ADMIN — AMLODIPINE BESYLATE 2.5 MG: 2.5 TABLET ORAL at 10:22

## 2024-04-08 RX ADMIN — POLYETHYLENE GLYCOL 3350 17 G: 17 POWDER, FOR SOLUTION ORAL at 10:29

## 2024-04-08 RX ADMIN — PREDNISONE 5 MG: 5 TABLET ORAL at 10:23

## 2024-04-08 RX ADMIN — Medication 2000 UNITS: at 10:23

## 2024-04-08 RX ADMIN — FERROUS SULFATE TAB 325 MG (65 MG ELEMENTAL FE) 325 MG: 325 (65 FE) TAB at 10:22

## 2024-04-08 RX ADMIN — INSULIN LISPRO 2 UNITS: 100 INJECTION, SOLUTION INTRAVENOUS; SUBCUTANEOUS at 12:27

## 2024-04-08 RX ADMIN — SODIUM CHLORIDE, PRESERVATIVE FREE 10 ML: 5 INJECTION INTRAVENOUS at 10:23

## 2024-04-08 RX ADMIN — ATORVASTATIN CALCIUM 80 MG: 40 TABLET, FILM COATED ORAL at 21:42

## 2024-04-08 RX ADMIN — PANTOPRAZOLE SODIUM 40 MG: 40 TABLET, DELAYED RELEASE ORAL at 06:46

## 2024-04-08 RX ADMIN — ABIRATERONE ACETATE 1000 MG: 250 TABLET ORAL at 10:28

## 2024-04-08 RX ADMIN — PANTOPRAZOLE SODIUM 40 MG: 40 TABLET, DELAYED RELEASE ORAL at 18:13

## 2024-04-08 RX ADMIN — TAMSULOSIN HYDROCHLORIDE 0.4 MG: 0.4 CAPSULE ORAL at 10:22

## 2024-04-08 RX ADMIN — SODIUM CHLORIDE, PRESERVATIVE FREE 10 ML: 5 INJECTION INTRAVENOUS at 21:46

## 2024-04-09 LAB
ANION GAP SERPL CALC-SCNC: 5 MMOL/L (ref 5–15)
BASOPHILS # BLD: 0 K/UL (ref 0–0.1)
BASOPHILS NFR BLD: 1 % (ref 0–1)
BUN SERPL-MCNC: 33 MG/DL (ref 6–20)
BUN/CREAT SERPL: 23 (ref 12–20)
CALCIUM SERPL-MCNC: 10.1 MG/DL (ref 8.5–10.1)
CHLORIDE SERPL-SCNC: 106 MMOL/L (ref 97–108)
CO2 SERPL-SCNC: 25 MMOL/L (ref 21–32)
COMMENT:: NORMAL
CREAT SERPL-MCNC: 1.43 MG/DL (ref 0.7–1.3)
DIFFERENTIAL METHOD BLD: ABNORMAL
EOSINOPHIL # BLD: 0.2 K/UL (ref 0–0.4)
EOSINOPHIL NFR BLD: 3 % (ref 0–7)
ERYTHROCYTE [DISTWIDTH] IN BLOOD BY AUTOMATED COUNT: 17 % (ref 11.5–14.5)
GLUCOSE BLD STRIP.AUTO-MCNC: 116 MG/DL (ref 65–117)
GLUCOSE BLD STRIP.AUTO-MCNC: 164 MG/DL (ref 65–117)
GLUCOSE BLD STRIP.AUTO-MCNC: 167 MG/DL (ref 65–117)
GLUCOSE BLD STRIP.AUTO-MCNC: 257 MG/DL (ref 65–117)
GLUCOSE SERPL-MCNC: 114 MG/DL (ref 65–100)
HCT VFR BLD AUTO: 27.1 % (ref 36.6–50.3)
HGB BLD-MCNC: 8.3 G/DL (ref 12.1–17)
IMM GRANULOCYTES # BLD AUTO: 0.1 K/UL (ref 0–0.04)
IMM GRANULOCYTES NFR BLD AUTO: 1 % (ref 0–0.5)
LYMPHOCYTES # BLD: 2.1 K/UL (ref 0.8–3.5)
LYMPHOCYTES NFR BLD: 36 % (ref 12–49)
MCH RBC QN AUTO: 27.1 PG (ref 26–34)
MCHC RBC AUTO-ENTMCNC: 30.6 G/DL (ref 30–36.5)
MCV RBC AUTO: 88.6 FL (ref 80–99)
MONOCYTES # BLD: 0.5 K/UL (ref 0–1)
MONOCYTES NFR BLD: 9 % (ref 5–13)
NEUTS SEG # BLD: 3 K/UL (ref 1.8–8)
NEUTS SEG NFR BLD: 50 % (ref 32–75)
NRBC # BLD: 0 K/UL (ref 0–0.01)
NRBC BLD-RTO: 0 PER 100 WBC
PLATELET # BLD AUTO: 342 K/UL (ref 150–400)
PMV BLD AUTO: 8.7 FL (ref 8.9–12.9)
POTASSIUM SERPL-SCNC: 3.9 MMOL/L (ref 3.5–5.1)
RBC # BLD AUTO: 3.06 M/UL (ref 4.1–5.7)
SERVICE CMNT-IMP: ABNORMAL
SERVICE CMNT-IMP: NORMAL
SODIUM SERPL-SCNC: 136 MMOL/L (ref 136–145)
SPECIMEN HOLD: NORMAL
WBC # BLD AUTO: 5.9 K/UL (ref 4.1–11.1)

## 2024-04-09 PROCEDURE — 6370000000 HC RX 637 (ALT 250 FOR IP): Performed by: NURSE PRACTITIONER

## 2024-04-09 PROCEDURE — 97535 SELF CARE MNGMENT TRAINING: CPT

## 2024-04-09 PROCEDURE — 85025 COMPLETE CBC W/AUTO DIFF WBC: CPT

## 2024-04-09 PROCEDURE — 97530 THERAPEUTIC ACTIVITIES: CPT

## 2024-04-09 PROCEDURE — 80048 BASIC METABOLIC PNL TOTAL CA: CPT

## 2024-04-09 PROCEDURE — 82962 GLUCOSE BLOOD TEST: CPT

## 2024-04-09 PROCEDURE — 36415 COLL VENOUS BLD VENIPUNCTURE: CPT

## 2024-04-09 PROCEDURE — 2580000003 HC RX 258: Performed by: INTERNAL MEDICINE

## 2024-04-09 PROCEDURE — 6370000000 HC RX 637 (ALT 250 FOR IP): Performed by: INTERNAL MEDICINE

## 2024-04-09 PROCEDURE — 1100000000 HC RM PRIVATE

## 2024-04-09 RX ADMIN — FERROUS SULFATE TAB 325 MG (65 MG ELEMENTAL FE) 325 MG: 325 (65 FE) TAB at 11:25

## 2024-04-09 RX ADMIN — PREDNISONE 5 MG: 5 TABLET ORAL at 11:25

## 2024-04-09 RX ADMIN — ABIRATERONE ACETATE 1000 MG: 250 TABLET ORAL at 11:21

## 2024-04-09 RX ADMIN — PANTOPRAZOLE SODIUM 40 MG: 40 TABLET, DELAYED RELEASE ORAL at 06:06

## 2024-04-09 RX ADMIN — TAMSULOSIN HYDROCHLORIDE 0.4 MG: 0.4 CAPSULE ORAL at 11:25

## 2024-04-09 RX ADMIN — ATORVASTATIN CALCIUM 80 MG: 40 TABLET, FILM COATED ORAL at 21:35

## 2024-04-09 RX ADMIN — SODIUM CHLORIDE, PRESERVATIVE FREE 10 ML: 5 INJECTION INTRAVENOUS at 21:37

## 2024-04-09 RX ADMIN — Medication 2000 UNITS: at 11:25

## 2024-04-09 RX ADMIN — AMLODIPINE BESYLATE 2.5 MG: 2.5 TABLET ORAL at 11:20

## 2024-04-09 RX ADMIN — SODIUM CHLORIDE, PRESERVATIVE FREE 10 ML: 5 INJECTION INTRAVENOUS at 11:26

## 2024-04-09 RX ADMIN — PANTOPRAZOLE SODIUM 40 MG: 40 TABLET, DELAYED RELEASE ORAL at 17:29

## 2024-04-09 RX ADMIN — INSULIN LISPRO 4 UNITS: 100 INJECTION, SOLUTION INTRAVENOUS; SUBCUTANEOUS at 11:33

## 2024-04-09 ASSESSMENT — PAIN SCALES - GENERAL: PAINLEVEL_OUTOF10: 0

## 2024-04-10 LAB
GLUCOSE BLD STRIP.AUTO-MCNC: 109 MG/DL (ref 65–117)
GLUCOSE BLD STRIP.AUTO-MCNC: 157 MG/DL (ref 65–117)
GLUCOSE BLD STRIP.AUTO-MCNC: 203 MG/DL (ref 65–117)
GLUCOSE BLD STRIP.AUTO-MCNC: 219 MG/DL (ref 65–117)
SERVICE CMNT-IMP: ABNORMAL
SERVICE CMNT-IMP: NORMAL

## 2024-04-10 PROCEDURE — 6370000000 HC RX 637 (ALT 250 FOR IP): Performed by: NURSE PRACTITIONER

## 2024-04-10 PROCEDURE — 82962 GLUCOSE BLOOD TEST: CPT

## 2024-04-10 PROCEDURE — 6370000000 HC RX 637 (ALT 250 FOR IP): Performed by: INTERNAL MEDICINE

## 2024-04-10 PROCEDURE — 2580000003 HC RX 258: Performed by: INTERNAL MEDICINE

## 2024-04-10 PROCEDURE — 1100000000 HC RM PRIVATE

## 2024-04-10 RX ADMIN — Medication 2000 UNITS: at 09:02

## 2024-04-10 RX ADMIN — SODIUM CHLORIDE, PRESERVATIVE FREE 10 ML: 5 INJECTION INTRAVENOUS at 09:03

## 2024-04-10 RX ADMIN — ABIRATERONE ACETATE 1000 MG: 250 TABLET ORAL at 09:03

## 2024-04-10 RX ADMIN — TAMSULOSIN HYDROCHLORIDE 0.4 MG: 0.4 CAPSULE ORAL at 09:02

## 2024-04-10 RX ADMIN — POLYETHYLENE GLYCOL 3350 17 G: 17 POWDER, FOR SOLUTION ORAL at 10:21

## 2024-04-10 RX ADMIN — PANTOPRAZOLE SODIUM 40 MG: 40 TABLET, DELAYED RELEASE ORAL at 06:47

## 2024-04-10 RX ADMIN — INSULIN LISPRO 2 UNITS: 100 INJECTION, SOLUTION INTRAVENOUS; SUBCUTANEOUS at 13:11

## 2024-04-10 RX ADMIN — AMLODIPINE BESYLATE 2.5 MG: 2.5 TABLET ORAL at 09:03

## 2024-04-10 RX ADMIN — PANTOPRAZOLE SODIUM 40 MG: 40 TABLET, DELAYED RELEASE ORAL at 17:48

## 2024-04-10 RX ADMIN — SENNOSIDES AND DOCUSATE SODIUM 2 TABLET: 50; 8.6 TABLET ORAL at 09:02

## 2024-04-10 RX ADMIN — FERROUS SULFATE TAB 325 MG (65 MG ELEMENTAL FE) 325 MG: 325 (65 FE) TAB at 09:02

## 2024-04-10 RX ADMIN — ATORVASTATIN CALCIUM 80 MG: 40 TABLET, FILM COATED ORAL at 20:53

## 2024-04-10 RX ADMIN — SODIUM CHLORIDE, PRESERVATIVE FREE 10 ML: 5 INJECTION INTRAVENOUS at 20:54

## 2024-04-10 RX ADMIN — PREDNISONE 5 MG: 5 TABLET ORAL at 09:02

## 2024-04-10 ASSESSMENT — PAIN SCALES - GENERAL: PAINLEVEL_OUTOF10: 0

## 2024-04-10 NOTE — PLAN OF CARE
Problem: Discharge Planning  Goal: Discharge to home or other facility with appropriate resources  4/1/2024 0231 by Kelsey Burgos, RN  Outcome: Progressing  3/31/2024 1441 by Zeinab Ford, RN  Outcome: Progressing     Problem: Pain  Goal: Verbalizes/displays adequate comfort level or baseline comfort level  Outcome: Progressing     Problem: Skin/Tissue Integrity  Goal: Absence of new skin breakdown  Description: 1.  Monitor for areas of redness and/or skin breakdown  2.  Assess vascular access sites hourly  3.  Every 4-6 hours minimum:  Change oxygen saturation probe site  4.  Every 4-6 hours:  If on nasal continuous positive airway pressure, respiratory therapy assess nares and determine need for appliance change or resting period.  Outcome: Progressing     Problem: Safety - Adult  Goal: Free from fall injury  Outcome: Progressing     
  Problem: Discharge Planning  Goal: Discharge to home or other facility with appropriate resources  4/1/2024 1317 by Jennifer Brown RN  Outcome: Progressing  4/1/2024 0231 by Kelsey Burgos RN  Outcome: Progressing     Problem: Pain  Goal: Verbalizes/displays adequate comfort level or baseline comfort level  4/1/2024 1317 by Jennifer Brown RN  Outcome: Progressing  4/1/2024 0231 by Kelsey Burgos RN  Outcome: Progressing     Problem: Skin/Tissue Integrity  Goal: Absence of new skin breakdown  Description: 1.  Monitor for areas of redness and/or skin breakdown  2.  Assess vascular access sites hourly  3.  Every 4-6 hours minimum:  Change oxygen saturation probe site  4.  Every 4-6 hours:  If on nasal continuous positive airway pressure, respiratory therapy assess nares and determine need for appliance change or resting period.  4/1/2024 1317 by Jennifer Brown RN  Outcome: Progressing  4/1/2024 0231 by Kelsey Burgos RN  Outcome: Progressing     Problem: Safety - Adult  Goal: Free from fall injury  4/1/2024 1317 by Jennifer Brown RN  Outcome: Progressing  4/1/2024 0231 by Kelsey Burgos RN  Outcome: Progressing     
  Problem: Discharge Planning  Goal: Discharge to home or other facility with appropriate resources  Outcome: Progressing     
  Problem: Discharge Planning  Goal: Discharge to home or other facility with appropriate resources  Outcome: Progressing     Problem: Pain  Goal: Verbalizes/displays adequate comfort level or baseline comfort level  Outcome: Progressing     Problem: Skin/Tissue Integrity  Goal: Absence of new skin breakdown  Description: 1.  Monitor for areas of redness and/or skin breakdown  2.  Assess vascular access sites hourly  3.  Every 4-6 hours minimum:  Change oxygen saturation probe site  4.  Every 4-6 hours:  If on nasal continuous positive airway pressure, respiratory therapy assess nares and determine need for appliance change or resting period.  Outcome: Progressing     Problem: Safety - Adult  Goal: Free from fall injury  Outcome: Progressing     Problem: Chronic Conditions and Co-morbidities  Goal: Patient's chronic conditions and co-morbidity symptoms are monitored and maintained or improved  Outcome: Progressing     
  Problem: Discharge Planning  Goal: Discharge to home or other facility with appropriate resources  Outcome: Progressing     Problem: Pain  Goal: Verbalizes/displays adequate comfort level or baseline comfort level  Outcome: Progressing     Problem: Skin/Tissue Integrity  Goal: Absence of new skin breakdown  Description: 1.  Monitor for areas of redness and/or skin breakdown  2.  Assess vascular access sites hourly  3.  Every 4-6 hours minimum:  Change oxygen saturation probe site  4.  Every 4-6 hours:  If on nasal continuous positive airway pressure, respiratory therapy assess nares and determine need for appliance change or resting period.  Outcome: Progressing     Problem: Safety - Adult  Goal: Free from fall injury  Outcome: Progressing     Problem: Occupational Therapy - Adult  Goal: By Discharge: Performs self-care activities at highest level of function for planned discharge setting.  See evaluation for individualized goals.  Description: FUNCTIONAL STATUS PRIOR TO ADMISSION:  Patient was admitted from SNF rehab and reported requiring assist for ADLs. Patient was using rolling walker for functional mobility.    ,  ,  ,  ,  ,  ,  ,  ,  ,  ,       HOME SUPPORT: Patient admitted from SNF rehab.     Occupational Therapy Goals:  Initiated 4/1/2024  1.  Patient will perform grooming standing at sink with Supervision within 7 day(s).  2.  Patient will perform upper body dressing with Supervision within 7 day(s).  3.  Patient will perform lower body dressing with Stand by Assist within 7 day(s).  4.  Patient will perform toilet transfers with Supervision  within 7 day(s).  5.  Patient will perform all aspects of toileting with Supervision within 7 day(s).  6.  Patient will participate in upper extremity therapeutic exercise/activities with Supervision for 5 minutes within 7 day(s).    4/4/2024 1458 by Yvrose Vidales OT  Outcome: Progressing     Problem: Physical Therapy - Adult  Goal: By Discharge: Performs 
  Problem: Occupational Therapy - Adult  Goal: By Discharge: Performs self-care activities at highest level of function for planned discharge setting.  See evaluation for individualized goals.  Description: FUNCTIONAL STATUS PRIOR TO ADMISSION:  Patient was admitted from SNF rehab and reported requiring assist for ADLs. Patient was using rolling walker for functional mobility.    ,  ,  ,  ,  ,  ,  ,  ,  ,  ,       HOME SUPPORT: Patient admitted from SNF rehab.     Occupational Therapy Goals:  Initiated 4/1/2024  1.  Patient will perform grooming standing at sink with Supervision within 7 day(s).  2.  Patient will perform upper body dressing with Supervision within 7 day(s).  3.  Patient will perform lower body dressing with Stand by Assist within 7 day(s).  4.  Patient will perform toilet transfers with Supervision  within 7 day(s).  5.  Patient will perform all aspects of toileting with Supervision within 7 day(s).  6.  Patient will participate in upper extremity therapeutic exercise/activities with Supervision for 5 minutes within 7 day(s).      Outcome: Progressing    OCCUPATIONAL THERAPY TREATMENT  Patient: Mehrdad Sims (73 y.o. male)  Date: 4/4/2024  Primary Diagnosis: Prostate cancer (HCC) [C61]  Gross hematuria [R31.0]  Anemia, unspecified type [D64.9]  Persistent gross hematuria [R31.0]       Precautions: Fall Risk, Bed Alarm                Chart, occupational therapy assessment, plan of care, and goals were reviewed.    ASSESSMENT  Patient continues to benefit from skilled OT services and is slowly progressing towards goals. Patient with CGA for mobility and transfer on/off toilet using RW. Patient returned to chair and required min A for upper body dressing and bathing. Patient required mod A for lower body dressing and bathing. Patient required additional time for all ADLs and mobility. Patient left in chair with call bell in reach, RN aware, all needs met. Will continue to follow, recommend SNF once 
  Problem: Occupational Therapy - Adult  Goal: By Discharge: Performs self-care activities at highest level of function for planned discharge setting.  See evaluation for individualized goals.  Description: FUNCTIONAL STATUS PRIOR TO ADMISSION:  Patient was admitted from SNF rehab and reported requiring assist for ADLs. Patient was using rolling walker for functional mobility.    ,  ,  ,  ,  ,  ,  ,  ,  ,  ,       HOME SUPPORT: Patient admitted from SNF rehab.     Occupational Therapy Goals:  Initiated 4/1/202; Reviewed 4/9/2024 for weekly reassessment, pt progressing, continue goals  1.  Patient will perform grooming standing at sink with Supervision within 7 day(s).  2.  Patient will perform upper body dressing with Supervision within 7 day(s).  3.  Patient will perform lower body dressing with Stand by Assist within 7 day(s).  4.  Patient will perform toilet transfers with Supervision  within 7 day(s).  5.  Patient will perform all aspects of toileting with Supervision within 7 day(s).  6.  Patient will participate in upper extremity therapeutic exercise/activities with Supervision for 5 minutes within 7 day(s).    Outcome: Progressing  OCCUPATIONAL THERAPY TREATMENT: WEEKLY REASSESSMENT    Patient: Mehrdad Sims (73 y.o. male)  Date: 4/9/2024  Primary Diagnosis: Prostate cancer (HCC) [C61]  Gross hematuria [R31.0]  Anemia, unspecified type [D64.9]  Persistent gross hematuria [R31.0]       Precautions: Fall Risk, Bed Alarm                Chart, occupational therapy assessment, plan of care, and goals were reviewed.    ASSESSMENT  Patient continues to benefit from skilled OT services and is slowly progressing towards goals. Pt received seated on BS with RN present, agreeable to participate. He required mod A for toileting ADLs primarily changing brief, demo'd good static standing balance with RW. He ambulated a few feet to chair with CGA-min A and cues for safe RW management and transfer technique. Once in chair 
  Problem: Pain  Goal: Verbalizes/displays adequate comfort level or baseline comfort level  Outcome: Progressing     
  Problem: Physical Therapy - Adult  Goal: By Discharge: Performs mobility at highest level of function for planned discharge setting.  See evaluation for individualized goals.  Description: FUNCTIONAL STATUS PRIOR TO ADMISSION: Pt has been at SNF and utilizing RW.    HOME SUPPORT PRIOR TO ADMISSION: Pt has been at SNF and continues to work placement at discharge.     Physical Therapy Goals  Initiated 4/1/2024  1.  Patient will move from supine to sit and sit to supine in bed with contact guard assist within 7 day(s).    2.  Patient will perform sit to stand with supervision/set-up within 7 day(s).  3.  Patient will transfer from bed to chair and chair to bed with supervision/set-up using the least restrictive device within 7 day(s).  4.  Patient will ambulate with contact guard assist for 100 feet with the least restrictive device within 7 day(s).       Outcome: Progressing   PHYSICAL THERAPY TREATMENT    Patient: Mehrdad Sims (73 y.o. male)  Date: 4/4/2024  Diagnosis: Prostate cancer (HCC) [C61]  Gross hematuria [R31.0]  Anemia, unspecified type [D64.9]  Persistent gross hematuria [R31.0] Gross hematuria      Precautions: Fall Risk, Bed Alarm                      ASSESSMENT:  Patient continues to benefit from skilled PT services and is progressing towards goals. Pt overall SBA for bed mobility and CGA for transfers and amb with the rolling walker. He needs cues for safety and technique. He states he is not having any pain at nephrostomy tube sites. He states he always has knee pain in amb, very chronic. Pt was agreeable to sitting up in chair, call bell in reach.          PLAN:  Patient continues to benefit from skilled intervention to address the above impairments.  Continue treatment per established plan of care.    Recommendation for discharge: (in order for the patient to meet his/her long term goals): Therapy up to 5 days/week in Skilled nursing facility vs LTC depending upon his level of care prior to 
to and from bathroom and in room; up x3 meals.    Recommendation for discharge: (in order for the patient to meet his/her long term goals): SNF/LTC    Other factors to consider for discharge: no additional factors    IF patient discharges home will need the following DME: patient owns DME required for discharge       SUBJECTIVE:   Patient stated, \"I didn't want to get up, but I needed to do something.\"    OBJECTIVE DATA SUMMARY:   Critical Behavior:  Orientation  Overall Orientation Status: Within Normal Limits  Orientation Level: Oriented X4  Cognition  Insights: Decreased awareness of deficits  Initiation: Requires cues for some  Sequencing: Requires cues for some    Functional Mobility Training:  Bed Mobility:  Bed Mobility Training  Bed Mobility Training: No  Scooting: Supervision  Transfers:  Transfer Training  Transfer Training: Yes  Overall Level of Assistance: Stand-by assistance;Supervision  Interventions: Verbal cues;Safety awareness training  Sit to Stand: Supervision  Stand to Sit: Supervision  Bed to Chair: Stand-by assistance (with RW)  Balance:  Balance  Sitting: Intact  Standing: Impaired  Standing - Static: Constant support;Good (with RW)  Standing - Dynamic: Constant support;Good (with RW)   Ambulation/Gait Training:     Gait  Gait Training: Yes  Overall Level of Assistance: Stand-by assistance  Distance (ft):  (+40)  Assistive Device: Walker, rolling  Interventions: Verbal cues;Safety awareness training  Base of Support: Widened  Speed/Amy: Pace decreased (< 100 feet/min)  Step Length: Right shortened;Left shortened  Gait Abnormalities: Decreased step clearance        Activity Tolerance:   Fair     After treatment:   Patient left in no apparent distress sitting up in chair and Call bell within reach      COMMUNICATION/EDUCATION:   The patient's plan of care was discussed with: registered nurse    Patient Education  Education Given To: Patient  Education Provided: Role of Therapy;Plan of 
To: Patient  Education Provided: Home Exercise Program  Education Provided Comments: BUE strenghtenging HEP initiated against red theraband; good engagement with Max multi-modal cues for technique  Education Method: Verbal;Demonstration  Barriers to Learning: Cognition  Education Outcome: Verbalized understanding;Demonstrated understanding;Continued education needed    Thank you for this referral.  Matthew Kerley, OT  Minutes: 11       
Dressing: Minimal assistance       Toileting: Moderate assistance  Toileting Skilled Clinical Factors: ángela nephrostomy tubes    ADL Intervention and task modifications:      [facility] \"6 Clicks\"                                                       Daily Activity Inpatient Short Form  How much help from another person does the patient currently need... Total; A Lot A Little None   1.  Putting on and taking off regular lower body clothing? []  1 [x]  2 []  3 []  4   2.  Bathing (including washing, rinsing, drying)? []  1 [x]  2 []  3 []  4   3.  Toileting, which includes using toilet, bedpan or urinal? [] 1 [x]  2 []  3 []  4   4.  Putting on and taking off regular upper body clothing? []  1 []  2 [x]  3 []  4   5.  Taking care of personal grooming such as brushing teeth? []  1 []  2 [x]  3 []  4   6.  Eating meals? []  1 []  2 []  3 [x]  4   © , Trustees of [facility], under license to [employer]. All rights reserved     Score: 16/24     Interpretation of Tool:  Represents clinically-significant functional categories (i.e. Activities of daily living).    Cutoff score 39.4 (19) correlates to a good likelihood of discharging home versus a facility  Mariajose Scanlon, Victoria Castillo, Farshad Yi, Jessie Green, Wilfredo Yanez, Joshua Scanlon, AM-PAC “6-Clicks” Functional Assessment Scores Predict Acute Care Hospital Discharge Destination, Physical Therapy, Volume 94, Issue 9, 2014, Pages 5232-1096, [email or web address]    Pain Ratin/10   Pain Intervention(s):   pain is at a level acceptable to the patient    Activity Tolerance:   Fair  and requires rest breaks    After treatment:   Patient left in no apparent distress in bed, Call bell within reach, and Side rails x3    COMMUNICATION/EDUCATION:   The patient's plan of care was discussed with: physical therapist and registered nurse    Patient Education  Education Given To: Patient  Education 
shortened  Gait Abnormalities: Decreased step clearance                                                                                                                                                                                                                                              [facility] AM-PAC®      Basic Mobility Inpatient Short Form (6-Clicks) Version 2  How much HELP from another person do you currently need... (If the patient hasn't done an activity recently, how much help from another person do you think they would need if they tried?) Total A Lot A Little None   1.  Turning from your back to your side while in a flat bed without using bedrails? []  1 []  2 []  3  [x]  4   2.  Moving from lying on your back to sitting on the side of a flat bed without using bedrails? []  1 []  2 [x]  3  []  4   3.  Moving to and from a bed to a chair (including a wheelchair)? []  1 []  2 [x]  3  []  4   4. Standing up from a chair using your arms (e.g. wheelchair or bedside chair)? []  1 []  2 []  3  [x]  4   5.  Walking in hospital room? []  1 []  2 [x]  3  []  4   6.  Climbing 3-5 steps with a railing? []  1 []  2 [x]  3  []  4     Raw Score: 20/24                            Cutoff score ?171,2,3 had higher odds of discharging home with home health or need of SNF/IPR.    1. Mariajose Scanlon, Victoria Castillo, Farshad Yi, Jessie Green, Wilfredo Yanez, Joshua Scanlon.  Validity of the AM-PAC “6-Clicks” Inpatient Daily Activity and Basic Mobility Short Forms. Physical Therapy Mar 2014, 94 (2) 379-391; DOI: [email or web address]/ptj.[email or web address]  2. Isak ROSE, Manuel RÍOS, Lilian RÍOS, Alyson RÍOS. Association of AM-PAC \"6-Clicks\" Basic Mobility and Daily Activity Scores With Discharge Destination. Phys Ther. 2021 Apr 4;101(4):fqxw038. doi: [email or web address]. PMID: [id number].  3. Jatinder RÍOS, Hector D, Dolly S, Manolo K, Bo S. Activity Measure for Post-Acute Care \"6-Clicks\" Basic Mobility Scores Predict 
bell within reach, Bed/ chair alarm activated, and Updated patient's board on functional status and mobility recommendations    COMMUNICATION/EDUCATION:   The patient's plan of care was discussed with: occupational therapist and registered nurse    Patient Education  Education Given To: Patient  Education Provided: Role of Therapy;Plan of Care  Education Method: Verbal  Barriers to Learning: None  Education Outcome: Continued education needed;Verbalized understanding    Thank you for this referral.  Sandra Bhandari, PT, DPT  Minutes: 18      Physical Therapy Evaluation Charge Determination   History Examination Presentation Decision-Making   HIGH Complexity :3+ comorbidities / personal factors will impact the outcome/ POC  MEDIUM Complexity : 3 Standardized tests and measures addressin body structure, function, activity limitation and / or participation in recreation  MEDIUM Complexity : Evolving with changing characteristics  AM-PAC  MEDIUM   Based on the above components, the patient evaluation is determined to be of the following complexity level: Medium

## 2024-04-11 VITALS
HEART RATE: 58 BPM | TEMPERATURE: 98.1 F | WEIGHT: 181.22 LBS | RESPIRATION RATE: 18 BRPM | BODY MASS INDEX: 27.47 KG/M2 | DIASTOLIC BLOOD PRESSURE: 88 MMHG | HEIGHT: 68 IN | OXYGEN SATURATION: 97 % | SYSTOLIC BLOOD PRESSURE: 138 MMHG

## 2024-04-11 LAB
GLUCOSE BLD STRIP.AUTO-MCNC: 113 MG/DL (ref 65–117)
GLUCOSE BLD STRIP.AUTO-MCNC: 199 MG/DL (ref 65–117)
SERVICE CMNT-IMP: ABNORMAL
SERVICE CMNT-IMP: NORMAL

## 2024-04-11 PROCEDURE — 82962 GLUCOSE BLOOD TEST: CPT

## 2024-04-11 PROCEDURE — 6370000000 HC RX 637 (ALT 250 FOR IP): Performed by: NURSE PRACTITIONER

## 2024-04-11 PROCEDURE — 2580000003 HC RX 258: Performed by: INTERNAL MEDICINE

## 2024-04-11 PROCEDURE — 6370000000 HC RX 637 (ALT 250 FOR IP): Performed by: INTERNAL MEDICINE

## 2024-04-11 RX ORDER — SENNA AND DOCUSATE SODIUM 50; 8.6 MG/1; MG/1
2 TABLET, FILM COATED ORAL 2 TIMES DAILY
Qty: 60 TABLET | Refills: 0 | Status: SHIPPED | OUTPATIENT
Start: 2024-04-11 | End: 2024-04-26

## 2024-04-11 RX ADMIN — SODIUM CHLORIDE, PRESERVATIVE FREE 10 ML: 5 INJECTION INTRAVENOUS at 08:49

## 2024-04-11 RX ADMIN — APIXABAN 2.5 MG: 2.5 TABLET, FILM COATED ORAL at 10:14

## 2024-04-11 RX ADMIN — TAMSULOSIN HYDROCHLORIDE 0.4 MG: 0.4 CAPSULE ORAL at 08:43

## 2024-04-11 RX ADMIN — ABIRATERONE ACETATE 1000 MG: 250 TABLET ORAL at 08:52

## 2024-04-11 RX ADMIN — PANTOPRAZOLE SODIUM 40 MG: 40 TABLET, DELAYED RELEASE ORAL at 06:36

## 2024-04-11 RX ADMIN — PREDNISONE 5 MG: 5 TABLET ORAL at 08:45

## 2024-04-11 RX ADMIN — AMLODIPINE BESYLATE 2.5 MG: 2.5 TABLET ORAL at 08:44

## 2024-04-11 RX ADMIN — Medication 2000 UNITS: at 08:44

## 2024-04-11 RX ADMIN — FERROUS SULFATE TAB 325 MG (65 MG ELEMENTAL FE) 325 MG: 325 (65 FE) TAB at 08:43

## 2024-04-11 ASSESSMENT — PAIN SCALES - GENERAL: PAINLEVEL_OUTOF10: 0

## 2024-04-11 NOTE — DISCHARGE SUMMARY
normal  - avoid nephrotoxic medications  - monitor lab work     4. Type II DM  - hemoglobin A1c 6.1  - accuchecks  - sliding scale insulin  - diabetic diet  - hypoglycemia protocol     5. History of HTN      HLD  - continue norvasc, lipitor     PT/OT       Discharge Exam:  Patient seen and examined by me on discharge day.  Pertinent Findings:  Patient Vitals for the past 24 hrs:   BP Temp Temp src Pulse Resp SpO2   04/11/24 0748 138/88 98.1 °F (36.7 °C) -- 58 18 97 %   04/10/24 1950 (!) 144/80 98.4 °F (36.9 °C) Oral 63 18 97 %       Gen:    Not in distress  Chest: Clear lungs  CVS:   Regular rhythm.  No edema  Abd:  Soft, not distended, not tender  Neuro: awake, moving all exts    Discharge/Recent Laboratory Results:  Recent Labs     04/09/24  0600      K 3.9      CO2 25   BUN 33*   CREATININE 1.43*   GLUCOSE 114*   CALCIUM 10.1     Recent Labs     04/09/24  0600   HGB 8.3*   HCT 27.1*   WBC 5.9          Discharge Medications:     Medication List        STOP taking these medications      melatonin 3 MG Tabs tablet            ASK your doctor about these medications      abiraterone acetate 250 MG tablet  Commonly known as: ZYTIGA     acetaminophen 500 MG tablet  Commonly known as: TYLENOL     albuterol sulfate  (90 Base) MCG/ACT inhaler  Commonly known as: PROVENTIL;VENTOLIN;PROAIR     amLODIPine 2.5 MG tablet  Commonly known as: NORVASC     apixaban 2.5 MG Tabs tablet  Commonly known as: ELIQUIS  Ask about: Which instructions should I use?     atorvastatin 80 MG tablet  Commonly known as: LIPITOR  Take 1 tablet by mouth nightly     docusate sodium 100 MG capsule  Commonly known as: COLACE     gabapentin 100 MG capsule  Commonly known as: NEURONTIN  Ask about: Which instructions should I use?     guaiFENesin 100 MG/5ML liquid  Commonly known as: ROBITUSSIN     HumaLOG 100 UNIT/ML injection cartridge  Generic drug: insulin lispro     ondansetron 8 MG tablet  Commonly known as: ZOFRAN

## 2024-04-11 NOTE — DISCHARGE INSTRUCTIONS
HOSPITALIST DISCHARGE INSTRUCTIONS    NAME: Mehrdad Sims   :  1950   MRN:  [id number]     Date/Time:  2024 12:31 PM    ADMIT DATE: 3/31/2024   DISCHARGE DATE: 2024     Bleeding from nephrostomy sites    status post right nephrostomy tube replacement on 24  Bbo hematuria - resolved   History of prostate cancer    History of PE  ESBL UTI    It is important that you take the medication exactly as they are prescribed.   Keep your medication in the bottles provided by the pharmacist and keep a list of the medication names, dosages, and times to be taken in your wallet.   Do not take other medications without consulting your doctor.       What to do at Home    Recommended diet:  cardiac diet    Recommended activity: activity as tolerated      If you have questions regarding the hospital related prescriptions or hospital related issues please call [facility] office at . You can always direct your questions to your primary care doctor if you are unable to reach your hospital physician; your PCP works as an extension of your hospital doctor just like your hospital doctor is an extension of your PCP for your time at the hospital ([facility])    If you experience any of the following symptoms then please call your primary care physician or return to the emergency room if you cannot get hold of your doctor:    Fever, chills, nausea, vomiting, or persistent diarrhea  Worsening weakness or new problems with your speech or balance  Dark stools or visible blood in your stools  New Leg swelling or shortness of breath as these could be signs of a clot    Additional Instructions:      Bring these papers with you to your follow up appointments. The papers will help your doctors be sure to continue the care plan from the hospital.              Information obtained by :  I understand that if any problems occur once I am at home I am to contact my physician.    I understand and

## 2024-04-11 NOTE — CARE COORDINATION
04/11/24 1305   Services At/After Discharge   Transition of Care Consult (CM Consult) SNF  ([facility])   Services At/After Discharge Skilled Nursing Facility (SNF)  ([facility])   [place] Resource Information Provided? No   Mode of Transport at Discharge BLS  ([facility] 4:30P)   Confirm Follow Up Transport Family   Condition of Participation: Discharge Planning   The Patient and/or Patient Representative was provided with a Choice of Provider? Patient Representative  (Admin coordinaor)   The Patient and/Or Patient Representative agree with the Discharge Plan? Yes   Freedom of Choice list was provided with basic dialogue that supports the patient's individualized plan of care/goals, treatment preferences, and shares the quality data associated with the providers?  Yes     CM aware that pt will [facility]  CM aware that pt will report to room 229B and call report is: [phone].      Pt transport arranged at 4p with [facility].  CM informed clinical team.    CRISTOFER Berumen   [phone]

## 2024-04-11 NOTE — PROGRESS NOTES
Amber Dennison PA-C                       [phone] cell                      Friday 7:30 am- 4:30 pm         GI PROGRESS NOTE        NAME:   Mehrdad Sims       :    1950       MRN:    [id number]     Assessment/Plan     Mehrdad Sims is a 72 y/o male with hx of prostate cancer s/p XRT, radiation proctitis, urinary obstruction with bilateral nephrostomy tubes c/b hemorrhage, pulmonary embolism, CKD stage 3, T2DM with intermittent rectal bleeding. Pt notes hx of chronic constipation. He typically has a BM every 2-3 days with occasional straining and noticing little bit of bright red blood mixed with stool. He reports that the rectal bleeding has now resolved. Refused colonoscopy. Denies fhx of colon polyps, CRC. He feels well currently and is ready to go home today.     Recent Labs     24  0441 24  1757 24  0540 24  1930 24  0421   WBC 7.0  --  6.6  --  6.8   HGB 7.5* 7.9* 8.0*   < > 8.3*   HCT 24.8* 25.3* 25.7*   < > 26.6*   MCV 89.5  --  88.0  --  85.8     --  287  --  279    < > = values in this interval not displayed.     Lab Results   Component Value Date     2024    K 3.5 2024     (H) 2024    CO2 20 (L) 2024    BUN 28 (H) 2024    CREATININE 1.11 2024    GLUCOSE 96 2024    CALCIUM 8.2 (L) 2024    PROT 8.2 2024    LABALBU 2.6 (L) 2024    BILITOT 0.8 2024    ALKPHOS 133 (H) 2024    AST 10 (L) 2024    ALT 18 2024    LABGLOM 70 2024    GFRAA 57 (L) 09/15/2022    AGRATIO 0.5 (L) 2024    GLOB 5.6 (H) 2024       Rectal bleeding has resolved. Pt refuses colonoscopy. Recommend stool softener, sucralfate enema.      Patient Active Problem List   Diagnosis    ESBL (extended spectrum beta-lactamase) producing bacteria infection    Complicated urinary tract infection 
        Hospitalist Progress Note    NAME:   Mehrdad Sims   : 1950   MRN: [id number]     Date/Time: 2024 4:20 PM  Patient PCP: Felisha Hernadez APRN - NP    Estimated discharge date:   Barriers: GI clearance      Assessment / Plan:    Bleeding from nephrostomy sites - resolved        Status post right nephrostomy tube replacement on 24        Bob hematuria - resolved        History of prostate cancer        History of PE        Episodes of streaks of blood noted in stool ? Internal hemorrhoid.  -  Hgb 8.0  - iron % saturation 18, TIBC 229, iron 41, ferritin 180  - nursing reporting dark colored stools this am  - FOBT positive  - no active GIB per nursing  - NM bleeding study on 4/3/24 shows:  No evidence of active GIB  - continue zytiga, prednisone, flomax, iron  - continue senna, patient refuses miralax  - continue dressing changes and flushes to nephrostomy tubes.  - appreciate GI input:  See orders for sucralfate enema and hydrocortisone enema  - will consult hematology regarding safety of restarting AC.  - urology signed off        2. ESBL UTI  - continue flomax  - strict I&O  - continue meropenem until 24 for 7 days of therapy     3. History of CKD stage III  - baseline creatinine 1.36 to 2.43  - creatinine normal  - avoid nephrotoxic medications  - monitor lab work     4. Type II DM  - hemoglobin A1c 6.1  - accuchecks  - sliding scale insulin  - diabetic diet  - hypoglycemia protocol     5. History of HTN      HLD  - continue norvasc, lipitor     PT/OT         Medical Decision Making:   I personally reviewed labs: BMP, CBC  I personally reviewed imaging: none  I personally reviewed EKG: none  Toxic drug monitoring: none  Discussed case with: attendingSATINDER        Code Status: Full  DVT Prophylaxis: SCD  GI Prophylaxis:Protonix    Subjective:     Chief Complaint / Reason for Physician Visit  \"I have BM this morning\".      Per RN, there's still some blood noted on stool. Refused 
        Hospitalist Progress Note    NAME:   Mehrdad Sims   : 1950   MRN: [id number]     Date/Time: 4/10/2024 2:19 PM  Patient PCP: Felisha Hernadez APRN - NP    Estimated discharge date:  Barriers: GI clearance      Assessment / Plan:    Bleeding from nephrostomy sites - resolved        Status post right nephrostomy tube replacement on 24        Bob hematuria - resolved        History of prostate cancer        History of PE        Episodes of streaks of blood noted in stool ? Internal hemorrhoid.  -  Hgb 8.0  - iron % saturation 18, TIBC 229, iron 41, ferritin 180  - nursing reporting dark colored stools this am  - FOBT positive  - no active GIB per nursing  - NM bleeding study on 4/3/24 shows:  No evidence of active GIB  - continue zytiga, prednisone, flomax, iron  - continue senna, patient refuses miralax  - continue dressing changes and flushes to nephrostomy tubes.  - appreciate GI input:  See orders for sucralfate enema and hydrocortisone enema - patient refused  - patient initially refusing colonoscopy, educated on importance, RN later notified patient is now agreeing to it. RN to notify GI with this recent change of mind  - hematology consulted regarding safety of restarting AC.  - urology signed off  4/10.  No bleeding overnight, will wait for GI evaluation, colonoscopy.  Continue to hold anticoagulant        2. ESBL UTI  - continue flomax  - strict I&O  - continue meropenem until 24 for 7 days of therapy     3. History of CKD stage III  - baseline creatinine 1.36 to 2.43  - creatinine normal  - avoid nephrotoxic medications  - monitor lab work     4. Type II DM  - hemoglobin A1c 6.1  - accuchecks  - sliding scale insulin  - diabetic diet  - hypoglycemia protocol     5. History of HTN      HLD  - continue norvasc, lipitor     PT/OT        Medical Decision Making:   I personally reviewed labs: BMP, CBC  I personally reviewed imaging: none  I personally reviewed EKG: none  Toxic drug 
      Hospitalist Progress Note    NAME:   Mehrdad Sims   : 1950   MRN: [id number]     Date/Time: 2024 3:35 PM  Patient PCP: Felisha Hernadez APRN - DILC[facility]    Estimated discharge date:   Pending clinical improvement      Assessment / Plan:  Bleeding from nephrostomy sites/hematuria/history of prostate cancer    Hold anticoagulation  Maintain active type and screen  Continues to have significant bleeding from right nephrostomy tube  Hemoglobin 7.2 and ongoing bleeding so we will give 1 unit PRBC  Check CBC every 8 hours  Going for right nephrostomy tube replacement today     Urinary tract infection    Urine culture growing ESBL, follow-up final result  Continue meropenem     History of chronic kidney disease stage III-  Creatinine stable     Hyperglycemia type 2 diabetes-  Continue sliding scale     Pseudohyperkalemia  Potassium normal today    Medical Decision Making:   I personally reviewed labs  I personally reviewed imaging  Toxic drug monitoring  I personally reviewed EKG  Discussed case with: RN, CM, discussed plan of care and dispo during interdisciplinary round        Code Status: full  DVT Prophylaxis:scd     Seen and evaluated bedside for hematuria, seen after IR procedure  Some bloody urine in the right nephrostomy      Objective:     VITALS:   Last 24hrs VS reviewed since prior progress note. Most recent are:  Patient Vitals for the past 24 hrs:   BP Temp Temp src Pulse Resp SpO2   24 1517 118/79 98.4 °F (36.9 °C) -- 85 16 97 %   24 1412 101/84 -- -- 77 16 99 %   24 1340 106/65 -- -- 78 20 97 %   24 1330 92/64 -- -- 67 16 96 %   24 1310 (!) 75/55 -- -- 84 16 94 %   24 1305 91/63 -- -- 82 16 97 %   24 1300 94/62 -- -- 83 14 100 %   24 1255 116/79 -- -- 84 14 100 %   24 1250 110/74 -- -- 85 20 100 %   24 1245 125/75 -- -- 87 20 98 %   24 1240 110/75 -- -- 90 26 96 %   24 1157 110/70 -- -- 78 20 98 %   24 0800 121/79 
      Hospitalist Progress Note    NAME:   Mehrdad Sims   : 1950   MRN: [id number]     Date/Time: 2024 10:16 AM  Patient PCP: Felisha Hernadez APRN - NP    Estimated discharge date: greater than 24 hours  Barriers: Clinical stability. Hgb stability. Urology signed off. PT/OT recommend SNF at discharge. CM following for discharge planning to [facility] once stable.      Assessment / Plan:  Bleeding from nephrostomy sites - resolved        Status post right nephrostomy tube replacement on 24        Bob hematuria - resolved        History of prostate cancer        History of PE        Episodes of streaks of blood noted in stool ? Internal hemorrhoid.  -  Hgb 8.0  - iron % saturation 18, TIBC 229, iron 41, ferritin 180  - nursing reporting dark colored stools this am  - FOBT positive  - no active GIB per nursing  - NM bleeding study on 4/3/24 shows:  No evidence of active GIB  - continue zytiga, prednisone, flomax, iron  - continue senna, patient refuses miralax  - continue dressing changes and flushes to nephrostomy tubes.  - appreciate GI input:  See orders for sucralfate enema and hydrocortisone enema  - will consult hematology regarding safety of restarting AC.  - urology signed off      2. ESBL UTI  - continue flomax  - strict I&O  - continue meropenem until 24 for 7 days of therapy    3. History of CKD stage III  - baseline creatinine 1.36 to 2.43  - creatinine normal  - avoid nephrotoxic medications  - monitor lab work     4. Type II DM  - hemoglobin A1c 6.1  - accuchecks  - sliding scale insulin  - diabetic diet  - hypoglycemia protocol     5. History of HTN      HLD  - continue norvasc, lipitor    PT/OT      Medical Decision Making:   I personally reviewed labs: yes  I personally reviewed imaging: yes  I personally reviewed EKG: no  Toxic drug monitoring: none  Discussed case with: patient, nursing, CM, Dr. Carl        Code Status: Full Code  DVT Prophylaxis: SCD, no chemical DVT or 
    Patient: Mehrdad Sims MRN: [id number]  SSN: [id number]    YOB: 1950  Age: 73 y.o.  Sex: male        ADMITTED: 3/31/2024 to Jesús Ramírez MD by Lexi Maki MD for Prostate cancer (HCC) [C61]  Gross hematuria [R31.0]  Anemia, unspecified type [D64.9]  Persistent gross hematuria [R31.0]  POD# * No surgery found *     Mehrdad Sims is doing fair . Nurse reports clots passing through urethra overnight and notes hgb declining . Pt seen at bedside . L Perc drain with clear yellow urine , lR perc dite with Merlot colored drainage .      Chart review from [facility]   Mr. Mehrdad Sims SrVenancio is a 72 y.o. year old male with a h/o prostate cancer s/p radiation (2019) complicated by recurrent hematuria and recurrent UTI (history of ESBL), CKD 3, type 2 diabetes who was most recently hospitalized at [facility] from 3/13/2023 to 3/23/2023 due to hematuria requiring cystoscopy, clot evacuation and continuous bladder irrigation, hospital course complicated by pneumoperitoneum requiring urgent exploratory laparotomy.   Path : Clinical: Stage IVB (cTX, cN1, pM1b, PSA: 57.3) -   he is under the care of the [facility] ([facility]) while at [facility] .     Vss Afebrile  Wbc 7.5  Hgb 7.2  Cr 1.57 ( Cr 1.15 3/7/24)   No output for drains   Ucx GNR   Bld Cx NGTD    Vitals: Temp (24hrs), Av.3 °F (36.8 °C), Min:97.9 °F (36.6 °C), Max:98.8 °F (37.1 °C)    Blood pressure 121/79, pulse 80, temperature 97.9 °F (36.6 °C), temperature source Oral, resp. rate 16, height 1.727 m (5' 8\"), weight 80.1 kg (176 lb 9.4 oz), SpO2 97 %.    Intake and Output:   1901 - 04/02 0700  In: -   Out: 2630 [Urine:2630]  No intake/output data recorded.  JOSÉ MIGUEL Output lats 24 hrs: No data found.   JOSÉ MIGUEL Output last 8 hrs: No data found.  BM over last 24 hrs: No data found.    Exam:   Gen: NAD  CV: extremities well perfused  Lungs: nonlabored respirations. Symmetric chest 
  Physician Progress Note      PATIENT:               TORIBIO RAMIREZ  [facility] #:                  [id number]  :                       1950  ADMIT DATE:       3/31/2024 8:34 AM  DISCH DATE:  RESPONDING  PROVIDER #:        Jesús Ramírez MD          QUERY TEXT:    Dr Ramírez  Pt admitted with UTI.  Pt noted to have Bilateral nephrostomy tubes. If   possible, please document in the progress notes and discharge summary if you   are evaluating and/or treating any of the following:      The medical record reflects the following:  Risk Factors: CKD, DM2  Clinical Indicators: 3/31 CT; Bilateral nephrostomy tubes are in position   without hydronephrosis. Gross hematuria may be related to left ureteral   extension of disease.  Treatment: noted CT scan, urology consult, Merrem  Options provided:  -- UTI due to nephrostomy tube  -- UTI not due to nephrostomy tube  -- Other - I will add my own diagnosis  -- Disagree - Not applicable / Not valid  -- Disagree - Clinically unable to determine / Unknown  -- Refer to Clinical Documentation Reviewer    PROVIDER RESPONSE TEXT:    UTI is not due to nephrostomy tube.    Query created by: Elaina Birch on 2024 8:57 AM      QUERY TEXT:    Dr Ramírez  Pt admitted with 'UTI doesn't meet for sepsis criteria at this time'. Pt noted   to have fever 3/31 of 102.8 and WBC 15.8 on . If possible, please document   in the progress notes and discharge summary if you are evaluating and /or   treating any of the following:    The medical record reflects the following:  Risk Factors: DM, CKD  Clinical Indicators: 102.8 temp on 3/31at 4pm, WBC 15.8 (), UTI noted  Treatment: Merrem  Options provided:  -- UTI without Sepsis  -- Sepsis, present on admission  -- Sepsis, present on admission, now resolved  -- Sepsis was ruled out  -- Other - I will add my own diagnosis  -- Disagree - Not applicable / Not valid  -- Disagree - Clinically unable to determine / Unknown  -- Refer to Clinical 
..End of Shift Note    Bedside shift change report given to KERLINE Lundy (oncoming nurse) by Hussain Biggs RN (offgoing nurse).  Report included the following information SBAR, Kardex, Intake/Output, MAR, and Recent Results    Shift worked:  8238-9245     Shift summary and any significant changes:     Pt given prescribed meds per MAR. Pt requested PRN glycolax, refusing senokot. Pt had x3 BM. Caring rounds completed.          Hussain Biggs RN                            
..End of Shift Note    Bedside shift change report given to KERLINE Lundy (oncoming nurse) by Hussain Biggs RN (offgoing nurse).  Report included the following information SBAR, Kardex, Intake/Output, MAR, and Recent Results    Shift worked:  8273-0355     Shift summary and any significant changes:     Pt given prescribed meds per MAR. Pt up in chair today. Urology saw pt regarding blood in left nephrostomy. Caring rounds completed.            Hussain Biggs RN                            
..End of Shift Note    Bedside shift change report given to REMIGIO Corea (oncoming nurse) by Hussain Biggs RN (offgoing nurse).  Report included the following information SBAR, Kardex, Intake/Output, MAR, and Recent Results    Shift worked:  3820-2416     Shift summary and any significant changes:     Pt given prescribed meds per MAR. No PRN meds given. Pt had x1 BM. Pt up to chair today and ambulating with walker. Caring rounds completed.          Hussain Biggs RN                            
.End of Shift Note    Bedside shift change report given to REMIGIO Segura (oncoming nurse) by Maggie Bautista RN (offgoing nurse).  Report included the following information SBAR, Kardex, and MAR    Shift worked: 7a-7p     Shift summary and any significant changes:    Medications given per MAR and education provided. Bilateral nephrostomy tubes drained. Labs drawn and sent, w/ help of RR nurse. NM blood loss scan completed - Negative.          Maggie Bautista, RN                            
Attempted to stick pt twice for labs. Second RN attempted to stick pt as well and both nurses unsuccessful. Pt refusing to let us try again.   
Comprehensive Nutrition Assessment    Type and Reason for Visit:  Initial, RD Nutrition Re-Screen/LOS    Nutrition Recommendations/Plan:   Glucerna c meals     Malnutrition Assessment:  Malnutrition Status:  No malnutrition (04/08/24 1600)    Context:  Acute Illness     Findings of the 6 clinical characteristics of malnutrition:  Energy Intake:  No significant decrease in energy intake  Weight Loss:  No significant weight loss     Body Fat Loss:  No significant body fat loss     Muscle Mass Loss:  Mild muscle mass loss Temples (temporalis)  Fluid Accumulation:  No significant fluid accumulation     Strength:  Not Performed    Nutrition Assessment:     Patient reports excellent appetite. He told this RDN that he likes chocolate supplements and that he has diabetes. Glucose levels are above desirable target May wish to conult Diabetes treatment team for better glycemic control. . He has potentially metastatic CA per GI consultation, note: 4/8. His energy/protein needs are elevated. Mr. Subramanian agreed to ONS to support protein intake and add calories to meals.     Nutrition Related Findings:      Wound Type: None   MEDS: Sucralfate, Lactulose, Prednisone, Lisrpo, Atrovastatin. LABS: POC GLU: 111-308 mg/dL, BUN: 28 mg/dL, cr: 1.11 mg/dL; GFR: 58, K: 4.2 mmol/L    Current Nutrition Intake & Therapies:    Average Meal Intake: %  Average Supplements Intake: None Ordered  ADULT DIET; Regular; 4 carb choices (60 gm/meal); No Rice    Anthropometric Measures:  Height: 172.7 cm (5' 7.99\")  Ideal Body Weight (IBW): 154 lbs (70 kg)       Current Body Weight: 82.2 kg (181 lb 3.5 oz), 117.7 % IBW. Weight Source: Bed Scale  Current BMI (kg/m2): 27.6  Usual Body Weight: 100.6 kg (221 lb 12.5 oz)  % Weight Change (Calculated): -18.3  Weight Adjustment For: No Adjustment                 BMI Categories: Overweight (BMI 25.0-29.9)    Estimated Daily Nutrient Needs:  Energy Requirements Based On: Formula     Energy (kcal/day): 
End of Shift Note    Bedside shift change report given to Hussain FLOOD (oncoming nurse) by Colton Young RN (offgoing nurse).  Report included the following information SBAR, Kardex, Intake/Output, MAR, and Recent Results    Shift worked:  7p-7a     Shift summary and any significant changes:     Patient tolerated care. Scheduled meds given. Pt able to rest over shift. Labs drawn. Caring rounds provided.       Length of Stay:  Expected LOS: 8  Actual LOS: 5      Colton Young RN                            
End of Shift Note    Bedside shift change report given to Lisa FLOOD (oncoming nurse) by Jennifer Brown RN (offgoing nurse).  Report included the following information SBAR, Kardex, Intake/Output, and MAR    Shift worked:  7A - 7P     Shift summary and any significant changes:    Pt tolerated care. Hemoglobin declining but stable. Pt's nephrostomy tubes putting out dark reddish brown urine. Patient was bleeding out of urethra throughout nightshift and this am. Pt had minimla blood today. Urology consulted. IV flushed and patent. IV ax completed     Pt moved to oncology room 3413 at end of shift.      Concerns for physician to address:    Zone phone for oncoming shift:           Jennifer Brown RN                            
End of Shift Note    Bedside shift change report given to Marylin (oncoming nurse) by Bri Osuna RN (offgoing nurse).  Report included the following information SBAR, Kardex, and MAR    Shift worked:  7p-7a     Shift summary and any significant changes:     Humalog was held due to the patients blood glucose level, see MAR.  Patient refused Senokot, see MAR.  Scheduled medication was given, see MAR.  IV has been flushed and is patent.  Patient had a bowel movement during my shift.  A new IV was r-established on this patient.  Patient teaching and routine rounding has been done.     Concerns for physician to address:       Zone phone for oncoming shift:          Activity:     Number times ambulated in hallways past shift: 0  Number of times OOB to chair past shift: 0    Cardiac:   Cardiac Monitoring: No           Access:  Current line(s): PIV     Genitourinary:   Urinary status: urostomy    Respiratory:      Chronic home O2 use?: NO  Incentive spirometer at bedside: NO       GI:     Current diet:  ADULT DIET; Regular; 4 carb choices (60 gm/meal); No Rice  Passing flatus: YES  Tolerating current diet: YES       Pain Management:   Patient states pain is manageable on current regimen: YES    Skin:     Interventions: float heels, increase time out of bed, and nutritional support    Patient Safety:  Fall Score:    Interventions: bed/chair alarm, assistive device (walker, cane. etc), gripper socks, and pt to call before getting OOB       Length of Stay:  Expected LOS: 8  Actual LOS: 7      Bri Osuna RN                            
End of Shift Note    Bedside shift change report given to REMIGIO Coulter (oncoming nurse) by Nikkie Santillan LPN (offgoing nurse).  Report included the following information SBAR, Kardex, and MAR    Shift worked:  7:00p-7:30a     Shift summary and any significant changes:    Pt tolerated care fairly well. All pt medications administered per MAR. Pt declined having pain. Assisted to bedside commode 2x with no BM. Nephrostomy tubes intact, patent, and output charted. Routine rounding completed, heels elevated, SCD's placed. Call bell within reach     AM labs collected.    Concerns for physician to address:       Zone phone for oncoming shift:              Nikkie Santillan LPN                           
End of Shift Note    Bedside shift change report given to REMIGIO Gregory (oncoming nurse) by Marylin Cervantes RN (offgoing nurse).  Report included the following information SBAR, Kardex, Intake/Output, MAR, and Recent Results    Shift worked:  7 am to 7:30 pm     Shift summary and any significant changes:     Held all insulin doses due to blood sugars in 100's and patient declined Lactulose dose, see MAR. All other scheduled medications administered. PRN dose Miralax administered for constipation, see MAR. Patient denied pain during shift. 1 person assist with walker to bedside commode and chair; patient had 2 small bowel movements. Occult stool collected. Q 12 H&H lab collected at 1930; Hgb 8.0 and Hct 26.3. Bilateral nephrostomy tube dressings changed, left tube flushed and urine output documented. IV flushed and patent. Nursing rounds and education completed.     Concerns for physician to address:       Zone phone for oncoming shift:          Activity:     Number times ambulated in hallways past shift: 0  Number of times OOB to chair past shift: majority of shift    Cardiac:   Cardiac Monitoring: No           Access:  Current line(s): PIV     Genitourinary:   Urinary status: urostomy bilateral nephrostomy tubes    Respiratory:      Chronic home O2 use?: NO  Incentive spirometer at bedside: NO       GI:     Current diet:  ADULT DIET; Regular; 4 carb choices (60 gm/meal); No Rice  Passing flatus: YES  Tolerating current diet: YES       Pain Management:   Patient states pain is manageable on current regimen: YES    Skin:     Interventions: float heels, increase time out of bed, PT/OT consult, and internal/external urinary devices    Patient Safety:  Fall Score:    Interventions: bed/chair alarm, assistive device (walker, cane. etc), gripper socks, and pt to call before getting OOB       Length of Stay:  Expected LOS: 8  Actual LOS: 6      Marylin Cervantes RN                            
End of Shift Note    Bedside shift change report given to REMIGIO Segura (oncoming nurse) by Camila Martinez RN (offgoing nurse).  Report included the following information SBAR, Kardex, and MAR    Shift worked:  7a-7p     Shift summary and any significant changes:    Medications given per MAR, except amlodipine which patient is refusing, education provided.  Patient is up x1 with walker. Bilateral nephrostomy tubes emptied x2 and dressings changed.         Camila Martinez RN   
End of Shift Note    Bedside shift change report given to REMIGIO Whelan (oncoming nurse) by Marylin Cervantes RN (offgoing nurse).  Report included the following information SBAR, Kardex, Intake/Output, MAR, and Recent Results    Shift worked:  7 am to 7:30 pm     Shift summary and any significant changes:     Held morning and evening insulin doses due to blood sugars in 100's and patient refused Senokot dose, see MAR.All other scheduled medications administered. PRN dose Miralax administered for constipation, see MAR. Patient denied pain during shift. GI consult completed and Oncology consult completed via Zoom. 1 person assist with walker to bedside commode and chair. Q 12 H&H lab collected at 1800; Hgb 7.9 and Hct 25.7. Bilateral nephrostomy tubes draining and patent and urine output documented. IV flushed and patent. Nursing rounds and education completed.       Concerns for physician to address:  Patient refused Sucralfate enema, see MAR. Patient educated that enema was ordered by Ada, NP per GI request to treat actively bleeding hemorrhoids (patient having dark brown/black stools with red streaking and positive occult stool from 4/6/24). Patient continued to refuse enema. Ada, NP made aware of situation. Current Hgb was 7.9 as of 4/7/24 at 1800.     Zone phone for oncoming shift:          Activity:     Number times ambulated in hallways past shift: 0  Number of times OOB to chair past shift: 1    Cardiac:   Cardiac Monitoring: No           Access:  Current line(s): PIV     Genitourinary:   Urinary status: urostomy bilateral nephrostomy tubes     Respiratory:      Chronic home O2 use?: NO  Incentive spirometer at bedside: NO       GI:     Current diet:  ADULT DIET; Regular; 4 carb choices (60 gm/meal); No Rice  Passing flatus: YES  Tolerating current diet: YES       Pain Management:   Patient states pain is manageable on current regimen: YES    Skin:     Interventions: float heels, increase time out of bed, PT/OT 
End of Shift Note    Bedside shift change report given to REMIGIO iNxon (oncoming nurse) by Nikkie Santillan LPN (offgoing nurse).  Report included the following information SBAR, Kardex, and MAR    Shift worked:  7:00p-7:30a     Shift summary and any significant changes:    Pt tolerated care fairly well. All pt medications administered per MAR. Pt declined having pain. Assisted to bedside commode, pt had 1 BM. Nephrostomy tubes intact, patent, and output charted. Routine rounding completed, heels elevated, SCD's placed. Call bell within reach     AM labs collected.    Concerns for physician to address:       Zone phone for oncoming shift:              Nikkie Santillan LPN                           
End of Shift Note    Bedside shift change report given to Roxanne FLOOD (oncoming nurse) by Jennifer Brown RN (offgoing nurse).  Report included the following information SBAR, Kardex, Intake/Output, and MAR    Shift worked:  7a - 7p     Shift summary and any significant changes:    Pt tolerated care fairly well. Medications given per MAR.    Urology came to see patient due to large golf sized blood clots that were passed overnight through urethra. Urology consulted IR to remove and replace nephrostomy tubes. Patient hasn't had any more blood clots pass through for the rest of my shift.     Patient has bloody brown urine passing through R nephrostomy tube and cloudy yellow urine passing through L nephrostomy tube.     Patient has 1 unit of PRBC's currently transfusing. Patient tolerating well.     Hourly rounding completed.      Concerns for physician to address:      Zone phone for oncoming shift:           Jennifer Brown RN                            
End of Shift Note    Bedside shift change report given to Tonia FLOOD (oncoming nurse) by Colton Young RN (offgoing nurse).  Report included the following information SBAR, Kardex, Intake/Output, MAR, Recent Results, and Cardiac Rhythm Sinus rhythm    Shift worked:  7p-7a     Shift summary and any significant changes:     Patient tolerated care. Scheduled meds given. Bilateral nephrostomy tubes flushed and drained. Labs drawn. Patient able to rest over shift. Caring rounds provided.        Length of Stay:  Expected LOS: 5  Actual LOS: 4      Colton Young RN                            
NUC MED: GI bleed study completed. Results pending.  
Name of Procedure: Bilateral Nephrostomy Tube Exchange     Sedation medications given:      Versed: 2 mg     Fentanyl:  75 mcg     Sedation Tolerated: well     Procedure and sedation times are the same.      Sedation Start: 1250  Sedation End: 1300     Vital Signs:  VSS throughout     Fluids Removed: n/a     Samples sent to lab: n/a     Any complications related to procedure: none identified at this time     Patient is A&Ox4, on RA, and is in NAD at this time.     This patient is at an increased risk of falling because they have received sedating medications. Please evaluate and implement fall precautions/fall prevention practices as appropriate.      1340: TRANSFER - OUT REPORT:    Verbal report given to REMIGIO Rodriguez on Mehrdad Sims  being transferred to [facility] for routine post-op       Report consisted of patient's Situation, Background, Assessment and   Recommendations(SBAR).     Information from the following report(s) Nurse Handoff Report was reviewed with the receiving nurse.           Lines:   Peripheral IV 03/31/24 Right Antecubital (Active)   Site Assessment Clean, dry & intact 04/02/24 0800   Line Status Capped;Flushed 04/02/24 0800   Line Care Cap changed;Connections checked and tightened 04/02/24 0800   Phlebitis Assessment No symptoms 04/02/24 0800   Infiltration Assessment 0 04/02/24 0800   Alcohol Cap Used Yes 04/02/24 0800   Dressing Status Clean, dry & intact 04/02/24 0800   Dressing Type Transparent 04/02/24 0800        Opportunity for questions and clarification was provided.      Patient transported with:  [facility]        
Orders received, chart reviewed and patient evaluated by physical therapy. Pending progression with skilled acute physical therapy, recommend:  Therapy up to 5 days/week in Skilled nursing facility    Recommend with nursing OOB to chair 3x/day and walking daily with 1 assist and rolling walker. Thank you for completing as able in order to maintain patient strength, endurance and independence.     Full evaluation to follow.     
Patient has blood coming through his urethra. Urology consult was called and spoke to Anatoliy who said he will contact the Physician on call who is Dr Katelynn Finney. Awaiting response.  [id number] not hear from the Urology on call, Called back [facility] to follow up on the conslt, spoke to Nivia.  2993 MD Pace Called confirming receipt of consult and he will see patient in the morning. Patient is bleeding with clots moderate amount. Urology was informed about the clots and bleeding.   
Patient wanting to know information on when he would be discharged.  Patient was scheduled for colonoscopy and then refused colonoscopy.  Now patient is saying he wants to have colonoscopy.   has not seen patient since 4/4.    
Pharmacy Medication Reconciliation     Allergy Update: Celecoxib, Cephalexin, Ibuprofen, and Aspirin    Recommendations/Findings:   The following amendments were made to the patient's active medication list on file at [facility]:   1) Additions:   Guaifenesin oral solution  Humalog insulin  Potassium chloride tablet    2) Deletions:   Melatonin     3) Changes:   Apixaban dose changed from 5 mg BID to 2.5 mg BID  Gabapentin dose changed from 300 mg QHS to 100 mg TID   Vitamin D dose changed from 2000 units daily to 1000 units daily     Pertinent Findings:   Clarified PTA med list [facility] PTA medication list was corrected to the following:     Prior to Admission Medications   Prescriptions Informant   abiraterone acetate (ZYTIGA) 250 MG tablet Transfer Papers/EMS   Sig: Take 4 tablets by mouth daily Indications: Cancer of the Prostate Gland   acetaminophen (TYLENOL) 500 MG tablet Transfer Papers/EMS   Sig: Take 2 tablets by mouth every 6 hours as needed for Pain   albuterol sulfate HFA (PROVENTIL;VENTOLIN;PROAIR) 108 (90 Base) MCG/ACT inhaler Transfer Papers/EMS   Sig: Inhale 2 puffs into the lungs every 4 hours as needed for Wheezing   amLODIPine (NORVASC) 2.5 MG tablet Transfer Papers/EMS   Sig: Take 1 tablet by mouth daily Indications: High Blood Pressure Disorder   apixaban (ELIQUIS) 2.5 MG TABS tablet Transfer Papers/EMS   Sig: Take 1 tablet by mouth 2 times daily Indications: blood clots   atorvastatin (LIPITOR) 80 MG tablet Transfer Papers/EMS   Sig: Take 1 tablet by mouth nightly   docusate sodium (COLACE) 100 MG capsule Transfer Papers/EMS   Sig: Take 1 capsule by mouth daily   gabapentin (NEURONTIN) 100 MG capsule Transfer Papers/EMS   Sig: Take 1 capsule by mouth 3 times daily. Indications: Nerve Disease Max Daily Amount: 300 mg   guaiFENesin (ROBITUSSIN) 100 MG/5ML liquid Transfer Papers/EMS   Sig: Take 5 mLs by mouth every 6 hours as needed for Cough   insulin lispro 
Physical Therapy    Arrived to see pt for session, pt BRAXTON for testing. Will continue to follow.    Varsha Esquivel PT  
Physical Therapy    Chart reviewed, patient in process of nephrostomy tube exchange, will defer and continue to follow.    SANTY JasonT      
Pt' wishes to proceed with Colonoscopy discussed with GI provider;Amber Dennison-PAUL. Provider to call back with updated plan.  GI Provider called back with possible outpatient procedure -Advised to inform primary provider of plan by writer. P.S message sent to hosp.  
RN went over discharge paperwork with the patient, he stated he had no further questions at this time. Patients IV removed. Pt discharging to [place], report called to [facility] to home to transport patient to [place] at 1600.   
Spiritual Care Assessment/Progress Note  [facility]    Name: Mehrdad Sims MRN: [id number]    Age: 73 y.o.     Sex: male   Language: English     Date: 4/8/2024            Total Time Calculated: 19 min              Spiritual Assessment begun in [facility] MEDICAL ONCOLOGY  Service Provided For:: Patient  Referral/Consult From:: Rounding  Encounter Overview/Reason : Initial Encounter    Spiritual beliefs:      [x] Involved in a saulo tradition/spiritual practice:      [] Supported by a saulo community:      [] Claims no spiritual orientation:      [] Seeking spiritual identity:           [] Adheres to an individual form of spirituality:      [] Not able to assess:                Identified resources for coping and support system:   Support System: Hoahaoism/saulo community       [] Prayer                  [] Devotional reading               [] Music                  [] Guided Imagery     [] Pet visits                                        [] Other: (COMMENT)     Specific area/focus of visit   Encounter:    Crisis:    Spiritual/Emotional needs: Type: Spiritual Support  Ritual, Rites and Sacraments:    Grief, Loss, and Adjustments:    Ethics/Mediation:    Behavioral Health:    Palliative Care:    Advance Care Planning:      Plan/Referrals: No future visits requested    Narrative:   Visit was in 3413 for initial spiritual assessment, where  greeted and introduced self and role to patient, sitting in his recliner. He seemed surprised a  came to see him and asked if everything was alright. After further clarification of role as spiritual/emotional support, patient seemed to calm down. When asked if he had anything particular on mind and wished to talk about, patient shared a little about his medical condition and his saulo and stated that he had much on mind, but that this was not a good time. Advised of chaplains availability for support upon request. He was thankful for the visit.  
Unable to assess posterior side of patient due to being in pain. Skin is intact except for surgical scar around umbilicus  
fluid.  I was easily able to return the fluid which I placed.  Output resulted in clear yellow to pink on second and third flushings.  Percutaneous nephrostomy tube dressings are clean dry and intact.  And the tubes do not appear to be pulled or extending out beyond dressing. No evidence of infection or erythema around the sites.    Vitals: Temp (24hrs), Av.1 °F (36.7 °C), Min:97.5 °F (36.4 °C), Max:98.4 °F (36.9 °C)    Blood pressure 139/85, pulse 57, temperature 98.4 °F (36.9 °C), temperature source Oral, resp. rate 16, height 1.727 m (5' 8\"), weight 82.2 kg (181 lb 3.5 oz), SpO2 98 %.    Intake and Output:  1901 -  07  In: 1308.5 [P.O.:850; I.V.:242.8]  Out: 3975 [Urine:3975]  701 -  190  In: -   Out: 225 [Urine:225]  JOSÉ MIGUEL Output lats 24 hrs: No data found.   JOSÉ MIGUEL Output last 8 hrs: No data found.  BM over last 24 hrs: No data found.    Physical Exam  General: NAD, pleasant  Respiratory: no distress, breathing easily, room air  Abdomen: soft, no distention; non-tender to palpation  : no CVA tenderness, emptying clear yellow urine in right percutaneous nephrostomy bag; now emptying clear pink urine into left nephrostomy tube bag status post manual irrigation of left nephrostomy tube; no blood clots  Neuro: Appropriate, no focal neurological deficits  Skin: warm, dry  Extremities: moves all, full ROM    Labs:  CBC:   Lab Results   Component Value Date/Time    WBC 5.6 2024 06:08 AM    HCT 25.6 2024 06:08 AM     2024 06:08 AM     BMP:   Lab Results   Component Value Date/Time     2024 06:08 AM    K 3.7 2024 06:08 AM     2024 06:08 AM    CO2 24 2024 06:08 AM    BUN 23 2024 06:08 AM     Assessment/Plan:   History of advanced prostate cancer - previous radiation therapy now with ADT  Gross hematuria  History of UTIs  - Flushed and irrigated the left percutaneous nephrostomy tube with 10 cc syringe x 3.  The patient had no pain 
male with PMHx significant for multiple comorbidities presents to [facility] with bleeding from nephrostomy site.  Of note patient is a poor historian, is alert and oriented, states he has pain which is 2 out of 10 in flank, with no aggravating or alleviating factors, as per records patient was brought in by EMS for concerns for bleeding for bilateral nephrostomy tube site nursing facility.  Patient denies any fever chills nausea vomiting lightheadedness dizziness dyspnea orthopnea paroxysmal nocturnal dyspnea chest pain palpitations headache focal weakness loss sensation auditory or visual symptoms abdominal stool complaints or any other associated symptoms.  Patient endorses no recent sick contacts or travel activity Discussed with RN events overnight.       Objective:     VITALS:   Last 24hrs VS reviewed since prior progress note. Most recent are:  Patient Vitals for the past 24 hrs:   BP Temp Temp src Pulse Resp SpO2   04/04/24 2333 132/85 97.5 °F (36.4 °C) Oral 58 16 98 %   04/04/24 1950 128/87 98.4 °F (36.9 °C) Oral 59 18 100 %         Intake/Output Summary (Last 24 hours) at 4/5/2024 0827  Last data filed at 4/5/2024 0530  Gross per 24 hour   Intake 960.46 ml   Output 1975 ml   Net -1014.54 ml        I had a face to face encounter and independently examined this patient on 4/5/2024, as outlined below: Patient observed resting in bed with eyes opened. Complains of feeling weak on today. No other complaints or concerns expressed. No acute distress noted.  PHYSICAL EXAM:  General: Alert, cooperative  EENT:  EOMI. Anicteric sclerae.  Resp:  CTA bilaterally, no wheezing or rales.  No accessory muscle use  CV:  Regular  rhythm,  No edema  GI:  Soft, Non distended, Non tender.  +Bowel sounds. Last bowel movement on 4/3/24.  Neurologic:  Alert and oriented X 3, normal speech,   Psych:   Good insight. Not anxious nor agitated  Skin:  No rashes.  No jaundice. Bob hematuria noted to left 
presents to [facility] with bleeding from nephrostomy site.  Of note patient is a poor historian, is alert and oriented, states he has pain which is 2 out of 10 in flank, with no aggravating or alleviating factors, as per records patient was brought in by EMS for concerns for bleeding for bilateral nephrostomy tube site nursing facility.  Patient denies any fever chills nausea vomiting lightheadedness dizziness dyspnea orthopnea paroxysmal nocturnal dyspnea chest pain palpitations headache focal weakness loss sensation auditory or visual symptoms abdominal stool complaints or any other associated symptoms.  Patient endorses no recent sick contacts or travel activity Discussed with RN events overnight.   Objective:     VITALS:   Last 24hrs VS reviewed since prior progress note. Most recent are:  Patient Vitals for the past 24 hrs:   BP Temp Temp src Pulse Resp SpO2 Weight   04/04/24 0813 (!) 133/91 98.1 °F (36.7 °C) Oral 57 18 100 % --   04/04/24 0718 (!) 146/85 97.5 °F (36.4 °C) Oral 62 -- 100 % --   04/04/24 0615 -- -- -- -- -- -- 82.2 kg (181 lb 3.5 oz)   04/04/24 0313 (!) 154/83 98.1 °F (36.7 °C) Oral 59 16 99 % --   04/03/24 1920 139/79 97.9 °F (36.6 °C) Oral 72 16 100 % --   04/03/24 1459 129/82 97.7 °F (36.5 °C) Axillary 66 20 98 % --         Intake/Output Summary (Last 24 hours) at 4/4/2024 0830  Last data filed at 4/4/2024 0443  Gross per 24 hour   Intake 348.07 ml   Output 2100 ml   Net -1751.93 ml        I had a face to face encounter and independently examined this patient on 4/4/2024, as outlined below: Patient observed resting in bed with eyes opened. Denies complaints. No acute distress noted.  PHYSICAL EXAM:  General:          Alert, cooperative  EENT:              EOMI. Anicteric sclerae.  Resp:               CTA bilaterally, no wheezing or rales.  No accessory muscle use  CV:                  Regular  rhythm,  No edema. Telemetry: sinus rhythm  GI:                   Soft, Non 
             CTA bilaterally, no wheezing or rales.  No accessory muscle use  CV:                  Regular  rhythm,  No edema  GI:                   Soft, Non distended, Non tender.  +Bowel sounds. Last bowel movement on 4/4/24.  Neurologic:       Alert and oriented X 3, normal speech,   Psych:   Good insight. Not anxious nor agitated  Skin:                No rashes.  No jaundice. Small amount of hematuria noted to left nephrostomy collection bag and tubing, no bleeding from insertion site. No bleeding noted to right nephrostomy collection bag or tubing. No bleeding from insertion site.      Reviewed most current lab test results and cultures  YES  Reviewed most current radiology test results   YES  Review and summation of old records today    NO  Reviewed patient's current orders and MAR    YES  PMH/SH reviewed - no change compared to H&P    Procedures: see electronic medical records for all procedures/Xrays and details which were not copied into this note but were reviewed prior to creation of Plan.      LABS:  I reviewed today's most current labs and imaging studies.  Pertinent labs include:  Recent Labs     04/04/24  0540 04/04/24  1832 04/05/24  0608 04/06/24  0421   WBC 6.0  --  5.6 6.8   HGB 8.0* 8.1* 7.9* 8.3*   HCT 26.1* 26.3* 25.6* 26.6*     --  250 279     Recent Labs     04/04/24  0540 04/05/24  0608 04/06/24  0421    138 137   K 4.2 3.7 3.6    106 107   CO2 23 24 23   GLUCOSE 119* 119* 130*   BUN 28* 23* 29*   CREATININE 1.31* 1.42* 1.48*   CALCIUM 9.6 9.3 9.5       Signed: JOSE Marroquin - DILCIA    30 minutes    
 No edema. Telemetry: sinus rhythm  GI:  Soft, Non distended, Non tender.  +Bowel sounds. Last bowel movement on 4/3/24  Neurologic:  Alert and oriented X 2-3, normal speech,   Psych:             Not anxious nor agitated  Skin:  No rashes.  No jaundice, bilateral nephrostomy tubes intact. Scant amount of bloody drainage to right nephrostomy tube.    Reviewed most current lab test results and cultures  YES  Reviewed most current radiology test results   YES  Review and summation of old records today    NO  Reviewed patient's current orders and MAR    YES  PMH/SH reviewed - no change compared to H&P    Procedures: see electronic medical records for all procedures/Xrays and details which were not copied into this note but were reviewed prior to creation of Plan.      LABS:  I reviewed today's most current labs and imaging studies.  Pertinent labs include:  Recent Labs     04/01/24  0010 04/01/24  1143 04/02/24 0446 04/02/24  2214 04/03/24  0241   WBC 15.8*  --  7.5  --   --    HGB 8.6*   < > 7.2* 8.0* 7.8*   HCT 28.4*   < > 23.7* 25.6* 25.0*     --  238  --   --     < > = values in this interval not displayed.     Recent Labs     04/01/24  0010 04/02/24 0446 04/03/24  0241    137 137   K 4.1 3.9 4.2    108 109*   CO2 20* 22 21   GLUCOSE 182* 144* 160*   BUN 26* 34* 35*   CREATININE 1.42* 1.57* 1.47*   CALCIUM 9.4 9.2 9.2   LABALBU 2.6*  --   --    BILITOT 0.8  --   --    AST 10*  --   --    ALT 18  --   --        Signed: JOSE Marroquin - DILCIA    45 minutes     
MD Kaylin          
cooperative  EENT:  EOMI. Anicteric sclerae.  Resp:  CTA bilaterally, no wheezing or rales.  No accessory muscle use  CV:  Regular  rhythm,  No edema  GI:  Soft, Non distended, Non tender.  +Bowel sounds  Neurologic:  Alert and oriented X 3, normal speech,   Psych:   Good insight. Not anxious nor agitated  Skin:  No rashes.  No jaundice    Reviewed most current lab test results and cultures  YES  Reviewed most current radiology test results   YES  Review and summation of old records today    NO  Reviewed patient's current orders and MAR    YES  PMH/SH reviewed - no change compared to H&P    Procedures: see electronic medical records for all procedures/Xrays and details which were not copied into this note but were reviewed prior to creation of Plan.      LABS:  I reviewed today's most current labs and imaging studies.  Pertinent labs include:  Recent Labs     04/07/24  0540 04/07/24  1757 04/08/24  0441 04/08/24  2317 04/09/24  0600   WBC 6.6  --  7.0  --  5.9   HGB 8.0*   < > 7.5* 8.5* 8.3*   HCT 25.7*   < > 24.8* 27.2* 27.1*     --  303  --  342    < > = values in this interval not displayed.     Recent Labs     04/07/24  0540 04/08/24  0441 04/09/24  0600    139 136   K 4.2 3.5 3.9    113* 106   CO2 26 20* 25   GLUCOSE 143* 96 114*   BUN 27* 28* 33*   CREATININE 1.30 1.11 1.43*   CALCIUM 9.5 8.2* 10.1   MG  --  1.4*  --        Signed: JOSE Greco - DILCIA         
recurrent hematuria with UTI.  Bilateral nephrostomy tubes placed several weeks ago at outside facility.  Patient also on chronic anticoagulation for PE  1.  Gross hematuria In R perc resolved with perc change    - cont to hold AC today can resume tomorrow if drainage remains clear   -Holding off cystoscopy and possible clot event  -Patient will need to follow-up with primary urologist for cystoscopy.  - Nothing urgent for urology today     2.  Anemia secondary to acute blood loss continue to trend H&H transfuse for hemoglobin less than 7 will defer to primary team  3.  History of ESBL continue with meropenem    Urology will sign off for now     D/w Dr. Cantu    Signed By: JOSE Steward - NP - April 3, 2024

## 2024-05-17 DIAGNOSIS — I10 ESSENTIAL (PRIMARY) HYPERTENSION: ICD-10-CM

## 2024-05-17 RX ORDER — AMLODIPINE BESYLATE 5 MG/1
5 TABLET ORAL DAILY
Qty: 90 TABLET | Refills: 0 | OUTPATIENT
Start: 2024-05-17

## 2025-01-14 NOTE — ED TRIAGE NOTES
Pt arrives via EMS c/o fever (100.3). Per pt nursing home pt seemed to be \"unresponsive\" - pt ax3 upon arrival. Pt has hx of prostate cancer and diabetes. Per EMS .   
Detail Level: Detailed

## 2025-01-23 ENCOUNTER — APPOINTMENT (OUTPATIENT)
Facility: HOSPITAL | Age: 75
DRG: 699 | End: 2025-01-23
Payer: MEDICARE

## 2025-01-23 ENCOUNTER — HOSPITAL ENCOUNTER (INPATIENT)
Facility: HOSPITAL | Age: 75
LOS: 7 days | Discharge: NURSING, REHAB OR LONG-TERM CARE | DRG: 699 | End: 2025-01-30
Attending: EMERGENCY MEDICINE | Admitting: STUDENT IN AN ORGANIZED HEALTH CARE EDUCATION/TRAINING PROGRAM
Payer: MEDICARE

## 2025-01-23 DIAGNOSIS — E11.22 TYPE 2 DIABETES MELLITUS WITH DIABETIC CHRONIC KIDNEY DISEASE, UNSPECIFIED CKD STAGE, UNSPECIFIED WHETHER LONG TERM INSULIN USE (HCC): ICD-10-CM

## 2025-01-23 DIAGNOSIS — N17.9 ACUTE RENAL FAILURE, UNSPECIFIED ACUTE RENAL FAILURE TYPE (HCC): Primary | ICD-10-CM

## 2025-01-23 LAB
ALBUMIN SERPL-MCNC: 2.8 G/DL (ref 3.5–5)
ALBUMIN/GLOB SERPL: 0.5 (ref 1.1–2.2)
ALP SERPL-CCNC: 206 U/L (ref 45–117)
ALT SERPL-CCNC: 30 U/L (ref 12–78)
ANION GAP SERPL CALC-SCNC: 9 MMOL/L (ref 2–12)
APPEARANCE UR: ABNORMAL
AST SERPL-CCNC: 52 U/L (ref 15–37)
BACTERIA URNS QL MICRO: ABNORMAL /HPF
BASOPHILS # BLD: 0.02 K/UL (ref 0–0.1)
BASOPHILS NFR BLD: 0.3 % (ref 0–1)
BILIRUB SERPL-MCNC: 2.6 MG/DL (ref 0.2–1)
BILIRUB UR QL CFM: NEGATIVE
BUN SERPL-MCNC: 62 MG/DL (ref 6–20)
BUN/CREAT SERPL: 19 (ref 12–20)
CALCIUM SERPL-MCNC: 9.2 MG/DL (ref 8.5–10.1)
CHLORIDE SERPL-SCNC: 112 MMOL/L (ref 97–108)
CO2 SERPL-SCNC: 21 MMOL/L (ref 21–32)
COLOR UR: ABNORMAL
COMMENT:: NORMAL
CREAT SERPL-MCNC: 3.27 MG/DL (ref 0.7–1.3)
DIFFERENTIAL METHOD BLD: ABNORMAL
EOSINOPHIL # BLD: 0.01 K/UL (ref 0–0.4)
EOSINOPHIL NFR BLD: 0.1 % (ref 0–7)
EPITH CASTS URNS QL MICRO: ABNORMAL /LPF
ERYTHROCYTE [DISTWIDTH] IN BLOOD BY AUTOMATED COUNT: 16.8 % (ref 11.5–14.5)
FLUAV RNA SPEC QL NAA+PROBE: DETECTED
FLUBV RNA SPEC QL NAA+PROBE: NOT DETECTED
GLOBULIN SER CALC-MCNC: 5.1 G/DL (ref 2–4)
GLUCOSE BLD STRIP.AUTO-MCNC: 159 MG/DL (ref 65–117)
GLUCOSE SERPL-MCNC: 162 MG/DL (ref 65–100)
GLUCOSE UR STRIP.AUTO-MCNC: NEGATIVE MG/DL
GRAN CASTS URNS QL MICRO: ABNORMAL /LPF
HCT VFR BLD AUTO: 30.7 % (ref 36.6–50.3)
HGB BLD-MCNC: 9.4 G/DL (ref 12.1–17)
HGB UR QL STRIP: ABNORMAL
IMM GRANULOCYTES # BLD AUTO: 0.02 K/UL (ref 0–0.04)
IMM GRANULOCYTES NFR BLD AUTO: 0.3 % (ref 0–0.5)
KETONES UR QL STRIP.AUTO: ABNORMAL MG/DL
LACTATE BLD-SCNC: 1.93 MMOL/L (ref 0.4–2)
LEUKOCYTE ESTERASE UR QL STRIP.AUTO: ABNORMAL
LYMPHOCYTES # BLD: 1.04 K/UL (ref 0.8–3.5)
LYMPHOCYTES NFR BLD: 13.9 % (ref 12–49)
MCH RBC QN AUTO: 26.8 PG (ref 26–34)
MCHC RBC AUTO-ENTMCNC: 30.6 G/DL (ref 30–36.5)
MCV RBC AUTO: 87.5 FL (ref 80–99)
MONOCYTES # BLD: 0.69 K/UL (ref 0–1)
MONOCYTES NFR BLD: 9.2 % (ref 5–13)
NEUTS SEG # BLD: 5.68 K/UL (ref 1.8–8)
NEUTS SEG NFR BLD: 76.2 % (ref 32–75)
NITRITE UR QL STRIP.AUTO: NEGATIVE
NRBC # BLD: 0 K/UL (ref 0–0.01)
NRBC BLD-RTO: 0 PER 100 WBC
PH UR STRIP: 5.5 (ref 5–8)
PLATELET # BLD AUTO: 244 K/UL (ref 150–400)
PMV BLD AUTO: 9.5 FL (ref 8.9–12.9)
POTASSIUM SERPL-SCNC: 3.4 MMOL/L (ref 3.5–5.1)
PROCALCITONIN SERPL-MCNC: 1.65 NG/ML
PROT SERPL-MCNC: 7.9 G/DL (ref 6.4–8.2)
PROT UR STRIP-MCNC: 300 MG/DL
RBC # BLD AUTO: 3.51 M/UL (ref 4.1–5.7)
RBC #/AREA URNS HPF: >100 /HPF (ref 0–5)
SARS-COV-2 RNA RESP QL NAA+PROBE: NOT DETECTED
SERVICE CMNT-IMP: ABNORMAL
SODIUM SERPL-SCNC: 142 MMOL/L (ref 136–145)
SOURCE: ABNORMAL
SP GR UR REFRACTOMETRY: 1.02
SPECIMEN HOLD: NORMAL
TROPONIN I SERPL HS-MCNC: 28 NG/L (ref 0–76)
URINE CULTURE IF INDICATED: ABNORMAL
UROBILINOGEN UR QL STRIP.AUTO: 2 EU/DL (ref 0.2–1)
WBC # BLD AUTO: 7.5 K/UL (ref 4.1–11.1)
WBC URNS QL MICRO: >100 /HPF (ref 0–4)

## 2025-01-23 PROCEDURE — 71045 X-RAY EXAM CHEST 1 VIEW: CPT

## 2025-01-23 PROCEDURE — 87040 BLOOD CULTURE FOR BACTERIA: CPT

## 2025-01-23 PROCEDURE — 82962 GLUCOSE BLOOD TEST: CPT

## 2025-01-23 PROCEDURE — 99285 EMERGENCY DEPT VISIT HI MDM: CPT

## 2025-01-23 PROCEDURE — 96361 HYDRATE IV INFUSION ADD-ON: CPT

## 2025-01-23 PROCEDURE — 83605 ASSAY OF LACTIC ACID: CPT

## 2025-01-23 PROCEDURE — 87086 URINE CULTURE/COLONY COUNT: CPT

## 2025-01-23 PROCEDURE — 87636 SARSCOV2 & INF A&B AMP PRB: CPT

## 2025-01-23 PROCEDURE — 6370000000 HC RX 637 (ALT 250 FOR IP): Performed by: NURSE PRACTITIONER

## 2025-01-23 PROCEDURE — 36415 COLL VENOUS BLD VENIPUNCTURE: CPT

## 2025-01-23 PROCEDURE — 2580000003 HC RX 258: Performed by: EMERGENCY MEDICINE

## 2025-01-23 PROCEDURE — 2500000003 HC RX 250 WO HCPCS: Performed by: NURSE PRACTITIONER

## 2025-01-23 PROCEDURE — 93005 ELECTROCARDIOGRAM TRACING: CPT | Performed by: EMERGENCY MEDICINE

## 2025-01-23 PROCEDURE — 81001 URINALYSIS AUTO W/SCOPE: CPT

## 2025-01-23 PROCEDURE — 85025 COMPLETE CBC W/AUTO DIFF WBC: CPT

## 2025-01-23 PROCEDURE — 6360000002 HC RX W HCPCS: Performed by: NURSE PRACTITIONER

## 2025-01-23 PROCEDURE — 84145 PROCALCITONIN (PCT): CPT

## 2025-01-23 PROCEDURE — 84484 ASSAY OF TROPONIN QUANT: CPT

## 2025-01-23 PROCEDURE — 87077 CULTURE AEROBIC IDENTIFY: CPT

## 2025-01-23 PROCEDURE — 74176 CT ABD & PELVIS W/O CONTRAST: CPT

## 2025-01-23 PROCEDURE — 2580000003 HC RX 258: Performed by: NURSE PRACTITIONER

## 2025-01-23 PROCEDURE — 1100000000 HC RM PRIVATE

## 2025-01-23 PROCEDURE — 80053 COMPREHEN METABOLIC PANEL: CPT

## 2025-01-23 PROCEDURE — 87186 SC STD MICRODIL/AGAR DIL: CPT

## 2025-01-23 PROCEDURE — 96360 HYDRATION IV INFUSION INIT: CPT

## 2025-01-23 PROCEDURE — 6370000000 HC RX 637 (ALT 250 FOR IP): Performed by: STUDENT IN AN ORGANIZED HEALTH CARE EDUCATION/TRAINING PROGRAM

## 2025-01-23 RX ORDER — VITAMIN B COMPLEX
1000 TABLET ORAL DAILY
Status: DISCONTINUED | OUTPATIENT
Start: 2025-01-23 | End: 2025-01-30 | Stop reason: HOSPADM

## 2025-01-23 RX ORDER — FERROUS SULFATE 325(65) MG
325 TABLET ORAL
Status: DISCONTINUED | OUTPATIENT
Start: 2025-01-24 | End: 2025-01-30 | Stop reason: HOSPADM

## 2025-01-23 RX ORDER — TAMSULOSIN HYDROCHLORIDE 0.4 MG/1
0.4 CAPSULE ORAL NIGHTLY
Status: DISCONTINUED | OUTPATIENT
Start: 2025-01-23 | End: 2025-01-30 | Stop reason: HOSPADM

## 2025-01-23 RX ORDER — PANTOPRAZOLE SODIUM 40 MG/1
40 TABLET, DELAYED RELEASE ORAL DAILY
Status: DISCONTINUED | OUTPATIENT
Start: 2025-01-23 | End: 2025-01-30 | Stop reason: HOSPADM

## 2025-01-23 RX ORDER — LISINOPRIL 5 MG/1
10 TABLET ORAL DAILY
Status: DISCONTINUED | OUTPATIENT
Start: 2025-01-23 | End: 2025-01-30 | Stop reason: HOSPADM

## 2025-01-23 RX ORDER — ACETAMINOPHEN 650 MG/1
650 SUPPOSITORY RECTAL EVERY 6 HOURS PRN
Status: DISCONTINUED | OUTPATIENT
Start: 2025-01-23 | End: 2025-01-30 | Stop reason: HOSPADM

## 2025-01-23 RX ORDER — AMLODIPINE BESYLATE 2.5 MG/1
2.5 TABLET ORAL DAILY
Status: DISCONTINUED | OUTPATIENT
Start: 2025-01-23 | End: 2025-01-27

## 2025-01-23 RX ORDER — ACETAMINOPHEN 325 MG/1
650 TABLET ORAL EVERY 4 HOURS PRN
Status: DISCONTINUED | OUTPATIENT
Start: 2025-01-23 | End: 2025-01-24

## 2025-01-23 RX ORDER — POLYETHYLENE GLYCOL 3350 17 G/17G
17 POWDER, FOR SOLUTION ORAL DAILY
COMMUNITY

## 2025-01-23 RX ORDER — PREDNISONE 5 MG/1
5 TABLET ORAL DAILY
COMMUNITY

## 2025-01-23 RX ORDER — PREDNISONE 5 MG/1
5 TABLET ORAL DAILY
Status: DISCONTINUED | OUTPATIENT
Start: 2025-01-23 | End: 2025-01-30 | Stop reason: HOSPADM

## 2025-01-23 RX ORDER — 0.9 % SODIUM CHLORIDE 0.9 %
30 INTRAVENOUS SOLUTION INTRAVENOUS ONCE
Status: COMPLETED | OUTPATIENT
Start: 2025-01-23 | End: 2025-01-23

## 2025-01-23 RX ORDER — FERROUS SULFATE 325(65) MG
325 TABLET ORAL
COMMUNITY

## 2025-01-23 RX ORDER — SODIUM CHLORIDE 9 MG/ML
INJECTION, SOLUTION INTRAVENOUS PRN
Status: DISCONTINUED | OUTPATIENT
Start: 2025-01-23 | End: 2025-01-30 | Stop reason: HOSPADM

## 2025-01-23 RX ORDER — SENNOSIDES A AND B 8.6 MG/1
1 TABLET, FILM COATED ORAL DAILY
COMMUNITY

## 2025-01-23 RX ORDER — ATORVASTATIN CALCIUM 40 MG/1
80 TABLET, FILM COATED ORAL NIGHTLY
Status: DISCONTINUED | OUTPATIENT
Start: 2025-01-23 | End: 2025-01-30 | Stop reason: HOSPADM

## 2025-01-23 RX ORDER — OSELTAMIVIR PHOSPHATE 75 MG/1
75 CAPSULE ORAL 2 TIMES DAILY
Status: DISCONTINUED | OUTPATIENT
Start: 2025-01-23 | End: 2025-01-23 | Stop reason: DRUGHIGH

## 2025-01-23 RX ORDER — ACETAMINOPHEN 325 MG/1
650 TABLET ORAL 3 TIMES DAILY
COMMUNITY

## 2025-01-23 RX ORDER — ONDANSETRON 4 MG/1
4 TABLET, ORALLY DISINTEGRATING ORAL EVERY 8 HOURS PRN
Status: DISCONTINUED | OUTPATIENT
Start: 2025-01-23 | End: 2025-01-30 | Stop reason: HOSPADM

## 2025-01-23 RX ORDER — ONDANSETRON 2 MG/ML
4 INJECTION INTRAMUSCULAR; INTRAVENOUS EVERY 6 HOURS PRN
Status: DISCONTINUED | OUTPATIENT
Start: 2025-01-23 | End: 2025-01-30 | Stop reason: HOSPADM

## 2025-01-23 RX ORDER — SODIUM CHLORIDE 0.9 % (FLUSH) 0.9 %
5-40 SYRINGE (ML) INJECTION EVERY 12 HOURS SCHEDULED
Status: DISCONTINUED | OUTPATIENT
Start: 2025-01-23 | End: 2025-01-30 | Stop reason: HOSPADM

## 2025-01-23 RX ORDER — LISINOPRIL 10 MG/1
10 TABLET ORAL DAILY
COMMUNITY

## 2025-01-23 RX ORDER — SENNOSIDES A AND B 8.6 MG/1
1 TABLET, FILM COATED ORAL DAILY
Status: DISCONTINUED | OUTPATIENT
Start: 2025-01-23 | End: 2025-01-30 | Stop reason: HOSPADM

## 2025-01-23 RX ORDER — GABAPENTIN 100 MG/1
100 CAPSULE ORAL 3 TIMES DAILY
Status: DISCONTINUED | OUTPATIENT
Start: 2025-01-23 | End: 2025-01-30 | Stop reason: HOSPADM

## 2025-01-23 RX ORDER — ABIRATERONE ACETATE 250 MG/1
1000 TABLET ORAL DAILY
Status: DISCONTINUED | OUTPATIENT
Start: 2025-01-23 | End: 2025-01-30 | Stop reason: HOSPADM

## 2025-01-23 RX ORDER — OSELTAMIVIR PHOSPHATE 30 MG/1
30 CAPSULE ORAL DAILY
Status: COMPLETED | OUTPATIENT
Start: 2025-01-23 | End: 2025-01-27

## 2025-01-23 RX ORDER — ENOXAPARIN SODIUM 100 MG/ML
30 INJECTION SUBCUTANEOUS DAILY
Status: DISCONTINUED | OUTPATIENT
Start: 2025-01-23 | End: 2025-01-23

## 2025-01-23 RX ORDER — SODIUM CHLORIDE 0.9 % (FLUSH) 0.9 %
5-40 SYRINGE (ML) INJECTION PRN
Status: DISCONTINUED | OUTPATIENT
Start: 2025-01-23 | End: 2025-01-30 | Stop reason: HOSPADM

## 2025-01-23 RX ORDER — POLYETHYLENE GLYCOL 3350 17 G/17G
17 POWDER, FOR SOLUTION ORAL DAILY PRN
Status: DISCONTINUED | OUTPATIENT
Start: 2025-01-23 | End: 2025-01-30 | Stop reason: HOSPADM

## 2025-01-23 RX ORDER — ACETAMINOPHEN 325 MG/1
650 TABLET ORAL EVERY 6 HOURS PRN
Status: DISCONTINUED | OUTPATIENT
Start: 2025-01-23 | End: 2025-01-30 | Stop reason: HOSPADM

## 2025-01-23 RX ORDER — INSULIN LISPRO-AABC 100 [IU]/ML
INJECTION, SOLUTION SUBCUTANEOUS 3 TIMES DAILY
COMMUNITY

## 2025-01-23 RX ADMIN — GABAPENTIN 100 MG: 100 CAPSULE ORAL at 22:19

## 2025-01-23 RX ADMIN — OSELTAMIVIR PHOSPHATE 30 MG: 30 CAPSULE ORAL at 22:20

## 2025-01-23 RX ADMIN — PIPERACILLIN AND TAZOBACTAM 3375 MG: 3; .375 INJECTION, POWDER, LYOPHILIZED, FOR SOLUTION INTRAVENOUS at 17:36

## 2025-01-23 RX ADMIN — ATORVASTATIN CALCIUM 80 MG: 40 TABLET, FILM COATED ORAL at 22:19

## 2025-01-23 RX ADMIN — TAMSULOSIN HYDROCHLORIDE 0.4 MG: 0.4 CAPSULE ORAL at 22:20

## 2025-01-23 RX ADMIN — SODIUM CHLORIDE, PRESERVATIVE FREE 10 ML: 5 INJECTION INTRAVENOUS at 22:17

## 2025-01-23 RX ADMIN — SODIUM CHLORIDE 2721 ML: 9 INJECTION, SOLUTION INTRAVENOUS at 15:15

## 2025-01-23 ASSESSMENT — PAIN SCALES - GENERAL
PAINLEVEL_OUTOF10: 0
PAINLEVEL_OUTOF10: 0

## 2025-01-23 NOTE — H&P
Hospitalist Admission Note    NAME:   Mehrdad Sims   : 1950   MRN: [id number]     Date/Time: 2025 4:54 PM    Patient PCP: Felisha Hernadez APRN - NP    ______________________________________________________________________  Given the patient's current clinical presentation, I have a high level of concern for decompensation if discharged from the emergency department.  Complex decision making was performed, which includes reviewing the patient's available past medical records, laboratory results, and x-ray films.       My assessment of this patient's clinical condition and my plan of care is as follows.    Assessment / Plan:    Admit to medicine     MARNIE   Diarrhea   Weakness   Hypotension   Influenza A +ve   SBO      73 YO presents to the ED for increased weakness, diarrhea along with nauseas and emesis and over the past two days. EMS noted hypotension on arrival BP 80's. He resides in a [facility].  Has bilat nephrostomy tubes 2/2 prostate adenocarcinoma  dx 2019 s/p XRT and 2 years of ADT that ended in  2/2 massive PE, currently on Abiraterone       CT ABD    IMPRESSION:  1. Small bowel obstruction with transition at small bowel containing  supraumbilical ventral hernia. Given the mural thickening of the small bowel loops, patient is at risk for incarceration. No pneumoperitoneum at this time.  2. Positive response to therapy. Decreased size of the mass in the urinary  bladder and prostate gland.  3. Gas in the urinary bladder due to recent catheterization, infection, or  fistula with adjacent bowel loops.  4. Decreased density of osseous metastatic disease.  5. Slight retraction of right nephrostomy tube. No hydronephrosis at this time.  Consider adjustment and advancement. Left nephrostomy tube is in good position.    -consult urology   -consult gen surgery - no active emesis on presentation - keep NPO    -Hematology - unremarkable  -chemistry cr 3.27 baseline around 1.5  - lactic 1.93  - procal

## 2025-01-23 NOTE — ED PROVIDER NOTES
Diabetes Mother     Hypertension Mother        Social History:  Social History     Tobacco Use    Smoking status: Never     Passive exposure: Never    Smokeless tobacco: Never   Vaping Use    Vaping status: Never Used   Substance Use Topics    Alcohol use: No    Drug use: Not Currently       Allergies:  Allergies   Allergen Reactions    Celecoxib Dizziness or Vertigo    Cephalexin Swelling     Tolerated Zosyn and meropenem at [facility]    Ibuprofen Nausea Only    Aspirin Nausea Only     Patient states not allergic to medication but reports he does not wish to take it   UPSET STOMACH         CURRENT MEDICATIONS      Previous Medications    ABIRATERONE ACETATE (ZYTIGA) 250 MG TABLET    Take 4 tablets by mouth daily Indications: Cancer of the Prostate Gland    ALBUTEROL SULFATE HFA (PROVENTIL;VENTOLIN;PROAIR) 108 (90 BASE) MCG/ACT INHALER    Inhale 2 puffs into the lungs every 4 hours as needed for Wheezing    AMLODIPINE (NORVASC) 2.5 MG TABLET    Take 1 tablet by mouth daily Indications: High Blood Pressure Disorder    APIXABAN (ELIQUIS) 2.5 MG TABS TABLET    Take 1 tablet by mouth 2 times daily Indications: blood clots    ATORVASTATIN (LIPITOR) 80 MG TABLET    Take 1 tablet by mouth nightly    DOCUSATE SODIUM (COLACE) 100 MG CAPSULE    Take 1 capsule by mouth daily    GABAPENTIN (NEURONTIN) 100 MG CAPSULE    Take 1 capsule by mouth 3 times daily. Indications: Nerve Disease Max Daily Amount: 300 mg    GUAIFENESIN (ROBITUSSIN) 100 MG/5ML LIQUID    Take 5 mLs by mouth every 6 hours as needed for Cough    INSULIN LISPRO (HUMALOG) 100 UNIT/ML INJECTION CARTRIDGE    Inject into the skin 3 times daily (before meals) Inject as per sliding scale: if 200-249 = 4 units; 250-299 = 5 units; 300-349 = 6 units; 350-399 = 8 units; 400+ call MD    ONDANSETRON (ZOFRAN) 8 MG TABLET    Take 1 tablet by mouth every 8 hours as needed for Nausea or Vomiting    PANTOPRAZOLE (PROTONIX) 40 MG TABLET    Take 1 tablet by mouth daily Indications:

## 2025-01-24 LAB
25(OH)D3 SERPL-MCNC: 60.9 NG/ML (ref 30–100)
ANION GAP SERPL CALC-SCNC: 8 MMOL/L (ref 2–12)
BASOPHILS # BLD: 0.01 K/UL (ref 0–0.1)
BASOPHILS NFR BLD: 0.2 % (ref 0–1)
BUN SERPL-MCNC: 58 MG/DL (ref 6–20)
BUN/CREAT SERPL: 22 (ref 12–20)
CALCIUM SERPL-MCNC: 9.6 MG/DL (ref 8.5–10.1)
CHLORIDE SERPL-SCNC: 118 MMOL/L (ref 97–108)
CHOLEST SERPL-MCNC: 65 MG/DL
CO2 SERPL-SCNC: 18 MMOL/L (ref 21–32)
CREAT SERPL-MCNC: 2.62 MG/DL (ref 0.7–1.3)
DIFFERENTIAL METHOD BLD: ABNORMAL
EKG ATRIAL RATE: 74 BPM
EKG DIAGNOSIS: NORMAL
EKG P AXIS: 29 DEGREES
EKG P-R INTERVAL: 146 MS
EKG Q-T INTERVAL: 370 MS
EKG QRS DURATION: 76 MS
EKG QTC CALCULATION (BAZETT): 410 MS
EKG R AXIS: -7 DEGREES
EKG T AXIS: 0 DEGREES
EKG VENTRICULAR RATE: 74 BPM
EOSINOPHIL # BLD: 0.04 K/UL (ref 0–0.4)
EOSINOPHIL NFR BLD: 0.6 % (ref 0–7)
ERYTHROCYTE [DISTWIDTH] IN BLOOD BY AUTOMATED COUNT: 16.9 % (ref 11.5–14.5)
GLUCOSE SERPL-MCNC: 126 MG/DL (ref 65–100)
HCT VFR BLD AUTO: 29 % (ref 36.6–50.3)
HDLC SERPL-MCNC: 29 MG/DL
HDLC SERPL: 2.2 (ref 0–5)
HGB BLD-MCNC: 8.8 G/DL (ref 12.1–17)
IMM GRANULOCYTES # BLD AUTO: 0.03 K/UL (ref 0–0.04)
IMM GRANULOCYTES NFR BLD AUTO: 0.5 % (ref 0–0.5)
LDLC SERPL CALC-MCNC: 7.2 MG/DL (ref 0–100)
LYMPHOCYTES # BLD: 1.37 K/UL (ref 0.8–3.5)
LYMPHOCYTES NFR BLD: 20.7 % (ref 12–49)
MCH RBC QN AUTO: 27.1 PG (ref 26–34)
MCHC RBC AUTO-ENTMCNC: 30.3 G/DL (ref 30–36.5)
MCV RBC AUTO: 89.2 FL (ref 80–99)
MONOCYTES # BLD: 0.56 K/UL (ref 0–1)
MONOCYTES NFR BLD: 8.4 % (ref 5–13)
NEUTS SEG # BLD: 4.62 K/UL (ref 1.8–8)
NEUTS SEG NFR BLD: 69.6 % (ref 32–75)
NRBC # BLD: 0 K/UL (ref 0–0.01)
NRBC BLD-RTO: 0 PER 100 WBC
PLATELET # BLD AUTO: 234 K/UL (ref 150–400)
PMV BLD AUTO: 9.7 FL (ref 8.9–12.9)
POTASSIUM SERPL-SCNC: 3.2 MMOL/L (ref 3.5–5.1)
RBC # BLD AUTO: 3.25 M/UL (ref 4.1–5.7)
SODIUM SERPL-SCNC: 144 MMOL/L (ref 136–145)
T4 FREE SERPL-MCNC: 1.5 NG/DL (ref 0.8–1.5)
TRIGL SERPL-MCNC: 144 MG/DL
TSH SERPL DL<=0.05 MIU/L-ACNC: 0.37 UIU/ML (ref 0.36–3.74)
VIT B12 SERPL-MCNC: 696 PG/ML (ref 193–986)
VLDLC SERPL CALC-MCNC: 28.8 MG/DL
WBC # BLD AUTO: 6.6 K/UL (ref 4.1–11.1)

## 2025-01-24 PROCEDURE — 36415 COLL VENOUS BLD VENIPUNCTURE: CPT

## 2025-01-24 PROCEDURE — 6370000000 HC RX 637 (ALT 250 FOR IP): Performed by: INTERNAL MEDICINE

## 2025-01-24 PROCEDURE — 84154 ASSAY OF PSA FREE: CPT

## 2025-01-24 PROCEDURE — 97530 THERAPEUTIC ACTIVITIES: CPT

## 2025-01-24 PROCEDURE — 82607 VITAMIN B-12: CPT

## 2025-01-24 PROCEDURE — 84443 ASSAY THYROID STIM HORMONE: CPT

## 2025-01-24 PROCEDURE — 80048 BASIC METABOLIC PNL TOTAL CA: CPT

## 2025-01-24 PROCEDURE — 6370000000 HC RX 637 (ALT 250 FOR IP): Performed by: NURSE PRACTITIONER

## 2025-01-24 PROCEDURE — 85025 COMPLETE CBC W/AUTO DIFF WBC: CPT

## 2025-01-24 PROCEDURE — 84153 ASSAY OF PSA TOTAL: CPT

## 2025-01-24 PROCEDURE — 82306 VITAMIN D 25 HYDROXY: CPT

## 2025-01-24 PROCEDURE — 84439 ASSAY OF FREE THYROXINE: CPT

## 2025-01-24 PROCEDURE — 6370000000 HC RX 637 (ALT 250 FOR IP): Performed by: STUDENT IN AN ORGANIZED HEALTH CARE EDUCATION/TRAINING PROGRAM

## 2025-01-24 PROCEDURE — 2500000003 HC RX 250 WO HCPCS: Performed by: NURSE PRACTITIONER

## 2025-01-24 PROCEDURE — 97535 SELF CARE MNGMENT TRAINING: CPT

## 2025-01-24 PROCEDURE — 2580000003 HC RX 258: Performed by: INTERNAL MEDICINE

## 2025-01-24 PROCEDURE — 80061 LIPID PANEL: CPT

## 2025-01-24 PROCEDURE — 97161 PT EVAL LOW COMPLEX 20 MIN: CPT

## 2025-01-24 PROCEDURE — 1100000000 HC RM PRIVATE

## 2025-01-24 PROCEDURE — 99222 1ST HOSP IP/OBS MODERATE 55: CPT | Performed by: SURGERY

## 2025-01-24 PROCEDURE — 6360000002 HC RX W HCPCS: Performed by: INTERNAL MEDICINE

## 2025-01-24 PROCEDURE — 97165 OT EVAL LOW COMPLEX 30 MIN: CPT

## 2025-01-24 PROCEDURE — 97116 GAIT TRAINING THERAPY: CPT

## 2025-01-24 RX ORDER — SODIUM CHLORIDE AND POTASSIUM CHLORIDE 150; 450 MG/100ML; MG/100ML
INJECTION, SOLUTION INTRAVENOUS CONTINUOUS
Status: DISCONTINUED | OUTPATIENT
Start: 2025-01-24 | End: 2025-01-24

## 2025-01-24 RX ORDER — SODIUM CHLORIDE, SODIUM LACTATE, POTASSIUM CHLORIDE, CALCIUM CHLORIDE 600; 310; 30; 20 MG/100ML; MG/100ML; MG/100ML; MG/100ML
INJECTION, SOLUTION INTRAVENOUS CONTINUOUS
Status: DISCONTINUED | OUTPATIENT
Start: 2025-01-24 | End: 2025-01-24

## 2025-01-24 RX ORDER — POTASSIUM CHLORIDE 1500 MG/1
40 TABLET, EXTENDED RELEASE ORAL ONCE
Status: COMPLETED | OUTPATIENT
Start: 2025-01-24 | End: 2025-01-24

## 2025-01-24 RX ORDER — POTASSIUM CHLORIDE 7.45 MG/ML
10 INJECTION INTRAVENOUS
Status: DISCONTINUED | OUTPATIENT
Start: 2025-01-24 | End: 2025-01-24

## 2025-01-24 RX ADMIN — ATORVASTATIN CALCIUM 80 MG: 40 TABLET, FILM COATED ORAL at 20:32

## 2025-01-24 RX ADMIN — OSELTAMIVIR PHOSPHATE 30 MG: 30 CAPSULE ORAL at 17:34

## 2025-01-24 RX ADMIN — POTASSIUM CHLORIDE 40 MEQ: 1500 TABLET, EXTENDED RELEASE ORAL at 18:53

## 2025-01-24 RX ADMIN — SODIUM CHLORIDE, PRESERVATIVE FREE 10 ML: 5 INJECTION INTRAVENOUS at 08:57

## 2025-01-24 RX ADMIN — SODIUM CHLORIDE, PRESERVATIVE FREE 10 ML: 5 INJECTION INTRAVENOUS at 20:35

## 2025-01-24 RX ADMIN — PIPERACILLIN AND TAZOBACTAM 3375 MG: 3; .375 INJECTION, POWDER, LYOPHILIZED, FOR SOLUTION INTRAVENOUS at 15:17

## 2025-01-24 RX ADMIN — PANTOPRAZOLE SODIUM 40 MG: 40 TABLET, DELAYED RELEASE ORAL at 17:34

## 2025-01-24 RX ADMIN — TAMSULOSIN HYDROCHLORIDE 0.4 MG: 0.4 CAPSULE ORAL at 20:32

## 2025-01-24 RX ADMIN — GABAPENTIN 100 MG: 100 CAPSULE ORAL at 17:34

## 2025-01-24 RX ADMIN — PREDNISONE 5 MG: 5 TABLET ORAL at 17:34

## 2025-01-24 RX ADMIN — ABIRATERONE ACETATE 1000 MG: 250 TABLET ORAL at 17:35

## 2025-01-24 RX ADMIN — GABAPENTIN 100 MG: 100 CAPSULE ORAL at 20:32

## 2025-01-24 RX ADMIN — FERROUS SULFATE TAB 325 MG (65 MG ELEMENTAL FE) 325 MG: 325 (65 FE) TAB at 17:34

## 2025-01-24 RX ADMIN — POTASSIUM CHLORIDE AND SODIUM CHLORIDE: 450; 150 INJECTION, SOLUTION INTRAVENOUS at 17:29

## 2025-01-24 RX ADMIN — Medication 1000 UNITS: at 17:34

## 2025-01-24 ASSESSMENT — PAIN SCALES - GENERAL
PAINLEVEL_OUTOF10: 0
PAINLEVEL_OUTOF10: 0

## 2025-01-24 NOTE — ED NOTES
Bedside and Verbal shift change report given to Lady (oncoming nurse) by Laura (offgoing nurse). Report included the following information Nurse Handoff Report, ED Encounter Summary, ED SBAR, Adult Overview, Intake/Output, MAR, Recent Results, Neuro Assessment, and Event Log, any outstanding orders to be completed.         
Off to CT  
Patient called out, reporting \"I had a bowel movement.\" Brief clean and dry, linens remain clean and dry att. No stool occurrence noted.   
Abnormal; Notable for the following components:    POC Glucose 159 (*)     All other components within normal limits       Background  Allergies:   Allergies   Allergen Reactions    Celecoxib Dizziness or Vertigo    Cephalexin Swelling     Tolerated Zosyn and meropenem at [facility]    Ibuprofen Nausea Only    Aspirin Nausea Only     Patient states not allergic to medication but reports he does not wish to take it   UPSET STOMACH       History:   Past Medical History:   Diagnosis Date    Diabetes (HCC)     Gastrointestinal disorder     Hypertension     PATTI (obstructive sleep apnea)     AHI: 8.9 per hour       Assessment  Vitals:    Level of Consciousness: Alert (0)   Vitals:    01/23/25 1915 01/23/25 1925 01/23/25 1945 01/23/25 2000   BP: (!) 118/59 116/60 100/63 127/71   Pulse: 77 76 77 77   Resp: 22 21  14   Temp: 99.3 °F (37.4 °C)      TempSrc: Oral      SpO2: 96% 94% 96% 97%   Weight:       Height:           O2 Device: O2 Device: None (Room air)    Cultures: Cultures:Blood, Urine, Flu, and Covid    Active LDA's:   Peripheral IV 01/23/25 Right Antecubital (Active)     If any further questions, please call Sending RN at [phone]    Electronically signed by: Electronically signed by Kavita Kelley RN on 1/23/2025 at 8:12 PM    Opportunity for questions and clarification was provided.      Patient transported with:   Tech

## 2025-01-24 NOTE — CONSULTS
[facility] ([facility])         Nephrology and Hypertension         Patient seen and examined. Full consult dictated    ASSESSMENT:  MARNIE- likely pre-renal vs early ATN due to combination of N/V/D, hypotension, SBO and Influenza A. UA shows protein/blood/pyuria (recurrent findings even on previous UA). CT A/P did not show hydronephrosis. Slight retraction of R PCN tube. L PCN in good position.     Improving with conservative treatment    Hypokalemia  Metabolic acidosis-mild.  Secondary to MARNIE  CKD-3A: Baseline CR 1.1-1.3.  Secondary to combination of DM, hypertension, recurrent MARNIE, age. sees Nephrologist at [facility]  Hypotension- improved  Metastatic prostate Ca  Hx of b/l Hydronephrosis- s/p b/l PCNT  Hx of PE  Hx of multiple bladder clot irrigation  Influenza A +  SBO- improved  Nausea/vomiting + diarrhea    PLAN:  Continue IV fluids with KCl at 100 mL/h-start tapering by tomorrow  IV antibiotics  As needed antiemetics  Tamiflu  Holding home BP meds  Monitor urine output  Avoid nephrotoxins  Daily labs    D/W patient  Thanks for Renal consult. We will follow patient with you.    Signed by:  Jorge Portillo MD  Nephrology and HTN

## 2025-01-24 NOTE — CONSULTS
Subjective:       Mehrdad Sims is a 74 y.o. male who presents for evaluation of small bowel obstruction from a ventral hernia.  PMHx notable for metastatic prostate cancer actively undergoing chemotherapy, pulmonary emboli (previously on Eliquis), bilateral hydronephrosis 2/2 ureteral strictures with bilateral PCNs last exchanged 10/25/24. PSHx notable for umbilical hernia repair, exploratory laparotomy/ventral hernia repair 2023, cysto with clot evacuation (1/19/22, 3/24/23, 10/24/24). He presented to the ER with abdominal pain, diarrhea, and nausea per the chart. Patient denies any vomiting. He also has an outbreak of norovirus at his facility. CTAP results below. He reports he is having bowel movements. No nausea/vomiting. Asking for something to drink.    He was recently admitted 11/14-/11/16 at [facility] for a small bowel obstruction at his ventral hernia that resolved with nonoperative management.    1/23/25: CTAP: IMPRESSION:  1. Small bowel obstruction with transition at small bowel containing  supraumbilical ventral hernia. Given the mural thickening of the small bowel  loops, patient is at risk for incarceration. No pneumoperitoneum at this time.  2. Positive response to therapy. Decreased size of the mass in the urinary  bladder and prostate gland.  3. Gas in the urinary bladder due to recent catheterization, infection, or  fistula with adjacent bowel loops.  4. Decreased density of osseous metastatic disease.  5. Slight retraction of right nephrostomy tube. No hydronephrosis at this time.  Consider adjustment and advancement. Left nephrostomy tube is in good position.    Past Medical History:   Diagnosis Date    Diabetes (HCC)     Gastrointestinal disorder     Hypertension     PATTI (obstructive sleep apnea)     AHI: 8.9 per hour     Past Surgical History:   Procedure Laterality Date    MO UNLISTED PROCEDURE ABDOMEN PERITONEUM & OMENTUM      Hernia Repair    PROSTATE SURGERY       Social History

## 2025-01-25 LAB
ANION GAP SERPL CALC-SCNC: 7 MMOL/L (ref 2–12)
BASOPHILS # BLD: 0.02 K/UL (ref 0–0.1)
BASOPHILS NFR BLD: 0.4 % (ref 0–1)
BUN SERPL-MCNC: 50 MG/DL (ref 6–20)
BUN/CREAT SERPL: 21 (ref 12–20)
CALCIUM SERPL-MCNC: 9.3 MG/DL (ref 8.5–10.1)
CHLORIDE SERPL-SCNC: 117 MMOL/L (ref 97–108)
CO2 SERPL-SCNC: 18 MMOL/L (ref 21–32)
CREAT SERPL-MCNC: 2.41 MG/DL (ref 0.7–1.3)
DIFFERENTIAL METHOD BLD: ABNORMAL
EOSINOPHIL # BLD: 0.01 K/UL (ref 0–0.4)
EOSINOPHIL NFR BLD: 0.2 % (ref 0–7)
ERYTHROCYTE [DISTWIDTH] IN BLOOD BY AUTOMATED COUNT: 16.7 % (ref 11.5–14.5)
GLUCOSE SERPL-MCNC: 206 MG/DL (ref 65–100)
HCT VFR BLD AUTO: 25.4 % (ref 36.6–50.3)
HGB BLD-MCNC: 7.6 G/DL (ref 12.1–17)
IMM GRANULOCYTES # BLD AUTO: 0.02 K/UL (ref 0–0.04)
IMM GRANULOCYTES NFR BLD AUTO: 0.4 % (ref 0–0.5)
LYMPHOCYTES # BLD: 1.19 K/UL (ref 0.8–3.5)
LYMPHOCYTES NFR BLD: 26.4 % (ref 12–49)
MAGNESIUM SERPL-MCNC: 2 MG/DL (ref 1.6–2.4)
MCH RBC QN AUTO: 26.1 PG (ref 26–34)
MCHC RBC AUTO-ENTMCNC: 29.9 G/DL (ref 30–36.5)
MCV RBC AUTO: 87.3 FL (ref 80–99)
MONOCYTES # BLD: 0.32 K/UL (ref 0–1)
MONOCYTES NFR BLD: 7.1 % (ref 5–13)
NEUTS SEG # BLD: 2.94 K/UL (ref 1.8–8)
NEUTS SEG NFR BLD: 65.5 % (ref 32–75)
NRBC # BLD: 0 K/UL (ref 0–0.01)
NRBC BLD-RTO: 0 PER 100 WBC
PLATELET # BLD AUTO: 213 K/UL (ref 150–400)
PMV BLD AUTO: 9.6 FL (ref 8.9–12.9)
POTASSIUM SERPL-SCNC: 3.8 MMOL/L (ref 3.5–5.1)
RBC # BLD AUTO: 2.91 M/UL (ref 4.1–5.7)
SODIUM SERPL-SCNC: 142 MMOL/L (ref 136–145)
WBC # BLD AUTO: 4.5 K/UL (ref 4.1–11.1)

## 2025-01-25 PROCEDURE — 6370000000 HC RX 637 (ALT 250 FOR IP): Performed by: NURSE PRACTITIONER

## 2025-01-25 PROCEDURE — 83735 ASSAY OF MAGNESIUM: CPT

## 2025-01-25 PROCEDURE — 6370000000 HC RX 637 (ALT 250 FOR IP): Performed by: INTERNAL MEDICINE

## 2025-01-25 PROCEDURE — 80048 BASIC METABOLIC PNL TOTAL CA: CPT

## 2025-01-25 PROCEDURE — 6370000000 HC RX 637 (ALT 250 FOR IP): Performed by: STUDENT IN AN ORGANIZED HEALTH CARE EDUCATION/TRAINING PROGRAM

## 2025-01-25 PROCEDURE — 2500000003 HC RX 250 WO HCPCS: Performed by: NURSE PRACTITIONER

## 2025-01-25 PROCEDURE — 2580000003 HC RX 258: Performed by: INTERNAL MEDICINE

## 2025-01-25 PROCEDURE — 85025 COMPLETE CBC W/AUTO DIFF WBC: CPT

## 2025-01-25 PROCEDURE — 36415 COLL VENOUS BLD VENIPUNCTURE: CPT

## 2025-01-25 PROCEDURE — 6360000002 HC RX W HCPCS: Performed by: INTERNAL MEDICINE

## 2025-01-25 PROCEDURE — 1100000000 HC RM PRIVATE

## 2025-01-25 RX ORDER — SODIUM BICARBONATE 650 MG/1
650 TABLET ORAL 3 TIMES DAILY
Status: COMPLETED | OUTPATIENT
Start: 2025-01-25 | End: 2025-01-25

## 2025-01-25 RX ADMIN — SODIUM CHLORIDE, PRESERVATIVE FREE 10 ML: 5 INJECTION INTRAVENOUS at 21:13

## 2025-01-25 RX ADMIN — GABAPENTIN 100 MG: 100 CAPSULE ORAL at 09:54

## 2025-01-25 RX ADMIN — PIPERACILLIN AND TAZOBACTAM 3375 MG: 3; .375 INJECTION, POWDER, LYOPHILIZED, FOR SOLUTION INTRAVENOUS at 10:34

## 2025-01-25 RX ADMIN — SODIUM CHLORIDE, PRESERVATIVE FREE 10 ML: 5 INJECTION INTRAVENOUS at 10:02

## 2025-01-25 RX ADMIN — PIPERACILLIN AND TAZOBACTAM 3375 MG: 3; .375 INJECTION, POWDER, LYOPHILIZED, FOR SOLUTION INTRAVENOUS at 02:33

## 2025-01-25 RX ADMIN — SODIUM BICARBONATE 650 MG: 650 TABLET ORAL at 09:54

## 2025-01-25 RX ADMIN — SODIUM BICARBONATE 650 MG: 650 TABLET ORAL at 21:13

## 2025-01-25 RX ADMIN — ATORVASTATIN CALCIUM 80 MG: 40 TABLET, FILM COATED ORAL at 21:13

## 2025-01-25 RX ADMIN — Medication 1000 UNITS: at 09:54

## 2025-01-25 RX ADMIN — GABAPENTIN 100 MG: 100 CAPSULE ORAL at 21:13

## 2025-01-25 RX ADMIN — PANTOPRAZOLE SODIUM 40 MG: 40 TABLET, DELAYED RELEASE ORAL at 09:54

## 2025-01-25 RX ADMIN — SODIUM BICARBONATE 650 MG: 650 TABLET ORAL at 15:31

## 2025-01-25 RX ADMIN — FERROUS SULFATE TAB 325 MG (65 MG ELEMENTAL FE) 325 MG: 325 (65 FE) TAB at 09:54

## 2025-01-25 RX ADMIN — PIPERACILLIN AND TAZOBACTAM 3375 MG: 3; .375 INJECTION, POWDER, LYOPHILIZED, FOR SOLUTION INTRAVENOUS at 18:20

## 2025-01-25 RX ADMIN — ABIRATERONE ACETATE 1000 MG: 250 TABLET ORAL at 09:53

## 2025-01-25 RX ADMIN — PREDNISONE 5 MG: 5 TABLET ORAL at 09:54

## 2025-01-25 RX ADMIN — OSELTAMIVIR PHOSPHATE 30 MG: 30 CAPSULE ORAL at 09:54

## 2025-01-25 RX ADMIN — GABAPENTIN 100 MG: 100 CAPSULE ORAL at 15:31

## 2025-01-25 RX ADMIN — TAMSULOSIN HYDROCHLORIDE 0.4 MG: 0.4 CAPSULE ORAL at 21:13

## 2025-01-25 ASSESSMENT — PAIN SCALES - GENERAL: PAINLEVEL_OUTOF10: 0

## 2025-01-26 LAB
ALBUMIN SERPL-MCNC: 2.4 G/DL (ref 3.5–5)
ALBUMIN/GLOB SERPL: 0.5 (ref 1.1–2.2)
ALP SERPL-CCNC: 160 U/L (ref 45–117)
ALT SERPL-CCNC: 28 U/L (ref 12–78)
ANION GAP SERPL CALC-SCNC: 7 MMOL/L (ref 2–12)
AST SERPL-CCNC: 31 U/L (ref 15–37)
BASOPHILS # BLD: 0.05 K/UL (ref 0–0.1)
BASOPHILS NFR BLD: 1 % (ref 0–1)
BILIRUB SERPL-MCNC: 0.7 MG/DL (ref 0.2–1)
BUN SERPL-MCNC: 32 MG/DL (ref 6–20)
BUN/CREAT SERPL: 17 (ref 12–20)
CALCIUM SERPL-MCNC: 9.1 MG/DL (ref 8.5–10.1)
CHLORIDE SERPL-SCNC: 113 MMOL/L (ref 97–108)
CO2 SERPL-SCNC: 20 MMOL/L (ref 21–32)
CREAT SERPL-MCNC: 1.91 MG/DL (ref 0.7–1.3)
DIFFERENTIAL METHOD BLD: ABNORMAL
EOSINOPHIL # BLD: 0 K/UL (ref 0–0.4)
EOSINOPHIL NFR BLD: 0 % (ref 0–7)
ERYTHROCYTE [DISTWIDTH] IN BLOOD BY AUTOMATED COUNT: 16.3 % (ref 11.5–14.5)
GLOBULIN SER CALC-MCNC: 5 G/DL (ref 2–4)
GLUCOSE SERPL-MCNC: 156 MG/DL (ref 65–100)
HCT VFR BLD AUTO: 26.5 % (ref 36.6–50.3)
HGB BLD-MCNC: 8 G/DL (ref 12.1–17)
IMM GRANULOCYTES # BLD AUTO: 0 K/UL (ref 0–0.04)
IMM GRANULOCYTES NFR BLD AUTO: 0 % (ref 0–0.5)
LYMPHOCYTES # BLD: 1.87 K/UL (ref 0.8–3.5)
LYMPHOCYTES NFR BLD: 36 % (ref 12–49)
MAGNESIUM SERPL-MCNC: 1.9 MG/DL (ref 1.6–2.4)
MCH RBC QN AUTO: 27.1 PG (ref 26–34)
MCHC RBC AUTO-ENTMCNC: 30.2 G/DL (ref 30–36.5)
MCV RBC AUTO: 89.8 FL (ref 80–99)
MONOCYTES # BLD: 0.21 K/UL (ref 0–1)
MONOCYTES NFR BLD: 4 % (ref 5–13)
NEUTS SEG # BLD: 3.07 K/UL (ref 1.8–8)
NEUTS SEG NFR BLD: 59 % (ref 32–75)
NRBC # BLD: 0 K/UL (ref 0–0.01)
NRBC BLD-RTO: 0 PER 100 WBC
PHOSPHATE SERPL-MCNC: 2.4 MG/DL (ref 2.6–4.7)
PLATELET # BLD AUTO: 201 K/UL (ref 150–400)
PMV BLD AUTO: 9.2 FL (ref 8.9–12.9)
POTASSIUM SERPL-SCNC: 3.7 MMOL/L (ref 3.5–5.1)
PROT SERPL-MCNC: 7.4 G/DL (ref 6.4–8.2)
PSA FREE MFR SERPL: 60 %
PSA FREE SERPL-MCNC: 1.2 NG/ML
PSA SERPL-MCNC: 2 NG/ML (ref 0–4)
RBC # BLD AUTO: 2.95 M/UL (ref 4.1–5.7)
RBC MORPH BLD: ABNORMAL
SODIUM SERPL-SCNC: 140 MMOL/L (ref 136–145)
WBC # BLD AUTO: 5.2 K/UL (ref 4.1–11.1)
WBC MORPH BLD: ABNORMAL

## 2025-01-26 PROCEDURE — 2500000003 HC RX 250 WO HCPCS: Performed by: NURSE PRACTITIONER

## 2025-01-26 PROCEDURE — 6370000000 HC RX 637 (ALT 250 FOR IP): Performed by: STUDENT IN AN ORGANIZED HEALTH CARE EDUCATION/TRAINING PROGRAM

## 2025-01-26 PROCEDURE — 84100 ASSAY OF PHOSPHORUS: CPT

## 2025-01-26 PROCEDURE — 83735 ASSAY OF MAGNESIUM: CPT

## 2025-01-26 PROCEDURE — 1100000000 HC RM PRIVATE

## 2025-01-26 PROCEDURE — 80053 COMPREHEN METABOLIC PANEL: CPT

## 2025-01-26 PROCEDURE — 2580000003 HC RX 258: Performed by: INTERNAL MEDICINE

## 2025-01-26 PROCEDURE — 6370000000 HC RX 637 (ALT 250 FOR IP): Performed by: INTERNAL MEDICINE

## 2025-01-26 PROCEDURE — 85025 COMPLETE CBC W/AUTO DIFF WBC: CPT

## 2025-01-26 PROCEDURE — 6370000000 HC RX 637 (ALT 250 FOR IP): Performed by: NURSE PRACTITIONER

## 2025-01-26 PROCEDURE — 36415 COLL VENOUS BLD VENIPUNCTURE: CPT

## 2025-01-26 PROCEDURE — 6360000002 HC RX W HCPCS: Performed by: INTERNAL MEDICINE

## 2025-01-26 PROCEDURE — 2580000003 HC RX 258: Performed by: NURSE PRACTITIONER

## 2025-01-26 RX ORDER — SODIUM BICARBONATE 650 MG/1
650 TABLET ORAL 3 TIMES DAILY
Status: COMPLETED | OUTPATIENT
Start: 2025-01-26 | End: 2025-01-26

## 2025-01-26 RX ADMIN — SODIUM CHLORIDE, PRESERVATIVE FREE 10 ML: 5 INJECTION INTRAVENOUS at 08:16

## 2025-01-26 RX ADMIN — FERROUS SULFATE TAB 325 MG (65 MG ELEMENTAL FE) 325 MG: 325 (65 FE) TAB at 08:05

## 2025-01-26 RX ADMIN — SODIUM BICARBONATE 650 MG: 650 TABLET ORAL at 10:38

## 2025-01-26 RX ADMIN — Medication 1000 UNITS: at 08:05

## 2025-01-26 RX ADMIN — PIPERACILLIN AND TAZOBACTAM 3375 MG: 3; .375 INJECTION, POWDER, LYOPHILIZED, FOR SOLUTION INTRAVENOUS at 17:22

## 2025-01-26 RX ADMIN — TAMSULOSIN HYDROCHLORIDE 0.4 MG: 0.4 CAPSULE ORAL at 20:39

## 2025-01-26 RX ADMIN — GABAPENTIN 100 MG: 100 CAPSULE ORAL at 08:05

## 2025-01-26 RX ADMIN — PANTOPRAZOLE SODIUM 40 MG: 40 TABLET, DELAYED RELEASE ORAL at 08:05

## 2025-01-26 RX ADMIN — SODIUM CHLORIDE, PRESERVATIVE FREE 10 ML: 5 INJECTION INTRAVENOUS at 20:39

## 2025-01-26 RX ADMIN — PIPERACILLIN AND TAZOBACTAM 3375 MG: 3; .375 INJECTION, POWDER, LYOPHILIZED, FOR SOLUTION INTRAVENOUS at 02:37

## 2025-01-26 RX ADMIN — ATORVASTATIN CALCIUM 80 MG: 40 TABLET, FILM COATED ORAL at 20:39

## 2025-01-26 RX ADMIN — SODIUM BICARBONATE 650 MG: 650 TABLET ORAL at 13:09

## 2025-01-26 RX ADMIN — PREDNISONE 5 MG: 5 TABLET ORAL at 08:05

## 2025-01-26 RX ADMIN — GABAPENTIN 100 MG: 100 CAPSULE ORAL at 13:08

## 2025-01-26 RX ADMIN — SODIUM CHLORIDE 25 ML: 9 INJECTION, SOLUTION INTRAVENOUS at 02:36

## 2025-01-26 RX ADMIN — ABIRATERONE ACETATE 1000 MG: 250 TABLET ORAL at 08:04

## 2025-01-26 RX ADMIN — GABAPENTIN 100 MG: 100 CAPSULE ORAL at 20:39

## 2025-01-26 RX ADMIN — OSELTAMIVIR PHOSPHATE 30 MG: 30 CAPSULE ORAL at 08:05

## 2025-01-26 RX ADMIN — PIPERACILLIN AND TAZOBACTAM 3375 MG: 3; .375 INJECTION, POWDER, LYOPHILIZED, FOR SOLUTION INTRAVENOUS at 10:33

## 2025-01-26 RX ADMIN — SODIUM BICARBONATE 650 MG: 650 TABLET ORAL at 20:39

## 2025-01-26 ASSESSMENT — PAIN SCALES - GENERAL
PAINLEVEL_OUTOF10: 0
PAINLEVEL_OUTOF10: 0

## 2025-01-26 NOTE — CARE COORDINATION
Care Management Initial Assessment       RUR: 17%  Readmission? No  1st IM letter given? Yes - will need 2nd IM letter  1st  letter given: No    Chart reviewed. Patient is in both contact and droplet isolation. Patient came from a facility.    CM attempted to  listed as POA, without success. Rosa Hancock [phone].    CM called admissions, Mignon, at [facility], [phone]. Patient is a permanent resident in LTC bed there. States dependent upon staff to care for him. A bed will be ready for him, once he is ready to return. Cannot accept him today, if he was discharged. Requested referral be sent to them, to view records. CM sent referral in CM Navigator.      CM to follow up.     01/26/25 3744   Service Assessment   Patient Orientation Other (see comment)  (CM unable to visit with patient due to isolation)   Cognition Alert   History Provided By Other (see comment)  (facility admissions)   Primary Caregiver Other (Comment)  (LTC facility, [facility])   Support Systems Other (Comment)  (facility staff)   Patient's Healthcare Decision Maker is: Patient Declined (Legal Next of Kin Remains as Decision Maker)   PCP Verified by CM No  (facility to provide)   Prior Functional Level Assistance with the following:;Bathing;Dressing;Toileting;Cooking;Housework;Shopping;Mobility   Current Functional Level Assistance with the following:;Bathing;Dressing;Toileting;Cooking;Housework;Shopping;Mobility;Other (see comment)  (therapy unable to evaluate, as patient would not cooperate)   Can patient return to prior living arrangement Yes  (his permanent residence is [place])   Ability to make needs known: Fair   Family able to assist with home care needs: No   Would you like for me to discuss the discharge plan with any other family members/significant others, and if so, who? Yes  (there is a POA listed, Rosa Saucedow, but did not answer phone)   Financial Resources Medicaid;Medicare

## 2025-01-27 ENCOUNTER — APPOINTMENT (OUTPATIENT)
Facility: HOSPITAL | Age: 75
DRG: 699 | End: 2025-01-27
Payer: MEDICARE

## 2025-01-27 LAB
ALBUMIN SERPL-MCNC: 2.4 G/DL (ref 3.5–5)
ALBUMIN/GLOB SERPL: 0.4 (ref 1.1–2.2)
ALP SERPL-CCNC: 159 U/L (ref 45–117)
ALT SERPL-CCNC: 25 U/L (ref 12–78)
ANION GAP SERPL CALC-SCNC: 8 MMOL/L (ref 2–12)
AST SERPL-CCNC: 23 U/L (ref 15–37)
BILIRUB SERPL-MCNC: 0.5 MG/DL (ref 0.2–1)
BUN SERPL-MCNC: 23 MG/DL (ref 6–20)
BUN/CREAT SERPL: 13 (ref 12–20)
CALCIUM SERPL-MCNC: 9.4 MG/DL (ref 8.5–10.1)
CHLORIDE SERPL-SCNC: 111 MMOL/L (ref 97–108)
CO2 SERPL-SCNC: 20 MMOL/L (ref 21–32)
CREAT SERPL-MCNC: 1.71 MG/DL (ref 0.7–1.3)
GLOBULIN SER CALC-MCNC: 5.4 G/DL (ref 2–4)
GLUCOSE SERPL-MCNC: 211 MG/DL (ref 65–100)
MAGNESIUM SERPL-MCNC: 1.8 MG/DL (ref 1.6–2.4)
PHOSPHATE SERPL-MCNC: 2.4 MG/DL (ref 2.6–4.7)
POTASSIUM SERPL-SCNC: 3.6 MMOL/L (ref 3.5–5.1)
PROT SERPL-MCNC: 7.8 G/DL (ref 6.4–8.2)
SODIUM SERPL-SCNC: 139 MMOL/L (ref 136–145)

## 2025-01-27 PROCEDURE — 83735 ASSAY OF MAGNESIUM: CPT

## 2025-01-27 PROCEDURE — 1100000000 HC RM PRIVATE

## 2025-01-27 PROCEDURE — 6370000000 HC RX 637 (ALT 250 FOR IP): Performed by: STUDENT IN AN ORGANIZED HEALTH CARE EDUCATION/TRAINING PROGRAM

## 2025-01-27 PROCEDURE — 80053 COMPREHEN METABOLIC PANEL: CPT

## 2025-01-27 PROCEDURE — 36415 COLL VENOUS BLD VENIPUNCTURE: CPT

## 2025-01-27 PROCEDURE — 2500000003 HC RX 250 WO HCPCS: Performed by: NURSE PRACTITIONER

## 2025-01-27 PROCEDURE — 6360000004 HC RX CONTRAST MEDICATION: Performed by: STUDENT IN AN ORGANIZED HEALTH CARE EDUCATION/TRAINING PROGRAM

## 2025-01-27 PROCEDURE — 6360000002 HC RX W HCPCS: Performed by: STUDENT IN AN ORGANIZED HEALTH CARE EDUCATION/TRAINING PROGRAM

## 2025-01-27 PROCEDURE — 6370000000 HC RX 637 (ALT 250 FOR IP): Performed by: NURSE PRACTITIONER

## 2025-01-27 PROCEDURE — 6360000002 HC RX W HCPCS: Performed by: INTERNAL MEDICINE

## 2025-01-27 PROCEDURE — 0T25X0Z CHANGE DRAINAGE DEVICE IN KIDNEY, EXTERNAL APPROACH: ICD-10-PCS | Performed by: STUDENT IN AN ORGANIZED HEALTH CARE EDUCATION/TRAINING PROGRAM

## 2025-01-27 PROCEDURE — 2580000003 HC RX 258: Performed by: NURSE PRACTITIONER

## 2025-01-27 PROCEDURE — 6370000000 HC RX 637 (ALT 250 FOR IP): Performed by: INTERNAL MEDICINE

## 2025-01-27 PROCEDURE — 2580000003 HC RX 258: Performed by: INTERNAL MEDICINE

## 2025-01-27 PROCEDURE — 84100 ASSAY OF PHOSPHORUS: CPT

## 2025-01-27 PROCEDURE — 50435 EXCHANGE NEPHROSTOMY CATH: CPT

## 2025-01-27 RX ORDER — LIDOCAINE HYDROCHLORIDE 20 MG/ML
20 INJECTION, SOLUTION INFILTRATION; PERINEURAL ONCE
Status: COMPLETED | OUTPATIENT
Start: 2025-01-27 | End: 2025-01-27

## 2025-01-27 RX ORDER — AMLODIPINE BESYLATE 2.5 MG/1
2.5 TABLET ORAL DAILY
Status: DISCONTINUED | OUTPATIENT
Start: 2025-01-27 | End: 2025-01-30 | Stop reason: HOSPADM

## 2025-01-27 RX ORDER — FENTANYL CITRATE 50 UG/ML
INJECTION, SOLUTION INTRAMUSCULAR; INTRAVENOUS PRN
Status: COMPLETED | OUTPATIENT
Start: 2025-01-27 | End: 2025-01-27

## 2025-01-27 RX ORDER — IOPAMIDOL 755 MG/ML
100 INJECTION, SOLUTION INTRAVASCULAR ONCE
Status: COMPLETED | OUTPATIENT
Start: 2025-01-27 | End: 2025-01-27

## 2025-01-27 RX ORDER — HEPARIN SODIUM 200 [USP'U]/100ML
200 INJECTION, SOLUTION INTRAVENOUS ONCE
Status: DISCONTINUED | OUTPATIENT
Start: 2025-01-27 | End: 2025-01-30 | Stop reason: HOSPADM

## 2025-01-27 RX ORDER — SODIUM BICARBONATE 650 MG/1
650 TABLET ORAL 3 TIMES DAILY
Status: DISCONTINUED | OUTPATIENT
Start: 2025-01-27 | End: 2025-01-30 | Stop reason: HOSPADM

## 2025-01-27 RX ADMIN — SODIUM BICARBONATE 650 MG: 650 TABLET ORAL at 13:08

## 2025-01-27 RX ADMIN — SODIUM CHLORIDE: 9 INJECTION, SOLUTION INTRAVENOUS at 21:55

## 2025-01-27 RX ADMIN — ATORVASTATIN CALCIUM 80 MG: 40 TABLET, FILM COATED ORAL at 21:47

## 2025-01-27 RX ADMIN — TAMSULOSIN HYDROCHLORIDE 0.4 MG: 0.4 CAPSULE ORAL at 21:47

## 2025-01-27 RX ADMIN — PREDNISONE 5 MG: 5 TABLET ORAL at 13:10

## 2025-01-27 RX ADMIN — OSELTAMIVIR PHOSPHATE 30 MG: 30 CAPSULE ORAL at 13:09

## 2025-01-27 RX ADMIN — FERROUS SULFATE TAB 325 MG (65 MG ELEMENTAL FE) 325 MG: 325 (65 FE) TAB at 13:10

## 2025-01-27 RX ADMIN — GABAPENTIN 100 MG: 100 CAPSULE ORAL at 13:08

## 2025-01-27 RX ADMIN — SODIUM CHLORIDE, PRESERVATIVE FREE 10 ML: 5 INJECTION INTRAVENOUS at 13:24

## 2025-01-27 RX ADMIN — LIDOCAINE HYDROCHLORIDE 10 ML: 20 INJECTION, SOLUTION INFILTRATION; PERINEURAL at 11:55

## 2025-01-27 RX ADMIN — Medication 1000 UNITS: at 13:09

## 2025-01-27 RX ADMIN — FENTANYL CITRATE 50 MCG: 50 INJECTION, SOLUTION INTRAMUSCULAR; INTRAVENOUS at 11:45

## 2025-01-27 RX ADMIN — IOPAMIDOL 20 ML: 755 INJECTION, SOLUTION INTRAVENOUS at 11:55

## 2025-01-27 RX ADMIN — PIPERACILLIN AND TAZOBACTAM 3375 MG: 3; .375 INJECTION, POWDER, LYOPHILIZED, FOR SOLUTION INTRAVENOUS at 02:01

## 2025-01-27 RX ADMIN — SODIUM BICARBONATE 650 MG: 650 TABLET ORAL at 21:47

## 2025-01-27 RX ADMIN — FENTANYL CITRATE 25 MCG: 50 INJECTION, SOLUTION INTRAMUSCULAR; INTRAVENOUS at 11:49

## 2025-01-27 RX ADMIN — ABIRATERONE ACETATE 1000 MG: 250 TABLET ORAL at 13:07

## 2025-01-27 RX ADMIN — AMLODIPINE BESYLATE 2.5 MG: 2.5 TABLET ORAL at 17:43

## 2025-01-27 RX ADMIN — GABAPENTIN 100 MG: 100 CAPSULE ORAL at 21:48

## 2025-01-27 RX ADMIN — PANTOPRAZOLE SODIUM 40 MG: 40 TABLET, DELAYED RELEASE ORAL at 13:10

## 2025-01-27 RX ADMIN — SODIUM CHLORIDE, PRESERVATIVE FREE 10 ML: 5 INJECTION INTRAVENOUS at 09:24

## 2025-01-27 RX ADMIN — PIPERACILLIN AND TAZOBACTAM 3375 MG: 3; .375 INJECTION, POWDER, LYOPHILIZED, FOR SOLUTION INTRAVENOUS at 21:56

## 2025-01-27 RX ADMIN — PIPERACILLIN AND TAZOBACTAM 3375 MG: 3; .375 INJECTION, POWDER, LYOPHILIZED, FOR SOLUTION INTRAVENOUS at 13:23

## 2025-01-27 RX ADMIN — PIPERACILLIN AND TAZOBACTAM 3375 MG: 3; .375 INJECTION, POWDER, LYOPHILIZED, FOR SOLUTION INTRAVENOUS at 09:29

## 2025-01-27 RX ADMIN — SODIUM CHLORIDE, PRESERVATIVE FREE 10 ML: 5 INJECTION INTRAVENOUS at 21:48

## 2025-01-27 ASSESSMENT — PAIN SCALES - GENERAL
PAINLEVEL_OUTOF10: 0
PAINLEVEL_OUTOF10: 0

## 2025-01-27 NOTE — CONSULTS
Radiology History and Physical    Patient: Mehrdad Sims 74 y.o. male       Chief Complaint: Diarrhea (Pt BIBEMS from facility with norovirus outbreak. Pt reporting n/v x 2 days and diarrhea today. Pt hypotensive on scene. Pt has mobility issues at baseline )      History of Present Illness: Mr. Hicks is a 74 y.o. man urologic history notable for recurrent UTI and metastatic prostate cancer status post radiation and ADT (2019), which has been complicated by bilateral hydronephrosis secondary to radiation-induced ureteral strictures. He is currently admitted to [facility] SBO. On a CT A/P peformed for the SBO, his right PCN was incidentally found to be retracted. The tubes continue to function and he has no hydronephrosis. They were last exchanged by [facility] in 10/2024. He has bilateral 10 [place] tubes.     History:    Past Medical History:   Diagnosis Date    Diabetes (HCC)     Gastrointestinal disorder     Hypertension     PATTI (obstructive sleep apnea)     AHI: 8.9 per hour     Family History   Problem Relation Age of Onset    Hypertension Father     Diabetes Father     Diabetes Mother     Hypertension Mother      Social History     Socioeconomic History    Marital status:      Spouse name: Not on file    Number of children: Not on file    Years of education: Not on file    Highest education level: Not on file   Occupational History    Not on file   Tobacco Use    Smoking status: Never     Passive exposure: Never    Smokeless tobacco: Never   Vaping Use    Vaping status: Never Used   Substance and Sexual Activity    Alcohol use: No    Drug use: Not Currently    Sexual activity: Not Currently     Partners: Female   Other Topics Concern    Not on file   Social History Narrative    Not on file     Social Determinants of Health     Financial Resource Strain: Patient Declined (9/1/2023)    Received from [facility], [facility]    Overall Financial Resource Strain (CARDIA)     Difficulty of Paying Living Expenses: Patient

## 2025-01-27 NOTE — CONSULTS
[facility]              [address] [place] [id number]                              CONSULTATION      PATIENT NAME: TORIBIO RAMIREZ               : 1950  MED REC NO: [id number]                       ROOM: 3243  ACCOUNT NO: [id number]                       ADMIT DATE: 2025  PROVIDER: Jorge Portillo MD    DATE OF SERVICE:  2025    ATTENDING PHYSICIAN:  GILDA EARL    REASON FOR CONSULTATION:  Evaluation and management of acute kidney injury.    HISTORY OF PRESENT ILLNESS:  The patient is a 74-year-old male with past medical history significant for metastatic prostate cancer, history of PE, history of bilateral hydronephrosis in the setting of ureteral strictures, status post bilateral percutaneous nephrostomy tubes, history of bladder irrigation and clot removal, hernia, who presented with nausea, vomiting, and diarrhea for the last couple of days.  Associated symptoms include weakness.  Some abdominal pain.  He was hypotensive on arrival.  Initial admission studies notable for findings of SBO on CT of abdomen and pelvis, which is currently conservatively managed.  It showed slight retraction of the right nephrostomy tube, but no hydronephrosis noted.  He was found to have acute kidney injury with creatinine of 3.27 on admit.  He was started on IV fluids.  Creatinine down to 2.62.  Baseline creatinine has been normal going back to 2024.  He had mild CKD as per chart review and is seen by a nephrologist at [facility].  It has been 1.3 1.5 range before.    He is feeling better.  He is starting to take p.o.  He is passing urine via a nephrostomy tube.  He is positive for influenza A and has been started on Tamiflu.  Blood pressure has improved.    Past Medical History:   Diagnosis Date    Diabetes (HCC)     Gastrointestinal disorder     Hypertension     PATTI (obstructive sleep apnea)     AHI: 8.9 per hour     Past Surgical History:   Procedure

## 2025-01-27 NOTE — PROCEDURES
Brief Postoperative Note    Mehrdad Sims  YOB: 1950  [id number]    Pre-operative Diagnosis: Malpositioned nephrostomy catheters.    Post-operative Diagnosis: Same    Procedure: Bilateral nephrostomy exchange     Anesthesia: Local    Surgeons/Assistants: Dolly Kirby MD    Estimated Blood Loss: Minimal     Complications: None    Specimens: Was Not Obtained    Findings: Both nephrostomy catheters were found to be partially retracted. Both nephrostomy catheters were exchanged for new 10.2 [place] catheters.    Plan:  Routine exchange in 3 months. This can be done here or at [facility], where most of his other exchanges have been.     Electronically signed by Dolly Kirby MD on 1/27/2025 at 11:57 AM

## 2025-01-28 LAB
ALBUMIN SERPL-MCNC: 2.3 G/DL (ref 3.5–5)
ALBUMIN/GLOB SERPL: 0.4 (ref 1.1–2.2)
ALP SERPL-CCNC: 166 U/L (ref 45–117)
ALT SERPL-CCNC: 22 U/L (ref 12–78)
ANION GAP SERPL CALC-SCNC: 7 MMOL/L (ref 2–12)
AST SERPL-CCNC: 17 U/L (ref 15–37)
BACTERIA SPEC CULT: NORMAL
BACTERIA SPEC CULT: NORMAL
BASOPHILS # BLD: 0.02 K/UL (ref 0–0.1)
BASOPHILS NFR BLD: 0.4 % (ref 0–1)
BILIRUB SERPL-MCNC: 0.4 MG/DL (ref 0.2–1)
BUN SERPL-MCNC: 19 MG/DL (ref 6–20)
BUN/CREAT SERPL: 11 (ref 12–20)
CALCIUM SERPL-MCNC: 9.2 MG/DL (ref 8.5–10.1)
CHLORIDE SERPL-SCNC: 110 MMOL/L (ref 97–108)
CO2 SERPL-SCNC: 21 MMOL/L (ref 21–32)
CREAT SERPL-MCNC: 1.76 MG/DL (ref 0.7–1.3)
DIFFERENTIAL METHOD BLD: ABNORMAL
EOSINOPHIL # BLD: 0.09 K/UL (ref 0–0.4)
EOSINOPHIL NFR BLD: 1.8 % (ref 0–7)
ERYTHROCYTE [DISTWIDTH] IN BLOOD BY AUTOMATED COUNT: 16 % (ref 11.5–14.5)
GLOBULIN SER CALC-MCNC: 5.3 G/DL (ref 2–4)
GLUCOSE SERPL-MCNC: 149 MG/DL (ref 65–100)
HCT VFR BLD AUTO: 25.4 % (ref 36.6–50.3)
HGB BLD-MCNC: 7.6 G/DL (ref 12.1–17)
IMM GRANULOCYTES # BLD AUTO: 0.02 K/UL (ref 0–0.04)
IMM GRANULOCYTES NFR BLD AUTO: 0.4 % (ref 0–0.5)
LYMPHOCYTES # BLD: 1.66 K/UL (ref 0.8–3.5)
LYMPHOCYTES NFR BLD: 33.9 % (ref 12–49)
MAGNESIUM SERPL-MCNC: 1.8 MG/DL (ref 1.6–2.4)
MCH RBC QN AUTO: 27 PG (ref 26–34)
MCHC RBC AUTO-ENTMCNC: 29.9 G/DL (ref 30–36.5)
MCV RBC AUTO: 90.1 FL (ref 80–99)
MONOCYTES # BLD: 0.33 K/UL (ref 0–1)
MONOCYTES NFR BLD: 6.7 % (ref 5–13)
NEUTS SEG # BLD: 2.77 K/UL (ref 1.8–8)
NEUTS SEG NFR BLD: 56.8 % (ref 32–75)
NRBC # BLD: 0 K/UL (ref 0–0.01)
NRBC BLD-RTO: 0 PER 100 WBC
PHOSPHATE SERPL-MCNC: 2.5 MG/DL (ref 2.6–4.7)
PLATELET # BLD AUTO: 247 K/UL (ref 150–400)
PMV BLD AUTO: 9.7 FL (ref 8.9–12.9)
POTASSIUM SERPL-SCNC: 3.7 MMOL/L (ref 3.5–5.1)
PROT SERPL-MCNC: 7.6 G/DL (ref 6.4–8.2)
RBC # BLD AUTO: 2.82 M/UL (ref 4.1–5.7)
SERVICE CMNT-IMP: NORMAL
SERVICE CMNT-IMP: NORMAL
SODIUM SERPL-SCNC: 138 MMOL/L (ref 136–145)
WBC # BLD AUTO: 4.9 K/UL (ref 4.1–11.1)

## 2025-01-28 PROCEDURE — 83735 ASSAY OF MAGNESIUM: CPT

## 2025-01-28 PROCEDURE — 85025 COMPLETE CBC W/AUTO DIFF WBC: CPT

## 2025-01-28 PROCEDURE — 2500000003 HC RX 250 WO HCPCS: Performed by: NURSE PRACTITIONER

## 2025-01-28 PROCEDURE — 84100 ASSAY OF PHOSPHORUS: CPT

## 2025-01-28 PROCEDURE — 2580000003 HC RX 258: Performed by: NURSE PRACTITIONER

## 2025-01-28 PROCEDURE — 2580000003 HC RX 258: Performed by: INTERNAL MEDICINE

## 2025-01-28 PROCEDURE — 6370000000 HC RX 637 (ALT 250 FOR IP): Performed by: STUDENT IN AN ORGANIZED HEALTH CARE EDUCATION/TRAINING PROGRAM

## 2025-01-28 PROCEDURE — 97530 THERAPEUTIC ACTIVITIES: CPT

## 2025-01-28 PROCEDURE — 97530 THERAPEUTIC ACTIVITIES: CPT | Performed by: PHYSICAL THERAPIST

## 2025-01-28 PROCEDURE — 6370000000 HC RX 637 (ALT 250 FOR IP): Performed by: INTERNAL MEDICINE

## 2025-01-28 PROCEDURE — 1100000000 HC RM PRIVATE

## 2025-01-28 PROCEDURE — 80053 COMPREHEN METABOLIC PANEL: CPT

## 2025-01-28 PROCEDURE — 6360000002 HC RX W HCPCS: Performed by: INTERNAL MEDICINE

## 2025-01-28 PROCEDURE — 36415 COLL VENOUS BLD VENIPUNCTURE: CPT

## 2025-01-28 PROCEDURE — 6370000000 HC RX 637 (ALT 250 FOR IP): Performed by: NURSE PRACTITIONER

## 2025-01-28 RX ADMIN — ABIRATERONE ACETATE 1000 MG: 250 TABLET ORAL at 10:48

## 2025-01-28 RX ADMIN — AMLODIPINE BESYLATE 2.5 MG: 2.5 TABLET ORAL at 10:44

## 2025-01-28 RX ADMIN — GABAPENTIN 100 MG: 100 CAPSULE ORAL at 21:30

## 2025-01-28 RX ADMIN — PREDNISONE 5 MG: 5 TABLET ORAL at 10:44

## 2025-01-28 RX ADMIN — SODIUM CHLORIDE: 9 INJECTION, SOLUTION INTRAVENOUS at 03:49

## 2025-01-28 RX ADMIN — ATORVASTATIN CALCIUM 80 MG: 40 TABLET, FILM COATED ORAL at 21:31

## 2025-01-28 RX ADMIN — SODIUM CHLORIDE, PRESERVATIVE FREE 10 ML: 5 INJECTION INTRAVENOUS at 21:32

## 2025-01-28 RX ADMIN — PIPERACILLIN AND TAZOBACTAM 3375 MG: 3; .375 INJECTION, POWDER, LYOPHILIZED, FOR SOLUTION INTRAVENOUS at 14:40

## 2025-01-28 RX ADMIN — SODIUM CHLORIDE: 9 INJECTION, SOLUTION INTRAVENOUS at 05:57

## 2025-01-28 RX ADMIN — PIPERACILLIN AND TAZOBACTAM 3375 MG: 3; .375 INJECTION, POWDER, LYOPHILIZED, FOR SOLUTION INTRAVENOUS at 21:33

## 2025-01-28 RX ADMIN — Medication 1000 UNITS: at 10:44

## 2025-01-28 RX ADMIN — GABAPENTIN 100 MG: 100 CAPSULE ORAL at 10:44

## 2025-01-28 RX ADMIN — SODIUM BICARBONATE 650 MG: 650 TABLET ORAL at 10:44

## 2025-01-28 RX ADMIN — SODIUM BICARBONATE 650 MG: 650 TABLET ORAL at 21:31

## 2025-01-28 RX ADMIN — FERROUS SULFATE TAB 325 MG (65 MG ELEMENTAL FE) 325 MG: 325 (65 FE) TAB at 10:44

## 2025-01-28 RX ADMIN — GABAPENTIN 100 MG: 100 CAPSULE ORAL at 14:38

## 2025-01-28 RX ADMIN — SODIUM BICARBONATE 650 MG: 650 TABLET ORAL at 14:38

## 2025-01-28 RX ADMIN — SODIUM CHLORIDE, PRESERVATIVE FREE 10 ML: 5 INJECTION INTRAVENOUS at 10:45

## 2025-01-28 RX ADMIN — PANTOPRAZOLE SODIUM 40 MG: 40 TABLET, DELAYED RELEASE ORAL at 10:44

## 2025-01-28 RX ADMIN — TAMSULOSIN HYDROCHLORIDE 0.4 MG: 0.4 CAPSULE ORAL at 21:31

## 2025-01-28 RX ADMIN — PIPERACILLIN AND TAZOBACTAM 3375 MG: 3; .375 INJECTION, POWDER, LYOPHILIZED, FOR SOLUTION INTRAVENOUS at 05:58

## 2025-01-28 ASSESSMENT — PAIN SCALES - GENERAL
PAINLEVEL_OUTOF10: 0
PAINLEVEL_OUTOF10: 0

## 2025-01-29 LAB
ANION GAP SERPL CALC-SCNC: 6 MMOL/L (ref 2–12)
BASOPHILS # BLD: 0.02 K/UL (ref 0–0.1)
BASOPHILS NFR BLD: 0.4 % (ref 0–1)
BUN SERPL-MCNC: 18 MG/DL (ref 6–20)
BUN/CREAT SERPL: 11 (ref 12–20)
CALCIUM SERPL-MCNC: 9.4 MG/DL (ref 8.5–10.1)
CHLORIDE SERPL-SCNC: 108 MMOL/L (ref 97–108)
CO2 SERPL-SCNC: 23 MMOL/L (ref 21–32)
CREAT SERPL-MCNC: 1.6 MG/DL (ref 0.7–1.3)
DIFFERENTIAL METHOD BLD: ABNORMAL
EOSINOPHIL # BLD: 0.13 K/UL (ref 0–0.4)
EOSINOPHIL NFR BLD: 2.5 % (ref 0–7)
ERYTHROCYTE [DISTWIDTH] IN BLOOD BY AUTOMATED COUNT: 16.1 % (ref 11.5–14.5)
GLUCOSE SERPL-MCNC: 168 MG/DL (ref 65–100)
HCT VFR BLD AUTO: 25.5 % (ref 36.6–50.3)
HGB BLD-MCNC: 7.9 G/DL (ref 12.1–17)
IMM GRANULOCYTES # BLD AUTO: 0.06 K/UL (ref 0–0.04)
IMM GRANULOCYTES NFR BLD AUTO: 1.1 % (ref 0–0.5)
LYMPHOCYTES # BLD: 2.06 K/UL (ref 0.8–3.5)
LYMPHOCYTES NFR BLD: 38.9 % (ref 12–49)
MCH RBC QN AUTO: 26.6 PG (ref 26–34)
MCHC RBC AUTO-ENTMCNC: 31 G/DL (ref 30–36.5)
MCV RBC AUTO: 85.9 FL (ref 80–99)
MONOCYTES # BLD: 0.44 K/UL (ref 0–1)
MONOCYTES NFR BLD: 8.3 % (ref 5–13)
NEUTS SEG # BLD: 2.59 K/UL (ref 1.8–8)
NEUTS SEG NFR BLD: 48.8 % (ref 32–75)
NRBC # BLD: 0 K/UL (ref 0–0.01)
NRBC BLD-RTO: 0 PER 100 WBC
PLATELET # BLD AUTO: 290 K/UL (ref 150–400)
PMV BLD AUTO: 9.2 FL (ref 8.9–12.9)
POTASSIUM SERPL-SCNC: 3.4 MMOL/L (ref 3.5–5.1)
RBC # BLD AUTO: 2.97 M/UL (ref 4.1–5.7)
SODIUM SERPL-SCNC: 137 MMOL/L (ref 136–145)
WBC # BLD AUTO: 5.3 K/UL (ref 4.1–11.1)

## 2025-01-29 PROCEDURE — 85025 COMPLETE CBC W/AUTO DIFF WBC: CPT

## 2025-01-29 PROCEDURE — 6370000000 HC RX 637 (ALT 250 FOR IP): Performed by: STUDENT IN AN ORGANIZED HEALTH CARE EDUCATION/TRAINING PROGRAM

## 2025-01-29 PROCEDURE — 1100000000 HC RM PRIVATE

## 2025-01-29 PROCEDURE — 36415 COLL VENOUS BLD VENIPUNCTURE: CPT

## 2025-01-29 PROCEDURE — 2580000003 HC RX 258: Performed by: INTERNAL MEDICINE

## 2025-01-29 PROCEDURE — 2500000003 HC RX 250 WO HCPCS: Performed by: NURSE PRACTITIONER

## 2025-01-29 PROCEDURE — 6360000002 HC RX W HCPCS: Performed by: INTERNAL MEDICINE

## 2025-01-29 PROCEDURE — 6370000000 HC RX 637 (ALT 250 FOR IP): Performed by: INTERNAL MEDICINE

## 2025-01-29 PROCEDURE — 6370000000 HC RX 637 (ALT 250 FOR IP): Performed by: NURSE PRACTITIONER

## 2025-01-29 PROCEDURE — 80048 BASIC METABOLIC PNL TOTAL CA: CPT

## 2025-01-29 RX ORDER — POTASSIUM CHLORIDE 1500 MG/1
20 TABLET, EXTENDED RELEASE ORAL ONCE
Status: COMPLETED | OUTPATIENT
Start: 2025-01-29 | End: 2025-01-29

## 2025-01-29 RX ADMIN — Medication 1000 UNITS: at 09:23

## 2025-01-29 RX ADMIN — SODIUM BICARBONATE 650 MG: 650 TABLET ORAL at 21:12

## 2025-01-29 RX ADMIN — GABAPENTIN 100 MG: 100 CAPSULE ORAL at 09:23

## 2025-01-29 RX ADMIN — SODIUM BICARBONATE 650 MG: 650 TABLET ORAL at 13:48

## 2025-01-29 RX ADMIN — SODIUM CHLORIDE, PRESERVATIVE FREE 10 ML: 5 INJECTION INTRAVENOUS at 09:26

## 2025-01-29 RX ADMIN — ATORVASTATIN CALCIUM 80 MG: 40 TABLET, FILM COATED ORAL at 21:12

## 2025-01-29 RX ADMIN — PIPERACILLIN AND TAZOBACTAM 3375 MG: 3; .375 INJECTION, POWDER, LYOPHILIZED, FOR SOLUTION INTRAVENOUS at 06:19

## 2025-01-29 RX ADMIN — PIPERACILLIN AND TAZOBACTAM 3375 MG: 3; .375 INJECTION, POWDER, LYOPHILIZED, FOR SOLUTION INTRAVENOUS at 22:04

## 2025-01-29 RX ADMIN — PREDNISONE 5 MG: 5 TABLET ORAL at 09:22

## 2025-01-29 RX ADMIN — SODIUM CHLORIDE, PRESERVATIVE FREE 10 ML: 5 INJECTION INTRAVENOUS at 21:13

## 2025-01-29 RX ADMIN — GABAPENTIN 100 MG: 100 CAPSULE ORAL at 21:12

## 2025-01-29 RX ADMIN — GABAPENTIN 100 MG: 100 CAPSULE ORAL at 13:48

## 2025-01-29 RX ADMIN — POTASSIUM CHLORIDE 20 MEQ: 1500 TABLET, EXTENDED RELEASE ORAL at 09:36

## 2025-01-29 RX ADMIN — AMLODIPINE BESYLATE 2.5 MG: 2.5 TABLET ORAL at 09:22

## 2025-01-29 RX ADMIN — ABIRATERONE ACETATE 1000 MG: 250 TABLET ORAL at 09:23

## 2025-01-29 RX ADMIN — PIPERACILLIN AND TAZOBACTAM 3375 MG: 3; .375 INJECTION, POWDER, LYOPHILIZED, FOR SOLUTION INTRAVENOUS at 13:50

## 2025-01-29 RX ADMIN — PANTOPRAZOLE SODIUM 40 MG: 40 TABLET, DELAYED RELEASE ORAL at 09:23

## 2025-01-29 RX ADMIN — FERROUS SULFATE TAB 325 MG (65 MG ELEMENTAL FE) 325 MG: 325 (65 FE) TAB at 09:23

## 2025-01-29 RX ADMIN — TAMSULOSIN HYDROCHLORIDE 0.4 MG: 0.4 CAPSULE ORAL at 21:12

## 2025-01-29 RX ADMIN — SODIUM BICARBONATE 650 MG: 650 TABLET ORAL at 09:22

## 2025-01-29 ASSESSMENT — PAIN SCALES - GENERAL
PAINLEVEL_OUTOF10: 0
PAINLEVEL_OUTOF10: 0

## 2025-01-29 NOTE — CARE COORDINATION
Transition of Care Plan:    RUR: 16%  Prior Level of Functioning: needs assistance   Disposition: return to [facility] LTC  EDMOND: 1/29  If SNF or IPR: Date FOC offered: N/A- return to LTC  Follow up appointments: PCP, specialists as indicated   DME needed: N/A  Transportation at discharge: medical transport   IM/IMM Medicare/ letter given: to be given  Is patient a  and connected with VA? no   If yes, was  transfer form completed and VA notified?   Caregiver Contact: Rosa Hancock ([facility])  Discharge Caregiver contacted prior to discharge? To be contacted   Care Conference needed? no  Barriers to discharge:  culture sensitivities     Chart reviewed. CM continuing to follow for discharge planning. Still awaiting urine culture sensitivities. Anticipate d/c tomorrow back to [facility] to resume LTC placement. Will continue to monitor d/c plan.    CRISTOFER Cross   Care Manager, [facility]  [phone]

## 2025-01-30 VITALS
DIASTOLIC BLOOD PRESSURE: 92 MMHG | RESPIRATION RATE: 16 BRPM | WEIGHT: 200 LBS | SYSTOLIC BLOOD PRESSURE: 165 MMHG | TEMPERATURE: 98.6 F | HEART RATE: 51 BPM | HEIGHT: 67 IN | BODY MASS INDEX: 31.39 KG/M2 | OXYGEN SATURATION: 91 %

## 2025-01-30 LAB
ANION GAP SERPL CALC-SCNC: 5 MMOL/L (ref 2–12)
BASOPHILS # BLD: 0.01 K/UL (ref 0–0.1)
BASOPHILS NFR BLD: 0.2 % (ref 0–1)
BUN SERPL-MCNC: 17 MG/DL (ref 6–20)
BUN/CREAT SERPL: 10 (ref 12–20)
CALCIUM SERPL-MCNC: 9.1 MG/DL (ref 8.5–10.1)
CHLORIDE SERPL-SCNC: 107 MMOL/L (ref 97–108)
CO2 SERPL-SCNC: 22 MMOL/L (ref 21–32)
CREAT SERPL-MCNC: 1.62 MG/DL (ref 0.7–1.3)
DIFFERENTIAL METHOD BLD: ABNORMAL
EOSINOPHIL # BLD: 0.12 K/UL (ref 0–0.4)
EOSINOPHIL NFR BLD: 2.2 % (ref 0–7)
ERYTHROCYTE [DISTWIDTH] IN BLOOD BY AUTOMATED COUNT: 16.2 % (ref 11.5–14.5)
GLUCOSE SERPL-MCNC: 164 MG/DL (ref 65–100)
HCT VFR BLD AUTO: 26.2 % (ref 36.6–50.3)
HGB BLD-MCNC: 8.3 G/DL (ref 12.1–17)
IMM GRANULOCYTES # BLD AUTO: 0.08 K/UL (ref 0–0.04)
IMM GRANULOCYTES NFR BLD AUTO: 1.5 % (ref 0–0.5)
LYMPHOCYTES # BLD: 1.84 K/UL (ref 0.8–3.5)
LYMPHOCYTES NFR BLD: 33.9 % (ref 12–49)
MCH RBC QN AUTO: 26.6 PG (ref 26–34)
MCHC RBC AUTO-ENTMCNC: 31.7 G/DL (ref 30–36.5)
MCV RBC AUTO: 84 FL (ref 80–99)
MONOCYTES # BLD: 0.42 K/UL (ref 0–1)
MONOCYTES NFR BLD: 7.7 % (ref 5–13)
NEUTS SEG # BLD: 2.95 K/UL (ref 1.8–8)
NEUTS SEG NFR BLD: 54.5 % (ref 32–75)
NRBC # BLD: 0 K/UL (ref 0–0.01)
NRBC BLD-RTO: 0 PER 100 WBC
PLATELET # BLD AUTO: 333 K/UL (ref 150–400)
PMV BLD AUTO: 8.9 FL (ref 8.9–12.9)
POTASSIUM SERPL-SCNC: 4.6 MMOL/L (ref 3.5–5.1)
RBC # BLD AUTO: 3.12 M/UL (ref 4.1–5.7)
SODIUM SERPL-SCNC: 134 MMOL/L (ref 136–145)
WBC # BLD AUTO: 5.4 K/UL (ref 4.1–11.1)

## 2025-01-30 PROCEDURE — 85025 COMPLETE CBC W/AUTO DIFF WBC: CPT

## 2025-01-30 PROCEDURE — 6370000000 HC RX 637 (ALT 250 FOR IP): Performed by: NURSE PRACTITIONER

## 2025-01-30 PROCEDURE — 2580000003 HC RX 258: Performed by: INTERNAL MEDICINE

## 2025-01-30 PROCEDURE — 2500000003 HC RX 250 WO HCPCS: Performed by: NURSE PRACTITIONER

## 2025-01-30 PROCEDURE — 6370000000 HC RX 637 (ALT 250 FOR IP): Performed by: STUDENT IN AN ORGANIZED HEALTH CARE EDUCATION/TRAINING PROGRAM

## 2025-01-30 PROCEDURE — 6360000002 HC RX W HCPCS: Performed by: INTERNAL MEDICINE

## 2025-01-30 PROCEDURE — 6370000000 HC RX 637 (ALT 250 FOR IP): Performed by: INTERNAL MEDICINE

## 2025-01-30 PROCEDURE — 36415 COLL VENOUS BLD VENIPUNCTURE: CPT

## 2025-01-30 PROCEDURE — 80048 BASIC METABOLIC PNL TOTAL CA: CPT

## 2025-01-30 PROCEDURE — 97530 THERAPEUTIC ACTIVITIES: CPT | Performed by: OCCUPATIONAL THERAPIST

## 2025-01-30 RX ORDER — GABAPENTIN 100 MG/1
100 CAPSULE ORAL 3 TIMES DAILY
Qty: 90 CAPSULE | Refills: 0 | Status: SHIPPED | OUTPATIENT
Start: 2025-01-30 | End: 2025-03-01

## 2025-01-30 RX ORDER — CIPROFLOXACIN 500 MG/1
500 TABLET, FILM COATED ORAL 2 TIMES DAILY
Qty: 14 TABLET | Refills: 0 | Status: SHIPPED | OUTPATIENT
Start: 2025-01-30 | End: 2025-02-06

## 2025-01-30 RX ORDER — ACETAMINOPHEN 500 MG
1 TABLET ORAL DAILY
Qty: 30 CAPSULE | Refills: 0 | Status: SHIPPED | OUTPATIENT
Start: 2025-01-30

## 2025-01-30 RX ADMIN — GABAPENTIN 100 MG: 100 CAPSULE ORAL at 08:59

## 2025-01-30 RX ADMIN — FERROUS SULFATE TAB 325 MG (65 MG ELEMENTAL FE) 325 MG: 325 (65 FE) TAB at 08:59

## 2025-01-30 RX ADMIN — SODIUM CHLORIDE, PRESERVATIVE FREE 10 ML: 5 INJECTION INTRAVENOUS at 09:00

## 2025-01-30 RX ADMIN — SODIUM BICARBONATE 650 MG: 650 TABLET ORAL at 14:27

## 2025-01-30 RX ADMIN — PREDNISONE 5 MG: 5 TABLET ORAL at 08:59

## 2025-01-30 RX ADMIN — PIPERACILLIN AND TAZOBACTAM 3375 MG: 3; .375 INJECTION, POWDER, LYOPHILIZED, FOR SOLUTION INTRAVENOUS at 05:34

## 2025-01-30 RX ADMIN — AMLODIPINE BESYLATE 2.5 MG: 2.5 TABLET ORAL at 08:59

## 2025-01-30 RX ADMIN — GABAPENTIN 100 MG: 100 CAPSULE ORAL at 14:27

## 2025-01-30 RX ADMIN — Medication 1000 UNITS: at 08:59

## 2025-01-30 RX ADMIN — PANTOPRAZOLE SODIUM 40 MG: 40 TABLET, DELAYED RELEASE ORAL at 08:59

## 2025-01-30 RX ADMIN — SODIUM BICARBONATE 650 MG: 650 TABLET ORAL at 08:59

## 2025-01-30 RX ADMIN — PIPERACILLIN AND TAZOBACTAM 3375 MG: 3; .375 INJECTION, POWDER, LYOPHILIZED, FOR SOLUTION INTRAVENOUS at 14:29

## 2025-01-30 RX ADMIN — ABIRATERONE ACETATE 1000 MG: 250 TABLET ORAL at 09:00

## 2025-01-30 ASSESSMENT — PAIN SCALES - GENERAL: PAINLEVEL_OUTOF10: 0

## 2025-01-30 NOTE — DISCHARGE SUMMARY
Discharge Summary    Name: Mehrdad Sims  [id number]  YOB: 1950 (Age: 74 y.o.)   Date of Admission: 1/23/2025  Date of Discharge: 1/30/2025  Attending Physician: Virginia Frausto MD    Discharge Diagnosis:     Small bowel obstruction  Diarrhea    Weakness   Hypotension   Influenza A +ve   MARNIE on CKD   Hypokalemia  Diarrhea   Metastatic prostate adenocarcinoma  involving lymph nodes and bone  Anemia   HTN  HLD  T2DM    Consultations:  IP CONSULT TO PHARMACY  IP CONSULT TO NEPHROLOGY  IP CONSULT TO GENERAL SURGERY      Brief Admission History/Reason for Admission Per Uvaldo Robbins MD:     Mehrdad Sims is a 74 y.o.  male with PMHx significant for prostate CA with mets and obstructive nephropathy s/p bilat PCN followed at [facility] oncology team presents to the ED for nausea and emesis with diarrhea over the past two days. Pt resides at [facility], clinical staff activated EMS. Upon EMS arrival pt was noted to be hypotensive BP 80's. Upon ED arrival pt remain hypotensive given IV fluid resuscitation. His blood pressure gradually increased to 130's. CT noted SBO, surgery consulted, UA positive - flexed to cx, empiric anbx started, he tested positive for Influenza A, Tamiflu started.  His PCN tubes, left noted with mucoid blood tinged urine, right is clear. Urology consulted due needing adjustment to the right PCN tube per CT.      On exam patient w/o emesis no complaints of abd pain, will keep NPO until surgery evals.      We were asked to admit for work up and evaluation of the above problems.     Brief Hospital Course by Main Problems:     Small bowel obstruction  Diarrhea     CT Abdomen with Slight retraction of right nephrostomy tube. No hydronephrosis at this time. Consider adjustment and advancement. Left nephrostomy tube is in good position.  1/27-status post IR nephrostomy drain replacement     Patient was initially n.p.o., seen by surgery who spoke with power of

## 2025-01-30 NOTE — DISCHARGE INSTRUCTIONS
All of your medications from before your hospitalization are the same EXCEPT:   Your new prescription for you to start is to take cipro for UTI.  Please take all of your medications as prescribed. If prescribed any medications, please read all pharmacy instructions and inserts. Inform your doctor and pharmacist about all other medications and alternative therapies.  Please follow up with your PCP in 1-2 weeks to be reassessed after your hospital stay.   If you start feeling any symptoms similar to what brought you into the hospital, please come back to the ED to be re-evaluated.

## 2025-01-30 NOTE — CARE COORDINATION
Cleared for D/C from CM standpoint.  Transition of Care Plan:     RUR: 15%  Prior Level of Functioning: needs assistance   Disposition: return to [facility] LT  EDMOND: 1/30  If SNF or IPR: Date FOC offered: N/A- return to LTC  Follow up appointments: PCP, specialists as indicated   DME needed: N/A  Transportation at discharge: [facility] transport, ETA 1600  IM/IMM Medicare/ letter given: given  Is patient a [place] and connected with VA? no              If yes, was  transfer form completed and VA notified?   Caregiver Contact: Rosa Hancock ([facility])  Discharge Caregiver contacted prior to discharge? Pt contacted   Care Conference needed? no  Barriers to discharge:  none    CM aware of discharge order. Pt returning to LTC placement at [facility]. Pt assigned to room#229A, unit 2. RN to call report to [phone]. CM secured Medicaid stretcher transportation: [phone], reference#[id number]. 2nd IM given at bedside and explained to patient. He deferred to sign copy until his sister arrived. No further CM needs identified at this time.       01/30/25 1607   Services At/After Discharge   Transition of Care Consult (CM Consult) Discharge Planning   Services At/After Discharge Long term care;Transport   [place] Resource Information Provided? No   Mode of Transport at Discharge BLS  (Medicaid stretcher)   Hospital Transport Time of Discharge 1600   Confirm Follow Up Transport Wheelchair Van   Condition of Participation: Discharge Planning   The Plan for Transition of Care is related to the following treatment goals: return to baseline   The Patient and/or Patient Representative was provided with a Choice of Provider? Patient   The Patient and/Or Patient Representative agree with the Discharge Plan? Yes   Freedom of Choice list was provided with basic dialogue that supports the patient's individualized plan of care/goals, treatment preferences, and shares the quality data associated with the

## 2025-01-30 NOTE — PLAN OF CARE
Problem: Chronic Conditions and Co-morbidities  Goal: Patient's chronic conditions and co-morbidity symptoms are monitored and maintained or improved  1/24/2025 2141 by Pop Blair RN  Outcome: Progressing  1/24/2025 2055 by Alycia Decker LPN  Outcome: Progressing     Problem: Discharge Planning  Goal: Discharge to home or other facility with appropriate resources  1/24/2025 2141 by Pop Blair RN  Outcome: Progressing  1/24/2025 2055 by Alycia Decker LPN  Outcome: Progressing     Problem: Pain  Goal: Verbalizes/displays adequate comfort level or baseline comfort level  1/24/2025 2141 by Pop Blair RN  Outcome: Progressing  1/24/2025 2055 by Alycia Decker LPN  Outcome: Progressing     Problem: Skin/Tissue Integrity  Goal: Skin integrity remains intact  Description: 1.  Monitor for areas of redness and/or skin breakdown  2.  Assess vascular access sites hourly  3.  Every 4-6 hours minimum:  Change oxygen saturation probe site  4.  Every 4-6 hours:  If on nasal continuous positive airway pressure, respiratory therapy assess nares and determine need for appliance change or resting period  1/24/2025 2141 by Pop Blair RN  Outcome: Progressing  1/24/2025 2055 by Alycia Decker LPN  Outcome: Progressing     Problem: Safety - Adult  Goal: Free from fall injury  1/24/2025 2141 by Pop Blair RN  Outcome: Progressing  1/24/2025 2055 by Alycia Decker LPN  Outcome: Progressing     Problem: Neurosensory - Adult  Goal: Achieves stable or improved neurological status  1/24/2025 2141 by Pop Blair RN  Outcome: Progressing  1/24/2025 2055 by Alycia Decker LPN  Outcome: Progressing  Goal: Achieves maximal functionality and self care  1/24/2025 2141 by Pop Blair RN  Outcome: Progressing  1/24/2025 2055 by Alycia Decker LPN  Outcome: Progressing     Problem: Skin/Tissue Integrity - Adult  Goal: Skin integrity remains intact  Description: 1.  Monitor for areas of redness and/or skin 
  Problem: Chronic Conditions and Co-morbidities  Goal: Patient's chronic conditions and co-morbidity symptoms are monitored and maintained or improved  1/28/2025 0100 by Gil Mcdonlad RN  Outcome: Progressing  1/27/2025 2047 by Alycia Decker LPN  Outcome: Progressing     Problem: Discharge Planning  Goal: Discharge to home or other facility with appropriate resources  1/28/2025 0100 by Gil Mcdonald RN  Outcome: Progressing  1/27/2025 2047 by Alycia Decker LPN  Outcome: Progressing     Problem: Pain  Goal: Verbalizes/displays adequate comfort level or baseline comfort level  1/28/2025 0100 by Gil Mcdonald RN  Outcome: Progressing  1/27/2025 2047 by Alycia Decker LPN  Outcome: Progressing     Problem: Skin/Tissue Integrity  Goal: Skin integrity remains intact  Description: 1.  Monitor for areas of redness and/or skin breakdown  2.  Assess vascular access sites hourly  3.  Every 4-6 hours minimum:  Change oxygen saturation probe site  4.  Every 4-6 hours:  If on nasal continuous positive airway pressure, respiratory therapy assess nares and determine need for appliance change or resting period  1/28/2025 0100 by Gil Mcdonald RN  Outcome: Progressing  1/27/2025 2047 by Alycia Decker LPN  Outcome: Progressing     Problem: Safety - Adult  Goal: Free from fall injury  1/28/2025 0100 by Gil Mcdonald RN  Outcome: Progressing  1/27/2025 2047 by Alycia Decker LPN  Outcome: Progressing     Problem: Neurosensory - Adult  Goal: Achieves stable or improved neurological status  1/28/2025 0100 by Gil Mcdonald RN  Outcome: Progressing  1/27/2025 2047 by Alycia Decker LPN  Outcome: Progressing  Goal: Achieves maximal functionality and self care  1/28/2025 0100 by Gil Mcdonald RN  Outcome: Progressing  1/27/2025 2047 by Alycia Decker LPN  Outcome: Progressing     Problem: Skin/Tissue Integrity - Adult  Goal: Skin integrity remains intact  Description: 1.  Monitor for 
  Problem: Chronic Conditions and Co-morbidities  Goal: Patient's chronic conditions and co-morbidity symptoms are monitored and maintained or improved  Outcome: Progressing     Problem: Discharge Planning  Goal: Discharge to home or other facility with appropriate resources  Outcome: Progressing     Problem: Pain  Goal: Verbalizes/displays adequate comfort level or baseline comfort level  Outcome: Progressing     Problem: Skin/Tissue Integrity  Goal: Skin integrity remains intact  Description: 1.  Monitor for areas of redness and/or skin breakdown  2.  Assess vascular access sites hourly  3.  Every 4-6 hours minimum:  Change oxygen saturation probe site  4.  Every 4-6 hours:  If on nasal continuous positive airway pressure, respiratory therapy assess nares and determine need for appliance change or resting period  Outcome: Progressing     Problem: Safety - Adult  Goal: Free from fall injury  Outcome: Progressing     Problem: Neurosensory - Adult  Goal: Achieves stable or improved neurological status  Outcome: Progressing  Goal: Achieves maximal functionality and self care  Outcome: Progressing     Problem: Skin/Tissue Integrity - Adult  Goal: Skin integrity remains intact  Description: 1.  Monitor for areas of redness and/or skin breakdown  2.  Assess vascular access sites hourly  3.  Every 4-6 hours minimum:  Change oxygen saturation probe site  4.  Every 4-6 hours:  If on nasal continuous positive airway pressure, respiratory therapy assess nares and determine need for appliance change or resting period  Outcome: Progressing  Goal: Incisions, wounds, or drain sites healing without S/S of infection  Outcome: Progressing  Goal: Oral mucous membranes remain intact  Outcome: Progressing     Problem: Musculoskeletal - Adult  Goal: Return mobility to safest level of function  Outcome: Progressing  Goal: Maintain proper alignment of affected body part  Outcome: Progressing  Goal: Return ADL status to a safe level of 
  Problem: Chronic Conditions and Co-morbidities  Goal: Patient's chronic conditions and co-morbidity symptoms are monitored and maintained or improved  Outcome: Progressing     Problem: Discharge Planning  Goal: Discharge to home or other facility with appropriate resources  Outcome: Progressing     Problem: Pain  Goal: Verbalizes/displays adequate comfort level or baseline comfort level  Outcome: Progressing     Problem: Skin/Tissue Integrity  Goal: Skin integrity remains intact  Description: 1.  Monitor for areas of redness and/or skin breakdown  2.  Assess vascular access sites hourly  3.  Every 4-6 hours minimum:  Change oxygen saturation probe site  4.  Every 4-6 hours:  If on nasal continuous positive airway pressure, respiratory therapy assess nares and determine need for appliance change or resting period  Outcome: Progressing     Problem: Safety - Adult  Goal: Free from fall injury  Outcome: Progressing     Problem: Neurosensory - Adult  Goal: Achieves stable or improved neurological status  Outcome: Progressing  Goal: Achieves maximal functionality and self care  Outcome: Progressing     Problem: Skin/Tissue Integrity - Adult  Goal: Skin integrity remains intact  Description: 1.  Monitor for areas of redness and/or skin breakdown  2.  Assess vascular access sites hourly  3.  Every 4-6 hours minimum:  Change oxygen saturation probe site  4.  Every 4-6 hours:  If on nasal continuous positive airway pressure, respiratory therapy assess nares and determine need for appliance change or resting period  Outcome: Progressing  Goal: Incisions, wounds, or drain sites healing without S/S of infection  Outcome: Progressing  Goal: Oral mucous membranes remain intact  Outcome: Progressing     Problem: Musculoskeletal - Adult  Goal: Return mobility to safest level of function  Outcome: Progressing  Goal: Maintain proper alignment of affected body part  Outcome: Progressing  Goal: Return ADL status to a safe level of 
  Problem: Chronic Conditions and Co-morbidities  Goal: Patient's chronic conditions and co-morbidity symptoms are monitored and maintained or improved  Outcome: Progressing     Problem: Discharge Planning  Goal: Discharge to home or other facility with appropriate resources  Outcome: Progressing     Problem: Pain  Goal: Verbalizes/displays adequate comfort level or baseline comfort level  Outcome: Progressing     Problem: Skin/Tissue Integrity  Goal: Skin integrity remains intact  Description: 1.  Monitor for areas of redness and/or skin breakdown  2.  Assess vascular access sites hourly  3.  Every 4-6 hours minimum:  Change oxygen saturation probe site  4.  Every 4-6 hours:  If on nasal continuous positive airway pressure, respiratory therapy assess nares and determine need for appliance change or resting period  Outcome: Progressing  Flowsheets (Taken 1/25/2025 1936)  Skin Integrity Remains Intact: Monitor for areas of redness and/or skin breakdown     Problem: Safety - Adult  Goal: Free from fall injury  Outcome: Progressing     Problem: Neurosensory - Adult  Goal: Achieves stable or improved neurological status  Outcome: Progressing  Goal: Achieves maximal functionality and self care  Outcome: Progressing     Problem: Skin/Tissue Integrity - Adult  Goal: Skin integrity remains intact  Description: 1.  Monitor for areas of redness and/or skin breakdown  2.  Assess vascular access sites hourly  3.  Every 4-6 hours minimum:  Change oxygen saturation probe site  4.  Every 4-6 hours:  If on nasal continuous positive airway pressure, respiratory therapy assess nares and determine need for appliance change or resting period  Outcome: Progressing  Flowsheets (Taken 1/25/2025 1936)  Skin Integrity Remains Intact: Monitor for areas of redness and/or skin breakdown  Goal: Incisions, wounds, or drain sites healing without S/S of infection  Outcome: Progressing  Goal: Oral mucous membranes remain intact  Outcome: 
  Problem: Chronic Conditions and Co-morbidities  Goal: Patient's chronic conditions and co-morbidity symptoms are monitored and maintained or improved  Outcome: Progressing     Problem: Discharge Planning  Goal: Discharge to home or other facility with appropriate resources  Outcome: Progressing     Problem: Pain  Goal: Verbalizes/displays adequate comfort level or baseline comfort level  Outcome: Progressing  Flowsheets (Taken 1/28/2025 2015)  Verbalizes/displays adequate comfort level or baseline comfort level: Encourage patient to monitor pain and request assistance     Problem: Skin/Tissue Integrity  Goal: Skin integrity remains intact  Description: 1.  Monitor for areas of redness and/or skin breakdown  2.  Assess vascular access sites hourly  3.  Every 4-6 hours minimum:  Change oxygen saturation probe site  4.  Every 4-6 hours:  If on nasal continuous positive airway pressure, respiratory therapy assess nares and determine need for appliance change or resting period  Outcome: Progressing     Problem: Safety - Adult  Goal: Free from fall injury  Outcome: Progressing     Problem: Neurosensory - Adult  Goal: Achieves stable or improved neurological status  Outcome: Progressing  Goal: Achieves maximal functionality and self care  Outcome: Progressing     Problem: Skin/Tissue Integrity - Adult  Goal: Skin integrity remains intact  Description: 1.  Monitor for areas of redness and/or skin breakdown  2.  Assess vascular access sites hourly  3.  Every 4-6 hours minimum:  Change oxygen saturation probe site  4.  Every 4-6 hours:  If on nasal continuous positive airway pressure, respiratory therapy assess nares and determine need for appliance change or resting period  Outcome: Progressing  Goal: Incisions, wounds, or drain sites healing without S/S of infection  Outcome: Progressing  Goal: Oral mucous membranes remain intact  Outcome: Progressing     Problem: Musculoskeletal - Adult  Goal: Return mobility to safest 
  Problem: Occupational Therapy - Adult  Goal: By Discharge: Performs self-care activities at highest level of function for planned discharge setting.  See evaluation for individualized goals.  Description: FUNCTIONAL STATUS PRIOR TO ADMISSION:  Patient admitted from [facility] vs LTC setting. He reports being modified independent for functional mobility using RW, requiring staff assistance for dressing and bathing ADLs.     Receives Help From: Other (Comment) (staff at [facility])      HOME SUPPORT: Patient lived in facility.    Occupational Therapy Goals:  Initiated 1/24/2025  1.  Patient will perform grooming standing at sink with Supervision using RW prn within 7 day(s).  2.  Patient will perform upper body dressing with Supervision within 7 day(s).  3.  Patient will perform lower body dressing with Minimal Assist using RW prn within 7 day(s).  4.  Patient will perform toilet transfers with Supervision using RW prn within 7 day(s).  5.  Patient will perform all aspects of toileting with Supervision using RW prn within 7 day(s).  6.  Patient will participate in upper extremity therapeutic exercise/activities with Supervision for 5 minutes within 7 day(s).        Outcome: Progressing   OCCUPATIONAL THERAPY TREATMENT  Patient: Mehrdad Sims (74 y.o. male)  Date: 1/28/2025  Primary Diagnosis: MARNIE (acute kidney injury) (HCC) [N17.9]  Acute renal failure, unspecified acute renal failure type (HCC) [N17.9]       Precautions:                  Chart, occupational therapy assessment, plan of care, and goals were reviewed.    ASSESSMENT  Patient continues to benefit from skilled OT services and is slowly progressing towards goals. Pt received resting in bed with sister at bedside. Pt alert and oriented x4, agreeable to therapy with encouragement. Pt overall Negro for bed mobility and CGA for scooting demonstrating good-fair sitting balance. Pt required management of nephrostomy tubes and cues for safety with transfer. Pt overall 
  Problem: Pain  Goal: Verbalizes/displays adequate comfort level or baseline comfort level  Outcome: Progressing     Problem: Skin/Tissue Integrity  Goal: Skin integrity remains intact  Description: 1.  Monitor for areas of redness and/or skin breakdown  2.  Assess vascular access sites hourly  3.  Every 4-6 hours minimum:  Change oxygen saturation probe site  4.  Every 4-6 hours:  If on nasal continuous positive airway pressure, respiratory therapy assess nares and determine need for appliance change or resting period  Outcome: Progressing     Problem: Safety - Adult  Goal: Free from fall injury  Outcome: Progressing     
  Problem: Physical Therapy - Adult  Goal: By Discharge: Performs mobility at highest level of function for planned discharge setting.  See evaluation for individualized goals.  Outcome: Progressing   PHYSICAL THERAPY TREATMENT    Patient: Mehrdad Sims (74 y.o. male)  Date: 1/28/2025  Diagnosis: MARNIE (acute kidney injury) ([facility]) [N17.9]  Acute renal failure, unspecified acute renal failure type ([facility]) [N17.9] MARNIE (acute kidney injury) ([facility])      Precautions:                        ASSESSMENT:  Patient continues to benefit from skilled PT services and is slowly progressing towards goals. Patient in semi-davis's upon arrival, reluctant initially for mobility, but agreeable to get up to chair. Able to come to EOB with min assist and additional time. Cues to push self up instead of pulling on therapist.  Good sitting balance EOB.  Sit to stand with CGA and additional time.  Patient stands with flexed posture despite cues.  Patient takes a few steps from bed to chair with rolling walker and CGA x 2.  Up in chair at end of session, visiting with sister.         PLAN:  Patient continues to benefit from skilled intervention to address the above impairments.  Continue treatment per established plan of care.    Recommendations for staff mobility and toileting assistance:  Recommend that staff completes patient mobility with assist x1 using gait belt and rolling walker.      Recommendation for discharge: (in order for the patient to meet his/her long term goals):   Intermittent physical therapy up to 2-3x/week in previous living setting    Other factors to consider for discharge: poor safety awareness and high risk for falls    IF patient discharges home will need the following DME:  rolling walker if doesn't own       SUBJECTIVE:   Patient stated, \"Do YOU hurt anywhere?\"    OBJECTIVE DATA SUMMARY:   Critical Behavior:  Orientation  Overall Orientation Status: Within Normal Limits  Orientation Level: Oriented 
  Problem: Physical Therapy - Adult  Goal: By Discharge: Performs mobility at highest level of function for planned discharge setting.  See evaluation for individualized goals.  Outcome: Progressing  Note: FUNCTIONAL STATUS PRIOR TO ADMISSION: Patient has extensive medicla history as seen in chart with CA and kidney issues including 2 nephrostomy tubes.  He lives in [place] - he could not recall the name. It is unclear what his mobility/functional status is b/c pt was not very cooperative with PT & OT today during evaluation.    HOME SUPPORT PRIOR TO ADMISSION: The patient lived with [facility].  Pt does have family..    Physical Therapy Goals  Initiated 1/24/2025  1.  Patient will move from supine to sit and sit to supine, scoot up and down, and roll side to side in bed with modified independence within 7 day(s).    2.  Patient will perform sit to stand with modified independence within 7 day(s).  3.  Patient will transfer from bed to chair and chair to bed with modified independence using the least restrictive device within 7 day(s).  4.  Patient will ambulate with contact guard assist for 150 feet with the least restrictive device within 7 day(s).      PHYSICAL THERAPY EVALUATION    Patient: Mehrdad Sims (74 y.o. male)  Date: 1/24/2025  Primary Diagnosis: MARNIE (acute kidney injury) ([facility]) [N17.9]  Acute renal failure, unspecified acute renal failure type (HCC) [N17.9]       Precautions:      fall risk, nephrostomy tubes                  ASSESSMENT : Patient is limited by decreased functional mobility, independence in ADLs, ROM, strength, activity tolerance, endurance, safety awareness, command following, balance, and posture. Patient received resting in bed, amenable to session. Patient demonstrates ability to mobilize at overall CGA, pulling up from stabilized RW despite education re: best hand placement for safe transfer. Patient then ambulated 10 ft to look out window and then back to chair.  He was frustrated with 
(cues for nephrostomy tube management)           Balance:     Balance  Sitting - Static: Good (unsupported)  Sitting - Dynamic: Good (unsupported)  Standing - Static: Good;Constant support  Standing - Dynamic: Fair      ADL Intervention:  Deferred due to pts frustration        Activity Tolerance:   Fair   Please refer to the flowsheet for vital signs taken during this treatment.    After treatment:   Patient left in no apparent distress in bed, Call bell within reach, Bed/ chair alarm activated, Side rails x3, and Updated patient's board on functional status and mobility recommendations    COMMUNICATION/EDUCATION:   The patient's plan of care was discussed with: physician and patient         Thank you for this referral.  Sarah Gordon OTR/L  Minutes: 8    
a good likelihood of discharging home versus a facility  Mariajose Scanlon, Victoria Castillo, Farshad Yi, Jessie Green, Wilfredo Yanez, Joshua Scanlon, AM-PAC “6-Clicks” Functional Assessment Scores Predict Acute Care Hospital Discharge Destination, Physical Therapy, Volume 94, Issue 9, 1 September 2014, Pages 0775-8092, [email or web address]    Pain Rating:  Tolerates session well.     Pain Intervention(s):   rest    Activity Tolerance:   Fair     After treatment:   Patient left in no apparent distress sitting up in chair, Call bell within reach, and Bed/ chair alarm activated    COMMUNICATION/EDUCATION:   The patient's plan of care was discussed with: physical therapist and registered nurse    Patient Education  Education Given To: Patient  Education Provided: Role of Therapy;Energy Conservation;Fall Prevention Strategies;Plan of Care;Precautions;Equipment;ADL Adaptive Strategies;Transfer Training  Education Method: Verbal;Demonstration  Barriers to Learning: Readiness to Learn  Education Outcome: Verbalized understanding;Continued education needed    Thank you for this referral.  Betsy Escalona OT  Minutes: 58    Occupational Therapy Evaluation Charge Determination   History Examination Decision-Making   LOW Complexity : Brief history review  LOW Complexity: 1-3 Performance deficits relating to physical, cognitive, or psychosocial skills that result in activity limitations and/or participation restrictions LOW Complexity: No comorbidities that affect functional and  no verbal  or physical assist needed to complete eval tasks   Based on the above components, the patient evaluation is determined to be of the following complexity level: Low

## 2025-01-31 LAB
BACTERIA SPEC CULT: ABNORMAL
CC UR VC: ABNORMAL
SERVICE CMNT-IMP: ABNORMAL

## 2025-01-31 NOTE — PROGRESS NOTES
Name: Mehrdad Sims   MRN: [id number]  : 1950    Nephrology Progress Note    Assessment:  MARNIE- likely pre-renal vs early ATN due to combination of N/V/D, hypotension, SBO and Influenza A. UA shows protein/blood/pyuria (recurrent findings even on previous UA). CT A/P did not show hydronephrosis. Slight retraction of R PCN tube. L PCN in good position.      Improving with conservative treatment-> Cr down from 3.2mg/dl to 2.6-> 2.4mg/dl today     Hypokalemia: better  Metabolic acidosis-mild.  Secondary to MARNIE  CKD-3A: Baseline CR 1.1-1.3.  Secondary to combination of DM, hypertension, recurrent MARNIE, age. sees Nephrologist at [facility]  Hypotension- improved  Metastatic prostate Ca  Hx of b/l Hydronephrosis- s/p b/l PCNT  Hx of PE  Hx of multiple bladder clot irrigation  Influenza A +  SBO- improved  Nausea/vomiting + diarrhea    Plan/Recommendations:  Ct oral Sodium Bicarbonate 650mg PO TID x3 doses again today  IV antibiotics  As needed antiemetics  Tamiflu   Holding home BP meds  Monitor urine output  Avoid nephrotoxins  Daily labs    Subjective:  Sleeping. UOP 1.9L yesterday. Discussed case with RN  No events overnight.     ROS:   No nausea, no vomiting  No chest pain, no shortness of breath    Exam:  BP (!) 141/81   Pulse 67   Temp 98.2 °F (36.8 °C) (Axillary)   Resp 16   Ht 1.702 m (5' 7\")   Wt 90.7 kg (200 lb)   SpO2 99%   BMI 31.32 kg/m²     Gen: NAD/Sleeping  HEENT:  AT/NC  Lungs/Chest wall:  No respiratory distress  Abdomen/: . B/L PCN  Ext: No peripheral edema    Current Facility-Administered Medications   Medication Dose Route Frequency Provider Last Rate Last Admin    sodium bicarbonate tablet 650 mg  650 mg Oral TID Lucio Portillo MD        piperacillin-tazobactam (ZOSYN) 3,375 mg in sodium chloride 0.9 % 50 mL IVPB (mini-bag)  3,375 mg IntraVENous Q8H 
                                                                                                                                              Name: Mehrdad Sims   MRN: [id number]  : 1950    Nephrology Progress Note    Assessment:  MARNIE- likely pre-renal vs early ATN due to combination of N/V/D, hypotension, SBO and Influenza A. UA shows protein/blood/pyuria (recurrent findings even on previous UA). CT A/P did not show hydronephrosis. Slight retraction of R PCN tube. L PCN in good position.           Hypokalemia: better  Metabolic acidosis-mild.  Secondary to MARNIE  CKD-3A: Baseline CR 1.1-1.3.  Secondary to combination of DM, hypertension, recurrent MARNIE, age. sees Nephrologist at [facility]  Hypotension- improved  Metastatic prostate Ca  Hx of b/l Hydronephrosis- s/p b/l PCNT  Hx of PE  Hx of multiple bladder clot irrigation  Influenza A +  SBO- improved  Nausea/vomiting + diarrhea    Plan/Recommendations:    Creatinine about the same  today.    Ct oral Sodium Bicarbonate 650mg PO TID     Monitor urine output    Avoid nephrotoxins    Daily labs    Subjective:      No complaint.      Exam:  BP (!) 166/86   Pulse 57   Temp 98.4 °F (36.9 °C) (Oral)   Resp 18   Ht 1.702 m (5' 7\")   Wt 90.7 kg (200 lb)   SpO2 95%   BMI 31.32 kg/m²     Gen: NAD/Sleeping    Ext: No peripheral edema    Current Facility-Administered Medications   Medication Dose Route Frequency Provider Last Rate Last Admin    heparin 2 units/mL solution in 0.9% sodium chloride  200 mL Irrigation Once Dolly Kirby MD   Stopped at 25 1156    sodium bicarbonate tablet 650 mg  650 mg Oral TID Pop Byrd MD   650 mg at 25 0922    amLODIPine (NORVASC) tablet 2.5 mg  2.5 mg Oral Daily Virginia Frausto MD   2.5 mg at 25 0922    piperacillin-tazobactam (ZOSYN) 3,375 mg in sodium chloride 0.9 % 50 mL IVPB (mini-bag)  3,375 mg IntraVENous Q8H Meño Cronin MD 12.5 mL/hr at 25 0619 3,375 mg at 25 0619    sodium 
                                                                                                                                              Name: Mehrdad Sims   MRN: [id number]  : 1950    Nephrology Progress Note    Assessment:  MARNIE- likely pre-renal vs early ATN due to combination of N/V/D, hypotension, SBO and Influenza A. UA shows protein/blood/pyuria (recurrent findings even on previous UA). CT A/P did not show hydronephrosis. Slight retraction of R PCN tube. L PCN in good position.           Hypokalemia: better  Metabolic acidosis-mild.  Secondary to MARNIE  CKD-3A: Baseline CR 1.1-1.3.  Secondary to combination of DM, hypertension, recurrent MARNIE, age. sees Nephrologist at [facility]  Hypotension- improved  Metastatic prostate Ca  Hx of b/l Hydronephrosis- s/p b/l PCNT  Hx of PE  Hx of multiple bladder clot irrigation  Influenza A +  SBO- improved  Nausea/vomiting + diarrhea    Plan/Recommendations:    Creatinine a little better today.    Ct oral Sodium Bicarbonate 650mg PO TID     Monitor urine output    Avoid nephrotoxins    Daily labs    Subjective:      No complaint.      Exam:  BP (!) 152/89   Pulse 63   Temp 98.2 °F (36.8 °C) (Oral)   Resp 19   Ht 1.702 m (5' 7\")   Wt 90.7 kg (200 lb)   SpO2 98%   BMI 31.32 kg/m²     Gen: NAD/Sleeping    Ext: No peripheral edema    Current Facility-Administered Medications   Medication Dose Route Frequency Provider Last Rate Last Admin    heparin 2 units/mL solution in 0.9% sodium chloride  200 mL Irrigation Once Dolly Kirby MD   Stopped at 25 1156    piperacillin-tazobactam (ZOSYN) 3,375 mg in sodium chloride 0.9 % 50 mL IVPB (mini-bag)  3,375 mg IntraVENous Q8H Meño Cronin MD   Paused at 25 1117    sodium chloride flush 0.9 % injection 5-40 mL  5-40 mL IntraVENous 2 times per day Korin Maurer ACNP   10 mL at 25 0924    sodium chloride flush 0.9 % injection 5-40 mL  5-40 mL IntraVENous PRN Korin Maurer ACNP        0.9 % sodium 
                                                                                                                                              Name: Mehrdad Sims   MRN: [id number]  : 1950    Nephrology Progress Note    Assessment:  MARNIE- likely pre-renal vs early ATN due to combination of N/V/D, hypotension, SBO and Influenza A. UA shows protein/blood/pyuria (recurrent findings even on previous UA). CT A/P did not show hydronephrosis. Slight retraction of R PCN tube. L PCN in good position.      Improving with conservative treatment-> Cr down from 3.2mg/dl to 2.6-> 2.4mg/dl today     Hypokalemia: better  Metabolic acidosis-mild.  Secondary to MARNIE  CKD-3A: Baseline CR 1.1-1.3.  Secondary to combination of DM, hypertension, recurrent MARNIE, age. sees Nephrologist at [facility]  Hypotension- improved  Metastatic prostate Ca  Hx of b/l Hydronephrosis- s/p b/l PCNT  Hx of PE  Hx of multiple bladder clot irrigation  Influenza A +  SBO- improved  Nausea/vomiting + diarrhea    Plan/Recommendations:  Start oral Sodium Bicarbonate 650mg PO TID x3 doses today  Push fluid intake-> off IVF since yesterday  IV antibiotics  As needed antiemetics  Tamiflu  Holding home BP meds  Monitor urine output  Avoid nephrotoxins  Daily labs    Subjective:  No new complaints. Laying in bed. Reports good intake. UOP 2.3L yesterday    ROS:   No nausea, no vomiting  No chest pain, no shortness of breath    Exam:  /68   Pulse 66   Temp 97.3 °F (36.3 °C) (Oral)   Resp 16   Ht 1.702 m (5' 7\")   Wt 90.7 kg (200 lb)   SpO2 98%   BMI 31.32 kg/m²     Gen: NAD  HEENT:  AT/NC  Lungs/Chest wall: Clear. Equal BS. No respiratory distress  Cardiovascular: Regular rate, normal rhythm.   Abdomen/: Soft, NT,  BS+  Ext: No peripheral edema  CNS: alert and awake.     Current Facility-Administered Medications   Medication Dose Route Frequency Provider Last Rate Last Admin    sodium bicarbonate tablet 650 mg  650 mg Oral TID Lucio Portillo MD        
                                                                                                                                              Name: Mehrdad Sims   MRN: [id number]  : 1950    Nephrology Progress Note    Assessment:  MARNIE- likely pre-renal vs early ATN due to combination of N/V/D, hypotension, SBO and Influenza A. UA shows protein/blood/pyuria (recurrent findings even on previous UA). CT A/P did not show hydronephrosis. Slight retraction of R PCN tube. L PCN in good position.           Hypokalemia: better  Metabolic acidosis-mild.  Secondary to MARNIE  CKD-3A: Baseline CR 1.1-1.3.  Secondary to combination of DM, hypertension, recurrent MARNIE, age. sees Nephrologist at [facility]  Hypotension- improved  Metastatic prostate Ca  Hx of b/l Hydronephrosis- s/p b/l PCNT  Hx of PE  Hx of multiple bladder clot irrigation  Influenza A +  SBO- improved  Nausea/vomiting + diarrhea    Plan/Recommendations:    Creatinine about the same  today.    Ct oral Sodium Bicarbonate 650mg PO TID     Monitor urine output    Avoid nephrotoxins    Daily labs    Subjective:      No complaint.      Exam:  BP (!) 170/89   Pulse 54   Temp 98.1 °F (36.7 °C)   Resp 16   Ht 1.702 m (5' 7\")   Wt 90.7 kg (200 lb)   SpO2 97%   BMI 31.32 kg/m²     Gen: NAD/Sleeping    Ext: No peripheral edema    Current Facility-Administered Medications   Medication Dose Route Frequency Provider Last Rate Last Admin    heparin 2 units/mL solution in 0.9% sodium chloride  200 mL Irrigation Once Dolly Kirby MD   Stopped at 25 1156    sodium bicarbonate tablet 650 mg  650 mg Oral TID Pop Byrd MD   650 mg at 25 1044    amLODIPine (NORVASC) tablet 2.5 mg  2.5 mg Oral Daily Virginia Frausto MD   2.5 mg at 25 1044    piperacillin-tazobactam (ZOSYN) 3,375 mg in sodium chloride 0.9 % 50 mL IVPB (mini-bag)  3,375 mg IntraVENous Q8H Meño Cronin MD   Stopped at 25 1045    sodium chloride flush 0.9 % injection 5-40 
      Hospitalist Progress Note    NAME:   Mehrdad Sims   : 1950   MRN: [id number]     Date/Time: 2025 5:22 PM  Patient PCP: Felisha Hernadez APRN - NP    Estimated discharge date:   Barriers: Lab updating sensitivities      Assessment / Plan:    Small bowel obstruction  Diarrhea     CT Abdomen with Slight retraction of right nephrostomy tube. No hydronephrosis at this time. Consider adjustment and advancement. Left nephrostomy tube is in good position.  -status post IR nephrostomy drain replacement     Patient was initially n.p.o., seen by surgery who spoke with power of , patient is high risk of surgery  As patient is having bowel function, surgery placed a diet order.  Family does not want patient to have surgery.  his hernia is currently reduced   Patient endorses bowel movement   Called lab to get susceptibility of cultures if possible-has been on Zosyn since      Weakness   Hypotension   Influenza A +ve   Blood pressure improved, status post IV fluid  Positive for flu, continue with Tamiflu  Continue with Zosyn  Blood culture negative to date  PT OT  -CXR               IMPRESSION:  1. Mild bilateral increased interstitial markings.   2. Osseous metastatic disease.      MARNIE on CKD   Cr 3.27  baseline around 1.4   History of obstructive nephropathy  10 bilateral nephrostomy tubes      Hypokalemia  Diarrhea   No further loose stools, therefore we can discontinue C. difficile order  -follow lytes replete as needed   Potassium to be repleted     Metastatic prostate adenocarcinoma  involving lymph nodes and bone  - ADT- Eligard 45 mg next dose 2025  -continue Abiraterone and prednisone   -resume PTA Flomax   -obtain PSA   -hx of massive PE - apixaban stopped due to anemia and hematuria- VTE -ASA only   -continue Vit D / CA   -ck vit D levels     Anemia   -continue ferrous sulfate   -trend cbc; transfuse if <7     HTN  HLD  -vitals per routine   hold all bp meds for now 
      Hospitalist Progress Note    NAME:   Mehrdad Sims   : 1950   MRN: [id number]     Date/Time: 2025 5:58 PM  Patient PCP: Felisha Hernadez APRN - NP    Estimated discharge date:   Barriers: Lab updating sensitivities-7 days total abx tomorrow as well, so likely DC tomorrow      Assessment / Plan:    Small bowel obstruction  Diarrhea     CT Abdomen with Slight retraction of right nephrostomy tube. No hydronephrosis at this time. Consider adjustment and advancement. Left nephrostomy tube is in good position.  -status post IR nephrostomy drain replacement     Patient was initially n.p.o., seen by surgery who spoke with power of , patient is high risk of surgery  As patient is having bowel function, surgery placed a diet order.  Family does not want patient to have surgery.  his hernia is currently reduced   Patient endorses bowel movement   Called lab to get susceptibility of cultures if possible-has been on Zosyn since      Weakness   Hypotension   Influenza A +ve   Blood pressure improved, status post IV fluid  Positive for flu, continue with Tamiflu  Continue with Zosyn  Blood culture negative to date  PT OT  -CXR               IMPRESSION:  1. Mild bilateral increased interstitial markings.   2. Osseous metastatic disease.      MARNIE on CKD   Cr 3.27  baseline around 1.4   History of obstructive nephropathy  10 bilateral nephrostomy tubes      Hypokalemia  Diarrhea   No further loose stools, therefore we can discontinue C. difficile order  -follow lytes replete as needed   Potassium to be repleted     Metastatic prostate adenocarcinoma  involving lymph nodes and bone  - ADT- Eligard 45 mg next dose 2025  -continue Abiraterone and prednisone   -resume PTA Flomax   -obtain PSA   -hx of massive PE - apixaban stopped due to anemia and hematuria- VTE -ASA only   -continue Vit D / CA   -ck vit D levels     Anemia   -continue ferrous sulfate   -trend cbc; transfuse if <7   
      Hospitalist Progress Note    NAME:   Mehrdad Sims   : 1950   MRN: [id number]     Date/Time: 2025 5:33 PM  Patient PCP: Felisha Hernadez APRN - NP    Estimated discharge date:  Barriers:       Assessment / Plan:    Expand All Collapse All        Hospitalist Admission Note     NAME:              Mehrdad Sims   :   1950   MRN:   [id number]      Date/Time: 2025 4:54 PM     Patient PCP: Felisha Hernadez APRN - NP     ______________________________________________________________________  Given the patient's current clinical presentation, I have a high level of concern for decompensation if discharged from the emergency department.  Complex decision making was performed, which includes reviewing the patient's available past medical records, laboratory results, and x-ray films.        My assessment of this patient's clinical condition and my plan of care is as follows.     Assessment / Plan:     Small bowel obstruction  Diarrhea   75 YO presents to the ED for increased weakness, diarrhea along with nauseas and emesis and over the past two days. EMS noted hypotension on arrival BP 80's. He resides in a [facility].  Has bilat nephrostomy tubes 2/2 prostate adenocarcinoma  dx 2019 s/p XRT and 2 years of ADT that ended in  2/2 massive PE, currently on Abiraterone         CT ABD               IMPRESSION:  1. Small bowel obstruction with transition at small bowel containing  supraumbilical ventral hernia. Given the mural thickening of the small bowel loops, patient is at risk for incarceration. No pneumoperitoneum at this time.  2. Positive response to therapy. Decreased size of the mass in the urinary  bladder and prostate gland.  3. Gas in the urinary bladder due to recent catheterization, infection, or  fistula with adjacent bowel loops.  4. Decreased density of osseous metastatic disease.  5. Slight retraction of right nephrostomy tube. No hydronephrosis at this time.  Consider adjustment and 
      Hospitalist Progress Note    NAME:   Mehrdad Sims   : 1950   MRN: [id number]     Date/Time: 2025 5:31 PM  Patient PCP: Felisha Hernadez APRN - NP    Estimated discharge date:   Barriers: Lab updating sensitivities      Assessment / Plan:    Small bowel obstruction  Diarrhea     CT Abdomen with Slight retraction of right nephrostomy tube. No hydronephrosis at this time. Consider adjustment and advancement. Left nephrostomy tube is in good position.  -status post IR nephrostomy drain replacement     Patient was initially n.p.o., seen by surgery who spoke with power of , patient is high risk of surgery  As patient is having bowel function, surgery placed a diet order.  Family does not want patient to have surgery.  his hernia is currently reduced   Patient endorses bowel movement   Will need to call lab to get susceptibility of cultures if possible-has been on Zosyn since      Weakness   Hypotension   Influenza A +ve   Blood pressure improved, status post IV fluid  Positive for flu, continue with Tamiflu  Continue with Zosyn  Blood culture negative to date  PT OT  -CXR               IMPRESSION:  1. Mild bilateral increased interstitial markings.   2. Osseous metastatic disease.      MARNIE on CKD   Cr 3.27  baseline around 1.4   History of obstructive nephropathy  10 bilateral nephrostomy tubes      Hypokalemia  Diarrhea   No further loose stools, therefore we can discontinue C. difficile order  -follow lytes replete as needed   Potassium to be repleted     Metastatic prostate adenocarcinoma  involving lymph nodes and bone  - ADT- Eligard 45 mg next dose 2025  -continue Abiraterone and prednisone   -resume PTA Flomax   -obtain PSA   -hx of massive PE - apixaban stopped due to anemia and hematuria- VTE -ASA only   -continue Vit D / CA   -ck vit D levels     Anemia   -continue ferrous sulfate   -trend cbc; transfuse if <7     HTN  HLD  -vitals per routine   hold all bp meds for 
      Hospitalist Progress Note    NAME:   Mehrdad Sims   : 1950   MRN: [id number]     Date/Time: 2025 2:59 PM  Patient PCP: Felisha Hernadez APRN - NP    Estimated discharge date:  Barriers: IR consult to adjust nephrostomy tube      Assessment / Plan:  Small bowel obstruction  Diarrhea   73 YO presents to the ED for increased weakness, diarrhea along with nauseas and emesis and over the past two days. EMS noted hypotension on arrival BP 80's. He resides in a [facility].  Has bilat nephrostomy tubes 2/2 prostate adenocarcinoma  dx  s/p XRT and 2 years of ADT that ended in  2/2 massive PE, currently on Abiraterone         CT ABD               IMPRESSION:  1. Small bowel obstruction with transition at small bowel containing  supraumbilical ventral hernia. Given the mural thickening of the small bowel loops, patient is at risk for incarceration. No pneumoperitoneum at this time.  2. Positive response to therapy. Decreased size of the mass in the urinary  bladder and prostate gland.  3. Gas in the urinary bladder due to recent catheterization, infection, or  fistula with adjacent bowel loops.  4. Decreased density of osseous metastatic disease.  5. Slight retraction of right nephrostomy tube. No hydronephrosis at this time.  Consider adjustment and advancement. Left nephrostomy tube is in good position.     Patient was initially n.p.o., seen by surgery who spoke with power of , patient is high risk of surgery  As patient is having bowel function, surgery placed a diet order.  Family does not want patient to have surgery  his hernia is currently reduced   Pt tolerating diet but no BM yet , has bowel sounds    : IR consulted to adjust the right nephrostomy tube.  Will keep n.p.o. after midnight     Weakness   Hypotension   Influenza A +ve   Blood pressure improved, status post IV fluid  Positive for flu, continue with Tamiflu  Continue with Zosyn  Blood culture negative to date  PT 
     Nutrition Note    MST triggered for unsure of wt loss and yes to poor appetite.  Per EMR review wt gain noted over the last 12 months.  He reports good intake to hospitalist.  Will defer nutrition referral, please reconsult prn otherwise pt will be assessed per LOS policy.   Patient Vitals for the past 120 hrs:   PO Meals Eaten (%)   01/24/25 2346 51 - 75%     Wt Readings from Last 4 Encounters:   01/23/25 90.7 kg (200 lb)   04/04/24 82.2 kg (181 lb 3.5 oz)   01/04/24 65.5 kg (144 lb 6.4 oz)   06/15/23 100.7 kg (222 lb)         Electronically signed by Beatrice Martinez RD, [facility] on 1/25/25 at 1:44 PM EST    Contact: ext [phone]    
  Physician Progress Note      PATIENT:               TORIBIO RAMIREZ  CSN #:                  [id number]  :                       1950  ADMIT DATE:       2025 1:42 PM  DISCH DATE:        2025 6:08 PM  RESPONDING  PROVIDER #:        Virginia Frausto MD          QUERY TEXT:    Pt admitted  with MARNIE and Influenza, noted to have UTI documented on .    Please document in progress notes and discharge summary the present on   admission status of UTI:    The medical record reflects the following:  Risk Factors: bilat nephrostomy tubes    Clinical Indicators:   h&p: bilat nephrostomy tubes  left - mucoid blood tinged urine , right   clear   urine Cx: Ecoli, Klebsiella pneumoniae, providencia rettgeri    Treatment: UA, UCx, IV Zosyn  Options provided:  -- Yes, UTI was present at the time of the order to admit to the hospital  -- No, UTI was not present on admission and developed during the inpatient   stay  -- Other - I will add my own diagnosis  -- Disagree - Not applicable / Not valid  -- Disagree - Clinically unable to determine / Unknown  -- Refer to Clinical Documentation Reviewer    PROVIDER RESPONSE TEXT:    Yes, UTI was present at the time of the order to admit to the hospital.    Query created by: HERMAN SOLORZANO on 2025 12:28 PM      QUERY TEXT:    Pt admitted with UTI, noted to have malpositioned nephrostomy catheters on CT   s/p Bilateral nephrostomy exchange on . Please document in the progress   notes and discharge summary if you are evaluating and/or treating any of the   following:    The medical record reflects the following:  Risk Factors: Malpositioned nephrostomy catheters ( Dr Kirby IR)    Clinical Indicators:  CT Abdomen: Slight retraction of right nephrostomy tube    His PCN tubes, left noted with mucoid blood tinged urine, right is clear.   Urology consulted due needing adjustment to the right PCN tube per CT.  (Dr Frausto)    Treatment: CT Abd, status 
.End of Shift Note    Bedside shift change report given to REMIGIO Corea (oncoming nurse) by Dilcia Gresham RN (offgoing nurse).  Report included the following information SBAR, Intake/Output, and MAR    Shift worked:  7a-7p     Shift summary and any significant changes:     Pt tolerated care fairly well today. Meds given per MAR, no PRNs given. Pt had no complaints of pain today. Pt worked with PT/OT and ambulated to chair. Pt sat in chair till lunch and ambulated back to bed with RN. Pt tolerated well. Pt is resting in bed watching tv. Caring rounds completed.      Concerns for physician to address:       Zone phone for oncoming shift:          Activity:  Level of Assistance: Moderate assist, patient does 50-74%  Number times ambulated in hallways past shift: 0  Number of times OOB to chair past shift: 1    Cardiac:   Cardiac Monitoring: Yes      Cardiac Rhythm: Sinus rhythm    Access:  Current line(s): PIV     Genitourinary:   Urinary Status: Draining    Respiratory:   O2 Device: None (Room air)  Chronic home O2 use?: NO  Incentive spirometer at bedside: YES    GI:  Last BM (including prior to admit): 01/26/25  Current diet:  ADULT DIET; Regular  Passing flatus: YES    Pain Management:   Patient states pain is manageable on current regimen: YES    Skin:  Vinh Scale Score: 18  Interventions: Wound Offloading (Prevention Methods): Turning, Pillows, Repositioning    Patient Safety:  Fall Risk: Nursing Judgement-Fall Risk High(Add Comments): Yes  Fall Risk Interventions  Nursing Judgement-Fall Risk High(Add Comments): Yes  Toilet Every 2 Hours-In Advance of Need: Yes  Hourly Visual Checks: In bed, Awake, Quiet  Fall Visual Posted: Armband, Socks, Fall sign posted  Room Door Open: Deferred for droplet isolation  Alarm On: Bed  Patient Moved Closer to Nursing Station: No    Active Consults:   IP CONSULT TO PHARMACY  IP CONSULT TO NEPHROLOGY  IP CONSULT TO GENERAL SURGERY    Length of Stay:  Expected LOS: 
.End of Shift Note    Bedside shift change report given to REMIGIO Denis (oncoming nurse) by DANIELLE MCINTOSH RN (offgoing nurse).  Report included the following information SBAR, Intake/Output, and MAR    Shift worked:  7a-7p     Shift summary and any significant changes:     Uneventful night,  Meds given as ordered . Pt had no complaints of pain today. Pt is resting in bed watching tv. Caring rounds completed. Vitals stable, call bell, phone and bedside table within reach of patient, able to make needs known.      Concerns for physician to address:       Zone phone for oncoming shift:          Activity:  Level of Assistance: Moderate assist, patient does 50-74%  Number times ambulated in hallways past shift: 0  Number of times OOB to chair past shift: 1    Cardiac:   Cardiac Monitoring: Yes      Cardiac Rhythm: Sinus rhythm    Access:  Current line(s): PIV     Genitourinary:   Urinary Status: Draining    Respiratory:   O2 Device: None (Room air)  Chronic home O2 use?: NO  Incentive spirometer at bedside: YES    GI:  Last BM (including prior to admit): 01/28/25  Current diet:  ADULT DIET; Regular  Passing flatus: YES    Pain Management:   Patient states pain is manageable on current regimen: YES    Skin:  Vinh Scale Score: 18  Interventions: Wound Offloading (Prevention Methods): Repositioning    Patient Safety:  Fall Risk: Nursing Judgement-Fall Risk High(Add Comments): Yes  Fall Risk Interventions  Nursing Judgement-Fall Risk High(Add Comments): Yes  Toilet Every 2 Hours-In Advance of Need: Yes  Hourly Visual Checks: In bed  Fall Visual Posted: Fall sign posted  Room Door Open: Yes  Alarm On: Bed  Patient Moved Closer to Nursing Station: Yes    Active Consults:   IP CONSULT TO PHARMACY  IP CONSULT TO NEPHROLOGY  IP CONSULT TO GENERAL SURGERY    Length of Stay:  Expected LOS: 6  Actual LOS: 6    DANIELLE MCINTOSH RN                           
.End of Shift Note    Bedside shift change report given to Trevon FLOOD RN (oncoming nurse) by Meka Portillo, REMIGIO (offgoing nurse).  Report included the following information SBAR, Intake/Output, and MAR    Shift worked:  7a-7p     Shift summary and any significant changes:     Uneventful Day,  Meds given as ordered . Pt had no complaints of pain today. Caring rounds completed. Vitals stable, call bell,  bedside table within reach of patient, able to make needs known.      Concerns for physician to address:       Zone phone for oncoming shift:          Activity:  Level of Assistance: Moderate assist, patient does 50-74%  Number times ambulated in hallways past shift: 0  Number of times OOB to chair past shift: 1    Cardiac:   Cardiac Monitoring: Yes      Cardiac Rhythm: Sinus rhythm    Access:  Current line(s): PIV     Genitourinary:   Urinary Status: Draining    Respiratory:   O2 Device: None (Room air)  Chronic home O2 use?: NO  Incentive spirometer at bedside: YES    GI:  Last BM (including prior to admit): 01/29/25  Current diet:  ADULT DIET; Regular  Passing flatus: YES    Pain Management:   Patient states pain is manageable on current regimen: YES    Skin:  Ivnh Scale Score: 18  Interventions: Wound Offloading (Prevention Methods): Repositioning, Pillows, Turning    Patient Safety:  Fall Risk: Nursing Judgement-Fall Risk High(Add Comments): Yes  Fall Risk Interventions  Nursing Judgement-Fall Risk High(Add Comments): Yes  Toilet Every 2 Hours-In Advance of Need: Yes  Hourly Visual Checks: In bed, Awake  Fall Visual Posted: Fall sign posted  Room Door Open: Deferred for droplet isolation  Alarm On: Bed  Patient Moved Closer to Nursing Station: No    Active Consults:   IP CONSULT TO PHARMACY  IP CONSULT TO NEPHROLOGY  IP CONSULT TO GENERAL SURGERY    Length of Stay:  Expected LOS: 7  Actual LOS: 6    Meka Portillo, RN                           
ATSP for MARNIE  Chart revd  Consult to follow     Jorge Portillo MD   [facility] 
Chart reviewed for IR consult  
Chart reviewed for impending right nephrostomy tube placement.  
End of Shift Note    Bedside shift change report given to Alycia CHURCHILL (oncoming nurse) by Jaswinder Fuentes RN (offgoing nurse).  Report included the following information SBAR, Kardex, MAR, Recent Results, Cardiac Rhythm No tele, and Quality Measures    Shift worked:  1152-4324     Shift summary and any significant changes:    New admission. Remains NPO; SBO. No c/o N/V. Contact/Droplet precautions in place.     0730 No other concerns at this time. Shift report given.      Concerns for physician to address:  No concerns at this time      Zone phone for oncoming shift:   1156       Activity:  Level of Assistance: Moderate assist, patient does 50-74%  Number times ambulated in hallways past shift:   Number of times OOB to chair past shift:     Cardiac:   Cardiac Monitoring: No      Cardiac Rhythm: Sinus rhythm    Access:  Current line(s): PIV    Genitourinary:   Urinary Status:  (Nephrostomy tube bilaterally)    Respiratory:   O2 Device: None (Room air)  Chronic home O2 use?: NO  Incentive spirometer at bedside: NO    GI:  Last BM (including prior to admit): 01/23/25  Current diet:  Diet NPO  Passing flatus: YES    Pain Management:   Patient states pain is manageable on current regimen: N/A    Skin:  Vinh Scale Score: 15  Interventions: Wound Offloading (Prevention Methods): Bed, pressure redistribution/air, Repositioning    Patient Safety:  Fall Risk: Nursing Judgement-Fall Risk High(Add Comments): Yes  Fall Risk Interventions  Nursing Judgement-Fall Risk High(Add Comments): Yes  Toilet Every 2 Hours-In Advance of Need: Yes  Hourly Visual Checks: Awake  Fall Visual Posted: Armband, Socks, Fall sign posted  Room Door Open: Yes  Alarm On: Bed  Patient Moved Closer to Nursing Station: No    Active Consults:   IP CONSULT TO PHARMACY  IP CONSULT TO NEPHROLOGY  IP CONSULT TO UROLOGY  IP CONSULT TO GENERAL SURGERY    Length of Stay:  Expected LOS: 4  Actual LOS: 1    Jaswinder Fuentes RN      
End of Shift Note    Bedside shift change report given to Alycia CHURCHILL (oncoming nurse) by Trevon Dominguez RN (offgoing nurse).  Report included the following information Kardex, Intake/Output, MAR, Recent Results, and Quality Measures    Shift worked:  3616-7083     Shift summary and any significant changes:     Seen patient on bed, not in distress,no complain of pain, with bilateral nephrostomy tube with good urine output. Due medications given, NPO instructions done, patient verbalized understanding, patient needs attended, No complain as of this time     Concerns for physician to address:  none     Zone phone for oncoming shift:   1156       Activity:  Level of Assistance: Moderate assist, patient does 50-74%  Number times ambulated in hallways past shift: 0  Number of times OOB to chair past shift: 0    Cardiac:   Cardiac Monitoring: No      Cardiac Rhythm: Sinus rhythm    Access:  Current line(s): PIV     Genitourinary:   Urinary Status: Draining    Respiratory:   O2 Device: None (Room air)  Chronic home O2 use?: NO  Incentive spirometer at bedside: NO    GI:  Last BM (including prior to admit): 01/25/25  Current diet:  Diet NPO Exceptions are: Sips of Water with Meds  Passing flatus: YES    Pain Management:   Patient states pain is manageable on current regimen: YES    Skin:  Vinh Scale Score: 18  Interventions: Wound Offloading (Prevention Methods): Repositioning, Pillows, Turning    Patient Safety:  Fall Risk: Nursing Judgement-Fall Risk High(Add Comments): Yes  Fall Risk Interventions  Nursing Judgement-Fall Risk High(Add Comments): Yes  Toilet Every 2 Hours-In Advance of Need: Yes  Hourly Visual Checks: In bed, Awake  Fall Visual Posted: Armband, Socks, Fall sign posted  Room Door Open: Deferred for droplet isolation  Alarm On: Bed  Patient Moved Closer to Nursing Station: No    Active Consults:   IP CONSULT TO PHARMACY  IP CONSULT TO NEPHROLOGY  IP CONSULT TO GENERAL SURGERY  IP CONSULT TO INTERVENTIONAL 
End of Shift Note    Bedside shift change report given to Ivan CHURCHILL (oncoming nurse) by Trevon Dominguez RN (offgoing nurse).  Report included the following information SBAR, Kardex, Intake/Output, MAR, and Quality Measures    Shift worked:  0648-4343     Shift summary and any significant changes:      Seen patient on bed, not in distress,no complain of pain, with bilateral nephrostomy tube with good urine output. IV removed aseptically due to leaking seferino reinserted at Left forearm.Due medications given, hourly rounding done, Patient needs attended.No complain as of this time        Concerns for physician to address:  none     Zone phone for oncoming shift:   1156       Activity:  Level of Assistance: Moderate assist, patient does 50-74%  Number times ambulated in hallways past shift: 0  Number of times OOB to chair past shift: 2    Cardiac:   Cardiac Monitoring: Yes      Cardiac Rhythm: Sinus rhythm    Access:  Current line(s): PIV     Genitourinary:   Urinary Status: Draining    Respiratory:   O2 Device: None (Room air)  Chronic home O2 use?: NO  Incentive spirometer at bedside: NO    GI:  Last BM (including prior to admit): 01/29/25  Current diet:  ADULT DIET; Regular  Passing flatus: YES    Pain Management:   Patient states pain is manageable on current regimen: YES    Skin:  Vinh Scale Score: 18  Interventions: Wound Offloading (Prevention Methods): Pillows, Repositioning, Turning    Patient Safety:  Fall Risk: Nursing Judgement-Fall Risk High(Add Comments): Yes  Fall Risk Interventions  Nursing Judgement-Fall Risk High(Add Comments): Yes  Toilet Every 2 Hours-In Advance of Need: Yes  Hourly Visual Checks: In bed, Awake  Fall Visual Posted: Fall sign posted  Room Door Open: Deferred for droplet isolation  Alarm On: Bed  Patient Moved Closer to Nursing Station: No    Active Consults:   IP CONSULT TO PHARMACY  IP CONSULT TO NEPHROLOGY  IP CONSULT TO GENERAL SURGERY    Length of Stay:  Expected LOS: 7  Actual 
End of Shift Note    Bedside shift change report given to KERLINE Moran (oncoming nurse) by Pop Blair RN (offgoing nurse).  Report included the following information SBAR, Kardex, Intake/Output, MAR, Recent Results, and Quality Measures    Shift worked:  0031-5653     Shift summary and any significant changes:     Pt slept most of the night. No complaints of pain. No other concerns during shift change.     Concerns for physician to address:  Hemoglobin dropped to 7.6 today from 8.8 yesterday      Zone phone for oncoming shift:   1153       Activity:  Level of Assistance: Moderate assist, patient does 50-74%  Number times ambulated in hallways past shift: 0  Number of times OOB to chair past shift: 0    Cardiac:   Cardiac Monitoring: No      Cardiac Rhythm: Sinus rhythm    Access:  Current line(s): PIV     Genitourinary:   Urinary Status:  (bilateral nephrostomy tube)    Respiratory:   O2 Device: None (Room air)  Chronic home O2 use?: NO  Incentive spirometer at bedside: NO    GI:  Last BM (including prior to admit): 01/24/25  Current diet:  ADULT DIET; Regular  Passing flatus: YES    Pain Management:   Patient states pain is manageable on current regimen: N/A    Skin:  Vinh Scale Score: 18  Interventions: Wound Offloading (Prevention Methods): Repositioning, Pillows, Turning, Elevate heels    Patient Safety:  Fall Risk: Nursing Judgement-Fall Risk High(Add Comments): Yes  Fall Risk Interventions  Nursing Judgement-Fall Risk High(Add Comments): Yes  Toilet Every 2 Hours-In Advance of Need: Yes  Hourly Visual Checks: In bed, Awake  Fall Visual Posted: Armband, Socks, Fall sign posted  Room Door Open: Deferred for droplet isolation  Alarm On: Bed  Patient Moved Closer to Nursing Station: No    Active Consults:   IP CONSULT TO PHARMACY  IP CONSULT TO NEPHROLOGY  IP CONSULT TO GENERAL SURGERY  IP CONSULT TO INTERVENTIONAL RADIOLOGY    Length of Stay:  Expected LOS: 4  Actual LOS: 2    Pop Blair 
End of Shift Note    Bedside shift change report given to Luisa (oncoming nurse) by Roxi Wright RN (offgoing nurse).  Report included the following information SBAR, Kardex, Intake/Output, MAR, and Recent Results    Shift worked:  3076-1374     Shift summary and any significant changes:     No acute changes. No concerns overnight.     Concerns for physician to address:  none     Zone phone for oncoming shift:          Activity:  Level of Assistance: Moderate assist, patient does 50-74%  Number times ambulated in hallways past shift: 0  Number of times OOB to chair past shift: 0    Cardiac:   Cardiac Monitoring: No      Cardiac Rhythm: Sinus rhythm    Access:  Current line(s): PIV     Genitourinary:   Urinary Status: Draining (bilat nephrostomies)    Respiratory:   O2 Device: None (Room air)  Chronic home O2 use?: NO  Incentive spirometer at bedside: YES    GI:  Last BM (including prior to admit): 01/24/25  Current diet:  ADULT DIET; Regular  Passing flatus: YES    Pain Management:   Patient states pain is manageable on current regimen: N/A    Skin:  Vinh Scale Score: 18  Interventions: Wound Offloading (Prevention Methods): Bed, pressure reduction mattress, Elevate heels, Pillows, Repositioning, Turning    Patient Safety:  Fall Risk: Nursing Judgement-Fall Risk High(Add Comments): Yes  Fall Risk Interventions  Nursing Judgement-Fall Risk High(Add Comments): Yes  Toilet Every 2 Hours-In Advance of Need: Yes  Hourly Visual Checks: Awake, In bed, Quiet  Fall Visual Posted: Fall sign posted, Socks  Room Door Open: Deferred for droplet isolation  Alarm On: Bed  Patient Moved Closer to Nursing Station: No    Active Consults:   IP CONSULT TO PHARMACY  IP CONSULT TO NEPHROLOGY  IP CONSULT TO GENERAL SURGERY  IP CONSULT TO INTERVENTIONAL RADIOLOGY    Length of Stay:  Expected LOS: 4  Actual LOS: 3    Roxi Wright, RN                           
End of Shift Note    Bedside shift change report given to REMIGIO Eldridge (oncoming nurse) by Alycia Decker LPN (offgoing nurse).  Report included the following information SBAR, Intake/Output, MAR, and Alarm Parameters     Shift worked:  8854-3172     Shift summary and any significant changes:     Nephrostomy tubes changed bilaterally. Dressings clean, dry, and intact.  Pt reported tiredness and was often found in bed with eyes closed. Singular c/o pain at bilateral nephrostomy sites post procedure. Pt declined medication interventions. Pt anxious at IV infusion and reported being upset with himself for forgetting to limit arm movement during infusion. Pt was reassured and did not report repeat feelings again. Urine output is appropriate.  Plan of care ongoing     Concerns for physician to address:  None     Zone phone for oncoming shift:   1157       Activity:  Level of Assistance: Moderate assist, patient does 50-74%  Number times ambulated in hallways past shift: 0  Number of times OOB to chair past shift: 0    Cardiac:   Cardiac Monitoring: No      Cardiac Rhythm: Sinus rhythm    Access:  Current line(s): PIV     Genitourinary:   Urinary Status: Draining    Respiratory:   O2 Device: None (Room air)  Chronic home O2 use?: NO  Incentive spirometer at bedside: NO    GI:  Last BM (including prior to admit): 01/26/25  Current diet:  ADULT DIET; Regular  Passing flatus: NO    Pain Management:   Patient states pain is manageable on current regimen: YES    Skin:  Vinh Scale Score: 16  Interventions: Wound Offloading (Prevention Methods): Pillows, Repositioning    Patient Safety:  Fall Risk: Nursing Judgement-Fall Risk High(Add Comments): Yes  Fall Risk Interventions  Nursing Judgement-Fall Risk High(Add Comments): Yes  Toilet Every 2 Hours-In Advance of Need: Yes  Hourly Visual Checks: In bed, Awake  Fall Visual Posted: Armband, Socks, Fall sign posted  Room Door Open: Deferred for droplet isolation  Alarm On: 
End of Shift Note    Bedside shift change report given to REMIGIO Lord (oncoming nurse) by Alycia Decker LPN (offgoing nurse).  Report included the following information SBAR, Intake/Output, MAR, and Alarm Parameters     Shift worked:  9150-1471     Shift summary and any significant changes:     Pt agitated throughout shift. The agitation began with NPO status.  Pt refused AM meds because Zytiga was unavailable. Pharmacy messaged at 0904 regarding need for medication. Pt sister was asked to bring patient supply. Pt was compliant with med pass after taking newly supplied medication first.  Pt ambulating fairly well with walker but is impulsive when in the chair.  Pt tolerating diet change and meds well.  Plan of care ongoing     Concerns for physician to address:  None     Zone phone for oncoming shift:   1156       Activity:  Level of Assistance: Moderate assist, patient does 50-74%  Number times ambulated in hallways past shift: 0  Number of times OOB to chair past shift: 1    Cardiac:   Cardiac Monitoring: Yes      Cardiac Rhythm: Sinus rhythm    Access:  Current line(s): PIV     Genitourinary:   Urinary Status:  (Nephrostomy tube bilaterally)    Respiratory:   O2 Device: None (Room air)  Chronic home O2 use?: NO  Incentive spirometer at bedside: NO    GI:  Last BM (including prior to admit): 01/23/25  Current diet:  ADULT DIET; Regular  Passing flatus: YES    Pain Management:   Patient states pain is manageable on current regimen: N/A    Skin:  Vinh Scale Score: 15  Interventions: Wound Offloading (Prevention Methods): Bed, pressure redistribution/air, Repositioning    Patient Safety:  Fall Risk: Nursing Judgement-Fall Risk High(Add Comments): Yes  Fall Risk Interventions  Nursing Judgement-Fall Risk High(Add Comments): Yes  Toilet Every 2 Hours-In Advance of Need: Yes  Hourly Visual Checks: Awake  Fall Visual Posted: Armband, Socks, Fall sign posted  Room Door Open: Yes  Alarm On: Bed  Patient Moved Closer to 
OCCUPATIONAL THERAPY: OT attempted to see patient after cleared by nurse--he declined to participate stating \"I'm too tired\". He did have nephrostomy tubes changed this am in IR. Will rupa to follow per 3 x week POC.  Neema Livingston, OT    
Patient arrived to IR for ordered procedure. AxO4 and NAD.    1125- MD Kirby at bedside for consent    1157- Name of Procedure: Bilateral nephrostomy tube exchange     Sedation medications given:      Versed: 0 mg     Fentanyl:  75 mcg     Sedation Tolerated: Well     Procedure and sedation times are the same.      Sedation Start: 1145  Sedation End: 1157     Vital Signs:  Stable     Fluids Removed: None     Samples sent to lab: None     Any complications related to procedure: none identified at this time    This patient is at an increased risk of falling because they have received sedating medications. Please evaluate and implement fall precautions/fall prevention practices as appropriate.    1214- Verbal report given to patient's primary nurse Alycia    
Patient discharged via stretcher with medicaid transport. IV removed. AVS reviewed with patient. All belongings with patient. Report given to [facility] to room 229. Patient refused to signed IM letter left by CM. AVS, discharge summary, H&P, MAR report and gabapentin hard script.  
Physical Therapy    Pt unavailable for PT treatment due to off floor for neph tube placement. Will con't to follow.    Emily Maurer, PT, MPT  
Received Pt. New admission-ED. Dx. MARNIE,Diarrhea, Weakness, Influenza +, SBO. AOX4.No sign of respiratory distress/discomfort noted. NPO.  No c/o N/V. Pt denies complaints. VSS.Safety/Contact/Droplet precautions in place.   
Urology Clinical Note:  Urology was consulted for adjustment of right PCN. Patient not seen. This is primarily an IR intervention. Urology does not replace or adjust PCNTs. Please defer to IR. Please re-consult Urology if needed. Thank you!    D/w supervising MD, Dr. John Lopez  
8.6 MG tablet 1/23/2025 Transfer Papers/EMS   Sig: Take 1 tablet by mouth daily   tamsulosin (FLOMAX) 0.4 MG capsule 1/22/2025 Transfer Papers/EMS   Sig: Take 1 capsule by mouth at bedtime Indications: Benign Enlargement of Prostate   vitamin D (CHOLECALCIFEROL) 25 MCG (1000 UT) TABS tablet 1/23/2025 Transfer Papers/EMS   Sig: Take 1 tablet by mouth daily      Facility-Administered Medications: None         Pertinent Information:      The following medications have been added:   + tylenol  + ferrous sulfate  + lisinopril  + lyumjev insulin  + miralax  + senna    The following medications have been removed:   - albuterol inhaler  - eliquis  - colace  - robitussin  - humalog  - zofran  - potassium chloride    The following medications have been modified: none  The patient takes the following medications differently than prescribed: none    Medication history obtained from: Rx Query/dispense report and [facility]  [phone]    Who takes care of medications prior to arrival:  [facility]  [phone]    Adherence: Good adherence (>80-90% doses)        Patient's preferred pharmacy: Confirmed    Is patient interested in MTB? N/A    Allergy Update: Celecoxib, Cephalexin, Ibuprofen, and Aspirin        Of Note:   Unable to speak with nurse at SNF to verify last dose given.  Due to time patient arrived here assuming morning meds were given.    Patient's PTA medication list include the high risk meds: None.  Patient's medical history include the following CMS readmission measures: None .    Thank you,  Danae Dent CPhT  Medication History Pharmacy Technician Specialist   Available M-F 9:00am - 5:00pm via [facility] ([phone])      Disclaimer:    Patient was not interviewed regarding current PTA medication list, use and drug allergies and was questioned regarding use of any other inhalers, topical products, over the counter medications, herbal medications, vitamin products or ophthalmic/nasal/otic 
    Labs/Data:    Lab Results   Component Value Date    WBC 5.4 01/30/2025    HGB 8.3 (L) 01/30/2025    HCT 26.2 (L) 01/30/2025    MCV 84.0 01/30/2025     01/30/2025       Lab Results   Component Value Date/Time     01/30/2025 12:43 AM    K 4.6 01/30/2025 12:43 AM     01/30/2025 12:43 AM    CO2 22 01/30/2025 12:43 AM    BUN 17 01/30/2025 12:43 AM    CREATININE 1.62 01/30/2025 12:43 AM    GLUCOSE 164 01/30/2025 12:43 AM    CALCIUM 9.1 01/30/2025 12:43 AM    LABGLOM 44 01/30/2025 12:43 AM    LABGLOM 51 04/09/2024 06:00 AM    LABGLOM 23 12/04/2022 10:04 PM        Wt Readings from Last 3 Encounters:   01/23/25 90.7 kg (200 lb)   04/04/24 82.2 kg (181 lb 3.5 oz)   01/04/24 65.5 kg (144 lb 6.4 oz)         Intake/Output Summary (Last 24 hours) at 1/30/2025 1130  Last data filed at 1/30/2025 0534  Gross per 24 hour   Intake 650 ml   Output 2840 ml   Net -2190 ml       Patient seen and examined. Chart reviewed. Labs, data and other pertinent notes reviewed in last 24 hrs.    PMH/SH/FH reviewed and unchanged compared to H&P                 
52*  --   --    ALT 30  --   --        Signed: Meño Cronin MD